# Patient Record
Sex: FEMALE | Race: WHITE
[De-identification: names, ages, dates, MRNs, and addresses within clinical notes are randomized per-mention and may not be internally consistent; named-entity substitution may affect disease eponyms.]

---

## 2017-01-12 RX ORDER — FUROSEMIDE 40 MG/1
TABLET ORAL
Qty: 90 TABLET | Refills: 0 | Status: SHIPPED | OUTPATIENT
Start: 2017-01-12 | End: 2017-03-13

## 2017-01-12 RX ORDER — BENAZEPRIL HYDROCHLORIDE 10 MG/1
TABLET ORAL
Qty: 90 TABLET | Refills: 0 | Status: SHIPPED | OUTPATIENT
Start: 2017-01-12 | End: 2017-03-13

## 2017-03-10 ENCOUNTER — PRIOR ORIGINAL RECORDS (OUTPATIENT)
Dept: OTHER | Age: 62
End: 2017-03-10

## 2017-03-13 ENCOUNTER — OFFICE VISIT (OUTPATIENT)
Dept: INTERNAL MEDICINE CLINIC | Facility: CLINIC | Age: 62
End: 2017-03-13

## 2017-03-13 VITALS
BODY MASS INDEX: 51.28 KG/M2 | TEMPERATURE: 98 F | WEIGHT: 261.19 LBS | HEART RATE: 80 BPM | SYSTOLIC BLOOD PRESSURE: 136 MMHG | DIASTOLIC BLOOD PRESSURE: 72 MMHG | HEIGHT: 60 IN | RESPIRATION RATE: 16 BRPM | OXYGEN SATURATION: 99 %

## 2017-03-13 DIAGNOSIS — Z00.00 ROUTINE GENERAL MEDICAL EXAMINATION AT A HEALTH CARE FACILITY: Primary | ICD-10-CM

## 2017-03-13 DIAGNOSIS — Z12.31 ENCOUNTER FOR SCREENING MAMMOGRAM FOR BREAST CANCER: ICD-10-CM

## 2017-03-13 PROCEDURE — 99396 PREV VISIT EST AGE 40-64: CPT | Performed by: INTERNAL MEDICINE

## 2017-03-13 RX ORDER — FUROSEMIDE 40 MG/1
TABLET ORAL
Qty: 90 TABLET | Refills: 1 | Status: SHIPPED | OUTPATIENT
Start: 2017-03-13 | End: 2017-10-30

## 2017-03-13 RX ORDER — LEVOTHYROXINE SODIUM 112 UG/1
TABLET ORAL
Qty: 90 TABLET | Refills: 1 | Status: SHIPPED | OUTPATIENT
Start: 2017-03-13 | End: 2017-03-13

## 2017-03-13 RX ORDER — BENAZEPRIL HYDROCHLORIDE 10 MG/1
TABLET ORAL
Qty: 90 TABLET | Refills: 1 | Status: SHIPPED | OUTPATIENT
Start: 2017-03-13 | End: 2017-03-13

## 2017-03-13 RX ORDER — FUROSEMIDE 40 MG/1
TABLET ORAL
Qty: 90 TABLET | Refills: 1 | Status: SHIPPED | OUTPATIENT
Start: 2017-03-13 | End: 2017-03-13

## 2017-03-13 RX ORDER — BENAZEPRIL HYDROCHLORIDE 10 MG/1
TABLET ORAL
Qty: 90 TABLET | Refills: 1 | Status: SHIPPED | OUTPATIENT
Start: 2017-03-13 | End: 2017-10-23

## 2017-03-13 RX ORDER — LEVOTHYROXINE SODIUM 112 UG/1
TABLET ORAL
Qty: 90 TABLET | Refills: 1 | Status: SHIPPED | OUTPATIENT
Start: 2017-03-13 | End: 2017-10-11

## 2017-03-13 NOTE — PROGRESS NOTES
HPI:   El Mason is a 57year old female who presents for a complete physical exam. . She started CPAP and feels a world of differencel. She gets occ palps and Dr Sonido Overton still has her on pradaxa and tambicor.      Wt Readings from Last 6 Encou Oral Tab TAKE 1 BY MOUTH BEFORE BREAKFAST Disp: 90 tablet Rfl: 1   [DISCONTINUED] BENAZEPRIL HCL 10 MG Oral Tab TAKE 1 BY MOUTH DAILY Disp: 90 tablet Rfl: 0   [DISCONTINUED] LEVOTHYROXINE SODIUM 112 MCG Oral Tab TAKE 1 BY MOUTH BEFORE BREAKFAST Disp: 90 ta abdominal pain,denies heartburn, change in bm's, bloody stools,   : denies dysuria, vaginal discharge or itching,  MUSCULOSKELETAL: denies back pain, new joint pain- her knees feel much better since she had a shot of cortisone  NEURO: denies headaches, d understanding of these issues and agrees to the plan. The patient is asked to return for CPX in 1 year, see me 6 mos for med check.     Routine general medical examination at a health care facility  (primary encounter diagnosis)  Encounter for screening ma

## 2017-05-10 ENCOUNTER — LAB ENCOUNTER (OUTPATIENT)
Dept: LAB | Age: 62
End: 2017-05-10
Attending: INTERNAL MEDICINE
Payer: COMMERCIAL

## 2017-05-10 ENCOUNTER — PRIOR ORIGINAL RECORDS (OUTPATIENT)
Dept: OTHER | Age: 62
End: 2017-05-10

## 2017-05-10 DIAGNOSIS — I10 HYPERTENSION: Primary | ICD-10-CM

## 2017-05-10 DIAGNOSIS — E78.00 HYPERCHOLESTEREMIA: ICD-10-CM

## 2017-05-10 DIAGNOSIS — Z00.00 ROUTINE GENERAL MEDICAL EXAMINATION AT A HEALTH CARE FACILITY: ICD-10-CM

## 2017-05-10 PROCEDURE — 80053 COMPREHEN METABOLIC PANEL: CPT

## 2017-05-10 PROCEDURE — 36415 COLL VENOUS BLD VENIPUNCTURE: CPT | Performed by: INTERNAL MEDICINE

## 2017-05-10 PROCEDURE — 85025 COMPLETE CBC W/AUTO DIFF WBC: CPT | Performed by: INTERNAL MEDICINE

## 2017-05-10 PROCEDURE — 80061 LIPID PANEL: CPT

## 2017-05-11 LAB
ALKALINE PHOSPHATATE(ALK PHOS): 93 IU/L
BILIRUBIN TOTAL: 0.5 MG/DL
BUN: 18 MG/DL
CALCIUM: 9.4 MG/DL
CHLORIDE: 103 MEQ/L
CHOLESTEROL, TOTAL: 167 MG/DL
CREATININE, SERUM: 0.88 MG/DL
GLUCOSE: 89 MG/DL
HDL CHOLESTEROL: 63 MG/DL
LDL CHOLESTEROL: 86 MG/DL
POTASSIUM, SERUM: 4.3 MEQ/L
PROTEIN, TOTAL: 7.3 G/DL
SGOT (AST): 15 IU/L
SGPT (ALT): 20 IU/L
SODIUM: 139 MEQ/L
TRIGLYCERIDES: 89 MG/DL

## 2017-05-26 ENCOUNTER — TELEPHONE (OUTPATIENT)
Dept: INTERNAL MEDICINE CLINIC | Facility: CLINIC | Age: 62
End: 2017-05-26

## 2017-05-26 RX ORDER — BENAZEPRIL HYDROCHLORIDE 10 MG/1
10 TABLET ORAL DAILY
Qty: 4 TABLET | Refills: 0 | Status: SHIPPED | OUTPATIENT
Start: 2017-05-26 | End: 2017-05-30

## 2017-05-26 NOTE — TELEPHONE ENCOUNTER
Patient is in [place] visiting her sister and she forgot her blood pressure medication.   She is asking for 4 pills of:  Benazepril HCl 10 MG Oral Tab  Please call into the following pharmacy:  [facility] on [address] in [facility] ([phone])

## 2017-05-31 ENCOUNTER — TELEPHONE (OUTPATIENT)
Dept: INTERNAL MEDICINE CLINIC | Facility: CLINIC | Age: 62
End: 2017-05-31

## 2017-05-31 ENCOUNTER — HOSPITAL ENCOUNTER (OUTPATIENT)
Dept: MAMMOGRAPHY | Age: 62
Discharge: HOME | End: 2017-05-31
Attending: INTERNAL MEDICINE
Payer: COMMERCIAL

## 2017-05-31 DIAGNOSIS — Z12.31 ENCOUNTER FOR SCREENING MAMMOGRAM FOR BREAST CANCER: ICD-10-CM

## 2017-05-31 PROCEDURE — 77067 SCR MAMMO BI INCL CAD: CPT | Performed by: INTERNAL MEDICINE

## 2017-05-31 NOTE — TELEPHONE ENCOUNTER
See below. Please advise. Last parking placard 12/1/16 for 6 months. Last OV 3/13/17 for physical and return in 6 months for med check.

## 2017-05-31 NOTE — TELEPHONE ENCOUNTER
Patient will need a new parking placard and is asking if Dr. Keya Mar can complete the paperwork for her. Patient advises that she was last seen by Dr. Keya Mar on 3/1317. Please call patient back to discuss.

## 2017-06-03 NOTE — TELEPHONE ENCOUNTER
Called and left message on home number advising pt that form is completed and can be picked up at  next week.

## 2017-08-10 ENCOUNTER — TELEPHONE (OUTPATIENT)
Dept: INTERNAL MEDICINE CLINIC | Facility: CLINIC | Age: 62
End: 2017-08-10

## 2017-08-18 RX ORDER — ESCITALOPRAM OXALATE 10 MG/1
TABLET ORAL
Qty: 90 TABLET | Refills: 3 | Status: SHIPPED | OUTPATIENT
Start: 2017-08-18 | End: 2017-10-30

## 2017-08-18 NOTE — TELEPHONE ENCOUNTER
Last OV pertinent to medication: 3/13/17 PE  Last refill date: 8/29/16     #/refills: 90+3  When pt was asked to return for OV: 6 months  Upcoming appt/reason: none    Lab Results  Component Value Date   GLU 89 05/10/2017   BUN 18 05/10/2017   CREATSERUM 0

## 2017-08-18 NOTE — TELEPHONE ENCOUNTER
Incoming (mail or fax): Fax  Received from:  [facility] for Escitalopram  Documentation given to:  [id number] [employer] fax bin.

## 2017-09-19 ENCOUNTER — TELEPHONE (OUTPATIENT)
Dept: INTERNAL MEDICINE CLINIC | Facility: CLINIC | Age: 62
End: 2017-09-19

## 2017-09-19 NOTE — TELEPHONE ENCOUNTER
Incoming (mail or fax):  fax  Received from:  [facility]  Documentation given to:  Dr Machelle Pacheco

## 2017-10-11 NOTE — TELEPHONE ENCOUNTER
Last OV pertinent to medication: 3/13/2017 - AWV  Last refill date: 3/13/2017     #/refills: 90/1  When pt was asked to return for OV: 6 months - Med check  Upcoming appt/reason: 10/30/2017 - Med chk, Lab results    FREE T4 (ng/dL)   Date Value   02/23/201

## 2017-10-12 RX ORDER — LEVOTHYROXINE SODIUM 112 UG/1
TABLET ORAL
Qty: 30 TABLET | Refills: 0 | Status: SHIPPED | OUTPATIENT
Start: 2017-10-12 | End: 2017-10-30

## 2017-10-13 ENCOUNTER — PRIOR ORIGINAL RECORDS (OUTPATIENT)
Dept: OTHER | Age: 62
End: 2017-10-13

## 2017-10-16 ENCOUNTER — TELEPHONE (OUTPATIENT)
Dept: INTERNAL MEDICINE CLINIC | Facility: CLINIC | Age: 62
End: 2017-10-16

## 2017-10-16 NOTE — TELEPHONE ENCOUNTER
Pt stopped by office for lab orders, had gone to lab thinking she had orders. Has appt on 10/30/17, no orders in system or encounters noted where pt requested labs to be ordered. Do you want to order any labs in advance of her med check?

## 2017-10-24 RX ORDER — BENAZEPRIL HYDROCHLORIDE 10 MG/1
TABLET ORAL
Qty: 90 TABLET | Refills: 0 | Status: SHIPPED | OUTPATIENT
Start: 2017-10-24 | End: 2017-10-30

## 2017-10-30 ENCOUNTER — TELEPHONE (OUTPATIENT)
Dept: INTERNAL MEDICINE CLINIC | Facility: CLINIC | Age: 62
End: 2017-10-30

## 2017-10-30 ENCOUNTER — OFFICE VISIT (OUTPATIENT)
Dept: INTERNAL MEDICINE CLINIC | Facility: CLINIC | Age: 62
End: 2017-10-30

## 2017-10-30 VITALS
SYSTOLIC BLOOD PRESSURE: 126 MMHG | BODY MASS INDEX: 46.99 KG/M2 | RESPIRATION RATE: 18 BRPM | HEIGHT: 63 IN | HEART RATE: 68 BPM | DIASTOLIC BLOOD PRESSURE: 68 MMHG | TEMPERATURE: 98 F | OXYGEN SATURATION: 99 % | WEIGHT: 265.19 LBS

## 2017-10-30 DIAGNOSIS — D64.9 ANEMIA, UNSPECIFIED TYPE: ICD-10-CM

## 2017-10-30 DIAGNOSIS — I10 ESSENTIAL HYPERTENSION: ICD-10-CM

## 2017-10-30 DIAGNOSIS — M17.0 OSTEOARTHRITIS OF BOTH KNEES, UNSPECIFIED OSTEOARTHRITIS TYPE: ICD-10-CM

## 2017-10-30 DIAGNOSIS — I89.0 LYMPHEDEMA OF BOTH LOWER EXTREMITIES: ICD-10-CM

## 2017-10-30 DIAGNOSIS — E03.9 ACQUIRED HYPOTHYROIDISM: Primary | ICD-10-CM

## 2017-10-30 DIAGNOSIS — F41.9 ANXIETY: ICD-10-CM

## 2017-10-30 PROCEDURE — 99214 OFFICE O/P EST MOD 30 MIN: CPT | Performed by: INTERNAL MEDICINE

## 2017-10-30 RX ORDER — FUROSEMIDE 40 MG/1
TABLET ORAL
Qty: 90 TABLET | Refills: 1 | Status: SHIPPED | OUTPATIENT
Start: 2017-10-30 | End: 2018-03-15

## 2017-10-30 RX ORDER — ESCITALOPRAM OXALATE 10 MG/1
TABLET ORAL
Qty: 90 TABLET | Refills: 3 | Status: SHIPPED | OUTPATIENT
Start: 2017-10-30 | End: 2018-10-02

## 2017-10-30 RX ORDER — BENAZEPRIL HYDROCHLORIDE 10 MG/1
10 TABLET ORAL
Qty: 90 TABLET | Refills: 1 | Status: SHIPPED | OUTPATIENT
Start: 2017-10-30 | End: 2018-03-15

## 2017-10-30 RX ORDER — LEVOTHYROXINE SODIUM 112 UG/1
TABLET ORAL
Qty: 90 TABLET | Refills: 1 | Status: SHIPPED | OUTPATIENT
Start: 2017-10-30 | End: 2018-06-07

## 2017-10-30 NOTE — PROGRESS NOTES
Cesar Hernandez is a 57year old female. To F/U for med-check. HPI:   Interim history: Patient is getting a holter monitor tomorrow for atrial fibrillation that is flaring up.   Patient has been having breakthrough in symptoms where she experiences her (PRILOSEC) 20 MG Oral Capsule Delayed Release Take 1 Cap by mouth daily.  Disp:  Rfl:          Social History:  Smoking status: Never Smoker                                                              Smokeless tobacco: Never Used                      Alco Absolute 0.40 0.10 - 0.60 x10(3) uL   Eosinophil Absolute 0.13 0.00 - 0.30 x10(3) uL   Basophil Absolute 0.03 0.00 - 0.10 x10(3) uL   Immature Granulocyte Absolute 0.02 0.00 - 1.00 x10(3) uL   Neutrophil % 59.4 %   Lymphocyte % 30.2 %   Monocyte % 7.2 %

## 2017-10-31 ENCOUNTER — HOSPITAL ENCOUNTER (OUTPATIENT)
Dept: LAB | Facility: HOSPITAL | Age: 62
Discharge: HOME | End: 2017-10-31
Attending: INTERNAL MEDICINE
Payer: COMMERCIAL

## 2017-10-31 ENCOUNTER — HOSPITAL ENCOUNTER (OUTPATIENT)
Dept: CV DIAGNOSTICS | Facility: HOSPITAL | Age: 62
Discharge: HOME | End: 2017-10-31
Attending: INTERNAL MEDICINE
Payer: COMMERCIAL

## 2017-10-31 DIAGNOSIS — E03.9 ACQUIRED HYPOTHYROIDISM: ICD-10-CM

## 2017-10-31 DIAGNOSIS — I48.91 A-FIB (HCC): ICD-10-CM

## 2017-10-31 DIAGNOSIS — R00.2 PALPITATIONS: ICD-10-CM

## 2017-10-31 DIAGNOSIS — D64.9 ANEMIA, UNSPECIFIED TYPE: ICD-10-CM

## 2017-10-31 PROCEDURE — 36415 COLL VENOUS BLD VENIPUNCTURE: CPT

## 2017-10-31 PROCEDURE — 93225 XTRNL ECG REC<48 HRS REC: CPT | Performed by: INTERNAL MEDICINE

## 2017-10-31 PROCEDURE — 85027 COMPLETE CBC AUTOMATED: CPT

## 2017-10-31 PROCEDURE — 84443 ASSAY THYROID STIM HORMONE: CPT

## 2017-10-31 PROCEDURE — 93227 XTRNL ECG REC<48 HR R&I: CPT | Performed by: INTERNAL MEDICINE

## 2017-10-31 PROCEDURE — 93226 XTRNL ECG REC<48 HR SCAN A/R: CPT | Performed by: INTERNAL MEDICINE

## 2017-10-31 PROCEDURE — 84439 ASSAY OF FREE THYROXINE: CPT

## 2017-11-03 ENCOUNTER — PRIOR ORIGINAL RECORDS (OUTPATIENT)
Dept: OTHER | Age: 62
End: 2017-11-03

## 2017-11-07 ENCOUNTER — TELEPHONE (OUTPATIENT)
Dept: INTERNAL MEDICINE CLINIC | Facility: CLINIC | Age: 62
End: 2017-11-07

## 2017-11-07 ENCOUNTER — MYAURORA ACCOUNT LINK (OUTPATIENT)
Dept: OTHER | Age: 62
End: 2017-11-07

## 2017-11-07 ENCOUNTER — HOSPITAL ENCOUNTER (OUTPATIENT)
Dept: CV DIAGNOSTICS | Facility: HOSPITAL | Age: 62
End: 2017-11-07
Attending: INTERNAL MEDICINE

## 2017-11-07 DIAGNOSIS — I48.91 ATRIAL FIBRILLATION, UNSPECIFIED TYPE (HCC): ICD-10-CM

## 2017-11-07 NOTE — TELEPHONE ENCOUNTER
Incoming (mail or fax):  fax  Received from:  [facility]  Documentation given to:  Dr Tyler Mckeon

## 2018-01-03 ENCOUNTER — TELEPHONE (OUTPATIENT)
Dept: INTERNAL MEDICINE CLINIC | Facility: CLINIC | Age: 63
End: 2018-01-03

## 2018-01-03 NOTE — TELEPHONE ENCOUNTER
Patient needs a handicap placard filled out by Dr. Shawn Yusuf the patient, she has arthritis in hips.  Patient stated her last one was done by Dr. Shawn Yusuf, it  on: 17, patient asked if Dr. Shawn Yusuf would fill out another form for patient, patient aware s

## 2018-01-04 NOTE — TELEPHONE ENCOUNTER
Received Completed Parking Placard Form from doctor. Called and informed Patient that form is ready for  at .

## 2018-01-04 NOTE — TELEPHONE ENCOUNTER
Spoke with pt. States that her current parking placard  17. States that due to her arthritis her mobility is limited. Last OV 10/30/17. Parking placard form to consult folder for completion and signature.

## 2018-03-01 RX ORDER — BENAZEPRIL HYDROCHLORIDE 10 MG/1
10 TABLET ORAL
Qty: 90 TABLET | Refills: 4 | OUTPATIENT
Start: 2018-03-01

## 2018-03-01 NOTE — TELEPHONE ENCOUNTER
Fax from [employer] requesting Benazepril HCL 10 mg tablet (1) po qd #90 with 4 refills. Last refilled 10/30/17 #90 with 1 refill.

## 2018-03-04 ENCOUNTER — HOSPITAL ENCOUNTER (INPATIENT)
Facility: HOSPITAL | Age: 63
LOS: 8 days | Discharge: HOME HEALTH | DRG: 872 | End: 2018-03-12
Attending: EMERGENCY MEDICINE | Admitting: HOSPITALIST
Payer: COMMERCIAL

## 2018-03-04 ENCOUNTER — HOSPITAL ENCOUNTER (OUTPATIENT)
Age: 63
Discharge: ANOTHER HOSPITAL | End: 2018-03-04
Attending: FAMILY MEDICINE
Payer: COMMERCIAL

## 2018-03-04 ENCOUNTER — APPOINTMENT (OUTPATIENT)
Dept: ULTRASOUND IMAGING | Facility: HOSPITAL | Age: 63
DRG: 872 | End: 2018-03-04
Attending: EMERGENCY MEDICINE
Payer: COMMERCIAL

## 2018-03-04 VITALS
DIASTOLIC BLOOD PRESSURE: 68 MMHG | WEIGHT: 256 LBS | TEMPERATURE: 101 F | SYSTOLIC BLOOD PRESSURE: 139 MMHG | HEIGHT: 62.5 IN | HEART RATE: 90 BPM | RESPIRATION RATE: 16 BRPM | BODY MASS INDEX: 45.93 KG/M2 | OXYGEN SATURATION: 98 %

## 2018-03-04 DIAGNOSIS — L03.116 CELLULITIS OF LEFT LOWER EXTREMITY: Primary | ICD-10-CM

## 2018-03-04 LAB
#LYMPHOCYTE IC: 0.5 X10ˆ3/UL (ref 0.9–3.2)
#MXD IC: 0.2 X10ˆ3/UL (ref 0.1–1)
#NEUTROPHIL IC: 21.6 X10ˆ3/UL (ref 1.3–6.7)
ALBUMIN SERPL-MCNC: 2.9 G/DL (ref 3.5–4.8)
ALP LIVER SERPL-CCNC: 94 U/L (ref 50–130)
ALT SERPL-CCNC: 37 U/L (ref 14–54)
AST SERPL-CCNC: 36 U/L (ref 15–41)
BAND %: 12 %
BAND %: 21 %
BASOPHIL % MANUAL: 0 %
BASOPHIL % MANUAL: 1 %
BASOPHIL ABSOLUTE MANUAL: 0 X10(3) UL (ref 0–0.1)
BASOPHIL ABSOLUTE MANUAL: 0.24 X10(3) UL (ref 0–0.1)
BILIRUB SERPL-MCNC: 0.8 MG/DL (ref 0.1–2)
BUN BLD-MCNC: 22 MG/DL (ref 8–20)
CALCIUM BLD-MCNC: 8.4 MG/DL (ref 8.3–10.3)
CHLORIDE: 97 MMOL/L (ref 101–111)
CO2: 26 MMOL/L (ref 22–32)
CREAT BLD-MCNC: 1.13 MG/DL (ref 0.55–1.02)
CREAT SERPL-MCNC: 1.3 MG/DL (ref 0.55–1.02)
DOHLE BODIES: PRESENT
EOSINOPHIL % MANUAL: 0 %
EOSINOPHIL % MANUAL: 0 %
EOSINOPHIL ABSOLUTE MANUAL: 0 X10(3) UL (ref 0–0.3)
EOSINOPHIL ABSOLUTE MANUAL: 0 X10(3) UL (ref 0–0.3)
ERYTHROCYTE [DISTWIDTH] IN BLOOD BY AUTOMATED COUNT: 13.5 % (ref 11.5–16)
ERYTHROCYTE [DISTWIDTH] IN BLOOD BY AUTOMATED COUNT: 13.5 % (ref 11.5–16)
GLUCOSE BLD-MCNC: 104 MG/DL (ref 70–99)
GLUCOSE BLD-MCNC: 118 MG/DL (ref 70–99)
HCT IC: 37.4 % (ref 37–54)
HCT VFR BLD AUTO: 36.3 % (ref 34–50)
HCT VFR BLD AUTO: 37.2 % (ref 34–50)
HGB BLD-MCNC: 11.7 G/DL (ref 12–16)
HGB BLD-MCNC: 12.1 G/DL (ref 12–16)
HGB IC: 12 G/DL (ref 11.7–16)
ISTAT BLOOD GAS TCO2: 27 MMOL/L (ref 22–32)
ISTAT BUN: 25 MG/DL (ref 8–20)
ISTAT CHLORIDE: 94 MMOL/L (ref 101–111)
ISTAT HEMATOCRIT: 39 % (ref 34–50)
ISTAT IONIZED CALCIUM: 1.07 MMOL/L (ref 1.12–1.32)
ISTAT POTASSIUM: 4 MMOL/L (ref 3.6–5.1)
ISTAT SODIUM: 133 MMOL/L (ref 136–144)
LACTIC ACID: 1.8 MMOL/L (ref 0.5–2)
LARGE PLATELETS: PRESENT
LYMPHOCYTE % MANUAL: 3 %
LYMPHOCYTE % MANUAL: 4 %
LYMPHOCYTE ABSOLUTE MANUAL: 0.71 X10(3) UL (ref 0.9–4)
LYMPHOCYTE ABSOLUTE MANUAL: 0.91 X10(3) UL (ref 0.9–4)
LYMPHOCYTES NFR BLD AUTO: 2.3 %
M PROTEIN MFR SERPL ELPH: 7 G/DL (ref 6.1–8.3)
MCH IC: 29.7 PG (ref 27–33.2)
MCH RBC QN AUTO: 29 PG (ref 27–33.2)
MCH RBC QN AUTO: 29.4 PG (ref 27–33.2)
MCHC IC: 32.1 G/DL (ref 31–37)
MCHC RBC AUTO-ENTMCNC: 32.2 G/DL (ref 31–37)
MCHC RBC AUTO-ENTMCNC: 32.5 G/DL (ref 31–37)
MCV IC: 92.6 FL (ref 81–100)
MCV RBC AUTO: 90.1 FL (ref 81–100)
MCV RBC AUTO: 90.3 FL (ref 81–100)
MIXED CELL %: 0.8 %
MONOCYTE % MANUAL: 2 %
MONOCYTE % MANUAL: 3 %
MONOCYTE ABSOLUTE MANUAL: 0.45 X10(3) UL (ref 0.1–1)
MONOCYTE ABSOLUTE MANUAL: 0.71 X10(3) UL (ref 0.1–1)
MORPHOLOGY: NORMAL
MORPHOLOGY: NORMAL
NEUTROPHIL ABS PRELIM: 21.35 X10 (3) UL (ref 1.3–6.7)
NEUTROPHIL ABS PRELIM: 22.02 X10 (3) UL (ref 1.3–6.7)
NEUTROPHIL ABSOLUTE MANUAL: 21.34 X10(3) UL (ref 1.3–6.7)
NEUTROPHIL ABSOLUTE MANUAL: 22.04 X10(3) UL (ref 1.3–6.7)
NEUTROPHILS % MANUAL: 72 %
NEUTROPHILS % MANUAL: 82 %
NEUTROPHILS NFR BLD AUTO: 96.9 %
NRBC CALCULATED: 1
PLATELET # BLD AUTO: 204 10(3)UL (ref 150–450)
PLATELET # BLD AUTO: 211 10(3)UL (ref 150–450)
PLATELET MORPHOLOGY: NORMAL
PLT IC: 201 X10ˆ3/UL (ref 150–450)
POTASSIUM SERPL-SCNC: 3.6 MMOL/L (ref 3.6–5.1)
RBC # BLD AUTO: 4.03 X10(6)UL (ref 3.8–5.1)
RBC # BLD AUTO: 4.12 X10(6)UL (ref 3.8–5.1)
RBC IC: 4.04 X10ˆ6/UL (ref 3.8–5.1)
RED CELL DISTRIBUTION WIDTH-SD: 44.6 FL (ref 35.1–46.3)
RED CELL DISTRIBUTION WIDTH-SD: 44.7 FL (ref 35.1–46.3)
SODIUM SERPL-SCNC: 133 MMOL/L (ref 136–144)
TOTAL CELLS COUNTED: 100
TOTAL CELLS COUNTED: 100
VACUOLATED NEUTS: PRESENT
WBC # BLD AUTO: 22.7 X10(3) UL (ref 4–13)
WBC # BLD AUTO: 23.7 X10(3) UL (ref 4–13)
WBC IC: 22.3 X10ˆ3/UL (ref 4–13)

## 2018-03-04 PROCEDURE — 81002 URINALYSIS NONAUTO W/O SCOPE: CPT | Performed by: FAMILY MEDICINE

## 2018-03-04 PROCEDURE — 85007 BL SMEAR W/DIFF WBC COUNT: CPT | Performed by: FAMILY MEDICINE

## 2018-03-04 PROCEDURE — 99215 OFFICE O/P EST HI 40 MIN: CPT

## 2018-03-04 PROCEDURE — 99205 OFFICE O/P NEW HI 60 MIN: CPT

## 2018-03-04 PROCEDURE — 85027 COMPLETE CBC AUTOMATED: CPT | Performed by: FAMILY MEDICINE

## 2018-03-04 PROCEDURE — 96374 THER/PROPH/DIAG INJ IV PUSH: CPT

## 2018-03-04 PROCEDURE — 80047 BASIC METABLC PNL IONIZED CA: CPT

## 2018-03-04 PROCEDURE — 99223 1ST HOSP IP/OBS HIGH 75: CPT | Performed by: HOSPITALIST

## 2018-03-04 PROCEDURE — 93971 EXTREMITY STUDY: CPT | Performed by: EMERGENCY MEDICINE

## 2018-03-04 PROCEDURE — 85025 COMPLETE CBC W/AUTO DIFF WBC: CPT | Performed by: FAMILY MEDICINE

## 2018-03-04 RX ORDER — ONDANSETRON 2 MG/ML
4 INJECTION INTRAMUSCULAR; INTRAVENOUS EVERY 6 HOURS PRN
Status: DISCONTINUED | OUTPATIENT
Start: 2018-03-04 | End: 2018-03-12

## 2018-03-04 RX ORDER — SODIUM CHLORIDE 9 MG/ML
125 INJECTION, SOLUTION INTRAVENOUS CONTINUOUS
Status: DISCONTINUED | OUTPATIENT
Start: 2018-03-04 | End: 2018-03-05

## 2018-03-04 RX ORDER — CLINDAMYCIN PHOSPHATE 600 MG/50ML
600 INJECTION INTRAVENOUS EVERY 8 HOURS
Status: DISCONTINUED | OUTPATIENT
Start: 2018-03-04 | End: 2018-03-07

## 2018-03-04 RX ORDER — ONDANSETRON 2 MG/ML
4 INJECTION INTRAMUSCULAR; INTRAVENOUS ONCE
Status: COMPLETED | OUTPATIENT
Start: 2018-03-04 | End: 2018-03-04

## 2018-03-04 RX ORDER — SODIUM CHLORIDE 9 MG/ML
1000 INJECTION, SOLUTION INTRAVENOUS ONCE
Status: COMPLETED | OUTPATIENT
Start: 2018-03-04 | End: 2018-03-04

## 2018-03-04 RX ORDER — CLINDAMYCIN PHOSPHATE 600 MG/50ML
600 INJECTION INTRAVENOUS ONCE
Status: COMPLETED | OUTPATIENT
Start: 2018-03-04 | End: 2018-03-04

## 2018-03-04 RX ORDER — SODIUM CHLORIDE 9 MG/ML
INJECTION, SOLUTION INTRAVENOUS ONCE
Status: COMPLETED | OUTPATIENT
Start: 2018-03-04 | End: 2018-03-04

## 2018-03-04 RX ORDER — ESCITALOPRAM OXALATE 10 MG/1
10 TABLET ORAL EVERY MORNING
Status: DISCONTINUED | OUTPATIENT
Start: 2018-03-04 | End: 2018-03-12

## 2018-03-04 RX ORDER — ACETAMINOPHEN 325 MG/1
650 TABLET ORAL EVERY 6 HOURS PRN
Status: DISCONTINUED | OUTPATIENT
Start: 2018-03-04 | End: 2018-03-12

## 2018-03-04 RX ORDER — LISINOPRIL 10 MG/1
10 TABLET ORAL DAILY
Status: DISCONTINUED | OUTPATIENT
Start: 2018-03-04 | End: 2018-03-12

## 2018-03-04 RX ORDER — FLECAINIDE ACETATE 100 MG/1
100 TABLET ORAL 2 TIMES DAILY
Status: DISCONTINUED | OUTPATIENT
Start: 2018-03-04 | End: 2018-03-12

## 2018-03-04 RX ORDER — POTASSIUM CHLORIDE 20 MEQ/1
40 TABLET, EXTENDED RELEASE ORAL EVERY 4 HOURS
Status: COMPLETED | OUTPATIENT
Start: 2018-03-04 | End: 2018-03-04

## 2018-03-04 RX ORDER — LEVOTHYROXINE SODIUM 112 UG/1
112 TABLET ORAL
Status: DISCONTINUED | OUTPATIENT
Start: 2018-03-04 | End: 2018-03-12

## 2018-03-04 RX ORDER — PANTOPRAZOLE SODIUM 20 MG/1
20 TABLET, DELAYED RELEASE ORAL
Status: DISCONTINUED | OUTPATIENT
Start: 2018-03-05 | End: 2018-03-12

## 2018-03-04 NOTE — H&P
MIC HOSPITALIST  History and Physical     Pujahao Denny Patient Status:  Emergency    1955 MRN [id number]   Location [address] [facility] Attending Jackie Rhoades, [address] [facility] Day # 0 PCP Deepali Lyle MD     Chief Comp 9/15/2015   • Measles    • Migraines    • Mumps    • OA (osteoarthritis)     knee   • Obesity 3/5/2012   • Occult blood positive stool    • Osteopenia 10/8/2014   • Other allergy, other than to medicinal agents 3/5/2012    Alleriges   • Otitis media    • P Rash  Augmentin [Amoxicil*        Comment:Reaction: \"violently ill\"  Dye [Iodine (Topica*      Pcn [Bicillin C-R,]       Shellfish                 Sulfa Antibiotics         Verapamil                   Comment:\"Verapamil SR\"  Verelan [Verapamil * Alert and oriented x 3. Morbidly obese. HEENT: Normocephalic atraumatic. Moist mucous membranes. EOM-I. PERRLA. Anicteric. Neck: No JVD. No carotid bruits. Respiratory: Clear to auscultation bilaterally. No wheezes.    Cardiovascular: S1, S2. Regular rat

## 2018-03-04 NOTE — ED PROVIDER NOTES
Patient Seen in: [facility] Emergency Department    History   No chief complaint on file. Stated Complaint: cellulitis    HPI    65-year-old female presents emergency room for evaluation of cellulitis to the left lower extremity.   Patient states • Migraines    • Mumps    • OA (osteoarthritis)     knee   • Obesity 3/5/2012   • Occult blood positive stool    • Osteopenia 10/8/2014   • Other allergy, other than to medicinal agents 3/5/2012    Alleriges   • Otitis media    • PAF (paroxysmal atrial f kg   SpO2 94%   BMI 45.73 kg/m²         Physical Exam    GENERAL: Patient is awake, alert, well-appearing, in no acute distress. HEENT: no scleral icterus. Mucous membranes are slightly dry, oropharynx is clear, uvula midline. Scalp is atraumatic.   NECK: Abnormal            Final result                 Please view results for these tests on the individual orders.    RAINBOW DRAW BLUE   RAINBOW DRAW LAVENDER   RAINBOW DRAW LIGHT GREEN   RAINBOW DRAW GOLD   BLOOD CULTURE   BLOOD CULTURE       ED Course as of

## 2018-03-04 NOTE — ED INITIAL ASSESSMENT (HPI)
The patient is here for evaluation of redness and pain to the left leg that is warm to touch and a burning sensation with touch.   She and her  states the symptoms started Friday in addition to a fever of 103, chills, a lot of fatigue and sleeping, a

## 2018-03-04 NOTE — ED INITIAL ASSESSMENT (HPI)
Pt sent from urgent care for evaluation of cellulitis of left groin and left leg. Pt has noticed symptoms on Friday.

## 2018-03-05 ENCOUNTER — PRIOR ORIGINAL RECORDS (OUTPATIENT)
Dept: OTHER | Age: 63
End: 2018-03-05

## 2018-03-05 LAB
BASOPHILS # BLD AUTO: 0.03 X10(3) UL (ref 0–0.1)
BASOPHILS NFR BLD AUTO: 0.2 %
EOSINOPHIL # BLD AUTO: 0.01 X10(3) UL (ref 0–0.3)
EOSINOPHIL NFR BLD AUTO: 0.1 %
ERYTHROCYTE [DISTWIDTH] IN BLOOD BY AUTOMATED COUNT: 13.8 % (ref 11.5–16)
EST. AVERAGE GLUCOSE BLD GHB EST-MCNC: 128 MG/DL (ref 68–126)
HBA1C MFR BLD HPLC: 6.1 % (ref ?–5.7)
HCT VFR BLD AUTO: 31.9 % (ref 34–50)
HGB BLD-MCNC: 10.2 G/DL (ref 12–16)
IMMATURE GRANULOCYTE COUNT: 0.08 X10(3) UL (ref 0–1)
IMMATURE GRANULOCYTE RATIO %: 0.6 %
LYMPHOCYTES # BLD AUTO: 0.66 X10(3) UL (ref 0.9–4)
LYMPHOCYTES NFR BLD AUTO: 4.9 %
MCH RBC QN AUTO: 28.7 PG (ref 27–33.2)
MCHC RBC AUTO-ENTMCNC: 32 G/DL (ref 31–37)
MCV RBC AUTO: 89.9 FL (ref 81–100)
MONOCYTES # BLD AUTO: 0.32 X10(3) UL (ref 0.1–1)
MONOCYTES NFR BLD AUTO: 2.4 %
NEUTROPHIL ABS PRELIM: 12.31 X10 (3) UL (ref 1.3–6.7)
NEUTROPHILS # BLD AUTO: 12.31 X10(3) UL (ref 1.3–6.7)
NEUTROPHILS NFR BLD AUTO: 91.8 %
PLATELET # BLD AUTO: 179 10(3)UL (ref 150–450)
POTASSIUM SERPL-SCNC: 4.4 MMOL/L (ref 3.6–5.1)
RBC # BLD AUTO: 3.55 X10(6)UL (ref 3.8–5.1)
RED CELL DISTRIBUTION WIDTH-SD: 45.7 FL (ref 35.1–46.3)
WBC # BLD AUTO: 13.4 X10(3) UL (ref 4–13)

## 2018-03-05 PROCEDURE — 99232 SBSQ HOSP IP/OBS MODERATE 35: CPT | Performed by: HOSPITALIST

## 2018-03-05 NOTE — PLAN OF CARE
CARDIOVASCULAR - ADULT    • Absence of cardiac arrhythmias or at baseline Progressing        DISCHARGE PLANNING    • Discharge to home or other facility with appropriate resources Progressing        PAIN - ADULT    • Verbalizes/displays adequate comfort le

## 2018-03-05 NOTE — PLAN OF CARE
Patient placed on isolation precaution for rule out c-diff (3 recent loose stools). Will continue to monitor.

## 2018-03-05 NOTE — PROGRESS NOTES
MIC HOSPITALIST  Progress Note     Terrell Gain Patient Status:  Inpatient    1955 MRN [id number]   [place] 3SW-A Attending Matthew Watts, [address] Day # 1 PCP Alexandru Velez MD     Chief Complaint: Left leg rednes (based on SCr of 1.13 mg/dL (H)). No results for input(s): PTP, INR in the last 72 hours. No results for input(s): TROP, CK in the last 72 hours. Imaging: Imaging data reviewed in Epic.     Medications:   • lisinopril  10 mg Oral Daily   • esc

## 2018-03-05 NOTE — PAYOR COMM NOTE
--------------  ADMISSION REVIEW     Payor: [employer]  Subscriber #:  [id number]  Authorization Number: [id number]    Admit date: 3/4/18  Admit time: 26       Admitting Physician: Kimberly Way MD  Attending Physician:  Felipe Solorio DO  Primary Care coughing paroxysm   • Dermatitis     stress   • Eczema    • Edema    • Epigastric fullness 4/27/2011   • Esophageal reflux     GERD   • ETD (eustachian tube dysfunction)    • Exertional chest pain 3/5/2012   • Extrinsic asthma, unspecified    • Fibroid javier reviewed and negative except as noted above.     Physical Exam[GM.1]   ED Triage Vitals [03/04/18 1015]  BP: 129/79  Pulse: 79  Resp: 19  Temp: 98 °F (36.7 °C)  Temp src: Temporal  SpO2: 100 %  O2 Device: None (Room air)[GM.2]    Current:[GM.1]/60   P WITH DIFFERENTIAL WITH PLATELET   BLOOD CULTURE   BLOOD CULTURE     ED Course as of Mar 04 1254   MDM[GM.1]   An IV established, blood work obtained, blood cultures performed.   Ultrasound performed to rule out DVT which is negative, patient did receive IV other complaints at this time. [NG.2]     Past Medical History:  As noted above in ED MD Assessment     Allergies:   Adhesive Tape           Rash  Augmentin [Amoxicil*        Comment:Reaction: \"violently ill\"  Dye [Iodine (Topica*      Pcn [Bicillin C-R,] kg)   SpO2 94%   BMI 45.73 kg/m²    General: No acute distress. Alert and oriented x 3. [NG.1]   HEENT: Normocephalic atraumatic. Moist mucous membranes. EOM-I. PERRLA. Anicteric. Neck: No JVD. No carotid bruits.   Respiratory: Clear to auscultation bilater DAY:  acetaminophen (TYLENOL) tab 650 mg     Date Action Dose Route User    3/5/2018 1056 Given 650 mg Oral Manuel Sung, RN    3/5/2018 0450 Given 650 mg Oral Yumiko Manzanares, RN    3/4/2018 1447 Given 650 mg Oral Ike Dangelo RN      cl Intravenous Adelina Novak RN      0.9%  NaCl infusion     125cc/h    Date Action Dose Route User    3/4/2018 1449 New Bag (none) Intravenous Jose Bell RN          REVIEWER COMMENTS:     PLEASE REVIEW AND FAX ALL INPT DAYS AS CERTIFIED ALONG

## 2018-03-06 LAB
BASOPHILS # BLD AUTO: 0.02 X10(3) UL (ref 0–0.1)
BASOPHILS NFR BLD AUTO: 0.2 %
BUN BLD-MCNC: 22 MG/DL (ref 8–20)
CALCIUM BLD-MCNC: 8.3 MG/DL (ref 8.3–10.3)
CHLORIDE: 100 MMOL/L (ref 101–111)
CO2: 21 MMOL/L (ref 22–32)
CREAT BLD-MCNC: 0.99 MG/DL (ref 0.55–1.02)
EOSINOPHIL # BLD AUTO: 0.06 X10(3) UL (ref 0–0.3)
EOSINOPHIL NFR BLD AUTO: 0.5 %
ERYTHROCYTE [DISTWIDTH] IN BLOOD BY AUTOMATED COUNT: 14 % (ref 11.5–16)
GLUCOSE BLD-MCNC: 108 MG/DL (ref 70–99)
HCT VFR BLD AUTO: 30.6 % (ref 34–50)
HGB BLD-MCNC: 9.8 G/DL (ref 12–16)
IMMATURE GRANULOCYTE COUNT: 0.15 X10(3) UL (ref 0–1)
IMMATURE GRANULOCYTE RATIO %: 1.2 %
LYMPHOCYTES # BLD AUTO: 1.13 X10(3) UL (ref 0.9–4)
LYMPHOCYTES NFR BLD AUTO: 8.8 %
MCH RBC QN AUTO: 28.9 PG (ref 27–33.2)
MCHC RBC AUTO-ENTMCNC: 32 G/DL (ref 31–37)
MCV RBC AUTO: 90.3 FL (ref 81–100)
MONOCYTES # BLD AUTO: 0.86 X10(3) UL (ref 0.1–1)
MONOCYTES NFR BLD AUTO: 6.7 %
NEUTROPHIL ABS PRELIM: 10.59 X10 (3) UL (ref 1.3–6.7)
NEUTROPHILS # BLD AUTO: 10.59 X10(3) UL (ref 1.3–6.7)
NEUTROPHILS NFR BLD AUTO: 82.6 %
PLATELET # BLD AUTO: 190 10(3)UL (ref 150–450)
POTASSIUM SERPL-SCNC: 3.9 MMOL/L (ref 3.6–5.1)
RBC # BLD AUTO: 3.39 X10(6)UL (ref 3.8–5.1)
RED CELL DISTRIBUTION WIDTH-SD: 46.2 FL (ref 35.1–46.3)
SODIUM SERPL-SCNC: 129 MMOL/L (ref 136–144)
WBC # BLD AUTO: 12.8 X10(3) UL (ref 4–13)

## 2018-03-06 PROCEDURE — 99232 SBSQ HOSP IP/OBS MODERATE 35: CPT | Performed by: HOSPITALIST

## 2018-03-06 RX ORDER — TRAMADOL HYDROCHLORIDE 50 MG/1
50 TABLET ORAL EVERY 6 HOURS PRN
Status: DISCONTINUED | OUTPATIENT
Start: 2018-03-06 | End: 2018-03-12

## 2018-03-06 NOTE — PROGRESS NOTES
MIC HOSPITALIST  Progress Note     Prem Line Patient Status:  Inpatient    1955 MRN [id number]   [facility] 3SW-A Attending Shari Mracos, [address] [facility] Day # 2 PCP Pina Peters MD     Chief Complaint: Left leg rednes Estimated Creatinine Clearance: 46 mL/min (based on SCr of 0.99 mg/dL). No results for input(s): PTP, INR in the last 72 hours. No results for input(s): TROP, CK in the last 72 hours. Imaging: Imaging data reviewed in Epic.     Roselyn Perales

## 2018-03-06 NOTE — PLAN OF CARE
PAIN - ADULT    • Verbalizes/displays adequate comfort level or patient's stated pain goal Progressing        SAFETY ADULT - FALL    • Free from fall injury Progressing        States pain well controlled on tylenol at this time. Left leg red, edematous.

## 2018-03-06 NOTE — PLAN OF CARE
Left lower extremity edematous, red, warm. Intact blisters to lle. Resting in bed, lle elevated on pillows. Instructed patient to call for all asst needed, discomfort felt. Verbalized understanding. Cont to monitor.

## 2018-03-06 NOTE — RESPIRATORY THERAPY NOTE
BHUPINDER - Equipment Use Daily Summary:                  . Set Mode:AUTO CPAP WITH C-FLEX                . Usage in hours:10.8                . 90% Pressure (EPAP) level:6.5                . 90% Insp. Pressure (IPAP): Ulises Stockton AHI:2.7                .  Barr

## 2018-03-06 NOTE — DIETARY NOTE
Nutrition Short Note    Dietitian consult received for pre-diabetes education. Reviewed and provided handouts on carbohydrate counting/label reading.  Discussed the role of CHO/protein and fat in BS control, reviewed importance of portion control, timing of

## 2018-03-07 PROCEDURE — 99232 SBSQ HOSP IP/OBS MODERATE 35: CPT | Performed by: HOSPITALIST

## 2018-03-07 RX ORDER — CEFAZOLIN SODIUM/WATER 2 G/20 ML
2 SYRINGE (ML) INTRAVENOUS EVERY 6 HOURS
Status: DISCONTINUED | OUTPATIENT
Start: 2018-03-07 | End: 2018-03-12

## 2018-03-07 RX ORDER — FUROSEMIDE 10 MG/ML
20 INJECTION INTRAMUSCULAR; INTRAVENOUS ONCE
Status: COMPLETED | OUTPATIENT
Start: 2018-03-07 | End: 2018-03-07

## 2018-03-07 RX ORDER — FUROSEMIDE 40 MG/1
40 TABLET ORAL DAILY
Status: DISCONTINUED | OUTPATIENT
Start: 2018-03-08 | End: 2018-03-12

## 2018-03-07 NOTE — RESPIRATORY THERAPY NOTE
BHUPINDER Equipment Usage Summary :            Set Mode :AUTO CPAP W FLEX          Usage in Hours:8;48          90% Pressure (EPAP) : 6           90% Insp Pressure (IPAP);           AHI : 5.9          Supplemental Oxygen : 3     LPM

## 2018-03-07 NOTE — PAYOR COMM NOTE
--------------  CONTINUED STAY REVIEW    Payor: [employer]  Subscriber #:  [id number]  Authorization Number: [id number]    Admit date: 3/4/18  Admit time: 26    Admitting Physician: Syed Greenberg MD  Attending Physician:  Jean Armas DO  Primary Care 3/7/2018 0620 Given 20 mg Oral Osker Nicola, RN            Plan: 3/6  1. Sepsis 2/2 LLE cellulitis  1. Clindamycin IV, like need 14 days of treatment- may be able to narrow to ancef as no h/o MRSA, suspect strep infection.    2. No DVT on US prior, d

## 2018-03-07 NOTE — CONSULTS
INFECTIOUS DISEASE [facility] Patient Status:  Inpatient    1955 MRN [id number]   [facility] 3SW-A Attending Nikia Mancilla, [address] [facility] Day # 3 PCP Eileen Paiz 8/4/2016   • Hypothyroid    • Hypothyroidism    • Laryngitis    • Leg pain    • Lipid screening 2/6/2009   • Lymphedema of both lower extremities 9/15/2015   • Measles    • Migraines    • Mumps    • OA (osteoarthritis)     knee   • Obesity 3/5/2012   • Occ reports that she does not use drugs.     Allergies:    Adhesive Tape           Rash  Augmentin [Amoxicil*        Comment:Reaction: \"violently ill\"  Dye [Iodine (Topica*      Pcn [Bicillin C-R,]       Shellfish                 Sulfa Antibiotics         Lola skin, redness, no fluctuance      Laboratory Data:      Recent Labs   Lab  03/06/18   0436   RBC  3.39*   HGB  9.8*   HCT  30.6*   MCV  90.3   MCH  28.9   MCHC  32.0   RDW  14.0   NEPRELIM  10.59*   WBC  12.8   PLT  190.0       Recent Labs   Lab  03/05/18

## 2018-03-07 NOTE — PROGRESS NOTES
MIC HOSPITALIST  Progress Note     Callum Boston Patient Status:  Inpatient    1955 MRN [id number]   [facility] 3SW-A Attending Sussy Naidu, [address] [facility] Day # 3 PCP Yessi Walls MD     Chief Complaint: Left leg rednes --    ALT  37   --    --    BILT  0.8   --    --    TP  7.0   --    --        Estimated Creatinine Clearance: 46 mL/min (based on SCr of 0.99 mg/dL). No results for input(s): PTP, INR in the last 72 hours.     No results for input(s): TROP, CK in the las DO

## 2018-03-08 LAB
BUN BLD-MCNC: 24 MG/DL (ref 8–20)
CALCIUM BLD-MCNC: 8.9 MG/DL (ref 8.3–10.3)
CHLORIDE: 101 MMOL/L (ref 101–111)
CO2: 23 MMOL/L (ref 22–32)
CREAT BLD-MCNC: 1.01 MG/DL (ref 0.55–1.02)
GLUCOSE BLD-MCNC: 105 MG/DL (ref 70–99)
POTASSIUM SERPL-SCNC: 4.2 MMOL/L (ref 3.6–5.1)
SODIUM SERPL-SCNC: 134 MMOL/L (ref 136–144)

## 2018-03-08 PROCEDURE — 99232 SBSQ HOSP IP/OBS MODERATE 35: CPT | Performed by: INTERNAL MEDICINE

## 2018-03-08 RX ORDER — ACETAMINOPHEN 325 MG/1
650 TABLET ORAL EVERY 6 HOURS PRN
Qty: 30 TABLET | Refills: 0 | Status: SHIPPED | OUTPATIENT
Start: 2018-03-08 | End: 2019-03-11 | Stop reason: ALTCHOICE

## 2018-03-08 RX ORDER — FUROSEMIDE 10 MG/ML
20 INJECTION INTRAMUSCULAR; INTRAVENOUS ONCE
Status: COMPLETED | OUTPATIENT
Start: 2018-03-08 | End: 2018-03-08

## 2018-03-08 NOTE — PAYOR COMM NOTE
--------------  CONTINUED STAY REVIEW    Payor: [facility] PPO  Subscriber #:  [id number]  Authorization Number: [id number]    Admit date: 3/4/18  Admit time: 26    Admitting Physician: Nessa Rose MD  Attending Physician:  Reuben Cortez MD  Primary C Route User    3/8/2018 4105 Given 40 mg Oral Clarita Payton RN      Levothyroxine Sodium (SYNTHROID, LEVOTHROID) tab 112 mcg     Date Action Dose Route User    3/8/2018 0451 Given 112 mcg Oral Juan Petersen RN      lisinopril (PRINIVIL,ZESTRIL

## 2018-03-08 NOTE — RESPIRATORY THERAPY NOTE
BHUPINDER : EQUIPMENT USE: DAILY SUMMARY                                            SET MODE: AUTO CPAP WITH CFLEX                                          USAGE IN HOURS: 10:10                                          9

## 2018-03-08 NOTE — PROGRESS NOTES
[facility]                INFECTIOUS DISEASE PROGRESS NOTE    Arnold Hui Patient Status:  Inpatient    1955 MRN [id number]   [facility] 3SW-A Attending Cyntha Leventhal, MD   [address] Day # 4 PCP Darshan Malone MD extremity     Morbid obesity with BMI of 45.0-49.9, adult (HCC)     Hyponatremia      ASSESSMENT/PLAN:  1.  Left thigh/leg cellulitis/blistering, weeping cw strep  Slow improvement on iv Cefazolin  -continue leg elevation, ivabx  Consider transfer to short

## 2018-03-08 NOTE — PLAN OF CARE
PAIN - ADULT    • Verbalizes/displays adequate comfort level or patient's stated pain goal Progressing        SKIN/TISSUE INTEGRITY - ADULT    • Skin integrity remains intact Progressing        Left lower leg with cellulitis, less redness, with some intact

## 2018-03-08 NOTE — PROGRESS NOTES
MIC HOSPITALIST  Progress Note     Rom Hernandez Patient Status:  Inpatient    1955 MRN [id number]   [facility] 3SW-A Attending Amy Schwab MD   [address] Day # 4 PCP Ro Flanagan MD     Chief Complaint: Left leg redn Daily   • escitalopram  10 mg Oral QAM   • Pantoprazole Sodium  20 mg Oral QAM AC   • Flecainide Acetate  100 mg Oral BID   • Levothyroxine Sodium  112 mcg Oral Before breakfast   • Dabigatran Etexilate Mesylate  150 mg Oral BID       ASSESSMENT / PLAN:

## 2018-03-09 PROCEDURE — 99232 SBSQ HOSP IP/OBS MODERATE 35: CPT | Performed by: INTERNAL MEDICINE

## 2018-03-09 PROCEDURE — 05H533Z INSERTION OF INFUSION DEVICE INTO RIGHT SUBCLAVIAN VEIN, PERCUTANEOUS APPROACH: ICD-10-PCS | Performed by: HOSPITALIST

## 2018-03-09 RX ORDER — SODIUM CHLORIDE 0.9 % (FLUSH) 0.9 %
10 SYRINGE (ML) INJECTION EVERY 12 HOURS
Status: DISCONTINUED | OUTPATIENT
Start: 2018-03-09 | End: 2018-03-12

## 2018-03-09 NOTE — RESPIRATORY THERAPY NOTE
BHUPINDER : EQUIPMENT USE: DAILY SUMMARY                                            SET MODE: AUTO CPAP WITH CFLEX                                          USAGE IN HOURS: 9:40                                          90

## 2018-03-09 NOTE — PLAN OF CARE
CARDIOVASCULAR - ADULT    • Absence of cardiac arrhythmias or at baseline Not Progressing        DISCHARGE PLANNING    • Discharge to home or other facility with appropriate resources Not Progressing        PAIN - ADULT    • Verbalizes/displays adequate co

## 2018-03-09 NOTE — PROGRESS NOTES
[facility]                INFECTIOUS DISEASE PROGRESS NOTE    Callum Boston Patient Status:  Inpatient    1955 MRN [id number]   [facility] 3SW-A Attending Nelson Dorman MD   [facility] Day # 5 PCP Yessi Walls MD Hyponatremia      ASSESSMENT/PLAN:  1.  Left thigh/leg cellulitis/blistering, weeping cw strep  Anticipate several days of ivabx, before ready to transition to PO  Continued leg elevation  PT eval pending  Consider transfer to [facility] for another week until kevin

## 2018-03-09 NOTE — PROGRESS NOTES
MIC HOSPITALIST  Progress Note     Jimena Hollins Patient Status:  Inpatient    1955 MRN [id number]   [facility] 3SW-A Attending Keira Méndez MD   [facility] Day # 5 PCP Geoffery Blizzard, MD     Chief Complaint: Left leg redn Oral Daily   • escitalopram  10 mg Oral QAM   • Pantoprazole Sodium  20 mg Oral QAM AC   • Flecainide Acetate  100 mg Oral BID   • Levothyroxine Sodium  112 mcg Oral Before breakfast   • Dabigatran Etexilate Mesylate  150 mg Oral BID       ASSESSMENT / VILMA

## 2018-03-09 NOTE — CM/SW NOTE
03/09/18 1500   CM/SW Referral Data   Referral Source Other  (PT)   Reason for Referral Discharge planning   Informant Patient;Spouse;Edward Staff   Pertinent Medical Hx   Primary Care Physician Name DC Pinto   Patient Info   Patient's Mental St was informed here that she is pre-diabetic.

## 2018-03-09 NOTE — PHYSICAL THERAPY NOTE
PHYSICAL THERAPY EVALUATION - INPATIENT     Room Number: 358/358-A  Evaluation Date: 3/9/2018  Type of Evaluation: Initial  Physician Order: PT Eval and Treat    Presenting Problem: Left LE cellulitis  Reason for Therapy: Mobility Dysfunction and Dis (paroxysmal atrial fibrillation) ([facility] 5/23/2016   • PMS (premenstrual syndrome)    • Pneumonia    • Rash    • Rhinitis    • Sinobronchitis    • Sinusitis     and acute sinusitis   • Stasis dermatitis 5/31/2003    3/14/2000: severe   • Stasis ulcer ([facility] Techniques: Activity promotion; Body mechanics;Breathing techniques;Relaxation;Repositioning    COGNITION  · Overall Cognitive Status:  WFL - within functional limits    RANGE OF MOTION AND STRENGTH ASSESSMENT  Upper extremity ROM and strength are within fu to perform supine<> sit with independence. Sit to stand with RW required supervision for safety. Patient ambulated with RW with Supervision to [facility] for safety. Able to manage 4 stairs with rail assist &min assist of PT. Patient was left up in bedside chair @ e education; Family education;Gait training;Transfer training;Balance training  Rehab Potential : Good  Frequency (Obs): 3x/week  Number of Visits to Meet Established Goals: 3      CURRENT GOALS    Goal #1 Patient is able to demonstrate supine - sit EOB @ lev

## 2018-03-09 NOTE — PAYOR COMM NOTE
--------------  CONTINUED STAY REVIEW    Payor: [facility]  Subscriber #:  [id number]  Authorization Number: [id number]    Admit date: 3/4/18  Admit time: 26    Admitting Physician: Nathanael Preciado MD  Attending Physician:  Josiah Mortensen MD  Primary C Date Action Dose Route User    3/9/2018 0920 Given 10 mL Intravenous Nehemiah Lopez, RN      Pantoprazole Sodium (PROTONIX) EC tab 20 mg     Date Action Dose Route User    3/9/2018 0747 Given 20 mg Oral Sabi Esteban RN            Plan: 3/9  Left t

## 2018-03-09 NOTE — PROGRESS NOTES
Patient seen by vascular access team due to difficult IV start and several infiltrated IV sites overnight. Per report, midline placed to right upper arm.

## 2018-03-09 NOTE — HOME CARE LIAISON
MET WITH PTNT TO DISCUSS HOME HEALTH SERVICES AND COVERAGE CRITERIA. PTNT AGREEABLE TO Gary Llamas. PTNT GIVEN RESIDENTIAL BROCHURE. RESIDENTIAL WITH PROVIDE SN/PT ON DISCHARGE.     Thank you for this referral,   Leigha Watts

## 2018-03-09 NOTE — PROGRESS NOTES
Called and spoke with PT aide per physician request, would like patient to see therapy earlier if possible today. Per PT will work on changing patient schedule.

## 2018-03-10 LAB
ALBUMIN SERPL-MCNC: 2 G/DL (ref 3.5–4.8)
ALP LIVER SERPL-CCNC: 288 U/L (ref 50–130)
ALT SERPL-CCNC: 11 U/L (ref 14–54)
AST SERPL-CCNC: 18 U/L (ref 15–41)
BASOPHILS # BLD AUTO: 0.03 X10(3) UL (ref 0–0.1)
BASOPHILS NFR BLD AUTO: 0.3 %
BILIRUB SERPL-MCNC: 0.4 MG/DL (ref 0.1–2)
BUN BLD-MCNC: 18 MG/DL (ref 8–20)
CALCIUM BLD-MCNC: 8.8 MG/DL (ref 8.3–10.3)
CHLORIDE: 101 MMOL/L (ref 101–111)
CO2: 28 MMOL/L (ref 22–32)
CREAT BLD-MCNC: 0.75 MG/DL (ref 0.55–1.02)
EOSINOPHIL # BLD AUTO: 0.15 X10(3) UL (ref 0–0.3)
EOSINOPHIL NFR BLD AUTO: 1.3 %
ERYTHROCYTE [DISTWIDTH] IN BLOOD BY AUTOMATED COUNT: 14.1 % (ref 11.5–16)
GLUCOSE BLD-MCNC: 107 MG/DL (ref 70–99)
HAV IGM SER QL: 2 MG/DL (ref 1.7–3)
HCT VFR BLD AUTO: 30.1 % (ref 34–50)
HGB BLD-MCNC: 9.5 G/DL (ref 12–16)
IMMATURE GRANULOCYTE COUNT: 0.5 X10(3) UL (ref 0–1)
IMMATURE GRANULOCYTE RATIO %: 4.5 %
LYMPHOCYTES # BLD AUTO: 1.15 X10(3) UL (ref 0.9–4)
LYMPHOCYTES NFR BLD AUTO: 10.3 %
M PROTEIN MFR SERPL ELPH: 6.4 G/DL (ref 6.1–8.3)
MCH RBC QN AUTO: 28.4 PG (ref 27–33.2)
MCHC RBC AUTO-ENTMCNC: 31.6 G/DL (ref 31–37)
MCV RBC AUTO: 90.1 FL (ref 81–100)
MONOCYTES # BLD AUTO: 1.09 X10(3) UL (ref 0.1–1)
MONOCYTES NFR BLD AUTO: 9.8 %
NEUTROPHIL ABS PRELIM: 8.2 X10 (3) UL (ref 1.3–6.7)
NEUTROPHILS # BLD AUTO: 8.2 X10(3) UL (ref 1.3–6.7)
NEUTROPHILS NFR BLD AUTO: 73.8 %
PLATELET # BLD AUTO: 323 10(3)UL (ref 150–450)
POTASSIUM SERPL-SCNC: 4.4 MMOL/L (ref 3.6–5.1)
RBC # BLD AUTO: 3.34 X10(6)UL (ref 3.8–5.1)
RED CELL DISTRIBUTION WIDTH-SD: 46.4 FL (ref 35.1–46.3)
SODIUM SERPL-SCNC: 136 MMOL/L (ref 136–144)
WBC # BLD AUTO: 11.1 X10(3) UL (ref 4–13)

## 2018-03-10 PROCEDURE — 99232 SBSQ HOSP IP/OBS MODERATE 35: CPT | Performed by: HOSPITALIST

## 2018-03-10 NOTE — PROGRESS NOTES
[facility]                INFECTIOUS DISEASE PROGRESS NOTE    Pablo Perera Patient Status:  Inpatient    1955 MRN [id number]   [facility] 3SW-A Attending Jacinta Leger MD   [address] Day # 6 PCP Sonu Davis MD PAF (paroxysmal atrial fibrillation) (HCC)     HTN (hypertension)     Cellulitis of left lower extremity     Morbid obesity with BMI of 45.0-49.9, adult (HCC)     Hyponatremia      ASSESSMENT/PLAN:  1.  Left thigh/leg cellulitis/blistering, weeping cw strep

## 2018-03-10 NOTE — PLAN OF CARE
CARDIOVASCULAR - ADULT    • Absence of cardiac arrhythmias or at baseline Progressing        DISCHARGE PLANNING    • Discharge to home or other facility with appropriate resources Progressing        Impaired Functional Mobility    • Achieve highest/safest

## 2018-03-10 NOTE — PROGRESS NOTES
Patient tele monitor alarming for R on T PVC and multifocal PVC, remains in SR. Asymptomatic. Notified Dr Daniel Chapman, orders received to check magnesium level. Mg level 2.0 this am, called results to Dr Daniel Chapman. Will monitor patient for now.

## 2018-03-10 NOTE — RESPIRATORY THERAPY NOTE
BHUPINDER - Equipment Use Daily Summary:  · Set Mode CPAP  · Usage in hours: 8  · 90% Pressure (EPAP) level:   · 90% Insp Pressure (IPAP): 10  · AHI: 5  · Supplemental Oxygen:  · Comments:

## 2018-03-11 PROCEDURE — 99232 SBSQ HOSP IP/OBS MODERATE 35: CPT | Performed by: HOSPITALIST

## 2018-03-11 NOTE — PLAN OF CARE
CARDIOVASCULAR - ADULT    • Absence of cardiac arrhythmias or at baseline Adequate for Discharge        Impaired Functional Mobility    • Achieve highest/safest level of mobility/gait Adequate for Discharge        PAIN - ADULT    • Verbalizes/displays adeq

## 2018-03-11 NOTE — PROGRESS NOTES
MIC HOSPITALIST  Progress Note     Chidi Luna Patient Status:  Inpatient    1955 MRN [id number]   [facility] 3SW-A Attending Georges Hernandes MD   Hosp Day # 7 PCP Yari Cruz MD     Chief Complaint: Left leg redness QAM   • Pantoprazole Sodium  20 mg Oral QAM AC   • Flecainide Acetate  100 mg Oral BID   • Levothyroxine Sodium  112 mcg Oral Before breakfast   • Dabigatran Etexilate Mesylate  150 mg Oral BID       ASSESSMENT / PLAN:     1.  Sepsis 2/2 LLE cellulitis, imp

## 2018-03-11 NOTE — PROGRESS NOTES
[facility]                INFECTIOUS DISEASE PROGRESS NOTE    Carol Granado Patient Status:  Inpatient    1955 MRN [id number]   [facility] 3SW-A Attending Marge Buchanan MD   [facility] Day # 7 PCP Elidia Viera MD with BMI of 45.0-49.9, adult (HCC)     Hyponatremia      ASSESSMENT/PLAN:  1.  Left thigh/leg cellulitis/blistering, weeping cw strep  Anticipate several days of ivabx, before ready to transition to PO  Per PT eval can do stairs  Will need several more days

## 2018-03-11 NOTE — RESPIRATORY THERAPY NOTE
BHUPINDER - Equipment Use Daily Summary:                  . Set Mode:AUTO CPAP WITH C-FLEX                . Usage in hours:8.4                . 90% Pressure (EPAP) level:7.0                . 90% Insp. Pressure (IPAP): [place] Pencil AHI:4.8                .  Sup

## 2018-03-12 VITALS
RESPIRATION RATE: 18 BRPM | TEMPERATURE: 99 F | HEIGHT: 62 IN | OXYGEN SATURATION: 94 % | BODY MASS INDEX: 46.01 KG/M2 | WEIGHT: 250 LBS | SYSTOLIC BLOOD PRESSURE: 121 MMHG | HEART RATE: 78 BPM | DIASTOLIC BLOOD PRESSURE: 68 MMHG

## 2018-03-12 PROCEDURE — 99232 SBSQ HOSP IP/OBS MODERATE 35: CPT | Performed by: HOSPITALIST

## 2018-03-12 RX ORDER — TRAMADOL HYDROCHLORIDE 50 MG/1
50 TABLET ORAL EVERY 6 HOURS PRN
Qty: 20 TABLET | Refills: 0 | Status: SHIPPED | OUTPATIENT
Start: 2018-03-12 | End: 2018-08-13 | Stop reason: ALTCHOICE

## 2018-03-12 RX ORDER — CEFAZOLIN SODIUM/WATER 2 G/20 ML
2 SYRINGE (ML) INTRAVENOUS EVERY 6 HOURS
Qty: 1120 ML | Refills: 0 | Status: SHIPPED | OUTPATIENT
Start: 2018-03-13 | End: 2018-03-27

## 2018-03-12 NOTE — CONSULTS
[facility]  Report of Inpatient Wound Care Consultation     Jimena Hollins Patient Status:  Inpatient    1955 MRN [id number]   [facility] 3SW-A Attending Eitan Devlin MD   [facility] Day # 8 PCP Geoffery Blizzard, MD     [place] of bone density study 1/12/2009   • HTN (hypertension) 8/4/2016   • Hypothyroid    • Hypothyroidism    • Laryngitis    • Leg pain    • Lipid screening 2/6/2009   • Lymphedema of both lower extremities 9/15/2015   • Measles    • Migraines    • Mumps    • OA Smokeless tobacco: Never Used                      Alcohol use: Yes           0.0 oz/week     Comment: 1-2 drink per month       Allergies:  @ALLERGY    Laboratory Data:  Recent Labs   Lab  03/10/18   0633   WBC  11.1   HGB  9 at #[phone] if you have any questions about this consultation and plan of care. If unable to reach me at these, please call the Inpatient Wound Care pager at #[phone]. Time Spent 1 Hour. Thank you,    Wesley Ortiz.  Nabor Sellers, PT, MPT  [id number]

## 2018-03-12 NOTE — PROGRESS NOTES
NURSING DISCHARGE NOTE    Discharged to home [facility]. .  Accompanied by her . Belongings packed and sent home with patient.   Discussed discharge instructions with patient and , both verbalized

## 2018-03-12 NOTE — RESPIRATORY THERAPY NOTE
BHUPINDER : EQUIPMENT USE: DAILY SUMMARY                                            SET MODE: AUTO CPAP WITH CFLEX                                          USAGE IN HOURS: 8:40                                          90

## 2018-03-12 NOTE — PLAN OF CARE
Impaired Functional Mobility    • Achieve highest/safest level of mobility/gait Adequate for Discharge        PAIN - ADULT    • Verbalizes/displays adequate comfort level or patient's stated pain goal Adequate for Discharge        SAFETY ADULT - FALL    •

## 2018-03-12 NOTE — DISCHARGE SUMMARY
[facility] HOSPITALIST  DISCHARGE SUMMARY     [facility] Patient Status:  Inpatient    1955 MRN [id number]   [facility] 3SW-A Attending Kimberly Way MD   [facility] Day # 8 PCP Tal Ward MD     Date of Admission: 3/4/2018 recent travel. No other complaints at this time. Brief Synopsis: Patient is a 67-year-old female who presented with weakness fever left lower extremity erythema. She reports it came on suddenly spreading up to her thigh.   She was admitted for sepsis s 3/27/2018  Quantity:  1120 mL  Refills:  0     TraMADol HCl 50 MG Tabs  Commonly known as:  ULTRAM      Take 1 tablet (50 mg total) by mouth every 6 (six) hours as needed.    Quantity:  20 tablet  Refills:  0        CONTINUE taking these medications      In Tabs  · ceFAZolin sodium 2 GM/20ML Sosy  · TraMADol HCl 50 MG Tabs         [place]    Follow-up appointment:   Marcell Rondon, [address]    Schedule an appointment as soon as pos

## 2018-03-12 NOTE — PLAN OF CARE
PAIN - ADULT    • Verbalizes/displays adequate comfort level or patient's stated pain goal Progressing        SKIN/TISSUE INTEGRITY - ADULT    • Skin integrity remains intact Progressing        Left lower leg with cellulitis with redness and swelling impro

## 2018-03-12 NOTE — CM/SW NOTE
Informed by Ariel Spivey with [facility] that pt has coverage at 100% for home IVAB. She is aware that [facility] will provide HHC.

## 2018-03-12 NOTE — PROGRESS NOTES
[facility] HOSPITALIST  Progress Note     Pepe Brown Patient Status:  Inpatient    1955 MRN [id number]   [facility] 3SW-A Attending Aaron Azar MD   Hosp Day # 8 PCP Chris Liz MD     Chief Complaint: Left leg redness QAM   • Pantoprazole Sodium  20 mg Oral QAM AC   • Flecainide Acetate  100 mg Oral BID   • Levothyroxine Sodium  112 mcg Oral Before breakfast   • Dabigatran Etexilate Mesylate  150 mg Oral BID       ASSESSMENT / PLAN:     1.  Sepsis 2/2 LLE cellulitis, imp

## 2018-03-12 NOTE — CM/SW NOTE
Call from PA that pt can d/c home today if cleared by ID. Pt will need dressing change to leg tomorrow. THOM spoke with Rigoberto Jiménez with [facility] [phone].   She notes that agency can provide wound care tomorrow during visit to teach Simran Scott

## 2018-03-12 NOTE — PROGRESS NOTES
[facility]                INFECTIOUS DISEASE PROGRESS NOTE    Magi Estevez Patient Status:  Inpatient    1955 MRN [id number]   [facility] 3SW-A Attending Lizandro Mendosa MD   [facility] Day # 8 PCP Reese Diop MD BMI of 45.0-49.9, adult (HCC)     Hyponatremia      ASSESSMENT/PLAN:  1.  Left thigh/leg cellulitis/blistering, weeping cw strep  Ongoing wound care, and ivabx   Wound care team to see today  Consider dc to [facility] for another 1-2 weeks if unable to manage at h

## 2018-03-12 NOTE — CM/SW NOTE
Order received re: coordinating IVAB after d/c with [facility]/I. SW spoke with pt and  re: above. Pt confirms that she does not want to go to rehab but will d/c to home. They agree with referral to above infusion provider.     Spoke with [facility]/I 6

## 2018-03-13 ENCOUNTER — TELEPHONE (OUTPATIENT)
Dept: INTERNAL MEDICINE CLINIC | Facility: CLINIC | Age: 63
End: 2018-03-13

## 2018-03-13 ENCOUNTER — PATIENT OUTREACH (OUTPATIENT)
Dept: CASE MANAGEMENT | Age: 63
End: 2018-03-13

## 2018-03-13 DIAGNOSIS — L03.116 CELLULITIS OF LEFT LOWER EXTREMITY: ICD-10-CM

## 2018-03-13 NOTE — TELEPHONE ENCOUNTER
[facility] calling to inquire if we will sign orders for physical therapy and skilled nursing. Please advise.

## 2018-03-13 NOTE — PROGRESS NOTES
Initial Post Discharge Follow Up   Discharge Date: 3/12/18  Contact Date: 3/13/2018    Consent Verification:  Assessment Completed With: Patient  HIPAA Verified?   Yes    Discharge Dx:    Sepsis secondary to left lower extremity cellulitis  Hx: chronic l needed for Pain. Disp: 30 tablet Rfl: 0   Albuterol Sulfate HFA (PROAIR HFA) 108 (90 Base) MCG/ACT Inhalation Aero Soln Inhale 2 puffs into the lungs every 4 (four) hours as needed for Wheezing or Shortness of Breath.  Disp: 3 Inhaler Rfl: 1   Benazepril HC antibiotics/supplies [phone]   When: 3/13/18    DME ordered at D/C? No    Services ordered at D/C?   No, not at present         Needs post D/C:   Now that you are home, are there any needs or concerns you need addressed before your next visit with your medications, discharge instructions, S&S of infection/blood clots, when to call the doctor and when to call 911. Patient verbalized understanding of these. NCM instructed patient to call PCP with any questions or needs, she states she will.     [x]  Amarilis Amanda

## 2018-03-13 NOTE — CM/SW NOTE
03/13/18 0800   Discharge disposition   Discharged to: Home-Health   Name of Fiona [address] Provider ([facility])   Home services after discharge Skilled home care; Other (comment)  (infusion)   Disch

## 2018-03-14 NOTE — TELEPHONE ENCOUNTER
Called Bar at [facility], advised we can sign orders after we see pt on 3/15/18.

## 2018-03-15 ENCOUNTER — HOSPITAL ENCOUNTER (EMERGENCY)
Facility: HOSPITAL | Age: 63
Discharge: HOME | End: 2018-03-15
Attending: EMERGENCY MEDICINE
Payer: COMMERCIAL

## 2018-03-15 ENCOUNTER — APPOINTMENT (OUTPATIENT)
Dept: ULTRASOUND IMAGING | Facility: HOSPITAL | Age: 63
End: 2018-03-15
Attending: EMERGENCY MEDICINE
Payer: COMMERCIAL

## 2018-03-15 ENCOUNTER — OFFICE VISIT (OUTPATIENT)
Dept: WOUND CARE | Facility: HOSPITAL | Age: 63
End: 2018-03-15
Attending: NURSE PRACTITIONER
Payer: COMMERCIAL

## 2018-03-15 VITALS
BODY MASS INDEX: 47.11 KG/M2 | OXYGEN SATURATION: 99 % | DIASTOLIC BLOOD PRESSURE: 66 MMHG | HEIGHT: 62 IN | RESPIRATION RATE: 20 BRPM | SYSTOLIC BLOOD PRESSURE: 117 MMHG | WEIGHT: 256 LBS | TEMPERATURE: 98 F | HEART RATE: 66 BPM

## 2018-03-15 DIAGNOSIS — L97.822 NON-PRESSURE CHRONIC ULCER OF OTHER PART OF LEFT LOWER LEG WITH FAT LAYER EXPOSED (HCC): Primary | ICD-10-CM

## 2018-03-15 DIAGNOSIS — L03.116 LEFT LEG CELLULITIS: Primary | ICD-10-CM

## 2018-03-15 DIAGNOSIS — I89.0 LYMPHEDEMA: ICD-10-CM

## 2018-03-15 PROCEDURE — 99284 EMERGENCY DEPT VISIT MOD MDM: CPT

## 2018-03-15 PROCEDURE — 29581 APPL MULTLAYER CMPRN SYS LEG: CPT

## 2018-03-15 PROCEDURE — 93971 EXTREMITY STUDY: CPT | Performed by: EMERGENCY MEDICINE

## 2018-03-15 PROCEDURE — 99215 OFFICE O/P EST HI 40 MIN: CPT

## 2018-03-15 RX ORDER — TRAMADOL HYDROCHLORIDE 50 MG/1
100 TABLET ORAL ONCE
Status: COMPLETED | OUTPATIENT
Start: 2018-03-15 | End: 2018-03-15

## 2018-03-15 NOTE — TELEPHONE ENCOUNTER
Spoke to patient's , Mireya Delgado (okay per HIPAA). 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  They are requesting the two below medi

## 2018-03-15 NOTE — PROGRESS NOTES
Chief Complaint  This information was obtained from the patient  Patient is here for an initial consult. She presents with a wound on her left lower leg from the first of the month. She is currently on an antibiotic for Cellulitis. 3/1/2018.  [facility] Grandparents, Thyroid Problems - No History, Tuberculosis - No History, Seizures - No History, Autoimmune Disease - No History    Social History  This information was obtained from the patient  Never smoker, Marital Status - , Alcohol Use - none, Cu reconciliation completed at today's visit. : Yes  History of chemotherapy?: No  History of radiation?: No    Medications  acetaminophen 325 mg tablet oral tablet oral  tramadol 50 mg tablet oral tablet oral  flecainide 100 mg tablet oral tablet oral  benaz thickened and long in length, adequate hygeine. no hairgrowth on legs and foot. .     Gastrointestinal (GI):  Abdomen is soft and non-distended without tenderness. Bowel sounds active.      Musculoskeletal:  patient is in wheelchair propelled by others for l Normal.    Psychiatric:  judgement and insight is intact, however assistance by family for medical decision-making. Alert and oriented times 3. Patient is anxious and teary, but cooperative with exam and answers questions appropriately.      Lower Extremity Lower Leg     Wound Cleansing & Dressings  May shower with protection.  - cleanse the limb, foot, between toes with soap and water and dry thoroughly with each compression wrap change  Cleanse with saline or wound cleanser - CLEANSE WITH ANASEPT  Silver alg Infection    Care summary  Reviewed and evaluated labs.  - march 2018 bun 18, creat .75, gfr>60  Wound type: - lymphedema/cellulitis  Start antibiotics as prescribed: - iv abx per dr Yaneth Miller  Perfusion assessed by doppler. - strong pulsatile signals at the d

## 2018-03-15 NOTE — TELEPHONE ENCOUNTER
Was patient advised to contact pharmacy directly?:  Patient's spouse stated this is a new mail order service asked if we can send new scripts. Is patient out of meds or supply very low?:  Very Low. ..  Need to send short term script as well    Medication

## 2018-03-15 NOTE — ED PROVIDER NOTES
Patient Seen in: [facility] Emergency Department    History   Patient presents with:  Deep Vein Thrombosis (cardiovascular)    Stated Complaint: R/O DVT and send back to wound care    HPI    Patient was discharged from the hospital just earlier this • History of bone density study 1/12/2009   • HTN (hypertension) 8/4/2016   • Hypothyroid    • Hypothyroidism    • Laryngitis    • Leg pain    • Lipid screening 2/6/2009   • Lymphedema of both lower extremities 9/15/2015   • Measles    • Migraines    • M Triage Vitals [03/15/18 0912]  BP: 130/49  Pulse: 63  Resp: 20  Temp: 98.1 °F (36.7 °C)  Temp src: Temporal  SpO2: 100 %  O2 Device: None (Room air)    Current:/66   Pulse 66   Temp 98.1 °F (36.7 °C) (Temporal)   Resp 20   Ht 157.5 cm (5' 2\")   Wt 1 specialist.      Disposition and Plan     Clinical Impression:  Left leg cellulitis  (primary encounter diagnosis)    Disposition:  Discharge  3/15/2018 10:10 am    Follow-up:  Marian Thacker [address] [place] [id number]

## 2018-03-16 RX ORDER — BENAZEPRIL HYDROCHLORIDE 10 MG/1
10 TABLET ORAL
Qty: 90 TABLET | Refills: 0 | Status: SHIPPED | OUTPATIENT
Start: 2018-03-16 | End: 2018-07-02

## 2018-03-16 RX ORDER — FUROSEMIDE 40 MG/1
TABLET ORAL
Qty: 90 TABLET | Refills: 0 | Status: SHIPPED | OUTPATIENT
Start: 2018-03-16 | End: 2018-07-02

## 2018-03-19 ENCOUNTER — OFFICE VISIT (OUTPATIENT)
Dept: WOUND CARE | Age: 63
End: 2018-03-19
Attending: NURSE PRACTITIONER
Payer: COMMERCIAL

## 2018-03-19 ENCOUNTER — LAB REQUISITION (OUTPATIENT)
Dept: LAB | Facility: HOSPITAL | Age: 63
End: 2018-03-19
Attending: INTERNAL MEDICINE
Payer: COMMERCIAL

## 2018-03-19 DIAGNOSIS — L03.116 CELLULITIS OF LEFT FOOT: ICD-10-CM

## 2018-03-19 DIAGNOSIS — I89.0 LYMPHEDEMA: ICD-10-CM

## 2018-03-19 DIAGNOSIS — L97.929 CHRONIC VENOUS HYPERTENSION (IDIOPATHIC) WITH ULCER AND INFLAMMATION OF LEFT LOWER EXTREMITY (HCC): ICD-10-CM

## 2018-03-19 DIAGNOSIS — L97.822 NON-PRESSURE CHRONIC ULCER OF OTHER PART OF LEFT LOWER LEG WITH FAT LAYER EXPOSED (HCC): Primary | ICD-10-CM

## 2018-03-19 DIAGNOSIS — I87.332 CHRONIC VENOUS HYPERTENSION (IDIOPATHIC) WITH ULCER AND INFLAMMATION OF LEFT LOWER EXTREMITY (HCC): ICD-10-CM

## 2018-03-19 DIAGNOSIS — R69 ILLNESS: ICD-10-CM

## 2018-03-19 LAB
ALBUMIN SERPL-MCNC: 2.2 G/DL (ref 3.5–4.8)
ALP LIVER SERPL-CCNC: 288 U/L (ref 50–130)
ALT SERPL-CCNC: 15 U/L (ref 14–54)
AST SERPL-CCNC: 23 U/L (ref 15–41)
BASOPHILS # BLD AUTO: 0.05 X10(3) UL (ref 0–0.1)
BASOPHILS NFR BLD AUTO: 0.7 %
BILIRUB SERPL-MCNC: 0.3 MG/DL (ref 0.1–2)
BUN BLD-MCNC: 16 MG/DL (ref 8–20)
CALCIUM BLD-MCNC: 8.9 MG/DL (ref 8.3–10.3)
CHLORIDE: 99 MMOL/L (ref 101–111)
CO2: 29 MMOL/L (ref 22–32)
CREAT BLD-MCNC: 0.79 MG/DL (ref 0.55–1.02)
EOSINOPHIL # BLD AUTO: 0.12 X10(3) UL (ref 0–0.3)
EOSINOPHIL NFR BLD AUTO: 1.7 %
ERYTHROCYTE [DISTWIDTH] IN BLOOD BY AUTOMATED COUNT: 14.3 % (ref 11.5–16)
GLUCOSE BLD-MCNC: 95 MG/DL (ref 70–99)
HCT VFR BLD AUTO: 27.9 % (ref 34–50)
HGB BLD-MCNC: 8.4 G/DL (ref 12–16)
IMMATURE GRANULOCYTE COUNT: 0.03 X10(3) UL (ref 0–1)
IMMATURE GRANULOCYTE RATIO %: 0.4 %
LYMPHOCYTES # BLD AUTO: 1.6 X10(3) UL (ref 0.9–4)
LYMPHOCYTES NFR BLD AUTO: 22.3 %
M PROTEIN MFR SERPL ELPH: 6.9 G/DL (ref 6.1–8.3)
MCH RBC QN AUTO: 28.2 PG (ref 27–33.2)
MCHC RBC AUTO-ENTMCNC: 30.1 G/DL (ref 31–37)
MCV RBC AUTO: 93.6 FL (ref 81–100)
MONOCYTES # BLD AUTO: 0.6 X10(3) UL (ref 0.1–1)
MONOCYTES NFR BLD AUTO: 8.3 %
NEUTROPHIL ABS PRELIM: 4.79 X10 (3) UL (ref 1.3–6.7)
NEUTROPHILS # BLD AUTO: 4.79 X10(3) UL (ref 1.3–6.7)
NEUTROPHILS NFR BLD AUTO: 66.6 %
PLATELET # BLD AUTO: 465 10(3)UL (ref 150–450)
POTASSIUM SERPL-SCNC: 4.4 MMOL/L (ref 3.6–5.1)
RBC # BLD AUTO: 2.98 X10(6)UL (ref 3.8–5.1)
RED CELL DISTRIBUTION WIDTH-SD: 48.8 FL (ref 35.1–46.3)
SODIUM SERPL-SCNC: 136 MMOL/L (ref 136–144)
WBC # BLD AUTO: 7.2 X10(3) UL (ref 4–13)

## 2018-03-19 PROCEDURE — 85025 COMPLETE CBC W/AUTO DIFF WBC: CPT | Performed by: INTERNAL MEDICINE

## 2018-03-19 PROCEDURE — 80053 COMPREHEN METABOLIC PANEL: CPT | Performed by: INTERNAL MEDICINE

## 2018-03-19 PROCEDURE — 29581 APPL MULTLAYER CMPRN SYS LEG: CPT

## 2018-03-21 ENCOUNTER — OFFICE VISIT (OUTPATIENT)
Dept: WOUND CARE | Age: 63
End: 2018-03-21
Attending: NURSE PRACTITIONER
Payer: COMMERCIAL

## 2018-03-21 DIAGNOSIS — L97.929 CHRONIC VENOUS HYPERTENSION (IDIOPATHIC) WITH ULCER AND INFLAMMATION OF LEFT LOWER EXTREMITY (HCC): ICD-10-CM

## 2018-03-21 DIAGNOSIS — L97.822 NON-PRESSURE CHRONIC ULCER OF OTHER PART OF LEFT LOWER LEG WITH FAT LAYER EXPOSED (HCC): Primary | ICD-10-CM

## 2018-03-21 DIAGNOSIS — I87.332 CHRONIC VENOUS HYPERTENSION (IDIOPATHIC) WITH ULCER AND INFLAMMATION OF LEFT LOWER EXTREMITY (HCC): ICD-10-CM

## 2018-03-21 DIAGNOSIS — I89.0 LYMPHEDEMA: ICD-10-CM

## 2018-03-21 PROCEDURE — 29581 APPL MULTLAYER CMPRN SYS LEG: CPT

## 2018-03-21 NOTE — PROGRESS NOTES
INFECTIOUS DISEASE PROGRESS NOTE    Bradley Larson     1955 MRN [id number]   Location: [facility]       PCP Caroline Medina MD     Antibiotics: Jannett Riedel    Subjective:  Attended wound clinic, martínez

## 2018-03-21 NOTE — PROGRESS NOTES
Chief Complaint  This information was obtained from the patient  Patient is here for a wound care follow up. She is having a lot of pain since Sonya had the wrap removed to look at the wound.      Allergies  Adhesive tape (Reaction: rash), Augmentin (Amoxi Patient to continue IV antibiotics and follow up with Dr. Tanja Carrion next Monday. x1 10cm comprilan wrap added this visit. Rai    Complaints and Symptoms  This information was obtained from the patient, caregiver and chart.   Patient complains of:   Cons dp bounding/palpable left. left lower Extremity free of varicosities, + edema/lymphadema, lipodermatersclerotic changes, the limb is much less red in color, scattered bruising and decompressed dry blisters. Capillary refill < 3 seconds. Digits are warm.  to Lower Extremity Assessment  Edema Assessment:  Left Extremity: Edema is present   Compression Device In Use: Yes   Device Used Correctly: Yes   Compression Device Used: Multi-Layer Wrap   Calf Measurement 31 cm from heel with left measurement of 55.5 cm RN visit for assessment of wound(s). - Monday and Friday    Misc/Additional Orders  Laisha Felton - for wound care  Supplement with a daily multivitamin.   Increase dietary protein - look for Brad by [employer] (These are essential branch chain Electronic Signature(s)   Signed By:  Date:    Cristhian CORREIA, GIOVANNI-ABBIE, CFLENA, [facility] 03/21/2018 16:26:38        Entered By: Cristhian Cervantes on 03/21/2018 22:82:29

## 2018-03-23 ENCOUNTER — OFFICE VISIT (OUTPATIENT)
Dept: WOUND CARE | Facility: HOSPITAL | Age: 63
End: 2018-03-23
Attending: NURSE PRACTITIONER
Payer: COMMERCIAL

## 2018-03-23 DIAGNOSIS — I87.332 CHRONIC VENOUS HYPERTENSION (IDIOPATHIC) WITH ULCER AND INFLAMMATION OF LEFT LOWER EXTREMITY (HCC): ICD-10-CM

## 2018-03-23 DIAGNOSIS — L97.822 NON-PRESSURE CHRONIC ULCER OF OTHER PART OF LEFT LOWER LEG WITH FAT LAYER EXPOSED (HCC): Primary | ICD-10-CM

## 2018-03-23 DIAGNOSIS — L97.929 CHRONIC VENOUS HYPERTENSION (IDIOPATHIC) WITH ULCER AND INFLAMMATION OF LEFT LOWER EXTREMITY (HCC): ICD-10-CM

## 2018-03-23 DIAGNOSIS — I89.0 LYMPHEDEMA: ICD-10-CM

## 2018-03-23 PROCEDURE — 29581 APPL MULTLAYER CMPRN SYS LEG: CPT

## 2018-03-26 ENCOUNTER — LAB REQUISITION (OUTPATIENT)
Dept: LAB | Facility: HOSPITAL | Age: 63
End: 2018-03-26
Attending: INTERNAL MEDICINE
Payer: COMMERCIAL

## 2018-03-26 ENCOUNTER — APPOINTMENT (OUTPATIENT)
Dept: WOUND CARE | Age: 63
End: 2018-03-26
Attending: NURSE PRACTITIONER
Payer: COMMERCIAL

## 2018-03-26 DIAGNOSIS — L03.90 CELLULITIS: ICD-10-CM

## 2018-03-26 DIAGNOSIS — A41.9 SEPSIS (HCC): ICD-10-CM

## 2018-03-26 LAB
ALBUMIN SERPL-MCNC: 2.4 G/DL (ref 3.5–4.8)
ALP LIVER SERPL-CCNC: 164 U/L (ref 50–130)
ALT SERPL-CCNC: 11 U/L (ref 14–54)
BASOPHILS # BLD AUTO: 0.05 X10(3) UL (ref 0–0.1)
BASOPHILS NFR BLD AUTO: 0.9 %
BILIRUB SERPL-MCNC: 0.6 MG/DL (ref 0.1–2)
BUN BLD-MCNC: 12 MG/DL (ref 8–20)
CALCIUM BLD-MCNC: 9.2 MG/DL (ref 8.3–10.3)
CHLORIDE: 97 MMOL/L (ref 101–111)
CO2: 32 MMOL/L (ref 22–32)
CREAT BLD-MCNC: 0.87 MG/DL (ref 0.55–1.02)
EOSINOPHIL # BLD AUTO: 0.18 X10(3) UL (ref 0–0.3)
EOSINOPHIL NFR BLD AUTO: 3.4 %
ERYTHROCYTE [DISTWIDTH] IN BLOOD BY AUTOMATED COUNT: 14.3 % (ref 11.5–16)
GLUCOSE BLD-MCNC: 77 MG/DL (ref 70–99)
HCT VFR BLD AUTO: 30.5 % (ref 34–50)
HGB BLD-MCNC: 9.5 G/DL (ref 12–16)
IMMATURE GRANULOCYTE COUNT: 0.02 X10(3) UL (ref 0–1)
IMMATURE GRANULOCYTE RATIO %: 0.4 %
LYMPHOCYTES # BLD AUTO: 1.37 X10(3) UL (ref 0.9–4)
LYMPHOCYTES NFR BLD AUTO: 25.8 %
M PROTEIN MFR SERPL ELPH: 7.9 G/DL (ref 6.1–8.3)
MCH RBC QN AUTO: 29.7 PG (ref 27–33.2)
MCHC RBC AUTO-ENTMCNC: 31.1 G/DL (ref 31–37)
MCV RBC AUTO: 95.3 FL (ref 81–100)
MONOCYTES # BLD AUTO: 0.52 X10(3) UL (ref 0.1–1)
MONOCYTES NFR BLD AUTO: 9.8 %
NEUTROPHIL ABS PRELIM: 3.16 X10 (3) UL (ref 1.3–6.7)
NEUTROPHILS # BLD AUTO: 3.16 X10(3) UL (ref 1.3–6.7)
NEUTROPHILS NFR BLD AUTO: 59.7 %
PLATELET # BLD AUTO: 356 10(3)UL (ref 150–450)
RBC # BLD AUTO: 3.2 X10(6)UL (ref 3.8–5.1)
RED CELL DISTRIBUTION WIDTH-SD: 48.1 FL (ref 35.1–46.3)
SODIUM SERPL-SCNC: 132 MMOL/L (ref 136–144)
WBC # BLD AUTO: 5.3 X10(3) UL (ref 4–13)

## 2018-03-26 PROCEDURE — 85025 COMPLETE CBC W/AUTO DIFF WBC: CPT | Performed by: INTERNAL MEDICINE

## 2018-03-26 PROCEDURE — 80053 COMPREHEN METABOLIC PANEL: CPT | Performed by: INTERNAL MEDICINE

## 2018-03-28 ENCOUNTER — APPOINTMENT (OUTPATIENT)
Dept: WOUND CARE | Age: 63
End: 2018-03-28
Attending: NURSE PRACTITIONER
Payer: COMMERCIAL

## 2018-03-28 DIAGNOSIS — I87.332 CHRONIC VENOUS HYPERTENSION (IDIOPATHIC) WITH ULCER AND INFLAMMATION OF LEFT LOWER EXTREMITY (HCC): ICD-10-CM

## 2018-03-28 DIAGNOSIS — L97.929 CHRONIC VENOUS HYPERTENSION (IDIOPATHIC) WITH ULCER AND INFLAMMATION OF LEFT LOWER EXTREMITY (HCC): ICD-10-CM

## 2018-03-28 DIAGNOSIS — I89.0 LYMPHEDEMA: ICD-10-CM

## 2018-03-28 DIAGNOSIS — L97.822 NON-PRESSURE CHRONIC ULCER OF OTHER PART OF LEFT LOWER LEG WITH FAT LAYER EXPOSED (HCC): Primary | ICD-10-CM

## 2018-03-28 PROCEDURE — 29581 APPL MULTLAYER CMPRN SYS LEG: CPT

## 2018-03-28 NOTE — PROGRESS NOTES
Chief Complaint  This information was obtained from the patient  Patient is here for a follow up visit. Patient has noticed a small amount of clear/yellow drainage in the medial thigh at the top of the wrap. They have been using a pad to monitor.  PICC line 3-21-18 patient returns today, she saw dr Julissa Alejandro earlier today and the plan is for iv abx x 1 more week and then transition to po abx. patient still with significant pain to the left lower leg, but redness has decreased and open areas are doing very well. Constitutional Symptoms (General Health):  Fatigue, Loss of Appetite, Marked Weight Change, Night Sweats, Chills  Respiratory: Cough, Shortness of Breath  Cardiovascular (Central/Peripheral): Chest Pain, Dyspnea on Exertion, Intermittent Claudication, Lower Wound #1 Left, Proximal, Posterior Lower Leg is an acute Full Thickness Cellulitis and has received an outcome of Resolved. Subsequent wound encounter measurements are 0cm length x 0cm width with no measurable depth, with an area of 0 sq cm .  No tunneling (Encounter Diagnosis) Z87.547 - Non-pressure chronic ulcer of other part of left lower leg with fat layer exposed  (Encounter Diagnosis) I89.0 - Lymphedema, not elsewhere classified  (Encounter Diagnosis) J67.438 - Chronic venous hypertension (idiopathic)

## 2018-03-29 ENCOUNTER — TELEPHONE (OUTPATIENT)
Dept: INTERNAL MEDICINE CLINIC | Facility: CLINIC | Age: 63
End: 2018-03-29

## 2018-03-29 ENCOUNTER — OFFICE VISIT (OUTPATIENT)
Dept: INTERNAL MEDICINE CLINIC | Facility: CLINIC | Age: 63
End: 2018-03-29

## 2018-03-29 VITALS
HEART RATE: 72 BPM | SYSTOLIC BLOOD PRESSURE: 128 MMHG | DIASTOLIC BLOOD PRESSURE: 62 MMHG | TEMPERATURE: 98 F | RESPIRATION RATE: 16 BRPM | HEIGHT: 63 IN

## 2018-03-29 DIAGNOSIS — L03.90 SEPSIS DUE TO CELLULITIS (HCC): Primary | ICD-10-CM

## 2018-03-29 DIAGNOSIS — R53.81 PHYSICAL DECONDITIONING: ICD-10-CM

## 2018-03-29 DIAGNOSIS — L03.116 CELLULITIS OF LEFT LOWER EXTREMITY: ICD-10-CM

## 2018-03-29 DIAGNOSIS — E66.01 MORBID OBESITY WITH BMI OF 45.0-49.9, ADULT (HCC): ICD-10-CM

## 2018-03-29 DIAGNOSIS — E87.1 HYPONATREMIA: ICD-10-CM

## 2018-03-29 DIAGNOSIS — E74.39 GLUCOSE INTOLERANCE: ICD-10-CM

## 2018-03-29 DIAGNOSIS — D64.9 ANEMIA, UNSPECIFIED TYPE: ICD-10-CM

## 2018-03-29 DIAGNOSIS — A41.9 SEPSIS DUE TO CELLULITIS (HCC): Primary | ICD-10-CM

## 2018-03-29 DIAGNOSIS — D72.829 LEUKOCYTOSIS, UNSPECIFIED TYPE: ICD-10-CM

## 2018-03-29 DIAGNOSIS — I10 ESSENTIAL HYPERTENSION: ICD-10-CM

## 2018-03-29 DIAGNOSIS — I48.0 PAF (PAROXYSMAL ATRIAL FIBRILLATION) (HCC): ICD-10-CM

## 2018-03-29 DIAGNOSIS — I89.0 LYMPHEDEMA OF BOTH LOWER EXTREMITIES: ICD-10-CM

## 2018-03-29 PROCEDURE — 1111F DSCHRG MED/CURRENT MED MERGE: CPT | Performed by: NURSE PRACTITIONER

## 2018-03-29 PROCEDURE — 99495 TRANSJ CARE MGMT MOD F2F 14D: CPT | Performed by: NURSE PRACTITIONER

## 2018-03-29 RX ORDER — CEPHALEXIN 500 MG/1
500 CAPSULE ORAL 2 TIMES DAILY
COMMUNITY
Start: 2018-03-21 | End: 2018-06-08

## 2018-03-29 NOTE — PROGRESS NOTES
HPI:    Bacilio Arreaga is a 60year old female here today for hospital follow up.    She was discharged from Inpatient hospital, [facility] to Home   Admission Date: 3/15/18   Discharge Date: 3/15/18  Hospital Discharge Diagnoses (since 2/27/2018 and wound care. In office today stated feeling well. Stated leg remains red and swollen but is \"so much better\" than on presentation to hospital. Stated she still has small area od weeping drainage to anterior lower left leg.  Stated has been discharge 500 MG Oral Tab Take 1 tablet by mouth daily. Omeprazole (PRILOSEC) 20 MG Oral Capsule Delayed Release Take 1 Cap by mouth daily. No current facility-administered medications on file prior to visit.        HISTORY: reconciled and reviewed with patient Cancer in an other family member; Heart Disease in her mother; High Blood Pressure in her father and mother; Hypertension in her mother; Osteoporosis in her mother; Other in her father and mother; Skin disorders in an other family member.    She  reports th intact    ASSESSMENT/ PLAN:   Diagnoses and all orders for this visit:    Sepsis due to cellulitis ([address]- resolved. Continue Keflex as ordered by ID. Cellulitis of left lower extremity- Resolving. Continue Keflex as ordered by ID.  Notify office if wors of Possible Diagnoses and/or Management Options: moderate  · Amount and/or Complexity of Data to Be Reviewed: moderate  · Risk of Significant Complications, Morbidity, and/or Mortality: moderate    Overall Risk:   moderate    Patient seen within 14 days fr

## 2018-03-29 NOTE — TELEPHONE ENCOUNTER
Called Justin Daigle [phone], gave ok to continue PT. He states that because pt is starting outpatient lymphedema clinic pt cannot continue home health. Ok to discharge?

## 2018-03-29 NOTE — TELEPHONE ENCOUNTER
Kimberly Tee from the [address] [place] [address] agency called ([phone]) wanting to extend the patient's PT order for 1 PT session/week for 3 weeks. Patient saw Kathy Barone this morning for OV. Forwarded to Kathy Barone for okay, and will call Giselle Stanford back.

## 2018-03-30 ENCOUNTER — APPOINTMENT (OUTPATIENT)
Dept: WOUND CARE | Facility: HOSPITAL | Age: 63
End: 2018-03-30
Attending: NURSE PRACTITIONER
Payer: COMMERCIAL

## 2018-04-02 ENCOUNTER — HOSPITAL ENCOUNTER (OUTPATIENT)
Dept: OCCUPATIONAL MEDICINE | Facility: HOSPITAL | Age: 63
Setting detail: THERAPIES SERIES
Discharge: HOME | End: 2018-04-02
Attending: NURSE PRACTITIONER
Payer: COMMERCIAL

## 2018-04-02 ENCOUNTER — LAB ENCOUNTER (OUTPATIENT)
Dept: LAB | Facility: HOSPITAL | Age: 63
End: 2018-04-02
Attending: NURSE PRACTITIONER
Payer: COMMERCIAL

## 2018-04-02 ENCOUNTER — PRIOR ORIGINAL RECORDS (OUTPATIENT)
Dept: OTHER | Age: 63
End: 2018-04-02

## 2018-04-02 DIAGNOSIS — D64.9 ANEMIA, UNSPECIFIED TYPE: ICD-10-CM

## 2018-04-02 DIAGNOSIS — E87.1 HYPONATREMIA: ICD-10-CM

## 2018-04-02 DIAGNOSIS — I89.0 LYMPHEDEMA: ICD-10-CM

## 2018-04-02 PROCEDURE — 97166 OT EVAL MOD COMPLEX 45 MIN: CPT

## 2018-04-02 PROCEDURE — 97535 SELF CARE MNGMENT TRAINING: CPT

## 2018-04-02 PROCEDURE — 97140 MANUAL THERAPY 1/> REGIONS: CPT

## 2018-04-02 PROCEDURE — 85025 COMPLETE CBC W/AUTO DIFF WBC: CPT

## 2018-04-02 PROCEDURE — 80048 BASIC METABOLIC PNL TOTAL CA: CPT

## 2018-04-02 PROCEDURE — 36415 COLL VENOUS BLD VENIPUNCTURE: CPT

## 2018-04-02 NOTE — PROGRESS NOTES
OCCUPATIONAL THERAPY LE LYMPHEDEMA EVALUATION:   Referring Physician:  GINETTE Shelley  Diagnosis: Lymphedema (I89.0)     Date of Service: 4/2/2018     PATIENT SUMMARY   Jimena Hollins is a 60year old y/o female who presents with report of bilat Significant findings include reports bilateral LE \"vein sx\" 10 years ago; cellulitis with \"blood poisoning\" in Right LE following an abrasive injury; HTN; severe bilateral knee OA (\"bone on bone\"); hypothyroidism; PAF; HTN; morbid obesity; asthma; GE limited bilateral knee flex  Sensation:  Intact   Edema/Tissue Observations:  Left LE circumferential lymphedema volume= 540.6cm. Right LE volume= 551.5cm.  Although Left LE total volume is 10.9cm lower than Right, Left thigh/knee volume is 14.1cm larger th Left lower leg Circaid garments. Pema Lever to wear lower leg compression for up to 23 hour periods; thigh compression until tomorrow, as tolerated. GARMENT SPECIFICS: Circaid Premium lower leg and ankle/foot garments.  [employer] custom-fit javier [phone]. Sincerely,  Electronically signed by therapist: Amarilis Perez OTR/L, MINOR     Physician's certification required: Yes  I certify the need for these services furnished under this plan of treatment and while under my care.     X_______

## 2018-04-04 ENCOUNTER — HOSPITAL ENCOUNTER (OUTPATIENT)
Dept: OCCUPATIONAL MEDICINE | Facility: HOSPITAL | Age: 63
Setting detail: THERAPIES SERIES
Discharge: HOME | End: 2018-04-04
Attending: NURSE PRACTITIONER
Payer: COMMERCIAL

## 2018-04-04 PROCEDURE — 97140 MANUAL THERAPY 1/> REGIONS: CPT

## 2018-04-04 NOTE — PROGRESS NOTES
Dx: Lymphedema (I89.0)         Authorized # of Visits:  2/16        Next MD visit/plan renewal:  5/2/2018  Fall Risk: Standard          Precautions:  Lymphedema; infection risk; PAF; 61+ y/o       Subjective:   *Pt reports she tolerated modified compressio reduction in distal lower leg density with slight improvement in superficial tissue mobility by end of session. *During MLD session discussed pt's above questions.  Advised pt to continue with elevating Left LE at night for the time being; will continue t Left thigh lymphedema volume by 10-20cm to allow pt to return to ambulation without use of RW and to navigate stairs to second floor. 16 sessions  6.  Reduce Left lower leg lymphedema density to densely boggy with fair to good soft tissue mobilization to re

## 2018-04-05 ENCOUNTER — OFFICE VISIT (OUTPATIENT)
Dept: INTERNAL MEDICINE CLINIC | Facility: CLINIC | Age: 63
End: 2018-04-05

## 2018-04-05 VITALS
SYSTOLIC BLOOD PRESSURE: 118 MMHG | RESPIRATION RATE: 16 BRPM | TEMPERATURE: 98 F | HEART RATE: 64 BPM | DIASTOLIC BLOOD PRESSURE: 66 MMHG | BODY MASS INDEX: 44.33 KG/M2 | WEIGHT: 250.19 LBS | HEIGHT: 63 IN

## 2018-04-05 DIAGNOSIS — D64.9 ANEMIA, UNSPECIFIED TYPE: ICD-10-CM

## 2018-04-05 DIAGNOSIS — E74.39 GLUCOSE INTOLERANCE: ICD-10-CM

## 2018-04-05 DIAGNOSIS — E87.1 HYPONATREMIA: ICD-10-CM

## 2018-04-05 DIAGNOSIS — L03.116 CELLULITIS OF LEFT LOWER EXTREMITY: Primary | ICD-10-CM

## 2018-04-05 DIAGNOSIS — I89.0 LYMPHEDEMA OF BOTH LOWER EXTREMITIES: ICD-10-CM

## 2018-04-05 PROCEDURE — 99214 OFFICE O/P EST MOD 30 MIN: CPT | Performed by: NURSE PRACTITIONER

## 2018-04-05 NOTE — PROGRESS NOTES
Patient presents with: Follow - Up      HPI:  Presents for follow up of multiple issues. Had recent sepsis secondary to  LLE cellulitis. Also had significant BLE lymphedema.  While hospitalized noted with some anemia and hyponatreima as well as glucose int pain    • Lipid screening 2/6/2009   • Lymphedema of both lower extremities 9/15/2015   • Measles    • Migraines    • Mumps    • OA (osteoarthritis)     knee   • Obesity 3/5/2012   • Occult blood positive stool    • Osteopenia 10/8/2014   • Other allergy, Sulfate HFA (PROAIR HFA) 108 (90 Base) MCG/ACT Inhalation Aero Soln Inhale 2 puffs into the lungs every 4 (four) hours as needed for Wheezing or Shortness of Breath.  Disp: 3 Inhaler Rfl: 1   Levothyroxine Sodium 112 MCG Oral Tab TAKE 1 TABLET BY MOUTH LYNETTE clinic. Glucose intolerance- Referred to Diabetic nutrition education. Discussed importance of controlling glucose again. Recheck A1C in 3 months as well. Anemia, unspecified type- improved. Recheck in 3 months. Hyponatremia- resolved.        Orders Pl

## 2018-04-06 ENCOUNTER — HOSPITAL ENCOUNTER (OUTPATIENT)
Dept: OCCUPATIONAL MEDICINE | Facility: HOSPITAL | Age: 63
Setting detail: THERAPIES SERIES
Discharge: HOME | End: 2018-04-06
Attending: NURSE PRACTITIONER
Payer: COMMERCIAL

## 2018-04-06 PROCEDURE — 97140 MANUAL THERAPY 1/> REGIONS: CPT

## 2018-04-06 NOTE — PROGRESS NOTES
Dx: Lymphedema (I89.0)         Authorized # of Visits:  3/16        Next MD visit/plan renewal:  5/2/2018  Fall Risk: Standard          Precautions:  Lymphedema; infection risk; PAF; 60+ y/o       Subjective:   *Pt reports she tolerated modified compressio exercises. Pt giving good return demonstration. Advised pt to complete 10 reps of each ex, x4/day. *Discussed difficulty with migration of modified thigh compression. Pt agreeable to using Tensogrip sleeve vs stockinet as layer under Rosidal wrap.    *Umair

## 2018-04-09 ENCOUNTER — HOSPITAL ENCOUNTER (OUTPATIENT)
Dept: OCCUPATIONAL MEDICINE | Facility: HOSPITAL | Age: 63
Setting detail: THERAPIES SERIES
Discharge: HOME | End: 2018-04-09
Attending: NURSE PRACTITIONER
Payer: COMMERCIAL

## 2018-04-09 PROCEDURE — 97140 MANUAL THERAPY 1/> REGIONS: CPT

## 2018-04-09 NOTE — PROGRESS NOTES
Dx: Lymphedema (I89.0)         Authorized # of Visits:  4/16        Next MD visit/plan renewal:  5/2/2018  Fall Risk: Standard          Precautions:  Lymphedema; infection risk; PAF; 60+ y/o       Subjective:   *Pt reports she tolerated modified compressio spared. Knee with light, dusky pink coloration. Lower leg dusky pink coloration over medial surface; medium pink coloration over lateral surface. Good tissue hydration, except around perimeters of sole in areas of callused/dead skin.    MEASUREMENTS:  *Left care. 4 sessions ACHIEVED   2. Pt will tolerate Left thigh modified compression via Rosidal/Tensogrip sleeve for 6-9 hours. 4 sessions ACHIEVED   3. Pt will be independent in decongestive exercises. 4 sessions   4.  Pt will be independent in upper quadrant

## 2018-04-11 ENCOUNTER — HOSPITAL ENCOUNTER (OUTPATIENT)
Dept: OCCUPATIONAL MEDICINE | Facility: HOSPITAL | Age: 63
Setting detail: THERAPIES SERIES
Discharge: HOME | End: 2018-04-11
Attending: NURSE PRACTITIONER
Payer: COMMERCIAL

## 2018-04-11 PROCEDURE — 97140 MANUAL THERAPY 1/> REGIONS: CPT

## 2018-04-11 NOTE — PROGRESS NOTES
Dx: Lymphedema (I89.0)         Authorized # of Visits:  5/16        Next MD visit/plan renewal:  5/2/2018  Fall Risk: Standard          Precautions:  Lymphedema; infection risk; PAF; 60+ y/o       Subjective:   *Pt reports she daily use of Circaid garments garments. *Applied modified compression over thigh.    COMPRESSION:  *Left thigh/knee: stockinet; size \"J\" Tensogrip sleeve; Rosidal wrap; size \"J\" Tensogrip sleeve  *Left lower leg: liner; Circaid Premium lower leg garment and ankle/foot garment     A

## 2018-04-13 ENCOUNTER — HOSPITAL ENCOUNTER (OUTPATIENT)
Dept: OCCUPATIONAL MEDICINE | Facility: HOSPITAL | Age: 63
Setting detail: THERAPIES SERIES
Discharge: HOME | End: 2018-04-13
Attending: NURSE PRACTITIONER
Payer: COMMERCIAL

## 2018-04-13 ENCOUNTER — PRIOR ORIGINAL RECORDS (OUTPATIENT)
Dept: OTHER | Age: 63
End: 2018-04-13

## 2018-04-13 PROCEDURE — 97140 MANUAL THERAPY 1/> REGIONS: CPT

## 2018-04-13 NOTE — PROGRESS NOTES
Dx: Lymphedema (I89.0)         Authorized # of Visits:  6/16        Next MD visit/plan renewal:  5/2/2018  Fall Risk: Standard          Precautions:  Lymphedema; infection risk; PAF; 60+ y/o       Subjective:   *Pt reports she daily use of Circaid garments Observed to have reduction in distal lower leg density with improvement in superficial tissue mobility by end of session. *Applied Left lower leg Circaid garments. *Applied modified compression over thigh sans stockinet.  Advised pt to remove immediatel

## 2018-04-14 ENCOUNTER — TELEPHONE (OUTPATIENT)
Dept: INTERNAL MEDICINE CLINIC | Facility: CLINIC | Age: 63
End: 2018-04-14

## 2018-04-16 ENCOUNTER — HOSPITAL ENCOUNTER (OUTPATIENT)
Dept: OCCUPATIONAL MEDICINE | Facility: HOSPITAL | Age: 63
Setting detail: THERAPIES SERIES
Discharge: HOME | End: 2018-04-16
Attending: NURSE PRACTITIONER
Payer: COMMERCIAL

## 2018-04-16 PROCEDURE — 97140 MANUAL THERAPY 1/> REGIONS: CPT

## 2018-04-16 NOTE — PROGRESS NOTES
Dx: Lymphedema (I89.0)         Authorized # of Visits:  7/16        Next MD visit/plan renewal:  5/2/2018  Fall Risk: Standard          Precautions:  Lymphedema; infection risk; PAF; 60+ y/o       Subjective:   *Pt reports Left thigh modified compression a casserole and some crackers for a snack; reports is aware she should avoid high sodium foods. *Left lower quadrant manual lymph drainage with soft tissue mobilization in areas of dense lymphedema.  Observed to have reduction in distal lower leg density wi Manual Therapy    Total Timed Treatment: 55 min  Total Treatment Time: 60 min

## 2018-04-18 ENCOUNTER — TELEPHONE (OUTPATIENT)
Dept: OCCUPATIONAL MEDICINE | Facility: HOSPITAL | Age: 63
End: 2018-04-18

## 2018-04-18 ENCOUNTER — TELEPHONE (OUTPATIENT)
Dept: INTERNAL MEDICINE CLINIC | Facility: CLINIC | Age: 63
End: 2018-04-18

## 2018-04-18 ENCOUNTER — OFFICE VISIT (OUTPATIENT)
Dept: INTERNAL MEDICINE CLINIC | Facility: CLINIC | Age: 63
End: 2018-04-18

## 2018-04-18 ENCOUNTER — HOSPITAL ENCOUNTER (OUTPATIENT)
Dept: OCCUPATIONAL MEDICINE | Facility: HOSPITAL | Age: 63
Setting detail: THERAPIES SERIES
Discharge: HOME | End: 2018-04-18
Attending: NURSE PRACTITIONER
Payer: COMMERCIAL

## 2018-04-18 VITALS
OXYGEN SATURATION: 99 % | SYSTOLIC BLOOD PRESSURE: 126 MMHG | HEIGHT: 62 IN | DIASTOLIC BLOOD PRESSURE: 60 MMHG | HEART RATE: 79 BPM | BODY MASS INDEX: 46.93 KG/M2 | TEMPERATURE: 98 F | RESPIRATION RATE: 16 BRPM | WEIGHT: 255 LBS

## 2018-04-18 DIAGNOSIS — I89.0 LYMPHEDEMA OF BOTH LOWER EXTREMITIES: Primary | ICD-10-CM

## 2018-04-18 PROCEDURE — 97140 MANUAL THERAPY 1/> REGIONS: CPT

## 2018-04-18 PROCEDURE — 99213 OFFICE O/P EST LOW 20 MIN: CPT | Performed by: NURSE PRACTITIONER

## 2018-04-18 NOTE — TELEPHONE ENCOUNTER
Returned pt's phone message requesting a return call.  She reports that yesterday afternoon she developed continuous, clear weeping from a pinhole-sized site on her medial to posterior Left thigh approx 5 inches superior to her knee in an area of discolored

## 2018-04-18 NOTE — PROGRESS NOTES
Patient presents with:  Drainage: Left Inner Thigh Area - former area of cellulitis      HPI:  Presents with approx  4-5 day history of clear, heavy drainage from left medial thigh at site of former cellulitis.  Denies fever/chills, redness/tenderness to ar Otitis media    • PAF (paroxysmal atrial fibrillation) ([facility]) 5/23/2016   • PMS (premenstrual syndrome)    • Pneumonia    • Rash    • Rhinitis    • Sinobronchitis    • Sinusitis     and acute sinusitis   • Stasis dermatitis 5/31/2003    3/14/2000: severe Oral Tab TAKE 1 BY MOUTH DAILY Disp: 90 tablet Rfl: 3   Flecainide Acetate (TAMBOCOR) 100 MG Oral Tab Take 100 mg by mouth 2 (two) times daily. Disp:  Rfl:    Cholecalciferol (VITAMIN D-3 OR) Take 2,000 Units by mouth daily.    Disp:  Rfl:    PRADAXA 150 MG prescriptions requested or ordered in this encounter    Imaging & Consults:  None    No Follow-up on file. Patient Instructions                     Take furosemide 60mg (one and one half tabs) daily for 3 days. Continue with lymphedema therapy.      No

## 2018-04-18 NOTE — PROGRESS NOTES
Dx: Lymphedema (I89.0)         Authorized # of Visits:  8/16        Next MD visit/plan renewal:  5/2/2018  Fall Risk: Standard          Precautions:  Lymphedema; infection risk; PAF; 59+ y/o       Subjective:   *Pt reports she saw NPAlessia, at [facility] TREATMENT:  *Removed Left lower leg Circaid garments, Left thigh Tensogrip sleeve and pad over thigh. Placed clean towel under thigh to collect fluid during session and to allow area to be exposed to air during session.    *Left lower quadrant manual lymp lymphedema. 16 sessions       Plan:   Continue current plan.       Charges: 4 Manual Therapy    Total Timed Treatment: 55 min  Total Treatment Time: 60 min

## 2018-04-18 NOTE — TELEPHONE ENCOUNTER
Patient called stating an area on her leg that had cellulitis d/t a staph infection started weeping last night slightly, but is weeping more this morning.  Patient states the drainage is clear and the skin surrounding the area where it is  weeping is red an

## 2018-04-18 NOTE — PATIENT INSTRUCTIONS
Take furosemide 60mg (one and one half tabs) daily for 3 days. Continue with lymphedema therapy. Notify office if fever/chills, foul odor to drainage, discoloration of fluid or other problems.

## 2018-04-18 NOTE — TELEPHONE ENCOUNTER
Patient has been seeing Corby Felton for a staph infection in her leg which developed into cellulitus and then sepsis. Yesterday, a little area in her upper thigh started weeping. It wept a little less last night and then started weeping more today.   She is get

## 2018-04-20 ENCOUNTER — PATIENT MESSAGE (OUTPATIENT)
Dept: INTERNAL MEDICINE CLINIC | Facility: CLINIC | Age: 63
End: 2018-04-20

## 2018-04-20 ENCOUNTER — TELEPHONE (OUTPATIENT)
Dept: INTERNAL MEDICINE CLINIC | Facility: CLINIC | Age: 63
End: 2018-04-20

## 2018-04-20 ENCOUNTER — HOSPITAL ENCOUNTER (OUTPATIENT)
Dept: OCCUPATIONAL MEDICINE | Facility: HOSPITAL | Age: 63
Setting detail: THERAPIES SERIES
Discharge: HOME | End: 2018-04-20
Attending: NURSE PRACTITIONER
Payer: COMMERCIAL

## 2018-04-20 LAB
BUN: 18 MG/DL
CALCIUM: 9.1 MG/DL
CHLORIDE: 101 MEQ/L
CREATININE, SERUM: 0.79 MG/DL
GLUCOSE: 86 MG/DL
HEMATOCRIT: 34.3 %
HEMOGLOBIN A1C: 6.1 %
HEMOGLOBIN: 10.5 G/DL
PLATELETS: 352 K/UL
POTASSIUM, SERUM: 4.4 MEQ/L
RED BLOOD COUNT: 3.66 X 10-6/U
SODIUM: 137 MEQ/L
WHITE BLOOD COUNT: 7 X 10-3/U

## 2018-04-20 PROCEDURE — 97140 MANUAL THERAPY 1/> REGIONS: CPT

## 2018-04-20 NOTE — TELEPHONE ENCOUNTER
From: Temitope Mckinney  To: Amisha Coyle NP  Sent: 4/20/2018 7:47 AM CDT  Subject: Other    The weeping in my leg has stopped. Yesterday I was suppose to take 60 mg of my water pill but I forgot.  Do I still need to go for the blood test?

## 2018-04-20 NOTE — TELEPHONE ENCOUNTER
JUST FYI - Patient did not increase dosage of water pills as was suggested at her Wednesday appt with Jacki Oliveira  and her legs are not leaking any more. So she is not going to get labs today as they were ordered with the upped dosage in mind.   Any questions, pl

## 2018-04-20 NOTE — TELEPHONE ENCOUNTER
Left msg that we received phone call and email (same information received in both). Asked pt to check her mychart msg as dr had responded to that msg. Pt asked to call back if any questions.

## 2018-04-20 NOTE — TELEPHONE ENCOUNTER
Take 60 mg furosemide today , tomorrow and Sunday and check BMP Monday.  Resume regular furosemide dose on monday

## 2018-04-20 NOTE — PROGRESS NOTES
Dx: Lymphedema (I89.0)         Authorized # of Visits:  9/16        Next MD visit/plan renewal:  5/2/2018  Fall Risk: Standard          Precautions:  Lymphedema; infection risk; PAF; 60+ y/o       Subjective:   *Pt reports lymphorrhea from Left medial post proximal thigh; 2-ply layer of size \"J\" Tensogrip sleeve  *Left lower leg: liner; Circaid Premium lower leg garment and ankle/foot garment     Assessment:   Pt with good response to session techniques. Demonstrating gradual improvement in tissue quality.

## 2018-04-23 ENCOUNTER — HOSPITAL ENCOUNTER (OUTPATIENT)
Dept: OCCUPATIONAL MEDICINE | Facility: HOSPITAL | Age: 63
Setting detail: THERAPIES SERIES
Discharge: HOME | End: 2018-04-23
Attending: NURSE PRACTITIONER
Payer: COMMERCIAL

## 2018-04-23 ENCOUNTER — APPOINTMENT (OUTPATIENT)
Dept: LAB | Age: 63
End: 2018-04-23
Attending: NURSE PRACTITIONER
Payer: COMMERCIAL

## 2018-04-23 DIAGNOSIS — I89.0 LYMPHEDEMA OF BOTH LOWER EXTREMITIES: ICD-10-CM

## 2018-04-23 PROCEDURE — 80048 BASIC METABOLIC PNL TOTAL CA: CPT | Performed by: NURSE PRACTITIONER

## 2018-04-23 PROCEDURE — 36415 COLL VENOUS BLD VENIPUNCTURE: CPT | Performed by: NURSE PRACTITIONER

## 2018-04-23 PROCEDURE — 97140 MANUAL THERAPY 1/> REGIONS: CPT

## 2018-04-23 NOTE — PROGRESS NOTES
Dx: Lymphedema (I89.0)         Authorized # of Visits:  10/16        Next MD visit/plan renewal:  5/2/2018  Fall Risk: Standard          Precautions:  Lymphedema; infection risk; PAF; 59+ y/o       Subjective:   *Pt reports daily use of Left lower leg Circ leg Circaid garments. *Assessed area of pt's concerns in Right lower leg. No signs/symptoms of infection. Pt reporting 2 areas of tenderness/soreness on lateral and medial sides of distal lower leg; both at same level in sagittal plane.  Discussed potentia for life-long self-management of lymphedema. 16 sessions       Plan:   Continue current plan.       Charges: 4 Manual Therapy    Total Timed Treatment: 55 min  Total Treatment Time: 60 min

## 2018-04-25 ENCOUNTER — HOSPITAL ENCOUNTER (OUTPATIENT)
Dept: OCCUPATIONAL MEDICINE | Facility: HOSPITAL | Age: 63
Setting detail: THERAPIES SERIES
Discharge: HOME | End: 2018-04-25
Attending: NURSE PRACTITIONER
Payer: COMMERCIAL

## 2018-04-25 PROCEDURE — 97140 MANUAL THERAPY 1/> REGIONS: CPT

## 2018-04-26 ENCOUNTER — HOSPITAL ENCOUNTER (OUTPATIENT)
Dept: OCCUPATIONAL MEDICINE | Facility: HOSPITAL | Age: 63
Setting detail: THERAPIES SERIES
Discharge: HOME | End: 2018-04-26
Attending: NURSE PRACTITIONER
Payer: COMMERCIAL

## 2018-04-26 PROCEDURE — 97140 MANUAL THERAPY 1/> REGIONS: CPT

## 2018-04-26 NOTE — PROGRESS NOTES
Dx: Lymphedema (I89.0)         Authorized # of Visits:  11/16        Next MD visit/plan renewal:  5/2/2018  Fall Risk: Standard          Precautions:  Lymphedema; infection risk; PAF; 60+ y/o       Subjective:   *Pt reports Left thigh compression applied a initiating lower leg sequences. No pressure areas or discomfort noted. *Left lower quadrant manual lymph drainage with soft tissue mobilization in areas of dense lymphedema.  Observed to have reduction in distal lower leg density with improvement in super compression stocking.      Charges: 4 Manual Therapy    Total Timed Treatment: 55 min  Total Treatment Time: 60 min

## 2018-04-26 NOTE — PROGRESS NOTES
Dx: Lymphedema (I89.0)         Authorized # of Visits:  12/16        Next MD visit/plan renewal:  5/2/2018  Fall Risk: Standard          Precautions:  Lymphedema; infection risk; PAF; 60+ y/o       Subjective:   *Pt reports Left thigh compression applied a than Right. Observed a rise in proximal Left lower leg volume by 2.3cm today. TREATMENT:  *Removed Left lower leg Circaid garments. *Volume re-measurement. *Discussed rise in lower leg volume with resuming use of compression stocking.  Educated pt on h liner; small textured foam pack placed over distal lateral surface of lower leg; Circaid garments    Assessment:   While pt's current compression stocking fits her well, it appears that it is unable to adequately manage her lower leg volume at this time.  P

## 2018-04-30 ENCOUNTER — APPOINTMENT (OUTPATIENT)
Dept: OCCUPATIONAL MEDICINE | Facility: HOSPITAL | Age: 63
End: 2018-04-30
Attending: NURSE PRACTITIONER
Payer: COMMERCIAL

## 2018-05-01 ENCOUNTER — HOSPITAL ENCOUNTER (OUTPATIENT)
Dept: OCCUPATIONAL MEDICINE | Facility: HOSPITAL | Age: 63
Setting detail: THERAPIES SERIES
Discharge: HOME | End: 2018-05-01
Attending: NURSE PRACTITIONER
Payer: COMMERCIAL

## 2018-05-01 PROCEDURE — 97535 SELF CARE MNGMENT TRAINING: CPT

## 2018-05-01 PROCEDURE — 97140 MANUAL THERAPY 1/> REGIONS: CPT

## 2018-05-01 NOTE — PROGRESS NOTES
Progress Summary    Pt has attended 14/16, cancelled 0, and no shown 0 visits in Occupational Therapy.      Subjective:   Pt reports daily use of Circaid garments on bilateral lower legs when in home environment; did use compression stockings when going ou her lower leg to improve tissue quality to reduce her infection risk. She has also been advised that d/t their therapeutic benefits she should wear her Circaid garments on both of her lower legs for 23 hour periods when in the home environment.  Pt and ther well, it is recommended that another pack be fabricated for use under her Right lower leg Circaid garment.     Today's session also included education on the benefits of RW use in the presence of end-stage knee OA--assistance with off-loading weight off of 2. Pt will tolerate Left thigh modified compression via Rosidal/Tensogrip sleeve for 6-9 hours. 4 sessions ACHIEVED   3. Pt will be independent in decongestive exercises. 4 sessions ACHIEVED   4.  Pt will be independent in upper quadrant self-manual lymph mobility by end of session. *Fabrication of [employer] foam pack insert for under Circaid garment. *Applied Circaid garments. Advised pt to use foam pack as tolerated. *Applied Left thigh modified compression.    COMPRESSION:  *Left thigh/knee: Rosidal w

## 2018-05-02 ENCOUNTER — HOSPITAL ENCOUNTER (OUTPATIENT)
Dept: OCCUPATIONAL MEDICINE | Facility: HOSPITAL | Age: 63
Setting detail: THERAPIES SERIES
Discharge: HOME | End: 2018-05-02
Attending: NURSE PRACTITIONER
Payer: COMMERCIAL

## 2018-05-02 PROCEDURE — 97140 MANUAL THERAPY 1/> REGIONS: CPT

## 2018-05-02 PROCEDURE — 97535 SELF CARE MNGMENT TRAINING: CPT

## 2018-05-02 NOTE — PROGRESS NOTES
Dx: Lymphedema (I89.0)         Authorized # of Visits:  14/16        Next MD visit/plan renewal:  6/1/2018  Fall Risk: Standard          Precautions:  Lymphedema; infection risk; PAF; 60+ y/o       Subjective:   *Pt reports no difficulty tolerating larger perimeters of sole in areas of callused/dead skin. Emerging hair follicles over proximal surface and now over distal surface. MEASUREMENTS:  None taken   TREATMENT:  *Removed Left lower leg Circaid garments.   * present to learn how to apply modifie PROGRESSING  6. Reduce Left lower leg lymphedema density to densely boggy with fair to good soft tissue mobilization to reduce infection risk. 16 sessions PROGRESSING  7.  Pt will be independent in use of compression garments, self-manual lymph drainage, de

## 2018-05-04 ENCOUNTER — HOSPITAL ENCOUNTER (OUTPATIENT)
Dept: OCCUPATIONAL MEDICINE | Facility: HOSPITAL | Age: 63
Setting detail: THERAPIES SERIES
Discharge: HOME | End: 2018-05-04
Attending: NURSE PRACTITIONER
Payer: COMMERCIAL

## 2018-05-04 PROCEDURE — 97140 MANUAL THERAPY 1/> REGIONS: CPT

## 2018-05-14 ENCOUNTER — APPOINTMENT (OUTPATIENT)
Dept: OCCUPATIONAL MEDICINE | Facility: HOSPITAL | Age: 63
End: 2018-05-14
Attending: NURSE PRACTITIONER
Payer: COMMERCIAL

## 2018-05-14 NOTE — PROGRESS NOTES
Dx: Lymphedema (I89.0)         Authorized # of Visits:  15/16        Next MD visit/plan renewal:  6/1/2018  Fall Risk: Standard          Precautions:  Lymphedema; infection risk; PAF; 60+ y/o       Subjective:   *Pt reports no difficulty tolerating large f TREATMENT:  *Removed Left lower leg Circaid garments. *Fabricated large foam pack insert for under Right lower leg Circaid garment. Advised pt to wear foam inserts as tolerated.  Educated pt that inserts can also be used under [facility] infection risk. 16 sessions PROGRESSING  7. Pt will be independent in use of compression garments, self-manual lymph drainage, decongestive exercises, Flexitouch device and lymphedema precautions for life-long self-management of lymphedema.  16 sessions [facility]

## 2018-05-25 ENCOUNTER — HOSPITAL ENCOUNTER (OUTPATIENT)
Dept: OCCUPATIONAL MEDICINE | Facility: HOSPITAL | Age: 63
Setting detail: THERAPIES SERIES
Discharge: HOME | End: 2018-05-25
Attending: NURSE PRACTITIONER
Payer: COMMERCIAL

## 2018-05-25 PROCEDURE — 97140 MANUAL THERAPY 1/> REGIONS: CPT

## 2018-05-25 PROCEDURE — 97535 SELF CARE MNGMENT TRAINING: CPT

## 2018-05-29 ENCOUNTER — APPOINTMENT (OUTPATIENT)
Dept: LAB | Facility: HOSPITAL | Age: 63
End: 2018-05-29
Attending: NURSE PRACTITIONER
Payer: COMMERCIAL

## 2018-05-29 DIAGNOSIS — I89.0 LYMPHEDEMA OF BOTH LOWER EXTREMITIES: ICD-10-CM

## 2018-05-29 PROCEDURE — 36415 COLL VENOUS BLD VENIPUNCTURE: CPT

## 2018-05-29 PROCEDURE — 80048 BASIC METABOLIC PNL TOTAL CA: CPT

## 2018-06-05 ENCOUNTER — TELEPHONE (OUTPATIENT)
Dept: INTERNAL MEDICINE CLINIC | Facility: CLINIC | Age: 63
End: 2018-06-05

## 2018-06-05 NOTE — TELEPHONE ENCOUNTER
Fax from [facility] requesting Rx for compression garments for the patient's lymphedema of both lower extremities. Please fill out the duration of need and a medical necessity statement then sign and date.  I will fax back to [facility] at [phone]

## 2018-06-07 RX ORDER — LEVOTHYROXINE SODIUM 112 UG/1
TABLET ORAL
Qty: 90 TABLET | Refills: 0 | Status: SHIPPED | OUTPATIENT
Start: 2018-06-07 | End: 2018-09-11

## 2018-06-08 ENCOUNTER — HOSPITAL ENCOUNTER (OUTPATIENT)
Age: 63
Discharge: HOME | End: 2018-06-08
Attending: FAMILY MEDICINE
Payer: COMMERCIAL

## 2018-06-08 VITALS
WEIGHT: 252 LBS | OXYGEN SATURATION: 99 % | SYSTOLIC BLOOD PRESSURE: 107 MMHG | TEMPERATURE: 98 F | RESPIRATION RATE: 18 BRPM | HEIGHT: 63 IN | HEART RATE: 68 BPM | DIASTOLIC BLOOD PRESSURE: 47 MMHG | BODY MASS INDEX: 44.65 KG/M2

## 2018-06-08 DIAGNOSIS — S61.451A CAT BITE OF RIGHT HAND, INITIAL ENCOUNTER: Primary | ICD-10-CM
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
## 2018-06-08 PROCEDURE — 99214 OFFICE O/P EST MOD 30 MIN: CPT

## 2018-06-08 PROCEDURE — 90471 IMMUNIZATION ADMIN: CPT

## 2018-06-08 PROCEDURE — 99213 OFFICE O/P EST LOW 20 MIN: CPT

## 2018-06-08 RX ORDER — DOXYCYCLINE HYCLATE 100 MG/1
100 CAPSULE ORAL 2 TIMES DAILY
Qty: 14 CAPSULE | Refills: 0 | Status: SHIPPED | OUTPATIENT
Start: 2018-06-08 | End: 2018-06-15

## 2018-06-08 NOTE — ED PROVIDER NOTES
Patient Seen in: [address] [facility]    History   Patient presents with:  Animal Bite    Stated Complaint: cat bite x1 day    HPI    79-year-old female was bit by her cat yesterday at 2200.   Patient was bitten and scratched on her r allergy, other than to medicinal agents 3/5/2012    Alleriges   • Otitis media    • PAF (paroxysmal atrial fibrillation) ([facility]) 5/23/2016   • PMS (premenstrual syndrome)    • Pneumonia    • Rash    • Rhinitis    • Sinobronchitis    • Sinusitis     and acute female in no acute distress  Neuro: Sensation intact. No focal deficits  Vascular: +2 radial and ulnar pulses of the right wrist.  Normal cap refill of all fingers of the right  Extremity: Swelling at the dorsal aspect of the right hand.   Tenderness on pa

## 2018-06-08 NOTE — ED INITIAL ASSESSMENT (HPI)
The patient is here with complaints of a cat bite and scratches to the right hand that occurred last night around 10pm.  She has used peroxide to clean it and used bacitracin and band-aids to the site.   She has had a small amount of creamy yellow/tan drain

## 2018-07-05 ENCOUNTER — TELEPHONE (OUTPATIENT)
Dept: INTERNAL MEDICINE CLINIC | Facility: CLINIC | Age: 63
End: 2018-07-05

## 2018-07-05 RX ORDER — BENAZEPRIL HYDROCHLORIDE 10 MG/1
TABLET ORAL
Qty: 14 TABLET | Refills: 0 | Status: SHIPPED | OUTPATIENT
Start: 2018-07-05 | End: 2018-07-11

## 2018-07-05 RX ORDER — FUROSEMIDE 40 MG/1
TABLET ORAL
Qty: 14 TABLET | Refills: 0 | Status: SHIPPED | OUTPATIENT
Start: 2018-07-05 | End: 2018-07-11

## 2018-07-05 NOTE — TELEPHONE ENCOUNTER
Patient called to schedule appointment for her Physical. Patient stated at her last visit she was advised to get a test done for blood in her stool.  Patient wanted to know if this is something she can get done before her appointment or if they would be don

## 2018-07-05 NOTE — TELEPHONE ENCOUNTER
Patient completely out of medication and needing 2 weeks worth until her mail order meds are delivered, both meds pass per protocol, 2 weeks worth sent to her [facility] listed. Patient has appt on 7/11/18 with Cindi Hernandez. Informed patient.

## 2018-07-05 NOTE — TELEPHONE ENCOUNTER
Spoke to patient to inform her that she did need to complete a CBC and A1C before her upcoming appt with Christian Washington on 7/11/18. Patient expressed understanding.

## 2018-07-05 NOTE — TELEPHONE ENCOUNTER
Patient calling regarding refill for her prescriptions Benazepril and Furosemide. Patient stated she did contact her Pharmacy [facility] Mail order for her refill's but the prescriptions will not be in for 2 weeks.  Patient stated she is out of her med

## 2018-07-06 NOTE — PROGRESS NOTES
Discharge Summary    Pt has attended 16/16, cancelled 0, and no shown 0 visits in Occupational Therapy from 4/2-5/25/2018.     Subjective:   Pt reports she is wearing her bilateral lower leg Circaid garments at home with the clinic-fabricated foam inserts; the end of the summer to assess the appropriateness of being re-measured for compression garments. Pt is to be discharged from lymphedema therapy services at this time. Objective:   Pt returns for follow-up visit today after last being seen on 5/4.  She foot is boggy, pitting; lateral surface is densely boggy, pitting. Dorsum of foot with resolving coloration. Good tissue hydration of LE, except around perimeters of sole of foot in areas of calloused/dead skin.  Continued emergence of hair follicles over l at Dept: [phone]. Sincerely,  Electronically signed by therapist: Amisha Darden. NORA PerezR/L, MANDEEP-SHAKIRA        Ambulated into clinic with RW,  Arrived wearing Circaid garments over bilateral lower legs; foam packs in place under garments.    TREATMENT:

## 2018-07-11 ENCOUNTER — OFFICE VISIT (OUTPATIENT)
Dept: INTERNAL MEDICINE CLINIC | Facility: CLINIC | Age: 63
End: 2018-07-11

## 2018-07-11 ENCOUNTER — LAB ENCOUNTER (OUTPATIENT)
Dept: LAB | Age: 63
End: 2018-07-11
Attending: NURSE PRACTITIONER
Payer: COMMERCIAL

## 2018-07-11 VITALS
WEIGHT: 262 LBS | HEART RATE: 68 BPM | OXYGEN SATURATION: 97 % | SYSTOLIC BLOOD PRESSURE: 124 MMHG | HEIGHT: 60.5 IN | RESPIRATION RATE: 16 BRPM | DIASTOLIC BLOOD PRESSURE: 72 MMHG | BODY MASS INDEX: 50.11 KG/M2 | TEMPERATURE: 97 F

## 2018-07-11 DIAGNOSIS — I10 ESSENTIAL HYPERTENSION: ICD-10-CM

## 2018-07-11 DIAGNOSIS — I89.0 LYMPHEDEMA OF BOTH LOWER EXTREMITIES: Primary | ICD-10-CM

## 2018-07-11 DIAGNOSIS — E66.01 MORBID OBESITY WITH BMI OF 45.0-49.9, ADULT (HCC): ICD-10-CM

## 2018-07-11 DIAGNOSIS — E74.39 GLUCOSE INTOLERANCE: ICD-10-CM

## 2018-07-11 DIAGNOSIS — D64.9 ANEMIA, UNSPECIFIED TYPE: ICD-10-CM

## 2018-07-11 PROBLEM — L03.116 CELLULITIS OF LEFT LOWER EXTREMITY: Status: RESOLVED | Noted: 2018-03-04 | Resolved: 2018-07-11

## 2018-07-11 LAB
BASOPHILS # BLD AUTO: 0.04 X10(3) UL (ref 0–0.1)
BASOPHILS NFR BLD AUTO: 0.7 %
EOSINOPHIL # BLD AUTO: 0.17 X10(3) UL (ref 0–0.3)
EOSINOPHIL NFR BLD AUTO: 3.2 %
ERYTHROCYTE [DISTWIDTH] IN BLOOD BY AUTOMATED COUNT: 14.4 % (ref 11.5–16)
EST. AVERAGE GLUCOSE BLD GHB EST-MCNC: 126 MG/DL (ref 68–126)
HBA1C MFR BLD HPLC: 6 % (ref ?–5.7)
HCT VFR BLD AUTO: 38.1 % (ref 34–50)
HGB BLD-MCNC: 11.2 G/DL (ref 12–16)
IMMATURE GRANULOCYTE COUNT: 0.02 X10(3) UL (ref 0–1)
IMMATURE GRANULOCYTE RATIO %: 0.4 %
LYMPHOCYTES # BLD AUTO: 1.52 X10(3) UL (ref 0.9–4)
LYMPHOCYTES NFR BLD AUTO: 28.3 %
MCH RBC QN AUTO: 28.6 PG (ref 27–33.2)
MCHC RBC AUTO-ENTMCNC: 29.4 G/DL (ref 31–37)
MCV RBC AUTO: 97.4 FL (ref 81–100)
MONOCYTES # BLD AUTO: 0.43 X10(3) UL (ref 0.1–1)
MONOCYTES NFR BLD AUTO: 8 %
NEUTROPHIL ABS PRELIM: 3.19 X10 (3) UL (ref 1.3–6.7)
NEUTROPHILS # BLD AUTO: 3.19 X10(3) UL (ref 1.3–6.7)
NEUTROPHILS NFR BLD AUTO: 59.4 %
PLATELET # BLD AUTO: 257 10(3)UL (ref 150–450)
RBC # BLD AUTO: 3.91 X10(6)UL (ref 3.8–5.1)
RED CELL DISTRIBUTION WIDTH-SD: 50.9 FL (ref 35.1–46.3)
WBC # BLD AUTO: 5.4 X10(3) UL (ref 4–13)

## 2018-07-11 PROCEDURE — 36415 COLL VENOUS BLD VENIPUNCTURE: CPT | Performed by: NURSE PRACTITIONER

## 2018-07-11 PROCEDURE — 83036 HEMOGLOBIN GLYCOSYLATED A1C: CPT | Performed by: NURSE PRACTITIONER

## 2018-07-11 PROCEDURE — 99214 OFFICE O/P EST MOD 30 MIN: CPT | Performed by: NURSE PRACTITIONER

## 2018-07-11 PROCEDURE — 85025 COMPLETE CBC W/AUTO DIFF WBC: CPT | Performed by: NURSE PRACTITIONER

## 2018-07-11 RX ORDER — FUROSEMIDE 40 MG/1
40 TABLET ORAL
Qty: 90 TABLET | Refills: 1 | Status: SHIPPED | OUTPATIENT
Start: 2018-07-11 | End: 2019-02-25

## 2018-07-11 RX ORDER — BENAZEPRIL HYDROCHLORIDE 10 MG/1
10 TABLET ORAL
Qty: 90 TABLET | Refills: 1 | Status: SHIPPED | OUTPATIENT
Start: 2018-07-11 | End: 2019-02-25

## 2018-07-11 NOTE — PROGRESS NOTES
Patient presents with: Follow - Up: patient here follow up on lymphedema of both lower extrimities. HPI:  Presents for follow up of lymphedema. Has followed with lymphedema clinic and stated is feeling much better.  Is able to ambulate short distance (hypertension) 8/4/2016   • Hypothyroid    • Hypothyroidism    • Laryngitis    • Leg pain    • Lipid screening 2/6/2009   • Lymphedema of both lower extremities 9/15/2015   • Measles    • Migraines    • Mumps    • OA (osteoarthritis)     knee   • Obesity 3 TraMADol HCl 50 MG Oral Tab Take 1 tablet (50 mg total) by mouth every 6 (six) hours as needed. Disp: 20 tablet Rfl: 0   acetaminophen 325 MG Oral Tab Take 2 tablets (650 mg total) by mouth every 6 (six) hours as needed for Pain.  Disp: 30 tablet Rfl: 0 encounter. Meds & Refills for this Visit:  Signed Prescriptions Disp Refills    Benazepril HCl 10 MG Oral Tab 90 tablet 1      Sig: Take 1 tablet (10 mg total) by mouth once daily.       furosemide 40 MG Oral Tab 90 tablet 1      Sig: Take 1 tablet (40

## 2018-07-28 ENCOUNTER — TELEPHONE (OUTPATIENT)
Dept: INTERNAL MEDICINE CLINIC | Facility: CLINIC | Age: 63
End: 2018-07-28

## 2018-07-28 DIAGNOSIS — I89.0 LYMPHEDEMA OF BOTH LOWER EXTREMITIES: Primary | ICD-10-CM

## 2018-08-02 ENCOUNTER — HOSPITAL ENCOUNTER (OUTPATIENT)
Dept: OCCUPATIONAL MEDICINE | Facility: HOSPITAL | Age: 63
Setting detail: THERAPIES SERIES
Discharge: HOME | End: 2018-08-02
Attending: NURSE PRACTITIONER
Payer: COMMERCIAL

## 2018-08-02 DIAGNOSIS — I89.0 LYMPHEDEMA OF BOTH LOWER EXTREMITIES: ICD-10-CM

## 2018-08-02 PROCEDURE — 97166 OT EVAL MOD COMPLEX 45 MIN: CPT

## 2018-08-02 PROCEDURE — 97535 SELF CARE MNGMENT TRAINING: CPT

## 2018-08-06 NOTE — PROGRESS NOTES
OCCUPATIONAL THERAPY LE LYMPHEDEMA EVALUATION:   Referring Provider: Deja Terry NP  Diagnosis: Lymphedema of both lower extremities (I89.0) Date of Service: 8/2/2018     PATIENT SUMMARY   Arturo Shine is a 60year old y/o female who presents with bilateral knee pain 4-5/10. Current functional limitations  Denies. Arjun Gomez describes prior level of function as independent in ADL/IADLs. Works in her home as a . Has a RW, but is not using it.    Pt goals include get measured for new com 566.9cm. Left thigh is supple except for distal medial to posterior thigh which is boggy, pitting. Knee is boggy, pitting.  Proximal 1/3 of lower leg is boggy, pitting over anterior and lateral surfaces; boggy, non-pitting over medial and posterior surfac thigh compression. Discussed present status and anticipated plan.  Deandre Camilo requesting that she be allowed to return to recommended self-management skills for a period of approximately one month and then return at that time to determine if she has been able to below SBP  52.9 59.7   30cm below SBP  35.5 45.7   35cm below SBP  28.9 35.0   40cm below SBP  29.3 30.5   Lateral Malleoli  29.8 30.6   7cm proximal to 1st toe web space  26.0 25.5   5cm proximal to 1st toe web space  25.1 24.7   TOTALS  550.6 566.9   Abelardo

## 2018-08-13 ENCOUNTER — OFFICE VISIT (OUTPATIENT)
Dept: INTERNAL MEDICINE CLINIC | Facility: CLINIC | Age: 63
End: 2018-08-13
Payer: COMMERCIAL

## 2018-08-13 VITALS
RESPIRATION RATE: 16 BRPM | OXYGEN SATURATION: 98 % | HEIGHT: 60.25 IN | BODY MASS INDEX: 49.6 KG/M2 | HEART RATE: 83 BPM | SYSTOLIC BLOOD PRESSURE: 128 MMHG | WEIGHT: 256 LBS | DIASTOLIC BLOOD PRESSURE: 66 MMHG | TEMPERATURE: 98 F

## 2018-08-13 DIAGNOSIS — Z12.31 ENCOUNTER FOR SCREENING MAMMOGRAM FOR BREAST CANCER: Primary | ICD-10-CM

## 2018-08-13 DIAGNOSIS — Z00.00 ROUTINE GENERAL MEDICAL EXAMINATION AT A HEALTH CARE FACILITY: ICD-10-CM

## 2018-08-13 DIAGNOSIS — Z12.11 COLON CANCER SCREENING: ICD-10-CM

## 2018-08-13 PROCEDURE — 99396 PREV VISIT EST AGE 40-64: CPT | Performed by: INTERNAL MEDICINE

## 2018-08-13 NOTE — PROGRESS NOTES
HPI:   Jose Prince is a 60year old female who presents for a complete physical exam. . She started CPAP and feels a world of differencel. She gets occ palps and Dr Edward Salgado still has her on pradaxa and tambicor.    She was hosp for cellulitis in Family History   Problem Relation Age of Onset   • High Blood Pressure Father    • Other [OTHER] Father      DEMENTIA   • Hypertension Mother    • High Blood Pressure Mother    • Heart Disease Mother    • Other Get Dy Mother      MACULAR DEGENERATION anemia  ENDOCRINE: denies thyroid history  ALL/ASTHMA: allergies, ACT 25 this year, adherent to meds    EXAM:   /66 (BP Location: Left arm, Patient Position: Sitting, Cuff Size: large)   Pulse 83   Temp 98 °F (36.7 °C) (Oral)   Resp 16   Ht 60.25\" Encounter      CMP      Lipid Panel      Vit D      TSH W Reflex To Free T4      CBC With Differential With Platelet      Occult Blood, Fecal, Immunoassay [E]    Meds & Refills for this Visit:  No prescriptions requested or ordered in this encounter    Ozzy Montoya

## 2018-08-29 ENCOUNTER — HOSPITAL ENCOUNTER (OUTPATIENT)
Dept: OCCUPATIONAL MEDICINE | Facility: HOSPITAL | Age: 63
Setting detail: THERAPIES SERIES
Discharge: HOME | End: 2018-08-29
Attending: NURSE PRACTITIONER
Payer: COMMERCIAL

## 2018-08-29 PROCEDURE — 97535 SELF CARE MNGMENT TRAINING: CPT

## 2018-08-29 NOTE — PROGRESS NOTES
Discharge Summary    Pt has attended ***, cancelled ***, and no shown *** visits in {EDW THERAPY LIST:1266} from ***    Subjective:   ***    Assessment:   ***    Objective:   ***    Lymphedema Life Impact Scale 2: Score: ***. Percent of impairment: ***%.

## 2018-09-12 RX ORDER — LEVOTHYROXINE SODIUM 112 UG/1
TABLET ORAL
Qty: 90 TABLET | Refills: 0 | Status: SHIPPED | OUTPATIENT
Start: 2018-09-12 | End: 2018-12-10

## 2018-09-13 ENCOUNTER — TELEPHONE (OUTPATIENT)
Dept: INTERNAL MEDICINE CLINIC | Facility: CLINIC | Age: 63
End: 2018-09-13

## 2018-09-13 NOTE — TELEPHONE ENCOUNTER
Incoming (mail or fax):  Fax  Received from: [facility]   Documentation given to: Triage    *Prescriber Signature Requested

## 2018-09-14 NOTE — TELEPHONE ENCOUNTER
Order for compression stockings signed and faxed back to [address] [place] [address]. at [phone]. Fax confirmation received. Order placed in scanning bin.

## 2018-09-14 NOTE — TELEPHONE ENCOUNTER
Filled out form and routed back to Dr. Stu Obrien. In folder. Patient seen by Hudson Paredes for this, but out of the office until Tuesday next week, so requested from Dr. Stu Obrien.

## 2018-09-14 NOTE — TELEPHONE ENCOUNTER
Order for compression stockings received from [email or web address] and placed in folder. Routed to Dr. Bart Reynolds.

## 2018-09-18 ENCOUNTER — LABORATORY ENCOUNTER (OUTPATIENT)
Dept: LAB | Age: 63
End: 2018-09-18
Attending: INTERNAL MEDICINE
Payer: COMMERCIAL

## 2018-09-18 ENCOUNTER — PRIOR ORIGINAL RECORDS (OUTPATIENT)
Dept: OTHER | Age: 63
End: 2018-09-18

## 2018-09-18 DIAGNOSIS — Z00.00 ROUTINE GENERAL MEDICAL EXAMINATION AT A HEALTH CARE FACILITY: ICD-10-CM

## 2018-09-18 DIAGNOSIS — D64.9 ANEMIA, UNSPECIFIED TYPE: ICD-10-CM

## 2018-09-18 LAB
ALBUMIN SERPL-MCNC: 3.8 G/DL (ref 3.5–4.8)
ALBUMIN/GLOB SERPL: 0.9 {RATIO} (ref 1–2)
ALP LIVER SERPL-CCNC: 106 U/L (ref 50–130)
ALT SERPL-CCNC: 20 U/L (ref 14–54)
ANION GAP SERPL CALC-SCNC: 10 MMOL/L (ref 0–18)
AST SERPL-CCNC: 15 U/L (ref 15–41)
BASOPHILS # BLD AUTO: 0.03 X10(3) UL (ref 0–0.1)
BASOPHILS NFR BLD AUTO: 0.5 %
BILIRUB SERPL-MCNC: 0.6 MG/DL (ref 0.1–2)
BUN BLD-MCNC: 23 MG/DL (ref 8–20)
BUN/CREAT SERPL: 22.3 (ref 10–20)
CALCIUM BLD-MCNC: 9.1 MG/DL (ref 8.3–10.3)
CHLORIDE SERPL-SCNC: 103 MMOL/L (ref 101–111)
CHOLEST SMN-MCNC: 199 MG/DL (ref ?–200)
CO2 SERPL-SCNC: 26 MMOL/L (ref 22–32)
CREAT BLD-MCNC: 1.03 MG/DL (ref 0.55–1.02)
EOSINOPHIL # BLD AUTO: 0.14 X10(3) UL (ref 0–0.3)
EOSINOPHIL NFR BLD AUTO: 2.3 %
ERYTHROCYTE [DISTWIDTH] IN BLOOD BY AUTOMATED COUNT: 14.1 % (ref 11.5–16)
GLOBULIN PLAS-MCNC: 4.1 G/DL (ref 2.5–4)
GLUCOSE BLD-MCNC: 89 MG/DL (ref 70–99)
HCT VFR BLD AUTO: 37.4 % (ref 34–50)
HDLC SERPL-MCNC: 60 MG/DL (ref 40–59)
HGB BLD-MCNC: 11.5 G/DL (ref 12–16)
IMMATURE GRANULOCYTE COUNT: 0.01 X10(3) UL (ref 0–1)
IMMATURE GRANULOCYTE RATIO %: 0.2 %
LDLC SERPL CALC-MCNC: 119 MG/DL (ref ?–100)
LYMPHOCYTES # BLD AUTO: 1.8 X10(3) UL (ref 0.9–4)
LYMPHOCYTES NFR BLD AUTO: 29.1 %
M PROTEIN MFR SERPL ELPH: 7.9 G/DL (ref 6.1–8.3)
MCH RBC QN AUTO: 28.5 PG (ref 27–33.2)
MCHC RBC AUTO-ENTMCNC: 30.7 G/DL (ref 31–37)
MCV RBC AUTO: 92.6 FL (ref 81–100)
MONOCYTES # BLD AUTO: 0.38 X10(3) UL (ref 0.1–1)
MONOCYTES NFR BLD AUTO: 6.1 %
NEUTROPHIL ABS PRELIM: 3.83 X10 (3) UL (ref 1.3–6.7)
NEUTROPHILS # BLD AUTO: 3.83 X10(3) UL (ref 1.3–6.7)
NEUTROPHILS NFR BLD AUTO: 61.8 %
NONHDLC SERPL-MCNC: 139 MG/DL (ref ?–130)
OSMOLALITY SERPL CALC.SUM OF ELEC: 291 MOSM/KG (ref 275–295)
PLATELET # BLD AUTO: 269 10(3)UL (ref 150–450)
POTASSIUM SERPL-SCNC: 4.4 MMOL/L (ref 3.6–5.1)
RBC # BLD AUTO: 4.04 X10(6)UL (ref 3.8–5.1)
RED CELL DISTRIBUTION WIDTH-SD: 47.8 FL (ref 35.1–46.3)
SODIUM SERPL-SCNC: 139 MMOL/L (ref 136–144)
TRIGL SERPL-MCNC: 101 MG/DL (ref 30–149)
TSI SER-ACNC: 4.37 MIU/ML (ref 0.35–5.5)
VIT D+METAB SERPL-MCNC: 30.6 NG/ML (ref 30–100)
VLDLC SERPL CALC-MCNC: 20 MG/DL (ref 0–30)
WBC # BLD AUTO: 6.2 X10(3) UL (ref 4–13)

## 2018-09-18 PROCEDURE — 82607 VITAMIN B-12: CPT | Performed by: INTERNAL MEDICINE

## 2018-09-18 PROCEDURE — 83540 ASSAY OF IRON: CPT | Performed by: INTERNAL MEDICINE

## 2018-09-18 PROCEDURE — 83550 IRON BINDING TEST: CPT | Performed by: INTERNAL MEDICINE

## 2018-09-18 PROCEDURE — 82306 VITAMIN D 25 HYDROXY: CPT | Performed by: INTERNAL MEDICINE

## 2018-09-18 PROCEDURE — 80061 LIPID PANEL: CPT | Performed by: INTERNAL MEDICINE

## 2018-09-18 PROCEDURE — 82728 ASSAY OF FERRITIN: CPT | Performed by: INTERNAL MEDICINE

## 2018-09-18 PROCEDURE — 80050 GENERAL HEALTH PANEL: CPT | Performed by: INTERNAL MEDICINE

## 2018-09-18 PROCEDURE — 36415 COLL VENOUS BLD VENIPUNCTURE: CPT | Performed by: INTERNAL MEDICINE

## 2018-09-19 DIAGNOSIS — D64.9 ANEMIA, UNSPECIFIED TYPE: Primary | ICD-10-CM

## 2018-09-19 LAB
DEPRECATED HBV CORE AB SER IA-ACNC: 24.1 NG/ML (ref 18–340)
HAV AB SERPL IA-ACNC: 339 PG/ML (ref 193–986)
IRON SATURATION: 15 % (ref 15–50)
IRON: 70 UG/DL (ref 28–170)
TOTAL IRON BINDING CAPACITY: 472 UG/DL (ref 240–450)
TRANSFERRIN SERPL-MCNC: 317 MG/DL (ref 200–360)

## 2018-09-20 DIAGNOSIS — Z12.11 SCREENING FOR COLON CANCER: ICD-10-CM

## 2018-09-20 DIAGNOSIS — K90.9 INTESTINAL MALABSORPTION, UNSPECIFIED TYPE: Primary | ICD-10-CM

## 2018-09-20 DIAGNOSIS — D50.9 IRON DEFICIENCY ANEMIA, UNSPECIFIED IRON DEFICIENCY ANEMIA TYPE: ICD-10-CM

## 2018-09-20 NOTE — PROGRESS NOTES
Cannot add Folate lab to existing specimen, will call patient to let her know to redraw. Referral pended for [facility], phone number: [phone]. Dr. Daley Favors Dx code for GI referral, pended order.  Also would you like them to place colonoscop

## 2018-09-24 NOTE — PROGRESS NOTES
Spoke to patient, aware of results, and recommendations. Pt voiced understanding. Referral is placed, patient has information already for [address] [place] [address].

## 2018-10-01 ENCOUNTER — HOSPITAL ENCOUNTER (OUTPATIENT)
Dept: MAMMOGRAPHY | Age: 63
End: 2018-10-01
Attending: INTERNAL MEDICINE
Payer: COMMERCIAL

## 2018-10-01 ENCOUNTER — HOSPITAL ENCOUNTER (OUTPATIENT)
Dept: MAMMOGRAPHY | Age: 63
Discharge: HOME | End: 2018-10-01
Attending: INTERNAL MEDICINE
Payer: COMMERCIAL

## 2018-10-01 DIAGNOSIS — Z12.31 ENCOUNTER FOR SCREENING MAMMOGRAM FOR BREAST CANCER: ICD-10-CM

## 2018-10-01 PROCEDURE — 77067 SCR MAMMO BI INCL CAD: CPT | Performed by: INTERNAL MEDICINE

## 2018-10-01 PROCEDURE — 77063 BREAST TOMOSYNTHESIS BI: CPT | Performed by: INTERNAL MEDICINE

## 2018-10-03 NOTE — TELEPHONE ENCOUNTER
Last OV relevant to medication: 8/13/18 - PE  Last refill date: 10/30/2017     #/refills: 90/3  When pt was asked to return for OV: 6 months - BP, 1 yr - PE  Upcoming appt/reason: none

## 2018-10-04 RX ORDER — ESCITALOPRAM OXALATE 10 MG/1
TABLET ORAL
Qty: 90 TABLET | Refills: 2 | Status: SHIPPED | OUTPATIENT
Start: 2018-10-04 | End: 2019-07-12

## 2018-11-09 ENCOUNTER — PRIOR ORIGINAL RECORDS (OUTPATIENT)
Dept: OTHER | Age: 63
End: 2018-11-09

## 2018-11-09 ENCOUNTER — MYAURORA ACCOUNT LINK (OUTPATIENT)
Dept: OTHER | Age: 63
End: 2018-11-09

## 2018-12-03 LAB
ALBUMIN: 3.8 G/DL
ALKALINE PHOSPHATATE(ALK PHOS): 106 IU/L
BILIRUBIN TOTAL: 0.6 MG/DL
BUN: 23 MG/DL
CALCIUM: 9.1 MG/DL
CHLORIDE: 103 MEQ/L
CHOLESTEROL, TOTAL: 199 MG/DL
CREATININE, SERUM: 1.03 MG/DL
GLOBULIN: 4.1 G/DL
GLUCOSE: 89 MG/DL
HDL CHOLESTEROL: 60 MG/DL
HEMATOCRIT: 37.4 %
HEMOGLOBIN: 11.5 G/DL
LDL CHOLESTEROL: 119 MG/DL
PLATELETS: 269 K/UL
POTASSIUM, SERUM: 4.4 MEQ/L
PROTEIN, TOTAL: 7.9 G/DL
RED BLOOD COUNT: 4.04 X 10-6/U
SGOT (AST): 15 IU/L
SGPT (ALT): 20 IU/L
SODIUM: 139 MEQ/L
THYROID STIMULATING HORMONE: 4.37 MLU/L
TRIGLYCERIDES: 101 MG/DL
VITAMIN D 25-OH: 30.6 NG/ML
WHITE BLOOD COUNT: 6.2 X 10-3/U

## 2018-12-05 ENCOUNTER — MED REC SCAN ONLY (OUTPATIENT)
Dept: INTERNAL MEDICINE CLINIC | Facility: CLINIC | Age: 63
End: 2018-12-05

## 2018-12-11 RX ORDER — LEVOTHYROXINE SODIUM 112 UG/1
TABLET ORAL
Qty: 90 TABLET | Refills: 1 | Status: SHIPPED | OUTPATIENT
Start: 2018-12-11 | End: 2019-06-10

## 2019-01-09 ENCOUNTER — TELEPHONE (OUTPATIENT)
Dept: INTERNAL MEDICINE CLINIC | Facility: CLINIC | Age: 64
End: 2019-01-09

## 2019-01-09 NOTE — TELEPHONE ENCOUNTER
Patient's handicapped plackard expires the end of January. The plackard she gets is usually for 6 months but she would like a permanent one if possible. She said our office usually prints and completes the form for her and then tells her when it's ready.

## 2019-01-10 NOTE — TELEPHONE ENCOUNTER
Noted below and this has been addressed through a [employer] message that the patient has sent. I am awaiting Dr. Emilee Holstein reply, then will contact the patient.

## 2019-02-27 NOTE — TELEPHONE ENCOUNTER
LM's for patient to call back. Patient is due for a 6 month Med chk appt and must schedule to Release Rx Refill. A 30 day supply can be sent to local pharmacy to last until appt date.       Last OV relevant to medication: 8/13/2018 - PE  Last refill date:

## 2019-02-28 VITALS
WEIGHT: 250 LBS | HEIGHT: 63 IN | SYSTOLIC BLOOD PRESSURE: 130 MMHG | BODY MASS INDEX: 44.3 KG/M2 | HEART RATE: 58 BPM | DIASTOLIC BLOOD PRESSURE: 56 MMHG

## 2019-02-28 VITALS
WEIGHT: 262 LBS | BODY MASS INDEX: 49.47 KG/M2 | HEIGHT: 61 IN | SYSTOLIC BLOOD PRESSURE: 138 MMHG | DIASTOLIC BLOOD PRESSURE: 60 MMHG | HEART RATE: 68 BPM

## 2019-02-28 VITALS
SYSTOLIC BLOOD PRESSURE: 118 MMHG | WEIGHT: 264 LBS | HEART RATE: 65 BPM | DIASTOLIC BLOOD PRESSURE: 58 MMHG | HEIGHT: 63 IN | BODY MASS INDEX: 46.78 KG/M2

## 2019-02-28 RX ORDER — BENAZEPRIL HYDROCHLORIDE 10 MG/1
TABLET ORAL
Qty: 30 TABLET | Refills: 0 | Status: SHIPPED | OUTPATIENT
Start: 2019-02-28 | End: 2019-03-11

## 2019-02-28 RX ORDER — FUROSEMIDE 40 MG/1
TABLET ORAL
Qty: 30 TABLET | Refills: 0 | Status: SHIPPED | OUTPATIENT
Start: 2019-02-28 | End: 2019-03-11

## 2019-02-28 NOTE — TELEPHONE ENCOUNTER
Patient called back, scheduled her a medication check apt with Dr. Juan Peralta for 3/11/19, can we please send small RX over to pharmacy to last patient until upcoming appointment? Thank you!

## 2019-03-01 VITALS
BODY MASS INDEX: 49.47 KG/M2 | HEIGHT: 61 IN | WEIGHT: 262 LBS | HEART RATE: 66 BPM | DIASTOLIC BLOOD PRESSURE: 76 MMHG | SYSTOLIC BLOOD PRESSURE: 134 MMHG

## 2019-03-11 ENCOUNTER — OFFICE VISIT (OUTPATIENT)
Dept: INTERNAL MEDICINE CLINIC | Facility: CLINIC | Age: 64
End: 2019-03-11
Payer: COMMERCIAL

## 2019-03-11 VITALS
BODY MASS INDEX: 51.19 KG/M2 | DIASTOLIC BLOOD PRESSURE: 78 MMHG | OXYGEN SATURATION: 99 % | SYSTOLIC BLOOD PRESSURE: 124 MMHG | HEART RATE: 78 BPM | HEIGHT: 60.25 IN | WEIGHT: 264.19 LBS | TEMPERATURE: 98 F | RESPIRATION RATE: 18 BRPM

## 2019-03-11 DIAGNOSIS — E03.9 ACQUIRED HYPOTHYROIDISM: ICD-10-CM

## 2019-03-11 DIAGNOSIS — I89.0 LYMPHEDEMA OF BOTH LOWER EXTREMITIES: ICD-10-CM

## 2019-03-11 DIAGNOSIS — D64.89 ANEMIA DUE TO OTHER CAUSE, NOT CLASSIFIED: Primary | ICD-10-CM

## 2019-03-11 DIAGNOSIS — I10 ESSENTIAL HYPERTENSION: ICD-10-CM

## 2019-03-11 DIAGNOSIS — E74.39 GLUCOSE INTOLERANCE: ICD-10-CM

## 2019-03-11 DIAGNOSIS — E66.01 MORBID OBESITY WITH BMI OF 45.0-49.9, ADULT (HCC): ICD-10-CM

## 2019-03-11 DIAGNOSIS — J45.20 MILD INTERMITTENT ASTHMA WITHOUT COMPLICATION: ICD-10-CM

## 2019-03-11 DIAGNOSIS — G47.33 OSA ON CPAP: ICD-10-CM

## 2019-03-11 DIAGNOSIS — Z99.89 OSA ON CPAP: ICD-10-CM

## 2019-03-11 PROBLEM — D64.9 ANEMIA: Status: ACTIVE | Noted: 2019-03-11

## 2019-03-11 PROCEDURE — 99214 OFFICE O/P EST MOD 30 MIN: CPT | Performed by: INTERNAL MEDICINE

## 2019-03-11 RX ORDER — FUROSEMIDE 40 MG/1
40 TABLET ORAL
Qty: 90 TABLET | Refills: 1 | Status: SHIPPED | OUTPATIENT
Start: 2019-03-11 | End: 2019-09-11

## 2019-03-11 RX ORDER — BENAZEPRIL HYDROCHLORIDE 10 MG/1
10 TABLET ORAL
Qty: 90 TABLET | Refills: 1 | Status: SHIPPED | OUTPATIENT
Start: 2019-03-11 | End: 2019-09-11

## 2019-03-11 NOTE — PROGRESS NOTES
Terrell Richter is a 58year old female.  To F/U from last visit regarding lymphedema, htn, hypothyroidism and asthma and anemia  HPI:    Interim history:her legs are great- weeping and blisters all healed, redness gone, massage boots nightly  Swimming t mouth daily. Disp:  Rfl:          Social History:  Social History    Tobacco Use      Smoking status: Never Smoker      Smokeless tobacco: Never Used    Alcohol use:  Yes      Alcohol/week: 0.0 oz      Comment: 1-2 drink per month     Drug use: No     Patie consider wt loss clinic discussion at annual PE  Essential hypertension- at goal, CPM  Lymphedema of both lower extremities- doing great, CPM  Kale on cpap- CPM  Acquired hypothyroidism- euthyorid chemically and clinically    Orders Placed This Encounter

## 2019-03-12 ENCOUNTER — LAB ENCOUNTER (OUTPATIENT)
Dept: LAB | Age: 64
End: 2019-03-12
Attending: INTERNAL MEDICINE
Payer: COMMERCIAL

## 2019-03-12 DIAGNOSIS — D64.89 ANEMIA DUE TO OTHER CAUSE, NOT CLASSIFIED: ICD-10-CM

## 2019-03-12 DIAGNOSIS — D64.9 ANEMIA, UNSPECIFIED TYPE: ICD-10-CM

## 2019-03-12 LAB
BASOPHILS # BLD AUTO: 0.03 X10(3) UL (ref 0–0.2)
BASOPHILS NFR BLD AUTO: 0.4 %
DEPRECATED HBV CORE AB SER IA-ACNC: 18.9 NG/ML (ref 18–340)
DEPRECATED RDW RBC AUTO: 46.1 FL (ref 35.1–46.3)
EOSINOPHIL # BLD AUTO: 0.16 X10(3) UL (ref 0–0.7)
EOSINOPHIL NFR BLD AUTO: 2.3 %
ERYTHROCYTE [DISTWIDTH] IN BLOOD BY AUTOMATED COUNT: 13.5 % (ref 11–15)
FOLATE SERPL-MCNC: 25.4 NG/ML (ref 8.7–?)
HCT VFR BLD AUTO: 37 % (ref 35–48)
HGB BLD-MCNC: 11.7 G/DL (ref 12–16)
IMM GRANULOCYTES # BLD AUTO: 0.03 X10(3) UL (ref 0–1)
IMM GRANULOCYTES NFR BLD: 0.4 %
IRON SATURATION: 22 % (ref 15–50)
IRON SERPL-MCNC: 96 UG/DL (ref 50–170)
LYMPHOCYTES # BLD AUTO: 1.6 X10(3) UL (ref 1–4)
LYMPHOCYTES NFR BLD AUTO: 23.3 %
MCH RBC QN AUTO: 29.1 PG (ref 26–34)
MCHC RBC AUTO-ENTMCNC: 31.6 G/DL (ref 31–37)
MCV RBC AUTO: 92 FL (ref 80–100)
MONOCYTES # BLD AUTO: 0.4 X10(3) UL (ref 0.1–1)
MONOCYTES NFR BLD AUTO: 5.8 %
NEUTROPHILS # BLD AUTO: 4.64 X10 (3) UL (ref 1.5–7.7)
NEUTROPHILS # BLD AUTO: 4.64 X10(3) UL (ref 1.5–7.7)
NEUTROPHILS NFR BLD AUTO: 67.8 %
PLATELET # BLD AUTO: 261 10(3)UL (ref 150–450)
RBC # BLD AUTO: 4.02 X10(6)UL (ref 3.8–5.3)
TOTAL IRON BINDING CAPACITY: 432 UG/DL (ref 240–450)
TRANSFERRIN SERPL-MCNC: 290 MG/DL (ref 200–360)
VIT B12 SERPL-MCNC: 343 PG/ML (ref 193–986)
WBC # BLD AUTO: 6.9 X10(3) UL (ref 4–11)

## 2019-03-12 PROCEDURE — 82728 ASSAY OF FERRITIN: CPT | Performed by: INTERNAL MEDICINE

## 2019-03-12 PROCEDURE — 36415 COLL VENOUS BLD VENIPUNCTURE: CPT | Performed by: INTERNAL MEDICINE

## 2019-03-12 PROCEDURE — 83550 IRON BINDING TEST: CPT | Performed by: INTERNAL MEDICINE

## 2019-03-12 PROCEDURE — 83540 ASSAY OF IRON: CPT | Performed by: INTERNAL MEDICINE

## 2019-03-12 PROCEDURE — 82607 VITAMIN B-12: CPT | Performed by: INTERNAL MEDICINE

## 2019-03-12 PROCEDURE — 85025 COMPLETE CBC W/AUTO DIFF WBC: CPT | Performed by: INTERNAL MEDICINE

## 2019-03-12 PROCEDURE — 82746 ASSAY OF FOLIC ACID SERUM: CPT | Performed by: INTERNAL MEDICINE

## 2019-03-14 DIAGNOSIS — O28.0 LOW MATERNAL SERUM VITAMIN B12: ICD-10-CM

## 2019-03-14 DIAGNOSIS — E53.8 LOW VITAMIN B12 LEVEL: ICD-10-CM

## 2019-03-14 DIAGNOSIS — R79.89 ABNORMAL CBC: Primary | ICD-10-CM

## 2019-03-14 DIAGNOSIS — R79.0 LOW FERRITIN: ICD-10-CM

## 2019-03-28 RX ORDER — ESCITALOPRAM OXALATE 10 MG/1
TABLET ORAL
COMMUNITY
Start: 2013-07-17 | End: 2019-12-02 | Stop reason: ALTCHOICE

## 2019-03-28 RX ORDER — LEVOTHYROXINE SODIUM 112 UG/1
1 TABLET ORAL DAILY
COMMUNITY
Start: 2015-08-28 | End: 2019-12-02 | Stop reason: ALTCHOICE

## 2019-03-28 RX ORDER — FUROSEMIDE 40 MG/1
TABLET ORAL
COMMUNITY
Start: 2013-07-17 | End: 2019-12-02 | Stop reason: ALTCHOICE

## 2019-03-28 RX ORDER — ALBUTEROL SULFATE 90 UG/1
AEROSOL, METERED RESPIRATORY (INHALATION)
COMMUNITY
Start: 2013-07-17 | End: 2019-12-02 | Stop reason: ALTCHOICE

## 2019-03-28 RX ORDER — FLECAINIDE ACETATE 100 MG/1
TABLET ORAL
COMMUNITY
Start: 2018-11-09 | End: 2019-12-02 | Stop reason: SDUPTHER

## 2019-03-28 RX ORDER — ACETAMINOPHEN 160 MG
TABLET,DISINTEGRATING ORAL
COMMUNITY
Start: 2015-08-28

## 2019-03-28 RX ORDER — BENAZEPRIL HYDROCHLORIDE 10 MG/1
TABLET ORAL
COMMUNITY
Start: 2013-07-17 | End: 2019-12-02 | Stop reason: ALTCHOICE

## 2019-03-28 RX ORDER — OMEPRAZOLE 20 MG/1
TABLET, DELAYED RELEASE ORAL
COMMUNITY
Start: 2013-07-17

## 2019-03-28 RX ORDER — B-COMPLEX WITH VITAMIN C
1 TABLET ORAL DAILY
COMMUNITY
Start: 2015-08-28 | End: 2019-12-02 | Stop reason: ALTCHOICE

## 2019-03-29 RX ORDER — CALCIUM CARBONATE 500(1250)
1 TABLET ORAL
COMMUNITY

## 2019-04-22 RX ORDER — BENAZEPRIL HYDROCHLORIDE 10 MG/1
TABLET ORAL
Qty: 30 TABLET | Refills: 0 | OUTPATIENT
Start: 2019-04-22

## 2019-04-22 RX ORDER — FUROSEMIDE 40 MG/1
TABLET ORAL
Qty: 30 TABLET | Refills: 0 | OUTPATIENT
Start: 2019-04-22

## 2019-05-16 ENCOUNTER — OFFICE VISIT (OUTPATIENT)
Dept: CARDIOLOGY | Age: 64
End: 2019-05-16

## 2019-05-16 VITALS
HEIGHT: 63 IN | WEIGHT: 265 LBS | SYSTOLIC BLOOD PRESSURE: 142 MMHG | BODY MASS INDEX: 46.95 KG/M2 | HEART RATE: 84 BPM | DIASTOLIC BLOOD PRESSURE: 60 MMHG

## 2019-05-16 DIAGNOSIS — I10 BENIGN ESSENTIAL HTN: ICD-10-CM

## 2019-05-16 DIAGNOSIS — I48.91 ATRIAL FIBRILLATION, UNSPECIFIED TYPE (CMD): Primary | ICD-10-CM

## 2019-05-16 DIAGNOSIS — G47.30 SLEEP APNEA, UNSPECIFIED TYPE: ICD-10-CM

## 2019-05-16 PROCEDURE — 93000 ELECTROCARDIOGRAM COMPLETE: CPT | Performed by: NURSE PRACTITIONER

## 2019-05-16 PROCEDURE — 99213 OFFICE O/P EST LOW 20 MIN: CPT | Performed by: NURSE PRACTITIONER

## 2019-05-16 PROCEDURE — 3077F SYST BP >= 140 MM HG: CPT | Performed by: NURSE PRACTITIONER

## 2019-05-16 PROCEDURE — 3078F DIAST BP <80 MM HG: CPT | Performed by: NURSE PRACTITIONER

## 2019-05-16 RX ORDER — PNV NO.95/FERROUS FUM/FOLIC AC 28MG-0.8MG
TABLET ORAL
COMMUNITY
End: 2019-12-02 | Stop reason: ALTCHOICE

## 2019-05-16 RX ORDER — DABIGATRAN ETEXILATE 150 MG/1
150 CAPSULE ORAL EVERY 12 HOURS SCHEDULED
COMMUNITY
End: 2019-05-18 | Stop reason: SDUPTHER

## 2019-05-16 ASSESSMENT — ENCOUNTER SYMPTOMS
CHILLS: 0
FEVER: 0
WEIGHT GAIN: 0
HEMATOCHEZIA: 0
SNORING: 1
HEMOPTYSIS: 0
ALLERGIC/IMMUNOLOGIC COMMENTS: NO NEW FOOD ALLERGIES
SUSPICIOUS LESIONS: 0
WEIGHT LOSS: 0
BRUISES/BLEEDS EASILY: 0
DIZZINESS: 0
LIGHT-HEADEDNESS: 0
COUGH: 0

## 2019-05-20 ENCOUNTER — E-ADVICE (OUTPATIENT)
Dept: CARDIOLOGY | Age: 64
End: 2019-05-20

## 2019-05-20 RX ORDER — DABIGATRAN ETEXILATE 150 MG/1
150 CAPSULE ORAL EVERY 12 HOURS SCHEDULED
Qty: 180 CAPSULE | Refills: 1 | Status: SHIPPED | OUTPATIENT
Start: 2019-05-20 | End: 2020-04-20

## 2019-05-20 RX ORDER — DABIGATRAN ETEXILATE 150 MG/1
CAPSULE ORAL EVERY 12 HOURS SCHEDULED
COMMUNITY
End: 2019-05-20 | Stop reason: SDUPTHER

## 2019-05-20 RX ORDER — ATENOLOL 100 MG/1
TABLET ORAL
Qty: 180 CAPSULE | Refills: 3 | Status: SHIPPED | OUTPATIENT
Start: 2019-05-20 | End: 2019-12-02 | Stop reason: ALTCHOICE

## 2019-05-20 RX ORDER — ATENOLOL 100 MG/1
150 TABLET ORAL EVERY 12 HOURS SCHEDULED
Qty: 60 CAPSULE | Refills: 1 | Status: SHIPPED | OUTPATIENT
Start: 2019-05-20 | End: 2019-12-02 | Stop reason: ALTCHOICE

## 2019-06-10 DIAGNOSIS — E03.9 ACQUIRED HYPOTHYROIDISM: Primary | ICD-10-CM

## 2019-06-11 RX ORDER — LEVOTHYROXINE SODIUM 112 UG/1
TABLET ORAL
Qty: 90 TABLET | Refills: 0 | Status: SHIPPED | OUTPATIENT
Start: 2019-06-11 | End: 2019-09-02

## 2019-07-12 ENCOUNTER — LAB ENCOUNTER (OUTPATIENT)
Dept: LAB | Age: 64
End: 2019-07-12
Attending: INTERNAL MEDICINE
Payer: COMMERCIAL

## 2019-07-12 DIAGNOSIS — D64.89 ANEMIA DUE TO OTHER CAUSE, NOT CLASSIFIED: Primary | ICD-10-CM

## 2019-07-12 DIAGNOSIS — R79.0 LOW FERRITIN: ICD-10-CM

## 2019-07-12 DIAGNOSIS — R79.89 ABNORMAL CBC: ICD-10-CM

## 2019-07-12 DIAGNOSIS — E53.8 LOW VITAMIN B12 LEVEL: ICD-10-CM

## 2019-07-12 LAB
BASOPHILS # BLD AUTO: 0.04 X10(3) UL (ref 0–0.2)
BASOPHILS NFR BLD AUTO: 0.7 %
DEPRECATED HBV CORE AB SER IA-ACNC: 30.4 NG/ML (ref 18–340)
DEPRECATED RDW RBC AUTO: 50.8 FL (ref 35.1–46.3)
EOSINOPHIL # BLD AUTO: 0.14 X10(3) UL (ref 0–0.7)
EOSINOPHIL NFR BLD AUTO: 2.6 %
ERYTHROCYTE [DISTWIDTH] IN BLOOD BY AUTOMATED COUNT: 14.8 % (ref 11–15)
HCT VFR BLD AUTO: 39.3 % (ref 35–48)
HGB BLD-MCNC: 12.4 G/DL (ref 12–16)
IMM GRANULOCYTES # BLD AUTO: 0.02 X10(3) UL (ref 0–1)
IMM GRANULOCYTES NFR BLD: 0.4 %
IRON SATURATION: 23 % (ref 15–50)
IRON SERPL-MCNC: 97 UG/DL (ref 50–170)
LYMPHOCYTES # BLD AUTO: 1.71 X10(3) UL (ref 1–4)
LYMPHOCYTES NFR BLD AUTO: 31.4 %
MCH RBC QN AUTO: 29.5 PG (ref 26–34)
MCHC RBC AUTO-ENTMCNC: 31.6 G/DL (ref 31–37)
MCV RBC AUTO: 93.6 FL (ref 80–100)
MONOCYTES # BLD AUTO: 0.38 X10(3) UL (ref 0.1–1)
MONOCYTES NFR BLD AUTO: 7 %
NEUTROPHILS # BLD AUTO: 3.15 X10 (3) UL (ref 1.5–7.7)
NEUTROPHILS # BLD AUTO: 3.15 X10(3) UL (ref 1.5–7.7)
NEUTROPHILS NFR BLD AUTO: 57.9 %
PLATELET # BLD AUTO: 178 10(3)UL (ref 150–450)
RBC # BLD AUTO: 4.2 X10(6)UL (ref 3.8–5.3)
TOTAL IRON BINDING CAPACITY: 413 UG/DL (ref 240–450)
TRANSFERRIN SERPL-MCNC: 277 MG/DL (ref 200–360)
VIT B12 SERPL-MCNC: 452 PG/ML (ref 193–986)
WBC # BLD AUTO: 5.4 X10(3) UL (ref 4–11)

## 2019-07-12 PROCEDURE — 36415 COLL VENOUS BLD VENIPUNCTURE: CPT | Performed by: INTERNAL MEDICINE

## 2019-07-12 PROCEDURE — 82607 VITAMIN B-12: CPT | Performed by: INTERNAL MEDICINE

## 2019-07-12 PROCEDURE — 83550 IRON BINDING TEST: CPT | Performed by: INTERNAL MEDICINE

## 2019-07-12 PROCEDURE — 82728 ASSAY OF FERRITIN: CPT | Performed by: INTERNAL MEDICINE

## 2019-07-12 PROCEDURE — 83540 ASSAY OF IRON: CPT | Performed by: INTERNAL MEDICINE

## 2019-07-12 PROCEDURE — 85025 COMPLETE CBC W/AUTO DIFF WBC: CPT | Performed by: INTERNAL MEDICINE

## 2019-07-15 DIAGNOSIS — D64.89 ANEMIA DUE TO OTHER CAUSE, NOT CLASSIFIED: Primary | ICD-10-CM

## 2019-07-15 RX ORDER — ESCITALOPRAM OXALATE 10 MG/1
TABLET ORAL
Qty: 90 TABLET | Refills: 0 | Status: SHIPPED | OUTPATIENT
Start: 2019-07-15 | End: 2019-11-06

## 2019-07-15 NOTE — TELEPHONE ENCOUNTER
Last OV relevant to medication: 3/11/19  Last refill date: 10/4/18     #90/refills: 2  When pt was asked to return for OV: 6 months  Upcoming appt/reason: 8/19/19

## 2019-08-19 ENCOUNTER — OFFICE VISIT (OUTPATIENT)
Dept: INTERNAL MEDICINE CLINIC | Facility: CLINIC | Age: 64
End: 2019-08-19
Payer: COMMERCIAL

## 2019-08-19 VITALS
WEIGHT: 258.19 LBS | HEART RATE: 72 BPM | SYSTOLIC BLOOD PRESSURE: 130 MMHG | OXYGEN SATURATION: 98 % | DIASTOLIC BLOOD PRESSURE: 68 MMHG | RESPIRATION RATE: 16 BRPM | BODY MASS INDEX: 47.51 KG/M2 | HEIGHT: 61.81 IN

## 2019-08-19 DIAGNOSIS — D50.8 OTHER IRON DEFICIENCY ANEMIA: ICD-10-CM

## 2019-08-19 DIAGNOSIS — M85.80 OSTEOPENIA, UNSPECIFIED LOCATION: ICD-10-CM

## 2019-08-19 DIAGNOSIS — Z12.11 COLON CANCER SCREENING: ICD-10-CM

## 2019-08-19 DIAGNOSIS — Z00.00 ROUTINE GENERAL MEDICAL EXAMINATION AT A HEALTH CARE FACILITY: ICD-10-CM

## 2019-08-19 DIAGNOSIS — E53.8 LOW SERUM VITAMIN B12: ICD-10-CM

## 2019-08-19 DIAGNOSIS — Z12.31 ENCOUNTER FOR SCREENING MAMMOGRAM FOR BREAST CANCER: Primary | ICD-10-CM

## 2019-08-19 DIAGNOSIS — I10 ESSENTIAL HYPERTENSION: ICD-10-CM

## 2019-08-19 DIAGNOSIS — E03.9 ACQUIRED HYPOTHYROIDISM: ICD-10-CM

## 2019-08-19 DIAGNOSIS — F41.9 ANXIETY: ICD-10-CM

## 2019-08-19 DIAGNOSIS — D64.89 ANEMIA DUE TO OTHER CAUSE, NOT CLASSIFIED: ICD-10-CM

## 2019-08-19 DIAGNOSIS — J45.20 MILD INTERMITTENT ASTHMA WITHOUT COMPLICATION: ICD-10-CM

## 2019-08-19 DIAGNOSIS — I89.0 LYMPHEDEMA OF BOTH LOWER EXTREMITIES: ICD-10-CM

## 2019-08-19 DIAGNOSIS — Z99.89 OSA ON CPAP: ICD-10-CM

## 2019-08-19 DIAGNOSIS — I48.0 PAF (PAROXYSMAL ATRIAL FIBRILLATION) (HCC): ICD-10-CM

## 2019-08-19 DIAGNOSIS — G47.33 OSA ON CPAP: ICD-10-CM

## 2019-08-19 PROCEDURE — 99214 OFFICE O/P EST MOD 30 MIN: CPT | Performed by: INTERNAL MEDICINE

## 2019-08-19 PROCEDURE — 99396 PREV VISIT EST AGE 40-64: CPT | Performed by: INTERNAL MEDICINE

## 2019-08-19 NOTE — PROGRESS NOTES
HPI:   Dawn Leon is a 58year old female who presents for a complete physical exam. . She started CPAP and feels a world of differencel. She gets occ palps and Dr David Lozoya still has her on pradaxa and tambicor.      Last visit march:   Interim h Tab Take 1 tablet (40 mg total) by mouth once daily. Disp: 90 tablet Rfl: 1   Flecainide Acetate (TAMBOCOR) 100 MG Oral Tab Take 100 mg by mouth 2 (two) times daily. Disp:  Rfl:    PRADAXA 150 MG Oral Cap Take 1 tablet by mouth Twice daily.  Disp:  Rfl: congestion, sinus pain or ST  LUNGS: denies shortness of breath with exertion  CARDIOVASCULAR: denies chest pain on exertion.  Her edema and stasis change is so improved w/exericise, wt reduction and CPAP  GI: denies abdominal pain,denies heartburn, change equal,    ASSESSMENT AND PLAN:   Archie Ram is a 58year old female who presents for a complete physical exam. . Order put in for mammogram and dexascan. Appropriate lab testing ordered, instructions given for healthy living as indicated.  The yun this visit.     Meds & Refills for this Visit:  Requested Prescriptions      No prescriptions requested or ordered in this encounter       Imaging & Consults:  GASTRO - INTERNAL  MANDIE JADE 2D+3D SCREENING BILAT (CPT=77067/18706)  XR DEXA BONE DENSITOMETRY (C

## 2019-08-26 ENCOUNTER — HOSPITAL ENCOUNTER (OUTPATIENT)
Dept: MAMMOGRAPHY | Age: 64
Discharge: HOME | End: 2019-08-26
Attending: INTERNAL MEDICINE
Payer: COMMERCIAL

## 2019-08-26 DIAGNOSIS — Z12.31 ENCOUNTER FOR SCREENING MAMMOGRAM FOR BREAST CANCER: ICD-10-CM

## 2019-08-26 PROCEDURE — 77063 BREAST TOMOSYNTHESIS BI: CPT | Performed by: INTERNAL MEDICINE

## 2019-08-26 PROCEDURE — 77067 SCR MAMMO BI INCL CAD: CPT | Performed by: INTERNAL MEDICINE

## 2019-09-02 DIAGNOSIS — E03.9 ACQUIRED HYPOTHYROIDISM: ICD-10-CM

## 2019-09-03 RX ORDER — LEVOTHYROXINE SODIUM 112 UG/1
TABLET ORAL
Qty: 90 TABLET | Refills: 0 | Status: SHIPPED | OUTPATIENT
Start: 2019-09-03 | End: 2019-12-01

## 2019-09-12 RX ORDER — BENAZEPRIL HYDROCHLORIDE 10 MG/1
TABLET ORAL
Qty: 90 TABLET | Refills: 1 | Status: SHIPPED | OUTPATIENT
Start: 2019-09-12 | End: 2020-03-12

## 2019-09-12 RX ORDER — FUROSEMIDE 40 MG/1
TABLET ORAL
Qty: 90 TABLET | Refills: 1 | Status: SHIPPED | OUTPATIENT
Start: 2019-09-12 | End: 2020-03-12

## 2019-10-12 ENCOUNTER — HOSPITAL ENCOUNTER (OUTPATIENT)
Age: 64
Discharge: HOME | End: 2019-10-12
Attending: FAMILY MEDICINE
Payer: COMMERCIAL

## 2019-10-12 VITALS
RESPIRATION RATE: 18 BRPM | DIASTOLIC BLOOD PRESSURE: 60 MMHG | HEART RATE: 64 BPM | OXYGEN SATURATION: 100 % | SYSTOLIC BLOOD PRESSURE: 136 MMHG | TEMPERATURE: 98 F

## 2019-10-12 DIAGNOSIS — R06.2 WHEEZING: ICD-10-CM

## 2019-10-12 DIAGNOSIS — J06.9 ACUTE URI: Primary | ICD-10-CM

## 2019-10-12 PROCEDURE — 99213 OFFICE O/P EST LOW 20 MIN: CPT

## 2019-10-12 PROCEDURE — 99214 OFFICE O/P EST MOD 30 MIN: CPT

## 2019-10-12 RX ORDER — CHOLECALCIFEROL (VITAMIN D3) 125 MCG
CAPSULE ORAL
COMMUNITY
End: 2019-12-03

## 2019-10-12 RX ORDER — METHYLPREDNISOLONE 4 MG/1
TABLET ORAL
Qty: 1 PACKAGE | Refills: 0 | Status: SHIPPED | OUTPATIENT
Start: 2019-10-12 | End: 2019-11-11

## 2019-10-12 RX ORDER — ALBUTEROL SULFATE 2.5 MG/3ML
2.5 SOLUTION RESPIRATORY (INHALATION) EVERY 4 HOURS PRN
Qty: 30 AMPULE | Refills: 0 | Status: SHIPPED | OUTPATIENT
Start: 2019-10-12 | End: 2019-11-11

## 2019-10-12 RX ORDER — ASCORBIC ACID 500 MG
500 TABLET ORAL DAILY
COMMUNITY

## 2019-10-12 RX ORDER — ALBUTEROL SULFATE 90 UG/1
2 AEROSOL, METERED RESPIRATORY (INHALATION) EVERY 4 HOURS PRN
Qty: 1 INHALER | Refills: 0 | Status: SHIPPED | OUTPATIENT
Start: 2019-10-12 | End: 2019-11-11

## 2019-10-12 NOTE — ED PROVIDER NOTES
Patient Seen in: [facility]      History   Patient presents with:  Cough/URI    Stated Complaint: chest congestion and asthma x 2 days    HPI    69-year-old female presents with complaints of acute URI symptoms.   She report Obesity 3/5/2012   • Occult blood positive stool    • Osteopenia 10/8/2014   • Other allergy, other than to medicinal agents 3/5/2012    Alleriges   • Otitis media    • PAF (paroxysmal atrial fibrillation) ([facility] 5/23/2016   • PMS (premenstrual syndrome) Physical Exam    Patient is alert oriented ×3 in no acute distress   conjunctiva clear no icterus  Some fluid behind the tympanic membrane bilaterally. Mild nasal congestion. Oropharynx : No erythema no exudates.   Neck supple full range of motion

## 2019-10-12 NOTE — ED INITIAL ASSESSMENT (HPI)
C/o head cold symptoms this week. Since yesterday with chest congestion, feeing like her asthma was getting aggravated. Also with ear discomfort, left more than right. Using Robitussin DM for relief.

## 2019-11-04 ENCOUNTER — HOSPITAL ENCOUNTER (OUTPATIENT)
Dept: BONE DENSITY | Age: 64
Discharge: HOME | End: 2019-11-04
Attending: INTERNAL MEDICINE
Payer: COMMERCIAL

## 2019-11-04 DIAGNOSIS — M85.80 OSTEOPENIA, UNSPECIFIED LOCATION: ICD-10-CM

## 2019-11-04 PROCEDURE — 77080 DXA BONE DENSITY AXIAL: CPT | Performed by: INTERNAL MEDICINE

## 2019-11-07 ENCOUNTER — TELEPHONE (OUTPATIENT)
Dept: INTERNAL MEDICINE CLINIC | Facility: CLINIC | Age: 64
End: 2019-11-07

## 2019-11-07 NOTE — TELEPHONE ENCOUNTER
Received fax from [employer], prescription for medical compression garments, requesting 4 pairs of compression day time knee high garments for patient for diagnosis of lymphedema.      Last OV 8/19/19, discussed lymphedema improvement with weight loss

## 2019-11-08 NOTE — TELEPHONE ENCOUNTER
Last OV relevant to medication: 8/19/19  Last refill date: 7/15/19     #/refills: 90/0  When pt was asked to return for OV: 6 months  Upcoming appt/reason: 2/7/20 med check

## 2019-11-11 PROBLEM — I10 BENIGN ESSENTIAL HTN: Status: ACTIVE | Noted: 2019-05-16

## 2019-11-12 ENCOUNTER — MED REC SCAN ONLY (OUTPATIENT)
Dept: INTERNAL MEDICINE CLINIC | Facility: CLINIC | Age: 64
End: 2019-11-12

## 2019-11-13 ENCOUNTER — LAB ENCOUNTER (OUTPATIENT)
Dept: LAB | Age: 64
End: 2019-11-13
Attending: INTERNAL MEDICINE
Payer: COMMERCIAL

## 2019-11-13 DIAGNOSIS — M85.80 OSTEOPENIA, UNSPECIFIED LOCATION: ICD-10-CM

## 2019-11-13 DIAGNOSIS — D64.89 ANEMIA DUE TO OTHER CAUSE, NOT CLASSIFIED: ICD-10-CM

## 2019-11-13 DIAGNOSIS — I10 ESSENTIAL HYPERTENSION: ICD-10-CM

## 2019-11-13 DIAGNOSIS — E03.9 ACQUIRED HYPOTHYROIDISM: ICD-10-CM

## 2019-11-13 PROCEDURE — 82306 VITAMIN D 25 HYDROXY: CPT | Performed by: INTERNAL MEDICINE

## 2019-11-13 PROCEDURE — 36415 COLL VENOUS BLD VENIPUNCTURE: CPT | Performed by: INTERNAL MEDICINE

## 2019-11-13 PROCEDURE — 82728 ASSAY OF FERRITIN: CPT | Performed by: INTERNAL MEDICINE

## 2019-11-13 PROCEDURE — 82607 VITAMIN B-12: CPT | Performed by: INTERNAL MEDICINE

## 2019-11-13 PROCEDURE — 83550 IRON BINDING TEST: CPT | Performed by: INTERNAL MEDICINE

## 2019-11-13 PROCEDURE — 80050 GENERAL HEALTH PANEL: CPT | Performed by: INTERNAL MEDICINE

## 2019-11-13 PROCEDURE — 83540 ASSAY OF IRON: CPT | Performed by: INTERNAL MEDICINE

## 2019-11-14 ENCOUNTER — TELEPHONE (OUTPATIENT)
Dept: CARDIOLOGY | Age: 64
End: 2019-11-14

## 2019-12-01 DIAGNOSIS — E03.9 ACQUIRED HYPOTHYROIDISM: ICD-10-CM

## 2019-12-02 RX ORDER — FLECAINIDE ACETATE 100 MG/1
TABLET ORAL
Qty: 60 TABLET | Refills: 0 | Status: SHIPPED | OUTPATIENT
Start: 2019-12-02 | End: 2020-01-20 | Stop reason: SDUPTHER

## 2019-12-03 ENCOUNTER — APPOINTMENT (OUTPATIENT)
Dept: LAB | Age: 64
End: 2019-12-03
Payer: COMMERCIAL

## 2019-12-03 DIAGNOSIS — R12 CHRONIC HEARTBURN: ICD-10-CM

## 2019-12-03 PROCEDURE — 80051 ELECTROLYTE PANEL: CPT

## 2019-12-03 PROCEDURE — 36415 COLL VENOUS BLD VENIPUNCTURE: CPT

## 2019-12-03 RX ORDER — LEVOTHYROXINE SODIUM 112 UG/1
TABLET ORAL
Qty: 90 TABLET | Refills: 0 | Status: SHIPPED | OUTPATIENT
Start: 2019-12-03 | End: 2020-03-12

## 2019-12-16 ENCOUNTER — TELEPHONE (OUTPATIENT)
Dept: CARDIOLOGY | Age: 64
End: 2019-12-16

## 2019-12-16 ENCOUNTER — OFFICE VISIT (OUTPATIENT)
Dept: CARDIOLOGY | Age: 64
End: 2019-12-16

## 2019-12-16 VITALS
DIASTOLIC BLOOD PRESSURE: 58 MMHG | WEIGHT: 249 LBS | BODY MASS INDEX: 47.01 KG/M2 | HEIGHT: 61 IN | SYSTOLIC BLOOD PRESSURE: 122 MMHG

## 2019-12-16 DIAGNOSIS — G47.30 SLEEP APNEA, UNSPECIFIED TYPE: ICD-10-CM

## 2019-12-16 DIAGNOSIS — I48.91 ATRIAL FIBRILLATION, UNSPECIFIED TYPE (CMD): Primary | ICD-10-CM

## 2019-12-16 DIAGNOSIS — I10 BENIGN ESSENTIAL HTN: ICD-10-CM

## 2019-12-16 PROCEDURE — 99244 OFF/OP CNSLTJ NEW/EST MOD 40: CPT | Performed by: INTERNAL MEDICINE

## 2019-12-16 PROCEDURE — X1094 ELECTROCARDIOGRAM 12-LEAD: HCPCS | Performed by: INTERNAL MEDICINE

## 2019-12-16 PROCEDURE — 93000 ELECTROCARDIOGRAM COMPLETE: CPT | Performed by: INTERNAL MEDICINE

## 2019-12-16 RX ORDER — FUROSEMIDE 40 MG/1
TABLET ORAL
COMMUNITY
Start: 2019-09-12

## 2019-12-16 RX ORDER — LEVOTHYROXINE SODIUM 112 UG/1
TABLET ORAL
COMMUNITY
Start: 2019-12-03

## 2019-12-16 RX ORDER — BENAZEPRIL HYDROCHLORIDE 10 MG/1
TABLET ORAL
COMMUNITY
Start: 2019-09-12

## 2019-12-16 RX ORDER — ASCORBIC ACID 500 MG
500 TABLET ORAL
COMMUNITY

## 2019-12-16 RX ORDER — ALBUTEROL SULFATE 2.5 MG/3ML
SOLUTION RESPIRATORY (INHALATION)
Refills: 0 | COMMUNITY
Start: 2019-10-12

## 2019-12-16 RX ORDER — ESCITALOPRAM OXALATE 10 MG/1
TABLET ORAL
COMMUNITY
Start: 2019-11-08

## 2019-12-16 SDOH — SOCIAL STABILITY: SOCIAL NETWORK: ARE YOU MARRIED, WIDOWED, DIVORCED, SEPARATED, NEVER MARRIED, OR LIVING WITH A PARTNER?: MARRIED

## 2019-12-16 SDOH — HEALTH STABILITY: PHYSICAL HEALTH: ON AVERAGE, HOW MANY MINUTES DO YOU ENGAGE IN EXERCISE AT THIS LEVEL?: 60 MIN

## 2019-12-16 SDOH — HEALTH STABILITY: PHYSICAL HEALTH: ON AVERAGE, HOW MANY DAYS PER WEEK DO YOU ENGAGE IN MODERATE TO STRENUOUS EXERCISE (LIKE A BRISK WALK)?: 2 DAYS

## 2019-12-16 ASSESSMENT — ENCOUNTER SYMPTOMS
FEVER: 0
HEMATOCHEZIA: 0
SUSPICIOUS LESIONS: 0
HEMOPTYSIS: 0
COUGH: 0
ALLERGIC/IMMUNOLOGIC COMMENTS: NO NEW FOOD ALLERGIES
CHILLS: 0
BRUISES/BLEEDS EASILY: 0
WEIGHT GAIN: 0
WEIGHT LOSS: 0

## 2019-12-16 ASSESSMENT — PATIENT HEALTH QUESTIONNAIRE - PHQ9
SUM OF ALL RESPONSES TO PHQ9 QUESTIONS 1 AND 2: 0
2. FEELING DOWN, DEPRESSED OR HOPELESS: NOT AT ALL
1. LITTLE INTEREST OR PLEASURE IN DOING THINGS: NOT AT ALL
SUM OF ALL RESPONSES TO PHQ9 QUESTIONS 1 AND 2: 0

## 2019-12-18 ENCOUNTER — APPOINTMENT (OUTPATIENT)
Dept: CARDIOLOGY | Age: 64
End: 2019-12-18

## 2019-12-26 RX ORDER — UBIDECARENONE 30 MG
CAPSULE ORAL DAILY
COMMUNITY
End: 2020-10-29

## 2020-01-09 ENCOUNTER — ANESTHESIA EVENT (OUTPATIENT)
Dept: ENDOSCOPY | Facility: HOSPITAL | Age: 65
End: 2020-01-09
Payer: COMMERCIAL

## 2020-01-09 ENCOUNTER — HOSPITAL ENCOUNTER (OUTPATIENT)
Facility: HOSPITAL | Age: 65
Setting detail: HOSPITAL OUTPATIENT SURGERY
Discharge: HOME | End: 2020-01-09
Attending: INTERNAL MEDICINE | Admitting: INTERNAL MEDICINE
Payer: COMMERCIAL

## 2020-01-09 ENCOUNTER — ANESTHESIA (OUTPATIENT)
Dept: ENDOSCOPY | Facility: HOSPITAL | Age: 65
End: 2020-01-09
Payer: COMMERCIAL

## 2020-01-09 VITALS
HEIGHT: 61 IN | WEIGHT: 247 LBS | DIASTOLIC BLOOD PRESSURE: 58 MMHG | SYSTOLIC BLOOD PRESSURE: 120 MMHG | BODY MASS INDEX: 46.63 KG/M2 | RESPIRATION RATE: 18 BRPM | TEMPERATURE: 98 F | HEART RATE: 56 BPM | OXYGEN SATURATION: 95 %

## 2020-01-09 DIAGNOSIS — Z12.11 ENCOUNTER FOR SCREENING COLONOSCOPY: ICD-10-CM

## 2020-01-09 DIAGNOSIS — K21.9 GASTROESOPHAGEAL REFLUX DISEASE, ESOPHAGITIS PRESENCE NOT SPECIFIED: ICD-10-CM

## 2020-01-09 PROCEDURE — 0DB68ZX EXCISION OF STOMACH, VIA NATURAL OR ARTIFICIAL OPENING ENDOSCOPIC, DIAGNOSTIC: ICD-10-PCS | Performed by: INTERNAL MEDICINE

## 2020-01-09 PROCEDURE — 88305 TISSUE EXAM BY PATHOLOGIST: CPT | Performed by: INTERNAL MEDICINE

## 2020-01-09 PROCEDURE — 0DB98ZX EXCISION OF DUODENUM, VIA NATURAL OR ARTIFICIAL OPENING ENDOSCOPIC, DIAGNOSTIC: ICD-10-PCS | Performed by: INTERNAL MEDICINE

## 2020-01-09 PROCEDURE — 0DBP8ZX EXCISION OF RECTUM, VIA NATURAL OR ARTIFICIAL OPENING ENDOSCOPIC, DIAGNOSTIC: ICD-10-PCS | Performed by: INTERNAL MEDICINE

## 2020-01-09 RX ORDER — NALOXONE HYDROCHLORIDE 0.4 MG/ML
80 INJECTION, SOLUTION INTRAMUSCULAR; INTRAVENOUS; SUBCUTANEOUS AS NEEDED
Status: DISCONTINUED | OUTPATIENT
Start: 2020-01-09 | End: 2020-01-09

## 2020-01-09 RX ORDER — LIDOCAINE HYDROCHLORIDE 10 MG/ML
INJECTION, SOLUTION EPIDURAL; INFILTRATION; INTRACAUDAL; PERINEURAL AS NEEDED
Status: DISCONTINUED | OUTPATIENT
Start: 2020-01-09 | End: 2020-01-09 | Stop reason: SURG

## 2020-01-09 RX ORDER — SODIUM CHLORIDE, SODIUM LACTATE, POTASSIUM CHLORIDE, CALCIUM CHLORIDE 600; 310; 30; 20 MG/100ML; MG/100ML; MG/100ML; MG/100ML
INJECTION, SOLUTION INTRAVENOUS CONTINUOUS
Status: DISCONTINUED | OUTPATIENT
Start: 2020-01-09 | End: 2020-01-09

## 2020-01-09 RX ADMIN — LIDOCAINE HYDROCHLORIDE 25 MG: 10 INJECTION, SOLUTION EPIDURAL; INFILTRATION; INTRACAUDAL; PERINEURAL at 07:31:00

## 2020-01-09 RX ADMIN — SODIUM CHLORIDE, SODIUM LACTATE, POTASSIUM CHLORIDE, CALCIUM CHLORIDE: 600; 310; 30; 20 INJECTION, SOLUTION INTRAVENOUS at 07:51:00

## 2020-01-09 NOTE — ANESTHESIA POSTPROCEDURE EVALUATION
[facility] Patient Status:  Hospital Outpatient Surgery   Age/Gender 71year old female MRN [id number]   Location [facility]. Attending Abdullahi Elias, [facility] Day # 0 PCP Deepali Lyle MD       [facility] [place]

## 2020-01-09 NOTE — OPERATIVE REPORT
Dariana Dempsey Patient Status:  Hospital Outpatient Surgery    1955 MRN [id number]   [facility] ENDOSCOPY Attending Poppy Ruelas DO   Hosp Day # 0 PCP Antonella Jay MD       PREOPERATIVE DIAGNOSIS/INDICATION: pattern were normal.  Descending colon: The mucosa and vascular pattern were normal.  Sigmoid colon: Diverticulosis. Rectum: 10 mm flat polyp s/p cold snare polypectomy (Exacto) Internal hemorrhoids. IMPRESSION:   1. A 10 mm rectal polyp. Removed.     2

## 2020-01-09 NOTE — OPERATIVE REPORT
Puja Denny Patient Status:  Hospital Outpatient Surgery    1955 MRN [id number]   [facility] ENDOSCOPY Attending Abdullahi Elias, DO   Hosp Day # 0 PCP Deepali Lyle MD       PREOPERATIVE DIAGNOSIS/INDICATION pathology results.        Jeanne Masterson  Gastroenterology/Advanced Endoscopy  [facility]

## 2020-01-10 NOTE — H&P
History & Physical Examination    Patient Name: El Mason  MRN: [id number]  [facility]: [id number]  YOB: 1955    Diagnosis: Anemia    Present Illness: 72 y/o F history as above presents for Colonoscopy    No medications prior to admission. pain    • Leg swelling    • Lipid screening 2/6/2009   • Lymphedema of both lower extremities 9/15/2015   • Measles    • Migraines    • Mumps    • OA (osteoarthritis)     knee   • Obesity 3/5/2012   • Occult blood positive stool    • Osteopenia 10/8/2014 Alcohol/week: 0.0 standard drinks      Comment: 1-2 drink per month       SYSTEM Check if Review is Normal Check if Physical Exam is Normal If not normal, please explain:   HEENT [ x] [x ]    NECK & BACK [ x] [ x]    HEART [ x] [x ]    LUNGS [ x] [ x]    A

## 2020-01-14 DIAGNOSIS — F41.9 ANXIETY: ICD-10-CM

## 2020-01-15 RX ORDER — ESCITALOPRAM OXALATE 10 MG/1
TABLET ORAL
Qty: 90 TABLET | Refills: 0 | Status: SHIPPED | OUTPATIENT
Start: 2020-01-15 | End: 2020-04-20

## 2020-01-15 NOTE — TELEPHONE ENCOUNTER
Last OV relevant to medication: 8/19/19  Last refill date: 11/8/19     #/refills: 90/0  When pt was asked to return for OV: 6 months  Upcoming appt/reason: 2/17/2020, 6mos multiple issues

## 2020-01-20 RX ORDER — FLECAINIDE ACETATE 100 MG/1
TABLET ORAL
Qty: 60 TABLET | Refills: 11 | Status: SHIPPED | OUTPATIENT
Start: 2020-01-20 | End: 2020-12-11 | Stop reason: SDUPTHER

## 2020-02-17 ENCOUNTER — OFFICE VISIT (OUTPATIENT)
Dept: INTERNAL MEDICINE CLINIC | Facility: CLINIC | Age: 65
End: 2020-02-17
Payer: COMMERCIAL

## 2020-02-17 VITALS
TEMPERATURE: 98 F | DIASTOLIC BLOOD PRESSURE: 68 MMHG | RESPIRATION RATE: 16 BRPM | WEIGHT: 250.5 LBS | HEART RATE: 61 BPM | OXYGEN SATURATION: 97 % | SYSTOLIC BLOOD PRESSURE: 116 MMHG | BODY MASS INDEX: 47.29 KG/M2 | HEIGHT: 60.83 IN

## 2020-02-17 DIAGNOSIS — E03.9 ACQUIRED HYPOTHYROIDISM: ICD-10-CM

## 2020-02-17 DIAGNOSIS — J45.20 MILD INTERMITTENT ASTHMA WITHOUT COMPLICATION: ICD-10-CM

## 2020-02-17 DIAGNOSIS — E66.01 MORBID OBESITY WITH BMI OF 45.0-49.9, ADULT (HCC): ICD-10-CM

## 2020-02-17 DIAGNOSIS — Z99.89 OSA ON CPAP: ICD-10-CM

## 2020-02-17 DIAGNOSIS — Z23 NEED FOR VACCINATION: Primary | ICD-10-CM

## 2020-02-17 DIAGNOSIS — G47.33 OSA ON CPAP: ICD-10-CM

## 2020-02-17 DIAGNOSIS — I10 BENIGN ESSENTIAL HTN: ICD-10-CM

## 2020-02-17 DIAGNOSIS — I10 ESSENTIAL HYPERTENSION: ICD-10-CM

## 2020-02-17 DIAGNOSIS — E74.39 GLUCOSE INTOLERANCE: ICD-10-CM

## 2020-02-17 DIAGNOSIS — D64.89 ANEMIA DUE TO OTHER CAUSE, NOT CLASSIFIED: ICD-10-CM

## 2020-02-17 PROBLEM — I83.90 VARICOSE VEIN OF LEG: Status: RESOLVED | Noted: 2020-02-17 | Resolved: 2020-02-17

## 2020-02-17 PROBLEM — I83.90 VARICOSE VEIN OF LEG: Status: ACTIVE | Noted: 2020-02-17

## 2020-02-17 PROCEDURE — 99214 OFFICE O/P EST MOD 30 MIN: CPT | Performed by: INTERNAL MEDICINE

## 2020-02-17 NOTE — PROGRESS NOTES
Dawn Leon is a 71year old female. To F/U from last visit regarding IGT HTN, BHUPINDER, AF, DL, lymphedema  HPI:    Interim history:      She started CPAP and feels a world of differencel.  She gets occ palps and Dr David Lozoya still has her on pradaxa a MG Oral Capsule Delayed Release Take 1 Cap by mouth daily. Social History:  Social History    Tobacco Use      Smoking status: Never Smoker      Smokeless tobacco: Never Used    Alcohol use:  Yes      Alcohol/week: 0.0 standard drinks      Comment Specimens:   A) - Duodenum, Duodenum biopsy                                                                      B) - Gastric biopsy CPM,   Mild intermittent asthma without complication controlled, CPM  Anemia due to other cause, not classified - resolved, no deficiencies, will check one more time, GI alvarado neg  Kale on cpap compliant, CPM  Acquired hypothyroidism- euthyroidi chmeically and

## 2020-03-09 DIAGNOSIS — E03.9 ACQUIRED HYPOTHYROIDISM: ICD-10-CM

## 2020-03-09 DIAGNOSIS — I10 BENIGN ESSENTIAL HTN: Primary | ICD-10-CM

## 2020-03-12 RX ORDER — BENAZEPRIL HYDROCHLORIDE 10 MG/1
TABLET ORAL
Qty: 90 TABLET | Refills: 1 | Status: SHIPPED | OUTPATIENT
Start: 2020-03-12 | End: 2020-09-16

## 2020-03-12 RX ORDER — FUROSEMIDE 40 MG/1
TABLET ORAL
Qty: 90 TABLET | Refills: 1 | Status: SHIPPED | OUTPATIENT
Start: 2020-03-12 | End: 2020-09-16

## 2020-03-12 RX ORDER — LEVOTHYROXINE SODIUM 112 UG/1
TABLET ORAL
Qty: 90 TABLET | Refills: 1 | Status: SHIPPED | OUTPATIENT
Start: 2020-03-12 | End: 2020-09-16

## 2020-04-20 DIAGNOSIS — F41.9 ANXIETY: ICD-10-CM

## 2020-04-20 RX ORDER — ESCITALOPRAM OXALATE 10 MG/1
TABLET ORAL
Qty: 90 TABLET | Refills: 1 | Status: SHIPPED | OUTPATIENT
Start: 2020-04-20 | End: 2020-11-28

## 2020-04-20 RX ORDER — ATENOLOL 100 MG/1
TABLET ORAL
Qty: 180 CAPSULE | Refills: 3 | Status: SHIPPED | OUTPATIENT
Start: 2020-04-20

## 2020-04-20 NOTE — TELEPHONE ENCOUNTER
Last OV relevant to medication: 2/17/2020  Last refill date: 1/15/2020     #/refills: 90/0  When pt was asked to return for OV: 6 months - PE  Upcoming appt/reason: 8/24/2020 - PE

## 2020-08-18 ENCOUNTER — LAB ENCOUNTER (OUTPATIENT)
Dept: LAB | Age: 65
End: 2020-08-18
Attending: INTERNAL MEDICINE
Payer: COMMERCIAL

## 2020-08-18 DIAGNOSIS — I10 ESSENTIAL HYPERTENSION: ICD-10-CM

## 2020-08-18 DIAGNOSIS — D64.89 ANEMIA DUE TO OTHER CAUSE, NOT CLASSIFIED: ICD-10-CM

## 2020-08-18 DIAGNOSIS — E74.39 GLUCOSE INTOLERANCE: ICD-10-CM

## 2020-08-18 DIAGNOSIS — I10 BENIGN ESSENTIAL HTN: ICD-10-CM

## 2020-08-18 DIAGNOSIS — E03.9 ACQUIRED HYPOTHYROIDISM: ICD-10-CM

## 2020-08-18 LAB
ALBUMIN SERPL-MCNC: 3.4 G/DL (ref 3.4–5)
ALBUMIN/GLOB SERPL: 0.9 {RATIO} (ref 1–2)
ALP LIVER SERPL-CCNC: 103 U/L (ref 50–130)
ALT SERPL-CCNC: 18 U/L (ref 13–56)
ANION GAP SERPL CALC-SCNC: 1 MMOL/L (ref 0–18)
AST SERPL-CCNC: 10 U/L (ref 15–37)
BASOPHILS # BLD AUTO: 0.04 X10(3) UL (ref 0–0.2)
BASOPHILS NFR BLD AUTO: 0.7 %
BILIRUB SERPL-MCNC: 0.7 MG/DL (ref 0.1–2)
BUN BLD-MCNC: 20 MG/DL (ref 7–18)
BUN/CREAT SERPL: 21.1 (ref 10–20)
CALCIUM BLD-MCNC: 9 MG/DL (ref 8.5–10.1)
CHLORIDE SERPL-SCNC: 104 MMOL/L (ref 98–112)
CHOLEST SMN-MCNC: 192 MG/DL (ref ?–200)
CO2 SERPL-SCNC: 32 MMOL/L (ref 21–32)
CREAT BLD-MCNC: 0.95 MG/DL (ref 0.55–1.02)
DEPRECATED HBV CORE AB SER IA-ACNC: 26 NG/ML (ref 18–340)
DEPRECATED RDW RBC AUTO: 47 FL (ref 35.1–46.3)
EOSINOPHIL # BLD AUTO: 0.17 X10(3) UL (ref 0–0.7)
EOSINOPHIL NFR BLD AUTO: 3.2 %
ERYTHROCYTE [DISTWIDTH] IN BLOOD BY AUTOMATED COUNT: 13.2 % (ref 11–15)
EST. AVERAGE GLUCOSE BLD GHB EST-MCNC: 123 MG/DL (ref 68–126)
GLOBULIN PLAS-MCNC: 3.8 G/DL (ref 2.8–4.4)
GLUCOSE BLD-MCNC: 93 MG/DL (ref 70–99)
HBA1C MFR BLD HPLC: 5.9 % (ref ?–5.7)
HCT VFR BLD AUTO: 38.8 % (ref 35–48)
HDLC SERPL-MCNC: 59 MG/DL (ref 40–59)
HGB BLD-MCNC: 12 G/DL (ref 12–16)
IMM GRANULOCYTES # BLD AUTO: 0.02 X10(3) UL (ref 0–1)
IMM GRANULOCYTES NFR BLD: 0.4 %
IRON SATURATION: 18 % (ref 15–50)
IRON SERPL-MCNC: 72 UG/DL (ref 50–170)
LDLC SERPL CALC-MCNC: 107 MG/DL (ref ?–100)
LYMPHOCYTES # BLD AUTO: 1.58 X10(3) UL (ref 1–4)
LYMPHOCYTES NFR BLD AUTO: 29.6 %
M PROTEIN MFR SERPL ELPH: 7.2 G/DL (ref 6.4–8.2)
MCH RBC QN AUTO: 29.6 PG (ref 26–34)
MCHC RBC AUTO-ENTMCNC: 30.9 G/DL (ref 31–37)
MCV RBC AUTO: 95.8 FL (ref 80–100)
MONOCYTES # BLD AUTO: 0.4 X10(3) UL (ref 0.1–1)
MONOCYTES NFR BLD AUTO: 7.5 %
NEUTROPHILS # BLD AUTO: 3.13 X10 (3) UL (ref 1.5–7.7)
NEUTROPHILS # BLD AUTO: 3.13 X10(3) UL (ref 1.5–7.7)
NEUTROPHILS NFR BLD AUTO: 58.6 %
NONHDLC SERPL-MCNC: 133 MG/DL (ref ?–130)
OSMOLALITY SERPL CALC.SUM OF ELEC: 286 MOSM/KG (ref 275–295)
PATIENT FASTING Y/N/NP: YES
PATIENT FASTING Y/N/NP: YES
PLATELET # BLD AUTO: 252 10(3)UL (ref 150–450)
POTASSIUM SERPL-SCNC: 4.3 MMOL/L (ref 3.5–5.1)
RBC # BLD AUTO: 4.05 X10(6)UL (ref 3.8–5.3)
SODIUM SERPL-SCNC: 137 MMOL/L (ref 136–145)
T4 FREE SERPL-MCNC: 1.2 NG/DL (ref 0.8–1.7)
TOTAL IRON BINDING CAPACITY: 404 UG/DL (ref 240–450)
TRANSFERRIN SERPL-MCNC: 271 MG/DL (ref 200–360)
TRIGL SERPL-MCNC: 132 MG/DL (ref 30–149)
TSI SER-ACNC: 2.76 MIU/ML (ref 0.36–3.74)
VIT B12 SERPL-MCNC: 279 PG/ML (ref 193–986)
VLDLC SERPL CALC-MCNC: 26 MG/DL (ref 0–30)
WBC # BLD AUTO: 5.3 X10(3) UL (ref 4–11)

## 2020-08-18 PROCEDURE — 82607 VITAMIN B-12: CPT | Performed by: INTERNAL MEDICINE

## 2020-08-18 PROCEDURE — 80061 LIPID PANEL: CPT | Performed by: INTERNAL MEDICINE

## 2020-08-18 PROCEDURE — 80050 GENERAL HEALTH PANEL: CPT | Performed by: INTERNAL MEDICINE

## 2020-08-18 PROCEDURE — 83550 IRON BINDING TEST: CPT | Performed by: INTERNAL MEDICINE

## 2020-08-18 PROCEDURE — 83540 ASSAY OF IRON: CPT | Performed by: INTERNAL MEDICINE

## 2020-08-18 PROCEDURE — 84439 ASSAY OF FREE THYROXINE: CPT | Performed by: INTERNAL MEDICINE

## 2020-08-18 PROCEDURE — 36415 COLL VENOUS BLD VENIPUNCTURE: CPT | Performed by: INTERNAL MEDICINE

## 2020-08-18 PROCEDURE — 82728 ASSAY OF FERRITIN: CPT | Performed by: INTERNAL MEDICINE

## 2020-08-18 PROCEDURE — 83036 HEMOGLOBIN GLYCOSYLATED A1C: CPT | Performed by: INTERNAL MEDICINE

## 2020-08-21 ENCOUNTER — HOSPITAL ENCOUNTER (EMERGENCY)
Facility: HOSPITAL | Age: 65
Discharge: HOME | End: 2020-08-21
Attending: EMERGENCY MEDICINE
Payer: COMMERCIAL

## 2020-08-21 ENCOUNTER — APPOINTMENT (OUTPATIENT)
Dept: CT IMAGING | Facility: HOSPITAL | Age: 65
End: 2020-08-21
Attending: EMERGENCY MEDICINE
Payer: COMMERCIAL

## 2020-08-21 VITALS
WEIGHT: 249.13 LBS | HEIGHT: 65 IN | SYSTOLIC BLOOD PRESSURE: 128 MMHG | RESPIRATION RATE: 18 BRPM | HEART RATE: 64 BPM | BODY MASS INDEX: 41.51 KG/M2 | OXYGEN SATURATION: 99 % | TEMPERATURE: 97 F | DIASTOLIC BLOOD PRESSURE: 59 MMHG

## 2020-08-21 DIAGNOSIS — S00.03XA CONTUSION OF SCALP, INITIAL ENCOUNTER: Primary | ICD-10-CM

## 2020-08-21 PROCEDURE — 70450 CT HEAD/BRAIN W/O DYE: CPT | Performed by: EMERGENCY MEDICINE

## 2020-08-21 PROCEDURE — 99284 EMERGENCY DEPT VISIT MOD MDM: CPT

## 2020-08-21 RX ORDER — ACETAMINOPHEN 500 MG
1000 TABLET ORAL ONCE
Status: COMPLETED | OUTPATIENT
Start: 2020-08-21 | End: 2020-08-21

## 2020-08-22 NOTE — ED PROVIDER NOTES
Patient Seen in: [facility] Emergency Department      History   Patient presents with:  Trauma    Stated Complaint: Fall; Head injury; taking Pradaxa. no LOC. HPI     69-year-old woman on Pradaxa for A. fib.   She was lifting a mattress when the h of both lower extremities 9/15/2015   • Measles    • Migraines    • Mumps    • OA (osteoarthritis)     knee   • Obesity 3/5/2012   • Occult blood positive stool    • Osteopenia 10/8/2014   • Other allergy, other than to medicinal agents 3/5/2012    [place] except as noted above.     Physical Exam     ED Triage Vitals [08/21/20 2206]   /59   Pulse 64   Resp 18   Temp 96.5 °F (35.8 °C)   Temp src Temporal   SpO2 99 %   O2 Device None (Room air)       Current:/59   Pulse 64   Temp 96.5 °F (35.8 °C) ( (As transcribed by Technologist)  Patient fell     backward hitting her posterior head. onto dresser               FINDINGS:      The ventricles are normal in size and configuration.  There is no evidence     of hemorrhage, mass, midline shift, or extra-axia

## 2020-08-22 NOTE — ED INITIAL ASSESSMENT (HPI)
Pt ambulatory to er with spouse cc hit back of her head pta  While moving her mattress at home; fell backward onto dresser because handle broke off.  On pradaxa for afib  Denies loc/visual changes

## 2020-08-23 ENCOUNTER — TELEPHONE (OUTPATIENT)
Dept: INTERNAL MEDICINE CLINIC | Facility: CLINIC | Age: 65
End: 2020-08-23

## 2020-08-23 DIAGNOSIS — S09.90XD INJURY OF HEAD, SUBSEQUENT ENCOUNTER: Primary | ICD-10-CM

## 2020-08-24 NOTE — TELEPHONE ENCOUNTER
Patient was seen in ED on 8/21 for injury to head. If patient has any concerns or is not feeling well, can schedule follow-up with me in 1-2days. Otherwise, patient is due for PE w/pelvic. Please schedule patient with Dr. Juan Peralta.

## 2020-09-14 DIAGNOSIS — I10 BENIGN ESSENTIAL HTN: ICD-10-CM

## 2020-09-14 DIAGNOSIS — E03.9 ACQUIRED HYPOTHYROIDISM: ICD-10-CM

## 2020-09-16 RX ORDER — LEVOTHYROXINE SODIUM 112 UG/1
TABLET ORAL
Qty: 90 TABLET | Refills: 1 | Status: SHIPPED | OUTPATIENT
Start: 2020-09-16 | End: 2021-03-17

## 2020-09-16 RX ORDER — FUROSEMIDE 40 MG/1
TABLET ORAL
Qty: 90 TABLET | Refills: 1 | Status: SHIPPED | OUTPATIENT
Start: 2020-09-16 | End: 2021-03-17

## 2020-09-16 RX ORDER — BENAZEPRIL HYDROCHLORIDE 10 MG/1
TABLET ORAL
Qty: 90 TABLET | Refills: 1 | Status: SHIPPED | OUTPATIENT
Start: 2020-09-16 | End: 2021-03-17

## 2020-09-16 NOTE — TELEPHONE ENCOUNTER
Last OV relevant to medication: 2/17/2020  Last refill date: 3/12/2020    #/refills: 90-1  When pt was asked to return for OV: 6 months PE  Upcoming appt/reason: 10/29/2020 PE    Lab Results   Component Value Date    GLU 93 08/18/2020    BUN 20 (H) 08/18/2

## 2020-10-29 ENCOUNTER — OFFICE VISIT (OUTPATIENT)
Dept: INTERNAL MEDICINE CLINIC | Facility: CLINIC | Age: 65
End: 2020-10-29
Payer: COMMERCIAL

## 2020-10-29 VITALS
HEART RATE: 75 BPM | DIASTOLIC BLOOD PRESSURE: 68 MMHG | TEMPERATURE: 98 F | BODY MASS INDEX: 47.74 KG/M2 | RESPIRATION RATE: 16 BRPM | HEIGHT: 60.83 IN | SYSTOLIC BLOOD PRESSURE: 112 MMHG | WEIGHT: 252.88 LBS | OXYGEN SATURATION: 99 %

## 2020-10-29 DIAGNOSIS — J45.20 MILD INTERMITTENT ASTHMA WITHOUT COMPLICATION: ICD-10-CM

## 2020-10-29 DIAGNOSIS — K21.9 GASTROESOPHAGEAL REFLUX DISEASE WITHOUT ESOPHAGITIS: ICD-10-CM

## 2020-10-29 DIAGNOSIS — M19.041 ARTHRITIS OF FINGER OF RIGHT HAND: ICD-10-CM

## 2020-10-29 DIAGNOSIS — Z99.89 OSA ON CPAP: ICD-10-CM

## 2020-10-29 DIAGNOSIS — I48.0 PAF (PAROXYSMAL ATRIAL FIBRILLATION) (HCC): ICD-10-CM

## 2020-10-29 DIAGNOSIS — G47.33 OSA ON CPAP: ICD-10-CM

## 2020-10-29 DIAGNOSIS — Z23 NEED FOR VACCINATION: ICD-10-CM

## 2020-10-29 DIAGNOSIS — Z12.31 BREAST CANCER SCREENING BY MAMMOGRAM: ICD-10-CM

## 2020-10-29 DIAGNOSIS — Z00.00 ROUTINE GENERAL MEDICAL EXAMINATION AT A HEALTH CARE FACILITY: Primary | ICD-10-CM

## 2020-10-29 DIAGNOSIS — I10 ESSENTIAL HYPERTENSION: ICD-10-CM

## 2020-10-29 DIAGNOSIS — E74.39 GLUCOSE INTOLERANCE: ICD-10-CM

## 2020-10-29 DIAGNOSIS — E03.9 ACQUIRED HYPOTHYROIDISM: ICD-10-CM

## 2020-10-29 DIAGNOSIS — E66.01 MORBID OBESITY WITH BMI OF 45.0-49.9, ADULT (HCC): ICD-10-CM

## 2020-10-29 DIAGNOSIS — M85.80 OSTEOPENIA, UNSPECIFIED LOCATION: ICD-10-CM

## 2020-10-29 DIAGNOSIS — I89.0 LYMPHEDEMA OF BOTH LOWER EXTREMITIES: ICD-10-CM

## 2020-10-29 PROCEDURE — 99214 OFFICE O/P EST MOD 30 MIN: CPT | Performed by: INTERNAL MEDICINE

## 2020-10-29 PROCEDURE — 3074F SYST BP LT 130 MM HG: CPT | Performed by: INTERNAL MEDICINE

## 2020-10-29 PROCEDURE — 3008F BODY MASS INDEX DOCD: CPT | Performed by: INTERNAL MEDICINE

## 2020-10-29 PROCEDURE — 3078F DIAST BP <80 MM HG: CPT | Performed by: INTERNAL MEDICINE

## 2020-10-29 PROCEDURE — 99397 PER PM REEVAL EST PAT 65+ YR: CPT | Performed by: INTERNAL MEDICINE

## 2020-10-29 NOTE — PROGRESS NOTES
HPI:   Christiano Liu is a 71year old female who presents for a complete physical exam. . She started CPAP and feels a world of differencel. She gets occ palps and Dr Marisol Merchant still has her on pradaxa and tambicor.      She is taking all of her medcai 108 (90 Base) MCG/ACT Inhalation Aero Soln Inhale 2 puffs into the lungs every 4 (four) hours as needed for Wheezing.  3 Inhaler 1   • escitalopram 10 MG Oral Tab TAKE 1 TABLET DAILY 90 tablet 1   • Calcium Carbonate-Vitamin D 600-125 MG-UNIT Oral Tab Take vision  HEENT: denies nasal congestion, sinus pain or ST  LUNGS: denies shortness of breath with exertion  CARDIOVASCULAR: denies chest pain on exertion.  Her edema and stasis change is so improved w/exericise, wt reduction and CPAP  GI: denies abdominal pa three,cranial nerves are intact,motor and sensory are grossly intact    ASSESSMENT AND PLAN:   Aleene Krabbe is a 71year old female who presents for a complete physical exam. . Order put in for mammogram and dexascan.   Appropriate lab testing ordered, in this encounter       Imaging & Consults:  INFLUENZA REFUSED [facility]  ORTHOPEDIC - INTERNAL  [facility] JADE 2D+3D SCREENING BILAT (CPT=77067/58171)    Return in about 6 months (around 4/29/2021) for multiple issues.

## 2020-11-28 DIAGNOSIS — F41.9 ANXIETY: ICD-10-CM

## 2020-11-28 RX ORDER — ESCITALOPRAM OXALATE 10 MG/1
TABLET ORAL
Qty: 90 TABLET | Refills: 1 | Status: SHIPPED | OUTPATIENT
Start: 2020-11-28 | End: 2021-04-29

## 2020-11-28 NOTE — TELEPHONE ENCOUNTER
Last OV relevant to medication: 10/29/20  Last refill date:4/20/20    #90/refills:1  When pt was asked to return for OV:6 months for med check  Upcoming appt/reason:4/29/21

## 2020-12-11 ENCOUNTER — OFFICE VISIT (OUTPATIENT)
Dept: CARDIOLOGY | Age: 65
End: 2020-12-11

## 2020-12-11 VITALS
DIASTOLIC BLOOD PRESSURE: 58 MMHG | HEIGHT: 61 IN | HEART RATE: 58 BPM | WEIGHT: 254.1 LBS | BODY MASS INDEX: 47.98 KG/M2 | SYSTOLIC BLOOD PRESSURE: 118 MMHG

## 2020-12-11 DIAGNOSIS — I10 BENIGN ESSENTIAL HTN: ICD-10-CM

## 2020-12-11 DIAGNOSIS — I48.91 ATRIAL FIBRILLATION, UNSPECIFIED TYPE (CMD): ICD-10-CM

## 2020-12-11 DIAGNOSIS — I83.90 VARICOSE VEINS OF LOWER EXTREMITY, UNSPECIFIED LATERALITY, UNSPECIFIED WHETHER COMPLICATED: ICD-10-CM

## 2020-12-11 DIAGNOSIS — G47.30 SLEEP APNEA, UNSPECIFIED TYPE: Primary | ICD-10-CM

## 2020-12-11 PROCEDURE — 93000 ELECTROCARDIOGRAM COMPLETE: CPT | Performed by: INTERNAL MEDICINE

## 2020-12-11 PROCEDURE — 99214 OFFICE O/P EST MOD 30 MIN: CPT | Performed by: INTERNAL MEDICINE

## 2020-12-11 RX ORDER — FLECAINIDE ACETATE 100 MG/1
100 TABLET ORAL EVERY 12 HOURS
Qty: 180 TABLET | Refills: 3 | Status: SHIPPED | OUTPATIENT
Start: 2020-12-11 | End: 2021-02-08

## 2020-12-11 ASSESSMENT — ENCOUNTER SYMPTOMS
SUSPICIOUS LESIONS: 0
BRUISES/BLEEDS EASILY: 0
FEVER: 0
COUGH: 0
CHILLS: 0
WEIGHT LOSS: 0
HEMOPTYSIS: 0
ALLERGIC/IMMUNOLOGIC COMMENTS: NO NEW FOOD ALLERGIES
WEIGHT GAIN: 0
SLEEP DISTURBANCES DUE TO BREATHING: 1
HEMATOCHEZIA: 0

## 2020-12-11 ASSESSMENT — PATIENT HEALTH QUESTIONNAIRE - PHQ9
2. FEELING DOWN, DEPRESSED OR HOPELESS: NOT AT ALL
CLINICAL INTERPRETATION OF PHQ2 SCORE: NO FURTHER SCREENING NEEDED
SUM OF ALL RESPONSES TO PHQ9 QUESTIONS 1 AND 2: 0
SUM OF ALL RESPONSES TO PHQ9 QUESTIONS 1 AND 2: 0
1. LITTLE INTEREST OR PLEASURE IN DOING THINGS: NOT AT ALL
CLINICAL INTERPRETATION OF PHQ9 SCORE: NO FURTHER SCREENING NEEDED

## 2020-12-31 ENCOUNTER — HOSPITAL ENCOUNTER (OUTPATIENT)
Dept: MAMMOGRAPHY | Age: 65
Discharge: HOME | End: 2020-12-31
Attending: INTERNAL MEDICINE
Payer: COMMERCIAL

## 2020-12-31 DIAGNOSIS — Z12.31 BREAST CANCER SCREENING BY MAMMOGRAM: ICD-10-CM

## 2020-12-31 PROCEDURE — 77067 SCR MAMMO BI INCL CAD: CPT | Performed by: INTERNAL MEDICINE

## 2020-12-31 PROCEDURE — 77063 BREAST TOMOSYNTHESIS BI: CPT | Performed by: INTERNAL MEDICINE

## 2021-02-08 RX ORDER — FLECAINIDE ACETATE 100 MG/1
100 TABLET ORAL EVERY 12 HOURS
Qty: 60 TABLET | Refills: 11 | Status: SHIPPED | OUTPATIENT
Start: 2021-02-08

## 2021-03-03 DIAGNOSIS — Z23 NEED FOR VACCINATION: ICD-10-CM

## 2021-03-15 DIAGNOSIS — E03.9 ACQUIRED HYPOTHYROIDISM: ICD-10-CM

## 2021-03-15 DIAGNOSIS — I10 BENIGN ESSENTIAL HTN: ICD-10-CM

## 2021-03-17 RX ORDER — BENAZEPRIL HYDROCHLORIDE 10 MG/1
TABLET ORAL
Qty: 90 TABLET | Refills: 0 | Status: SHIPPED | OUTPATIENT
Start: 2021-03-17 | End: 2021-07-14

## 2021-03-17 RX ORDER — FUROSEMIDE 40 MG/1
TABLET ORAL
Qty: 90 TABLET | Refills: 0 | Status: SHIPPED | OUTPATIENT
Start: 2021-03-17 | End: 2021-07-14

## 2021-03-17 RX ORDER — LEVOTHYROXINE SODIUM 112 UG/1
TABLET ORAL
Qty: 90 TABLET | Refills: 0 | Status: SHIPPED | OUTPATIENT
Start: 2021-03-17 | End: 2021-05-25

## 2021-04-09 ENCOUNTER — IMMUNIZATION (OUTPATIENT)
Dept: LAB | Age: 66
End: 2021-04-09
Attending: HOSPITALIST
Payer: COMMERCIAL

## 2021-04-09 DIAGNOSIS — Z23 NEED FOR VACCINATION: Primary | ICD-10-CM

## 2021-04-09 PROCEDURE — 0001A SARSCOV2 VAC 30MCG/0.3ML IM: CPT

## 2021-04-26 ENCOUNTER — TELEPHONE (OUTPATIENT)
Dept: CARDIOLOGY | Age: 66
End: 2021-04-26

## 2021-04-29 ENCOUNTER — OFFICE VISIT (OUTPATIENT)
Dept: INTERNAL MEDICINE CLINIC | Facility: CLINIC | Age: 66
End: 2021-04-29
Payer: COMMERCIAL

## 2021-04-29 VITALS
HEIGHT: 60 IN | SYSTOLIC BLOOD PRESSURE: 126 MMHG | BODY MASS INDEX: 50.65 KG/M2 | OXYGEN SATURATION: 95 % | HEART RATE: 61 BPM | TEMPERATURE: 98 F | DIASTOLIC BLOOD PRESSURE: 68 MMHG | WEIGHT: 258 LBS

## 2021-04-29 DIAGNOSIS — D64.89 ANEMIA DUE TO OTHER CAUSE, NOT CLASSIFIED: ICD-10-CM

## 2021-04-29 DIAGNOSIS — F41.9 ANXIETY: ICD-10-CM

## 2021-04-29 DIAGNOSIS — M85.80 OSTEOPENIA, UNSPECIFIED LOCATION: ICD-10-CM

## 2021-04-29 DIAGNOSIS — E66.01 MORBID OBESITY WITH BMI OF 45.0-49.9, ADULT (HCC): ICD-10-CM

## 2021-04-29 DIAGNOSIS — I48.0 PAF (PAROXYSMAL ATRIAL FIBRILLATION) (HCC): ICD-10-CM

## 2021-04-29 DIAGNOSIS — I10 ESSENTIAL HYPERTENSION: ICD-10-CM

## 2021-04-29 DIAGNOSIS — R73.03 PREDIABETES: ICD-10-CM

## 2021-04-29 DIAGNOSIS — J45.20 MILD INTERMITTENT ASTHMA WITHOUT COMPLICATION: Primary | ICD-10-CM

## 2021-04-29 DIAGNOSIS — Z99.89 OSA ON CPAP: ICD-10-CM

## 2021-04-29 DIAGNOSIS — G47.33 OSA ON CPAP: ICD-10-CM

## 2021-04-29 DIAGNOSIS — E03.9 ACQUIRED HYPOTHYROIDISM: ICD-10-CM

## 2021-04-29 DIAGNOSIS — I89.0 LYMPHEDEMA OF BOTH LOWER EXTREMITIES: ICD-10-CM

## 2021-04-29 DIAGNOSIS — I70.90 ATHEROSCLEROSIS: ICD-10-CM

## 2021-04-29 DIAGNOSIS — K21.9 GASTROESOPHAGEAL REFLUX DISEASE WITHOUT ESOPHAGITIS: ICD-10-CM

## 2021-04-29 PROBLEM — I77.9 CAROTID DISEASE, BILATERAL (HCC): Status: ACTIVE | Noted: 2021-04-29

## 2021-04-29 PROBLEM — I77.9 CAROTID DISEASE, BILATERAL: Status: ACTIVE | Noted: 2021-04-29

## 2021-04-29 PROCEDURE — 3008F BODY MASS INDEX DOCD: CPT | Performed by: INTERNAL MEDICINE

## 2021-04-29 PROCEDURE — 3074F SYST BP LT 130 MM HG: CPT | Performed by: INTERNAL MEDICINE

## 2021-04-29 PROCEDURE — 99214 OFFICE O/P EST MOD 30 MIN: CPT | Performed by: INTERNAL MEDICINE

## 2021-04-29 PROCEDURE — 3078F DIAST BP <80 MM HG: CPT | Performed by: INTERNAL MEDICINE

## 2021-04-29 RX ORDER — ESCITALOPRAM OXALATE 10 MG/1
TABLET ORAL
Qty: 135 TABLET | Refills: 0 | Status: SHIPPED | OUTPATIENT
Start: 2021-04-29 | End: 2021-09-09

## 2021-04-29 RX ORDER — ALBUTEROL SULFATE 90 UG/1
2 AEROSOL, METERED RESPIRATORY (INHALATION) EVERY 4 HOURS PRN
Qty: 3 INHALER | Refills: 0 | Status: SHIPPED | OUTPATIENT
Start: 2021-04-29 | End: 2021-09-09

## 2021-04-29 NOTE — PROGRESS NOTES
Bacilio Arreaga is a 76year old female. To F/U from last visit regarding multiple CCM and medications  HPI:      She is on CPAP  She sees cards for PAF.  Some palpitations lately d/t anxiety over COVID vaccine     She is taking all of her medcaitons     • FUROSEMIDE 40 MG Oral Tab TAKE 1 TABLET DAILY 90 tablet 0   • POTASSIUM OR Take 1 tablet by mouth daily. • ELDERBERRY OR Take 2 tablets by mouth daily.      • Albuterol Sulfate HFA (PROAIR HFA) 108 (90 Base) MCG/ACT Inhalation Aero Soln Inhale 2 puf mild pink discoloration, lymphedema    Results for orders placed or performed in visit on 08/18/20   TSH+FREE T4   Result Value Ref Range    Free T4 1.2 0.8 - 1.7 ng/dL    TSH 2.760 0.358 - 3.740 mIU/mL   VITAMIN B12   Result Value Ref Range    Vitamin B12 (L) 31.0 - 37.0 g/dL    RDW 13.2 11.0 - 15.0 %    RDW-SD 47.0 (H) 35.1 - 46.3 fL    Neutrophil Absolute Prelim 3.13 1.50 - 7.70 x10 (3) uL    Neutrophil Absolute 3.13 1.50 - 7.70 x10(3) uL    Lymphocyte Absolute 1.58 1.00 - 4.00 x10(3) uL    Monocyte Absol Consults:  None    Return in about 6 months (around 10/29/2021). Patient Instructions          The patient indicates understanding of these issues and agrees to the plan.

## 2021-04-29 NOTE — PROGRESS NOTES
HPI:   Archie Ram is a 76year old female who presents for a complete physical exam. . She started CPAP and feels a world of differencel. She gets occ palps and Dr Bipin Espinoza still has her on pradaxa and tambicor.      Last visit march:   Interim h Base) MCG/ACT Inhalation Aero Soln Inhale 2 puffs into the lungs every 4 (four) hours as needed for Wheezing. 3 Inhaler 1   • Calcium Carbonate-Vitamin D 600-125 MG-UNIT Oral Tab Take by mouth daily.      • Vitamin C 500 MG Oral Tab Take 500 mg by mouth aguila any unusual skin lesions  EYES:denies blurred vision or double vision  HEENT: denies nasal congestion, sinus pain or ST  LUNGS: denies shortness of breath with exertion  CARDIOVASCULAR: denies chest pain on exertion.  Her edema and stasis change is so impro resolved  NEURO: Oriented times three,cranial nerves are intact,motor and sensory are grossly intact, DTR's b/l equal,    ASSESSMENT AND PLAN:   Funim Bowles is a 76year old female who presents for a complete physical exam. . Order put in for [facility] may not be covered, or covered at this frequency, by your insurer. Please check with your insurance carrier before scheduling to verify coverage.    PREVENTATIVE SERVICES  INDICATIONS AND SCHEDULE Internal Lab or Procedure External Lab or Procedure   Diabet Anyone with a family history    Colorectal Cancer Screening  Covered up to Age 76     Colonoscopy Screen   Covered every 10 years- more often if abnormal Colonoscopy due on 01/09/2023 Update [facility] if applicable    Flex Sigmoidoscopy Screen  Co No orders found for this or any previous visit. Please get once after your 65th birthday    Pneumococcal 23 (Pneumovax)  Covered Once after 65 No orders found for this or any previous visit.  Please get once after your 65th birthday    Hepatitis B for Moder

## 2021-04-30 ENCOUNTER — IMMUNIZATION (OUTPATIENT)
Dept: LAB | Age: 66
End: 2021-04-30
Attending: HOSPITALIST
Payer: COMMERCIAL

## 2021-04-30 DIAGNOSIS — Z23 NEED FOR VACCINATION: Primary | ICD-10-CM

## 2021-04-30 PROCEDURE — 0002A SARSCOV2 VAC 30MCG/0.3ML IM: CPT

## 2021-05-24 DIAGNOSIS — E03.9 ACQUIRED HYPOTHYROIDISM: ICD-10-CM

## 2021-05-25 RX ORDER — LEVOTHYROXINE SODIUM 112 UG/1
TABLET ORAL
Qty: 90 TABLET | Refills: 1 | Status: SHIPPED | OUTPATIENT
Start: 2021-05-25 | End: 2021-12-13

## 2021-07-13 DIAGNOSIS — I10 BENIGN ESSENTIAL HTN: ICD-10-CM

## 2021-07-14 RX ORDER — FUROSEMIDE 40 MG/1
TABLET ORAL
Qty: 90 TABLET | Refills: 1 | Status: SHIPPED | OUTPATIENT
Start: 2021-07-14

## 2021-07-14 RX ORDER — BENAZEPRIL HYDROCHLORIDE 10 MG/1
TABLET ORAL
Qty: 90 TABLET | Refills: 1 | Status: SHIPPED | OUTPATIENT
Start: 2021-07-14 | End: 2021-11-04 | Stop reason: ALTCHOICE

## 2021-07-14 NOTE — TELEPHONE ENCOUNTER
Pt asked to come back in 6 months (around 10/29/2021) for physical and BP check. 90-1 sent to pharmacy.

## 2021-09-04 ENCOUNTER — LAB ENCOUNTER (OUTPATIENT)
Dept: LAB | Facility: HOSPITAL | Age: 66
End: 2021-09-04
Attending: INTERNAL MEDICINE
Payer: COMMERCIAL

## 2021-09-04 DIAGNOSIS — D64.89 ANEMIA DUE TO OTHER CAUSE, NOT CLASSIFIED: ICD-10-CM

## 2021-09-04 DIAGNOSIS — R73.03 PREDIABETES: ICD-10-CM

## 2021-09-04 DIAGNOSIS — I10 ESSENTIAL HYPERTENSION: ICD-10-CM

## 2021-09-04 DIAGNOSIS — E03.9 ACQUIRED HYPOTHYROIDISM: ICD-10-CM

## 2021-09-04 LAB
ALBUMIN SERPL-MCNC: 3.2 G/DL (ref 3.4–5)
ALBUMIN/GLOB SERPL: 0.9 {RATIO} (ref 1–2)
ALP LIVER SERPL-CCNC: 97 U/L
ALT SERPL-CCNC: 18 U/L
ANION GAP SERPL CALC-SCNC: 5 MMOL/L (ref 0–18)
AST SERPL-CCNC: 21 U/L (ref 15–37)
BASOPHILS # BLD AUTO: 0.05 X10(3) UL (ref 0–0.2)
BASOPHILS NFR BLD AUTO: 0.7 %
BILIRUB SERPL-MCNC: 0.5 MG/DL (ref 0.1–2)
BUN BLD-MCNC: 23 MG/DL (ref 7–18)
CALCIUM BLD-MCNC: 9.2 MG/DL (ref 8.5–10.1)
CHLORIDE SERPL-SCNC: 105 MMOL/L (ref 98–112)
CHOLEST SMN-MCNC: 188 MG/DL (ref ?–200)
CO2 SERPL-SCNC: 29 MMOL/L (ref 21–32)
CREAT BLD-MCNC: 0.9 MG/DL
EOSINOPHIL # BLD AUTO: 0.24 X10(3) UL (ref 0–0.7)
EOSINOPHIL NFR BLD AUTO: 3.3 %
ERYTHROCYTE [DISTWIDTH] IN BLOOD BY AUTOMATED COUNT: 12.9 %
EST. AVERAGE GLUCOSE BLD GHB EST-MCNC: 126 MG/DL (ref 68–126)
GLOBULIN PLAS-MCNC: 3.7 G/DL (ref 2.8–4.4)
GLUCOSE BLD-MCNC: 89 MG/DL (ref 70–99)
HBA1C MFR BLD HPLC: 6 % (ref ?–5.7)
HCT VFR BLD AUTO: 38.4 %
HDLC SERPL-MCNC: 51 MG/DL (ref 40–59)
HGB BLD-MCNC: 11.7 G/DL
IMM GRANULOCYTES # BLD AUTO: 0.02 X10(3) UL (ref 0–1)
IMM GRANULOCYTES NFR BLD: 0.3 %
LDLC SERPL CALC-MCNC: 110 MG/DL (ref ?–100)
LYMPHOCYTES # BLD AUTO: 1.99 X10(3) UL (ref 1–4)
LYMPHOCYTES NFR BLD AUTO: 27.5 %
M PROTEIN MFR SERPL ELPH: 6.9 G/DL (ref 6.4–8.2)
MCH RBC QN AUTO: 29.5 PG (ref 26–34)
MCHC RBC AUTO-ENTMCNC: 30.5 G/DL (ref 31–37)
MCV RBC AUTO: 96.7 FL
MONOCYTES # BLD AUTO: 0.57 X10(3) UL (ref 0.1–1)
MONOCYTES NFR BLD AUTO: 7.9 %
NEUTROPHILS # BLD AUTO: 4.36 X10 (3) UL (ref 1.5–7.7)
NEUTROPHILS # BLD AUTO: 4.36 X10(3) UL (ref 1.5–7.7)
NEUTROPHILS NFR BLD AUTO: 60.3 %
NONHDLC SERPL-MCNC: 137 MG/DL (ref ?–130)
OSMOLALITY SERPL CALC.SUM OF ELEC: 291 MOSM/KG (ref 275–295)
PATIENT FASTING Y/N/NP: YES
PATIENT FASTING Y/N/NP: YES
PLATELET # BLD AUTO: 244 10(3)UL (ref 150–450)
POTASSIUM SERPL-SCNC: 5 MMOL/L (ref 3.5–5.1)
RBC # BLD AUTO: 3.97 X10(6)UL
SODIUM SERPL-SCNC: 139 MMOL/L (ref 136–145)
TRIGL SERPL-MCNC: 153 MG/DL (ref 30–149)
TSI SER-ACNC: 2.36 MIU/ML (ref 0.36–3.74)
VLDLC SERPL CALC-MCNC: 26 MG/DL (ref 0–30)
WBC # BLD AUTO: 7.2 X10(3) UL (ref 4–11)

## 2021-09-04 PROCEDURE — 85025 COMPLETE CBC W/AUTO DIFF WBC: CPT

## 2021-09-04 PROCEDURE — 84443 ASSAY THYROID STIM HORMONE: CPT

## 2021-09-04 PROCEDURE — 83036 HEMOGLOBIN GLYCOSYLATED A1C: CPT

## 2021-09-04 PROCEDURE — 36415 COLL VENOUS BLD VENIPUNCTURE: CPT

## 2021-09-04 PROCEDURE — 80053 COMPREHEN METABOLIC PANEL: CPT

## 2021-09-04 PROCEDURE — 80061 LIPID PANEL: CPT

## 2021-09-07 DIAGNOSIS — F41.9 ANXIETY: ICD-10-CM

## 2021-09-07 DIAGNOSIS — J45.20 MILD INTERMITTENT ASTHMA WITHOUT COMPLICATION: Primary | ICD-10-CM

## 2021-09-09 RX ORDER — ESCITALOPRAM OXALATE 10 MG/1
TABLET ORAL
Qty: 135 TABLET | Refills: 0 | Status: SHIPPED | OUTPATIENT
Start: 2021-09-09

## 2021-09-09 RX ORDER — ALBUTEROL SULFATE 90 UG/1
AEROSOL, METERED RESPIRATORY (INHALATION)
Qty: 18 G | Refills: 0 | Status: SHIPPED | OUTPATIENT
Start: 2021-09-09

## 2021-09-09 NOTE — TELEPHONE ENCOUNTER
Last OV relevant to medication: 4/29/21  Last refill date: 4/29/21     #/refills: 135/0  When pt was asked to return for OV: 6 months  Upcoming appt/reason: 11/4/21, 6 mos f/u  Was pt informed of any over due labs: none

## 2021-09-20 ENCOUNTER — TELEPHONE (OUTPATIENT)
Dept: INTERNAL MEDICINE CLINIC | Facility: CLINIC | Age: 66
End: 2021-09-20

## 2021-09-20 NOTE — TELEPHONE ENCOUNTER
Received rx for [employer] for lymphedema of BLE.   Last OV 4/29/21  Next OV 11/4/21    To Dr. Hai Marin for signature  To fax back to [facility] at [phone]

## 2021-09-20 NOTE — TELEPHONE ENCOUNTER
Incoming (mail or fax):  fax  Received from:  [facility]   Documentation given to:   Incoming triage bin

## 2021-09-30 ENCOUNTER — MED REC SCAN ONLY (OUTPATIENT)
Dept: INTERNAL MEDICINE CLINIC | Facility: CLINIC | Age: 66
End: 2021-09-30

## 2021-11-04 ENCOUNTER — OFFICE VISIT (OUTPATIENT)
Dept: INTERNAL MEDICINE CLINIC | Facility: CLINIC | Age: 66
End: 2021-11-04
Payer: MEDICARE

## 2021-11-04 VITALS
OXYGEN SATURATION: 93 % | RESPIRATION RATE: 16 BRPM | SYSTOLIC BLOOD PRESSURE: 150 MMHG | BODY MASS INDEX: 48.74 KG/M2 | WEIGHT: 258.13 LBS | DIASTOLIC BLOOD PRESSURE: 70 MMHG | HEART RATE: 64 BPM | TEMPERATURE: 99 F | HEIGHT: 61 IN

## 2021-11-04 DIAGNOSIS — D64.89 ANEMIA DUE TO OTHER CAUSE, NOT CLASSIFIED: ICD-10-CM

## 2021-11-04 DIAGNOSIS — Z78.0 POSTMENOPAUSAL: ICD-10-CM

## 2021-11-04 DIAGNOSIS — Z11.59 NEED FOR HEPATITIS C SCREENING TEST: ICD-10-CM

## 2021-11-04 DIAGNOSIS — I48.0 PAF (PAROXYSMAL ATRIAL FIBRILLATION) (HCC): ICD-10-CM

## 2021-11-04 DIAGNOSIS — Z00.00 ENCOUNTER FOR ANNUAL HEALTH EXAMINATION: Primary | ICD-10-CM

## 2021-11-04 DIAGNOSIS — G47.33 OSA ON CPAP: ICD-10-CM

## 2021-11-04 DIAGNOSIS — I77.9 BILATERAL CAROTID ARTERY DISEASE, UNSPECIFIED TYPE (HCC): ICD-10-CM

## 2021-11-04 DIAGNOSIS — Z12.31 BREAST CANCER SCREENING BY MAMMOGRAM: ICD-10-CM

## 2021-11-04 DIAGNOSIS — F41.9 ANXIETY: ICD-10-CM

## 2021-11-04 DIAGNOSIS — I10 PRIMARY HYPERTENSION: ICD-10-CM

## 2021-11-04 DIAGNOSIS — Z99.89 OSA ON CPAP: ICD-10-CM

## 2021-11-04 DIAGNOSIS — R73.03 PRE-DIABETES: ICD-10-CM

## 2021-11-04 DIAGNOSIS — K21.9 GASTROESOPHAGEAL REFLUX DISEASE WITHOUT ESOPHAGITIS: ICD-10-CM

## 2021-11-04 DIAGNOSIS — E03.9 ACQUIRED HYPOTHYROIDISM: ICD-10-CM

## 2021-11-04 DIAGNOSIS — Z23 NEED FOR VACCINATION: ICD-10-CM

## 2021-11-04 DIAGNOSIS — I89.0 LYMPHEDEMA OF BOTH LOWER EXTREMITIES: ICD-10-CM

## 2021-11-04 DIAGNOSIS — M85.80 OSTEOPENIA, UNSPECIFIED LOCATION: ICD-10-CM

## 2021-11-04 DIAGNOSIS — E66.01 MORBID OBESITY WITH BMI OF 45.0-49.9, ADULT (HCC): ICD-10-CM

## 2021-11-04 DIAGNOSIS — J45.20 MILD INTERMITTENT ASTHMA WITHOUT COMPLICATION: ICD-10-CM

## 2021-11-04 PROBLEM — E74.39 GLUCOSE INTOLERANCE: Status: RESOLVED | Noted: 2018-04-05 | Resolved: 2021-11-04

## 2021-11-04 PROCEDURE — 99214 OFFICE O/P EST MOD 30 MIN: CPT | Performed by: INTERNAL MEDICINE

## 2021-11-04 PROCEDURE — G0402 INITIAL PREVENTIVE EXAM: HCPCS | Performed by: INTERNAL MEDICINE

## 2021-11-04 RX ORDER — BENAZEPRIL HYDROCHLORIDE 10 MG/1
10 TABLET ORAL DAILY
Qty: 90 TABLET | Refills: 1 | COMMUNITY
Start: 2021-11-04

## 2021-11-04 NOTE — PROGRESS NOTES
HPI:   Ted Mar is a 76year old female who presents for a Medicare Initial Preventative Physical Exam (Welcome to Medicare- < 12 months on Medicare). She is on CPAP  She sees cards for PAF. stable.  On pradaxa     She is taking all of her me (PHQ-2/PHQ-9): Over the LAST 2 WEEKS   Little interest or pleasure in doing things: Not at all  Feeling down, depressed, or hopeless: Not at all  PHQ-2 SCORE: 0      Advanced Directive:  She does NOT have a Living Will on file in [address] [place] [address] Rd.    Advance care plann Chemistry Labs:   Lab Results   Component Value Date    AST 21 09/04/2021    ALT 18 09/04/2021    CA 9.2 09/04/2021    ALB 3.2 (L) 09/04/2021    TSH 2.360 09/04/2021    CREATSERUM 0.90 09/04/2021    GLU 89 09/04/2021        CBC  (most recent labs)   Lab Re mass, left (5/29/2012), Bronchitis, Bronchospasm, Carotid disease, bilateral ([facility] [address] (4/29/2021), Cellulitis, Cephalgia (3/15/2012), Chest pain (11/3/2012), Chickenpox, Coughing (4/1/2010), Dermatitis, Eczema, Edema, Epigastric fullness (4/27/2011), Esophage She  reports that she has never smoked. She has never used smokeless tobacco. She reports current alcohol use. She reports that she does not use drugs.              EXAM:   /70   Pulse 64   Temp 99.2 °F (37.3 °C) (Temporal)   Resp 16   Ht 5' 1\" (1. intermittent asthma without complication controlled, does not want maintenance treatment    Bilateral carotid artery disease, unspecified type ([facility]- RF control    Morbid obesity with BMI of 45.0-49.9, adult ([facility]    Primary hypertension- high today, just DETAILS LAST COMPLETION DATE   Diabetes Screening    Fasting Blood Sugar /  Glucose    One screening every 12 months if never tested or if previously tested but not diagnosed with pre-diabetes   One screening every 6 months if diagnosed with pre-diabetes L Recommend annually for all female patients aged 36 and older    One baseline mammogram covered for patients aged 32-38 12/31/2020    Mammogram due on 12/31/2021    Immunizations    Influenza Covered once per flu season  Please get every year -  Influenza V walking?: Yes  Do you worry about falling?: No  Have you fallen in the past year?: No      Cognitive Assessment   She had a completely normal cognitive assessment- see flowsheet entries    Functional Ability/Status   Cyn Robledo has some abnormal fu without complication     Anemia     Carotid disease, bilateral (HCC)     Pre-diabetes    Wt Readings from Last 3 Encounters:  11/04/21 : 258 lb 1.6 oz (117.1 kg)  05/10/21 : 256 lb (116.1 kg)  04/29/21 : 258 lb (117 kg)     Last Cholesterol Labs:   Lab Res daily.  omeprazole 20 MG Oral Capsule Delayed Release, Take 1 Cap by mouth daily.        MEDICAL INFORMATION:   She  has a past medical history of Abnormal mammogram, Acute nasopharyngitis, Adjustment reaction (12/12/2000), Allergic rhinitis, Anemia (3/11/2 site right (cpt=19281) (2008?); and colonoscopy (N/A, 1/9/2020). Her family history includes ASVD in her mother;  Allergies/Asthma in an other family member; Cancer in an other family member; Heart Disease in her mother; High Blood Pressure in her father presents for a Medicare Assessment. PLAN SUMMARY:   Diagnoses and all orders for this visit:    Encounter for annual health examination    Postmenopausal  -     XR DEXA BONE DENSITOMETRY (CPT=77080);  Future    Need for vaccination  -     PNEUMOCOCCAL I &  COVERAGE DETAILS LAST COMPLETION DATE   Diabetes Screening    Fasting Blood Sugar /  Glucose    One screening every 12 months if never tested or if previously tested but not diagnosed with pre-diabetes   One screening every 6 months if diagnosed with pr Mammogram     Recommend annually for all female patients aged 36 and older    One baseline mammogram covered for patients aged 32-38 12/31/2020    Mammogram due on 12/31/2021    Immunizations    Influenza Covered once per flu season  Please get every year

## 2021-11-04 NOTE — PATIENT INSTRUCTIONS
Ellis Rivera's SCREENING SCHEDULE   Tests on this list are recommended by your physician but may not be covered, or covered at this frequency, by your insurer. Please check with your insurance carrier before scheduling to verify coverage.    German Diaz (CPT=77080) 11/04/2019      DEXA Scan due on 11/04/2021   Pap and Pelvic    Pap   Covered every 2 years for women at normal risk;  Annually if at high risk -  No recommendations at this time    Chlamydia Annually if high risk -  No recommendations at this t http://www. [place]ph.state. [place]/public/books/advin.htm  A link to the [employer]. This site has a lot of good information including definitions of the different types of Advance Directives.  It also has the State forms available on it's webs every 2 years if patient is at high risk or previous colonoscopy was abnormal 01/09/2020    Colonoscopy due on 01/09/2023    Flexible Sigmoidoscopy   Covered every 4 years -    Fecal Occult Blood Test Covered annually -   Bone Density Screening    Bone den 09/04/2021         Creatinine   Annually Lab Results   Component Value Date    CREATSERUM 0.90 09/04/2021         BUN Annually Lab Results   Component Value Date    BUN 23 (H) 09/04/2021       Drug Serum Conc Annually No results found for: DIGOXIN, DIG, VA

## 2021-11-17 ENCOUNTER — MED REC SCAN ONLY (OUTPATIENT)
Dept: INTERNAL MEDICINE CLINIC | Facility: CLINIC | Age: 66
End: 2021-11-17

## 2021-12-13 ENCOUNTER — LAB ENCOUNTER (OUTPATIENT)
Dept: LAB | Age: 66
End: 2021-12-13
Attending: INTERNAL MEDICINE
Payer: MEDICARE

## 2021-12-13 DIAGNOSIS — Z11.59 NEED FOR HEPATITIS C SCREENING TEST: ICD-10-CM

## 2021-12-13 DIAGNOSIS — E03.9 ACQUIRED HYPOTHYROIDISM: ICD-10-CM

## 2021-12-13 PROCEDURE — 36415 COLL VENOUS BLD VENIPUNCTURE: CPT

## 2021-12-13 PROCEDURE — 86803 HEPATITIS C AB TEST: CPT

## 2021-12-13 RX ORDER — LEVOTHYROXINE SODIUM 112 UG/1
112 TABLET ORAL
Qty: 90 TABLET | Refills: 1 | Status: SHIPPED | OUTPATIENT
Start: 2021-12-13

## 2021-12-13 NOTE — TELEPHONE ENCOUNTER
Did the patient contact the pharmacy directly?:  No new Pharm     Is patient out of meds or supply very low?:  8 days left     Medication Requested:  LEVOTHYROXINE SODIUM 112 MCG Oral Tab    Dose:      Is patient requesting a 30 or 90 day supply?: 90    Ph

## 2021-12-17 ENCOUNTER — APPOINTMENT (OUTPATIENT)
Dept: CARDIOLOGY | Age: 66
End: 2021-12-17

## 2022-01-10 ENCOUNTER — HOSPITAL ENCOUNTER (OUTPATIENT)
Dept: BONE DENSITY | Age: 67
Discharge: HOME | End: 2022-01-10
Attending: INTERNAL MEDICINE
Payer: MEDICARE

## 2022-01-10 ENCOUNTER — HOSPITAL ENCOUNTER (OUTPATIENT)
Dept: MAMMOGRAPHY | Age: 67
Discharge: HOME | End: 2022-01-10
Attending: INTERNAL MEDICINE
Payer: MEDICARE

## 2022-01-10 DIAGNOSIS — Z78.0 POSTMENOPAUSAL: ICD-10-CM

## 2022-01-10 DIAGNOSIS — Z12.31 BREAST CANCER SCREENING BY MAMMOGRAM: ICD-10-CM

## 2022-01-10 PROCEDURE — 77063 BREAST TOMOSYNTHESIS BI: CPT | Performed by: INTERNAL MEDICINE

## 2022-01-10 PROCEDURE — 77067 SCR MAMMO BI INCL CAD: CPT | Performed by: INTERNAL MEDICINE

## 2022-01-10 PROCEDURE — 77080 DXA BONE DENSITY AXIAL: CPT | Performed by: INTERNAL MEDICINE

## 2022-01-13 ENCOUNTER — IMMUNIZATION (OUTPATIENT)
Dept: LAB | Facility: HOSPITAL | Age: 67
End: 2022-01-13
Attending: EMERGENCY MEDICINE
Payer: MEDICARE

## 2022-01-13 DIAGNOSIS — Z23 NEED FOR VACCINATION: Primary | ICD-10-CM

## 2022-01-13 PROCEDURE — 0004A SARSCOV2 VAC 30MCG/0.3ML IM: CPT

## 2022-01-13 PROCEDURE — 0054A SARSCOV2 VAC 30MCG/0.3ML IM: CPT

## 2022-02-15 ENCOUNTER — HOSPITAL ENCOUNTER (OUTPATIENT)
Dept: MAMMOGRAPHY | Facility: HOSPITAL | Age: 67
Discharge: HOME | End: 2022-02-15
Attending: INTERNAL MEDICINE
Payer: MEDICARE

## 2022-02-15 DIAGNOSIS — R92.2 INCONCLUSIVE MAMMOGRAPHY: ICD-10-CM

## 2022-02-15 PROCEDURE — 76642 ULTRASOUND BREAST LIMITED: CPT | Performed by: INTERNAL MEDICINE

## 2022-02-15 PROCEDURE — 77065 DX MAMMO INCL CAD UNI: CPT | Performed by: INTERNAL MEDICINE

## 2022-02-15 PROCEDURE — 77061 BREAST TOMOSYNTHESIS UNI: CPT | Performed by: INTERNAL MEDICINE

## 2022-02-15 NOTE — IMAGING NOTE
This Breast Care RN assisted Dr. Obando with recommendation for a left breast 2 site ultrasound guided biopsy for mass. Procedure reviewed and all questions answered. Emotional and educational support given. On the day of the biopsy, pt instructed to take Tylenol 1000mg PO, eat a light meal & bring or wear a sports bra. Post biopsy care also reviewed with pt to include NO lifting more than 5lbs, no exercising or housework (limit upper body movement) for 24-48 hrs post biopsy. Patient denies bleeding disorders, liver disease, and chemo. Patient is taking Pradaxa prescribed by her Cardiologist. Patient instructed to check with her Cardiologist for permission to stop her Pradaxa for the recommended 4 days prior to breast biopsy. Patient also informed that she will need to arrive 1 hour early for breast biopsy and get labwork done. Pt verbalized understanding. Our breast center schedulers will be calling to schedule an appt that is convenient for pt.

## 2022-02-16 ENCOUNTER — TELEPHONE (OUTPATIENT)
Dept: INTERNAL MEDICINE CLINIC | Facility: CLINIC | Age: 67
End: 2022-02-16

## 2022-02-16 NOTE — TELEPHONE ENCOUNTER
Mammography needs biopsy order sign. Reviewed original mammogram order - was not a superorder. Routed to Dr Anirudh Brenner.

## 2022-02-16 NOTE — TELEPHONE ENCOUNTER
Incoming (mail or fax):  fax  Received from:  [facility]   Documentation given to:  Triage in basket     Needs to be signed and returned

## 2022-02-17 ENCOUNTER — PATIENT MESSAGE (OUTPATIENT)
Dept: INTERNAL MEDICINE CLINIC | Facility: CLINIC | Age: 67
End: 2022-02-17

## 2022-02-17 RX ORDER — ESCITALOPRAM OXALATE 10 MG/1
TABLET ORAL
Qty: 135 TABLET | Refills: 0 | Status: SHIPPED | OUTPATIENT
Start: 2022-02-17

## 2022-02-17 RX ORDER — BENAZEPRIL HYDROCHLORIDE 10 MG/1
10 TABLET ORAL DAILY
Qty: 90 TABLET | Refills: 0 | Status: SHIPPED | OUTPATIENT
Start: 2022-02-17

## 2022-02-17 NOTE — TELEPHONE ENCOUNTER
there is 1 refill remaining on benazepril. No refills left for escitalopram. Routing both to provider. Pharmacy change.     Last OV relevant to medication: 11/4/21  Last refill date: 9/9/21     #/refills: 90ds/0  When pt was asked to return for OV: 6 months  Upcoming appt/reason: 5/5/22 med check  Was pt informed of any over due labs: n/a

## 2022-02-17 NOTE — TELEPHONE ENCOUNTER
From: Marlon Zhang  To: Mat Villeda MD  Sent: 2/17/2022 10:17 AM CST  Subject: Medication refill due to change in insurance    Good morning Dr. Patsy Akbar,    Since going on medicare I now have to use a different insurance plan for refills of my Benazepril Hcl and for my Escitalopram (which is now 15 mg). If you could please send a 90 day prescription to [facility] at the address of [address] [place] [address] [employer] in [place]. If you could do that at your earliest convenience I would appreciate it.     Thanks,  Clint Dixon

## 2022-02-22 ENCOUNTER — MED REC SCAN ONLY (OUTPATIENT)
Dept: INTERNAL MEDICINE CLINIC | Facility: CLINIC | Age: 67
End: 2022-02-22

## 2022-03-09 ENCOUNTER — HOSPITAL ENCOUNTER (OUTPATIENT)
Dept: MAMMOGRAPHY | Facility: HOSPITAL | Age: 67
Discharge: HOME | End: 2022-03-09
Attending: INTERNAL MEDICINE
Payer: MEDICARE

## 2022-03-09 ENCOUNTER — LAB ENCOUNTER (OUTPATIENT)
Dept: LAB | Facility: HOSPITAL | Age: 67
End: 2022-03-09
Attending: INTERNAL MEDICINE
Payer: MEDICARE

## 2022-03-09 DIAGNOSIS — R92.2 INCONCLUSIVE MAMMOGRAPHY: ICD-10-CM

## 2022-03-09 DIAGNOSIS — R92.8 ABNORMAL MAMMOGRAM: ICD-10-CM

## 2022-03-09 DIAGNOSIS — R92.8 ABNORMAL MAMMOGRAM OF LEFT BREAST: ICD-10-CM

## 2022-03-09 LAB
INR BLD: 1 (ref 0.8–1.2)
PROTHROMBIN TIME: 13.2 SECONDS (ref 11.6–14.8)

## 2022-03-09 PROCEDURE — 77065 DX MAMMO INCL CAD UNI: CPT | Performed by: INTERNAL MEDICINE

## 2022-03-09 PROCEDURE — 88305 TISSUE EXAM BY PATHOLOGIST: CPT | Performed by: INTERNAL MEDICINE

## 2022-03-09 PROCEDURE — 36415 COLL VENOUS BLD VENIPUNCTURE: CPT

## 2022-03-09 PROCEDURE — 85610 PROTHROMBIN TIME: CPT

## 2022-03-09 PROCEDURE — 19083 BX BREAST 1ST LESION US IMAG: CPT | Performed by: INTERNAL MEDICINE

## 2022-03-09 PROCEDURE — 19084 BX BREAST ADD LESION US IMAG: CPT | Performed by: INTERNAL MEDICINE

## 2022-03-11 NOTE — IMAGING NOTE
1725: Contacted Ms. Aleshia Howe at this time. Follow-up to previous conversation today. Provided Ms. Aleshia Howe with Dr. Ginger Perdomo office phone number. Instructed Ms. Aleshia Howe to call Dr. Krista Campbell office for an appointment- surgical consult/referral recommended by radiologist.  Maria Fernanda Betancourt Ms. Aleshia Howe to follow-up with her primary care provider Dr. Aidan Richter. Ms. Aleshia Howe verbalized agreement and gratitude for the call.

## 2022-03-14 ENCOUNTER — TELEPHONE (OUTPATIENT)
Dept: INTERNAL MEDICINE CLINIC | Facility: CLINIC | Age: 67
End: 2022-03-14

## 2022-03-14 NOTE — TELEPHONE ENCOUNTER
Pt called and stated that she had a breast biopsy done and after the breast center told Pt that they would like her to see a specialist. Dr Facundo Dempsey. Pt was told to check in with Dr Chani Santiago to make sure Dr Chani Santiago is ok with this.    Please advise  Thank you

## 2022-03-17 ENCOUNTER — OFFICE VISIT (OUTPATIENT)
Dept: SURGERY | Facility: CLINIC | Age: 67
End: 2022-03-17
Payer: MEDICARE

## 2022-03-17 VITALS
SYSTOLIC BLOOD PRESSURE: 130 MMHG | RESPIRATION RATE: 16 BRPM | DIASTOLIC BLOOD PRESSURE: 69 MMHG | BODY MASS INDEX: 51.44 KG/M2 | HEIGHT: 60 IN | WEIGHT: 262 LBS | OXYGEN SATURATION: 97 % | HEART RATE: 60 BPM

## 2022-03-17 DIAGNOSIS — R92.8 ABNORMAL MAMMOGRAM OF LEFT BREAST: Primary | ICD-10-CM

## 2022-03-17 PROCEDURE — 99205 OFFICE O/P NEW HI 60 MIN: CPT | Performed by: SURGERY

## 2022-03-17 RX ORDER — RIVAROXABAN 20 MG/1
20 TABLET, FILM COATED ORAL DAILY
COMMUNITY
Start: 2022-03-03

## 2022-03-17 NOTE — PATIENT INSTRUCTIONS
Dr. Tonja Ureña  Tel: [phone]  Fax: [facility] [address] [place], [address]  [phone]     Surgery/Procedure: Left breast wire localized excisional biopsy. Anesthesia:   MAC  Surgery Length:   45 minutes CPT:  42352   Wire LOC:   Yes Nuc Med:   No   Dinah Seed:  No     Dx & ICD-10: Abnormal mammogram of left breast (R92.8)   Radiology Instructions: Left breast, 6 o'clock position, pellet shaped clip, biopsy demonstrates dense stromal fibrosis and dilated duct which is considered discordant.    _______________________________________________________________________________    1. Someone must accompany you the day of the procedure to drive you home safely, because of anesthesia. 2. You must remove any kind of makeup, nail polish, lotions, powders, creams or deodorant. 3. [place] ONLY: Pre-admission will give instruct you on when to take Gatorade and Tylenol/acetaminophen prior to your surgery, purchase 2 - 12oz bottles of regular Gatorade (NOT RED/SUGAR FREE). Otherwise, you may not eat or drink anything else after 11PM the night before surgery. 4. [place] ONLY: You may not eat or drink anything after midnight the day of your surgery. 5. Wear comfortable clothing that can be easily removed. 6. If you wear dentures, contacts lenses, or any prosthesis, you will be asked to remove them. 7. Do not drink alcohol or smoke 24 hours prior to your procedure. 8. Bring a picture ID and your insurance card. 9. You will be contacted by the hospital for Pre-Admission Covid-19 testing (regardless of vaccination status) to be scheduled as an appointment prior to surgery. They will call closer to the surgery date to set this up, because the earliest this can be done is 72 hours prior to surgery. 10. The Pre-Admission Testing Department will call the day before to confirm your procedure, give you the time you need to arrive by and directions on where to go.  They begin making calls after 2pm, if you are not contacted by 4pm, please call the surgeon's office listed above. 11. Do not take any blood thinners at least one week prior to the procedure/surgery. This includes aspirin, baby aspirin, Ibuprofen products, herbal supplements, diet medications, vitamin E, fish oil and green tea supplements. Please check other supplements for these ingredients. *TYLENOL or acetaminophen is acceptable*  12. If you take Coumadin, Plavix, Xarelto, or Eliquis, please contact your prescribing physician for special instructions on how long to hold. If you take insulin contact your primary care physician for special instructions. 15. Our surgery scheduler, Saturnino Solis, will be contacting you to discuss surgery dates. If you have any questions related to scheduling your surgery, please reach out to her at [phone].  _____________________________________________________________________  PRE-OPERATIVE TESTING IF INDICATED BELOW  PLEASE COMPLETE ASAP (AT LEAST 7-10 DAYS PRIOR TO SURGERY)  [] CBC [x] BMP [] CMP [x] EKG    [] PT, PTT, INR [x] Cardiac Clearance  [] H&P Medical Clearance [] Chest X-ray     Please call Central Scheduling to schedule an appointment for pre-operative labs/tests @ [phone]    Does the patient have a pacemaker or ICD?        [] Yes   [x] No

## 2022-03-21 NOTE — PROGRESS NOTES
[facility]PITAL[facility]  Progress Note     Pablo Perera Patient Status:  Inpatient    1955 MRN [id number]   [facility] 3SW-A Attending Tiffanie Zhou MD   [address] Day [address] 6 PCP Sonu Davis MD     Chief Complaint: Left leg redn mg Oral Daily   • ceFAZolin  2 g Intravenous Q6H   • lisinopril  10 mg Oral Daily   • escitalopram  10 mg Oral QAM   • Pantoprazole Sodium  20 mg Oral QAM AC   • Flecainide Acetate  100 mg Oral BID   • Levothyroxine Sodium  112 mcg Oral Before breakfast done

## 2022-03-24 ENCOUNTER — TELEPHONE (OUTPATIENT)
Dept: SURGERY | Facility: CLINIC | Age: 67
End: 2022-03-24

## 2022-03-24 NOTE — TELEPHONE ENCOUNTER
Attempted to call pt back regarding pt's message sent from PSR's  Pt would like to know when she will be getting a call to schedule her surgery and pre-testing. No answer. LVM after verifying verbal release. Informed pt that I would have our surgery scheduler Laure Zacarias call pt to set up an OR date. Re: setting up pre-testing (BMP/EKG), Pt needs to contact her cardiologist to set up appt for cardiac clearance and get testing done. Provided office number to call back or with additional questions and concerns.

## 2022-03-25 ENCOUNTER — TELEPHONE (OUTPATIENT)
Dept: SURGERY | Facility: CLINIC | Age: 67
End: 2022-03-25

## 2022-03-25 ENCOUNTER — TELEPHONE (OUTPATIENT)
Dept: CARDIOLOGY | Age: 67
End: 2022-03-25

## 2022-03-25 NOTE — TELEPHONE ENCOUNTER
Calling pt in regards to scheduling surgery. Informed pt that I have 05/20/2022 available at [facility] with Dr. Ilene Rodriguez. Pt verbalized understanding and in agreement with date and location. All questions answered. Encouraged pt to call or [employer] message office with any other questions or concerns.

## 2022-04-26 ENCOUNTER — TELEPHONE (OUTPATIENT)
Dept: GENERAL RADIOLOGY | Facility: HOSPITAL | Age: 67
End: 2022-04-26

## 2022-04-26 NOTE — TELEPHONE ENCOUNTER
1200: Spoke with Ham Elizabeth regarding localization procedure to be done in the women's imaging center prior to surgery Friday, May 20. Procedure explained and all questions answered. Ms. Isabela Krishna verbalized understanding and gratitude for the call.

## 2022-05-05 ENCOUNTER — OFFICE VISIT (OUTPATIENT)
Dept: INTERNAL MEDICINE CLINIC | Facility: CLINIC | Age: 67
End: 2022-05-05
Payer: MEDICARE

## 2022-05-05 ENCOUNTER — LAB ENCOUNTER (OUTPATIENT)
Dept: LAB | Age: 67
End: 2022-05-05
Attending: INTERNAL MEDICINE
Payer: MEDICARE

## 2022-05-05 VITALS
HEART RATE: 70 BPM | WEIGHT: 263 LBS | BODY MASS INDEX: 51.63 KG/M2 | TEMPERATURE: 98 F | HEIGHT: 60 IN | SYSTOLIC BLOOD PRESSURE: 112 MMHG | RESPIRATION RATE: 16 BRPM | OXYGEN SATURATION: 98 % | DIASTOLIC BLOOD PRESSURE: 64 MMHG

## 2022-05-05 DIAGNOSIS — F41.9 ANXIETY: ICD-10-CM

## 2022-05-05 DIAGNOSIS — K21.9 GASTROESOPHAGEAL REFLUX DISEASE WITHOUT ESOPHAGITIS: ICD-10-CM

## 2022-05-05 DIAGNOSIS — H93.19 TINNITUS, UNSPECIFIED LATERALITY: ICD-10-CM

## 2022-05-05 DIAGNOSIS — D64.89 ANEMIA DUE TO OTHER CAUSE, NOT CLASSIFIED: ICD-10-CM

## 2022-05-05 DIAGNOSIS — R73.03 PREDIABETES: ICD-10-CM

## 2022-05-05 DIAGNOSIS — Z01.818 PREOPERATIVE CLEARANCE: ICD-10-CM

## 2022-05-05 DIAGNOSIS — E03.9 ACQUIRED HYPOTHYROIDISM: ICD-10-CM

## 2022-05-05 DIAGNOSIS — Z99.89 OSA ON CPAP: ICD-10-CM

## 2022-05-05 DIAGNOSIS — G47.33 OSA ON CPAP: ICD-10-CM

## 2022-05-05 DIAGNOSIS — I77.9 BILATERAL CAROTID ARTERY DISEASE, UNSPECIFIED TYPE (HCC): ICD-10-CM

## 2022-05-05 DIAGNOSIS — E66.01 CLASS 3 SEVERE OBESITY DUE TO EXCESS CALORIES WITH SERIOUS COMORBIDITY AND BODY MASS INDEX (BMI) OF 50.0 TO 59.9 IN ADULT (HCC): ICD-10-CM

## 2022-05-05 DIAGNOSIS — I10 PRIMARY HYPERTENSION: Primary | ICD-10-CM

## 2022-05-05 DIAGNOSIS — I89.0 LYMPHEDEMA OF BOTH LOWER EXTREMITIES: ICD-10-CM

## 2022-05-05 DIAGNOSIS — R73.03 PRE-DIABETES: ICD-10-CM

## 2022-05-05 DIAGNOSIS — J45.20 MILD INTERMITTENT ASTHMA WITHOUT COMPLICATION: ICD-10-CM

## 2022-05-05 LAB
ANION GAP SERPL CALC-SCNC: 5 MMOL/L (ref 0–18)
BASOPHILS # BLD AUTO: 0.04 X10(3) UL (ref 0–0.2)
BASOPHILS NFR BLD AUTO: 0.5 %
BUN BLD-MCNC: 26 MG/DL (ref 7–18)
CALCIUM BLD-MCNC: 9.3 MG/DL (ref 8.5–10.1)
CHLORIDE SERPL-SCNC: 104 MMOL/L (ref 98–112)
CO2 SERPL-SCNC: 31 MMOL/L (ref 21–32)
CREAT BLD-MCNC: 1.2 MG/DL
EOSINOPHIL # BLD AUTO: 0.14 X10(3) UL (ref 0–0.7)
EOSINOPHIL NFR BLD AUTO: 1.9 %
ERYTHROCYTE [DISTWIDTH] IN BLOOD BY AUTOMATED COUNT: 13.2 %
FASTING STATUS PATIENT QL REPORTED: YES
GLUCOSE BLD-MCNC: 92 MG/DL (ref 70–99)
HCT VFR BLD AUTO: 39.7 %
HGB BLD-MCNC: 12.2 G/DL
IMM GRANULOCYTES # BLD AUTO: 0.03 X10(3) UL (ref 0–1)
IMM GRANULOCYTES NFR BLD: 0.4 %
LYMPHOCYTES # BLD AUTO: 1.64 X10(3) UL (ref 1–4)
LYMPHOCYTES NFR BLD AUTO: 21.8 %
MCH RBC QN AUTO: 29.8 PG (ref 26–34)
MCHC RBC AUTO-ENTMCNC: 30.7 G/DL (ref 31–37)
MCV RBC AUTO: 97.1 FL
MONOCYTES # BLD AUTO: 0.52 X10(3) UL (ref 0.1–1)
MONOCYTES NFR BLD AUTO: 6.9 %
NEUTROPHILS # BLD AUTO: 5.15 X10 (3) UL (ref 1.5–7.7)
NEUTROPHILS # BLD AUTO: 5.15 X10(3) UL (ref 1.5–7.7)
NEUTROPHILS NFR BLD AUTO: 68.5 %
OSMOLALITY SERPL CALC.SUM OF ELEC: 294 MOSM/KG (ref 275–295)
PLATELET # BLD AUTO: 245 10(3)UL (ref 150–450)
POTASSIUM SERPL-SCNC: 4.6 MMOL/L (ref 3.5–5.1)
RBC # BLD AUTO: 4.09 X10(6)UL
SODIUM SERPL-SCNC: 140 MMOL/L (ref 136–145)
WBC # BLD AUTO: 7.5 X10(3) UL (ref 4–11)

## 2022-05-05 PROCEDURE — 80048 BASIC METABOLIC PNL TOTAL CA: CPT

## 2022-05-05 PROCEDURE — 99214 OFFICE O/P EST MOD 30 MIN: CPT | Performed by: INTERNAL MEDICINE

## 2022-05-05 PROCEDURE — 85025 COMPLETE CBC W/AUTO DIFF WBC: CPT

## 2022-05-05 PROCEDURE — 36415 COLL VENOUS BLD VENIPUNCTURE: CPT

## 2022-05-05 RX ORDER — BIFIDOBACTERIUM LONGUM SUBSP. INFANTIS, AVOBENZONE, HOMOSALATE, OCTISALATE, OCTOCRYLENE, AND OXYBENZONE
2 KIT DAILY
COMMUNITY

## 2022-05-05 RX ORDER — LEVOTHYROXINE SODIUM 112 UG/1
112 TABLET ORAL
Qty: 90 TABLET | Refills: 1 | Status: SHIPPED | OUTPATIENT
Start: 2022-05-05

## 2022-05-05 RX ORDER — PHENOL 1.4 %
1 AEROSOL, SPRAY (ML) MUCOUS MEMBRANE DAILY
COMMUNITY

## 2022-05-05 RX ORDER — BENAZEPRIL HYDROCHLORIDE 10 MG/1
10 TABLET ORAL DAILY
Qty: 90 TABLET | Refills: 1 | Status: SHIPPED | OUTPATIENT
Start: 2022-05-05

## 2022-05-05 RX ORDER — ESCITALOPRAM OXALATE 10 MG/1
TABLET ORAL
Qty: 135 TABLET | Refills: 1 | Status: SHIPPED | OUTPATIENT
Start: 2022-05-05

## 2022-05-12 ENCOUNTER — TELEPHONE (OUTPATIENT)
Dept: SURGERY | Facility: CLINIC | Age: 67
End: 2022-05-12

## 2022-05-12 NOTE — TELEPHONE ENCOUNTER
No answer. Left voicemail informing patient that the office has not received her cardiac clearance, BMP, and EKG.

## 2022-05-17 ENCOUNTER — LAB ENCOUNTER (OUTPATIENT)
Dept: LAB | Facility: HOSPITAL | Age: 67
End: 2022-05-17
Attending: SURGERY
Payer: MEDICARE

## 2022-05-17 ENCOUNTER — TELEPHONE (OUTPATIENT)
Dept: SURGERY | Facility: CLINIC | Age: 67
End: 2022-05-17

## 2022-05-17 DIAGNOSIS — Z01.812 ENCOUNTER FOR PREOPERATIVE SCREENING LABORATORY TESTING FOR COVID-19 VIRUS: ICD-10-CM

## 2022-05-17 DIAGNOSIS — Z20.822 ENCOUNTER FOR PREOPERATIVE SCREENING LABORATORY TESTING FOR COVID-19 VIRUS: ICD-10-CM

## 2022-05-17 RX ORDER — DIAZEPAM 5 MG/1
5 TABLET ORAL AS NEEDED
Status: CANCELLED | OUTPATIENT
Start: 2022-05-17

## 2022-05-17 NOTE — TELEPHONE ENCOUNTER
Called the office to request pt cardiac clearance. They are going to refax the clearance and confirmed the fax number.

## 2022-05-18 ENCOUNTER — ANESTHESIA EVENT (OUTPATIENT)
Dept: SURGERY | Facility: HOSPITAL | Age: 67
End: 2022-05-18
Payer: MEDICARE

## 2022-05-18 LAB — SARS-COV-2 RNA RESP QL NAA+PROBE: NOT DETECTED

## 2022-05-20 ENCOUNTER — HOSPITAL ENCOUNTER (OUTPATIENT)
Facility: HOSPITAL | Age: 67
Setting detail: HOSPITAL OUTPATIENT SURGERY
Discharge: HOME | End: 2022-05-20
Attending: SURGERY | Admitting: SURGERY
Payer: MEDICARE

## 2022-05-20 ENCOUNTER — ANESTHESIA (OUTPATIENT)
Dept: SURGERY | Facility: HOSPITAL | Age: 67
End: 2022-05-20
Payer: MEDICARE

## 2022-05-20 ENCOUNTER — APPOINTMENT (OUTPATIENT)
Dept: MAMMOGRAPHY | Facility: HOSPITAL | Age: 67
End: 2022-05-20
Attending: SURGERY
Payer: MEDICARE

## 2022-05-20 ENCOUNTER — HOSPITAL ENCOUNTER (OUTPATIENT)
Dept: MAMMOGRAPHY | Facility: HOSPITAL | Age: 67
Discharge: HOME | End: 2022-05-20
Attending: SURGERY
Payer: MEDICARE

## 2022-05-20 VITALS
HEART RATE: 57 BPM | BODY MASS INDEX: 50.49 KG/M2 | HEIGHT: 60.5 IN | SYSTOLIC BLOOD PRESSURE: 129 MMHG | RESPIRATION RATE: 16 BRPM | DIASTOLIC BLOOD PRESSURE: 54 MMHG | OXYGEN SATURATION: 97 % | WEIGHT: 264 LBS | TEMPERATURE: 98 F

## 2022-05-20 DIAGNOSIS — Z01.812 ENCOUNTER FOR PREOPERATIVE SCREENING LABORATORY TESTING FOR COVID-19 VIRUS: Primary | ICD-10-CM

## 2022-05-20 DIAGNOSIS — R92.8 ABNORMAL MAMMOGRAM OF LEFT BREAST: ICD-10-CM

## 2022-05-20 DIAGNOSIS — Z20.822 ENCOUNTER FOR PREOPERATIVE SCREENING LABORATORY TESTING FOR COVID-19 VIRUS: Primary | ICD-10-CM

## 2022-05-20 PROCEDURE — 88305 TISSUE EXAM BY PATHOLOGIST: CPT | Performed by: SURGERY

## 2022-05-20 PROCEDURE — 76098 X-RAY EXAM SURGICAL SPECIMEN: CPT | Performed by: SURGERY

## 2022-05-20 PROCEDURE — 77065 DX MAMMO INCL CAD UNI: CPT | Performed by: SURGERY

## 2022-05-20 PROCEDURE — 0HBU0ZZ EXCISION OF LEFT BREAST, OPEN APPROACH: ICD-10-PCS | Performed by: SURGERY

## 2022-05-20 PROCEDURE — 19285 PERQ DEV BREAST 1ST US IMAG: CPT | Performed by: SURGERY

## 2022-05-20 RX ORDER — NALOXONE HYDROCHLORIDE 0.4 MG/ML
80 INJECTION, SOLUTION INTRAMUSCULAR; INTRAVENOUS; SUBCUTANEOUS AS NEEDED
Status: DISCONTINUED | OUTPATIENT
Start: 2022-05-20 | End: 2022-05-20

## 2022-05-20 RX ORDER — HYDROMORPHONE HYDROCHLORIDE 1 MG/ML
0.4 INJECTION, SOLUTION INTRAMUSCULAR; INTRAVENOUS; SUBCUTANEOUS EVERY 5 MIN PRN
Status: DISCONTINUED | OUTPATIENT
Start: 2022-05-20 | End: 2022-05-20

## 2022-05-20 RX ORDER — ACETAMINOPHEN 500 MG
1000 TABLET ORAL ONCE AS NEEDED
Status: DISCONTINUED | OUTPATIENT
Start: 2022-05-20 | End: 2022-05-20

## 2022-05-20 RX ORDER — ACETAMINOPHEN 500 MG
1000 TABLET ORAL EVERY 6 HOURS PRN
COMMUNITY

## 2022-05-20 RX ORDER — ONDANSETRON 2 MG/ML
4 INJECTION INTRAMUSCULAR; INTRAVENOUS EVERY 6 HOURS PRN
Status: DISCONTINUED | OUTPATIENT
Start: 2022-05-20 | End: 2022-05-20

## 2022-05-20 RX ORDER — ACETAMINOPHEN 500 MG
1000 TABLET ORAL ONCE
Status: DISCONTINUED | OUTPATIENT
Start: 2022-05-20 | End: 2022-05-20 | Stop reason: HOSPADM

## 2022-05-20 RX ORDER — LIDOCAINE HYDROCHLORIDE AND EPINEPHRINE 10; 10 MG/ML; UG/ML
INJECTION, SOLUTION INFILTRATION; PERINEURAL AS NEEDED
Status: DISCONTINUED | OUTPATIENT
Start: 2022-05-20 | End: 2022-05-20 | Stop reason: HOSPADM

## 2022-05-20 RX ORDER — MIDAZOLAM HYDROCHLORIDE 1 MG/ML
INJECTION INTRAMUSCULAR; INTRAVENOUS AS NEEDED
Status: DISCONTINUED | OUTPATIENT
Start: 2022-05-20 | End: 2022-05-20 | Stop reason: SURG

## 2022-05-20 RX ORDER — CLINDAMYCIN PHOSPHATE 900 MG/50ML
900 INJECTION INTRAVENOUS ONCE
Status: COMPLETED | OUTPATIENT
Start: 2022-05-20 | End: 2022-05-20

## 2022-05-20 RX ORDER — HYDROCODONE BITARTRATE AND ACETAMINOPHEN 5; 325 MG/1; MG/1
1-2 TABLET ORAL EVERY 6 HOURS PRN
Qty: 20 TABLET | Refills: 0 | Status: SHIPPED | OUTPATIENT
Start: 2022-05-20 | End: 2022-05-24

## 2022-05-20 RX ORDER — SODIUM CHLORIDE, SODIUM LACTATE, POTASSIUM CHLORIDE, CALCIUM CHLORIDE 600; 310; 30; 20 MG/100ML; MG/100ML; MG/100ML; MG/100ML
INJECTION, SOLUTION INTRAVENOUS CONTINUOUS
Status: DISCONTINUED | OUTPATIENT
Start: 2022-05-20 | End: 2022-05-20

## 2022-05-20 RX ORDER — HYDROMORPHONE HYDROCHLORIDE 1 MG/ML
0.6 INJECTION, SOLUTION INTRAMUSCULAR; INTRAVENOUS; SUBCUTANEOUS EVERY 5 MIN PRN
Status: DISCONTINUED | OUTPATIENT
Start: 2022-05-20 | End: 2022-05-20

## 2022-05-20 RX ORDER — DIAZEPAM 5 MG/1
5 TABLET ORAL AS NEEDED
Status: DISCONTINUED | OUTPATIENT
Start: 2022-05-20 | End: 2022-05-20 | Stop reason: HOSPADM

## 2022-05-20 RX ORDER — BUPIVACAINE HYDROCHLORIDE 5 MG/ML
INJECTION, SOLUTION EPIDURAL; INTRACAUDAL AS NEEDED
Status: DISCONTINUED | OUTPATIENT
Start: 2022-05-20 | End: 2022-05-20 | Stop reason: HOSPADM

## 2022-05-20 RX ORDER — HYDROMORPHONE HYDROCHLORIDE 1 MG/ML
0.2 INJECTION, SOLUTION INTRAMUSCULAR; INTRAVENOUS; SUBCUTANEOUS EVERY 5 MIN PRN
Status: DISCONTINUED | OUTPATIENT
Start: 2022-05-20 | End: 2022-05-20

## 2022-05-20 RX ORDER — HYDROCODONE BITARTRATE AND ACETAMINOPHEN 5; 325 MG/1; MG/1
2 TABLET ORAL ONCE AS NEEDED
Status: DISCONTINUED | OUTPATIENT
Start: 2022-05-20 | End: 2022-05-20

## 2022-05-20 RX ORDER — LIDOCAINE HYDROCHLORIDE 10 MG/ML
INJECTION, SOLUTION EPIDURAL; INFILTRATION; INTRACAUDAL; PERINEURAL AS NEEDED
Status: DISCONTINUED | OUTPATIENT
Start: 2022-05-20 | End: 2022-05-20 | Stop reason: SURG

## 2022-05-20 RX ORDER — METOCLOPRAMIDE HYDROCHLORIDE 5 MG/ML
10 INJECTION INTRAMUSCULAR; INTRAVENOUS EVERY 8 HOURS PRN
Status: DISCONTINUED | OUTPATIENT
Start: 2022-05-20 | End: 2022-05-20

## 2022-05-20 RX ORDER — LABETALOL HYDROCHLORIDE 5 MG/ML
5 INJECTION, SOLUTION INTRAVENOUS EVERY 5 MIN PRN
Status: DISCONTINUED | OUTPATIENT
Start: 2022-05-20 | End: 2022-05-20

## 2022-05-20 RX ORDER — HYDROCODONE BITARTRATE AND ACETAMINOPHEN 5; 325 MG/1; MG/1
1 TABLET ORAL ONCE AS NEEDED
Status: DISCONTINUED | OUTPATIENT
Start: 2022-05-20 | End: 2022-05-20

## 2022-05-20 RX ADMIN — LIDOCAINE HYDROCHLORIDE 30 MG: 10 INJECTION, SOLUTION EPIDURAL; INFILTRATION; INTRACAUDAL; PERINEURAL at 09:17:00

## 2022-05-20 RX ADMIN — SODIUM CHLORIDE, SODIUM LACTATE, POTASSIUM CHLORIDE, CALCIUM CHLORIDE: 600; 310; 30; 20 INJECTION, SOLUTION INTRAVENOUS at 09:49:00

## 2022-05-20 RX ADMIN — SODIUM CHLORIDE, SODIUM LACTATE, POTASSIUM CHLORIDE, CALCIUM CHLORIDE: 600; 310; 30; 20 INJECTION, SOLUTION INTRAVENOUS at 09:12:00

## 2022-05-20 RX ADMIN — CLINDAMYCIN PHOSPHATE 900 MG: 900 INJECTION INTRAVENOUS at 09:14:00

## 2022-05-20 RX ADMIN — MIDAZOLAM HYDROCHLORIDE 2 MG: 1 INJECTION INTRAMUSCULAR; INTRAVENOUS at 09:12:00

## 2022-05-20 NOTE — IMAGING NOTE
Assisted  with ultrasound guided needle localization of the left breast.   Davida Villanueva identified with spelling of name and date of birth. Medications and allergies reviewed. The following allergies were reported:    Dye [Iodine (Topical)]OTHER (SEE COMMENTS)   LatexSWELLING   ShellfishSWELLING   Augmentin [Amoxicillin-pot Clavulanate]DIARRHEA, NAUSEA AND VOMITING   Pcn [Bicillin C-r,]NAUSEA AND VOMITING   Sulfa AntibioticsOTHER (SEE COMMENTS)   Adhesive TapeRASH   VerapamilRASH    History: Abnormal mammogram of left breast- discordant biopsy findings  Surgery: Left breast wire localized excisional biopsy    Order verified. Procedure explained and questions answered. Davida Villanueva verbalized understanding and agreement. 0632: Written consent obtained. Scans taken by Joss Espinosa- ultrasound technologist    5858: Dr. Lisa Schaeffer present    9440: Time out complete.    0806: Site prepped and draped in a sterile manner. 5024: Lidocaine administered for anesthetic affect. 0809: Dietrich 20G x 5 cm needle placed- left breast  Emotional support provided. Davida Villanueva tolerated procedure well. Site cleaned. Discussed adhesive allergy with Ms. Sena Fung. Ms. Sena Fung provided verbal agreement to the use of steri strips and Tegaderm to secure wire. Wire secured with blue clip, steri strips,sterile 4x4 gauze dressing, and Tegaderm. 5271: Ms. Sena Fung transported via wheelchair to mammography for post localization images. in stable condition. Ms. Sena Fung without complaints or concerns at this time,. Transfer of care to mammography technician-Meño. Davida Villanueva to be discharged to surgery holding after imaging complete.

## 2022-05-20 NOTE — ANESTHESIA POSTPROCEDURE EVALUATION
[address] [facility] Patient Status:  Hospital Outpatient Surgery   Age/Gender 78year old female MRN [id number]   [facility] SURGERY Attending Anil Saavedra MD   Hosp Day # 0 PCP Ana Laura Patel MD       Anesthesia Post-op Note     Left breast wire localized excisional biopsy    Procedure Summary     Date: 05/20/22 Room / Location: [address] [place] [address] OR 12 / [facility] OR    Anesthesia Start: 3167 Anesthesia Stop: 1149    Procedure: Left breast wire localized excisional biopsy (Left Breast) Diagnosis:       Abnormal mammogram of left breast      (Abnormal mammogram of left breast [R92.8])    Surgeons: Anil Saavedra MD Anesthesiologist: Dru Alonso MD    Anesthesia Type: MAC ASA Status: 3          Anesthesia Type: MAC    Vitals Value Taken Time   /61 05/20/22 0950   Temp 97.5 05/20/22 0950   Pulse 63 05/20/22 0950   Resp 16 05/20/22 0950   SpO2 97 05/20/22 0950       Patient Location: Same Day Surgery    Anesthesia Type: MAC    Airway Patency: patent    Postop Pain Control: adequate    Mental Status: mildly sedated but able to meaningfully participate in the post-anesthesia evaluation    Nausea/Vomiting: none    Cardiopulmonary/Hydration status: stable euvolemic    Complications: no apparent anesthesia related complications    Postop vital signs: stable    Dental Exam: Unchanged from Postop

## 2022-05-20 NOTE — BRIEF OP NOTE
Pre-Operative Diagnosis: Abnormal mammogram of left breast [R92.8]     Post-Operative Diagnosis: Abnormal mammogram of left breast [R92.8]      Procedure Performed:    Left breast wire localized excisional biopsy    Surgeon(s) and Role:     Boogie Gayle MD - Primary    Assistant(s):  Surgical Assistant.: Maria L Gomez CSA     Surgical Findings: Clip in xray     Specimen: L lumpectomy     Estimated Blood Loss: Blood Output: 5 mL (5/20/2022  9:36 AM)    Rick Samayoa MD  5/20/2022  9:49 AM

## 2022-05-20 NOTE — OPERATIVE REPORT
[facility]    PATIENT'S NAME: Fillmore Dinorah MORRIS   ATTENDING PHYSICIAN: Otis Bach. Nestor Geller M.D. OPERATING PHYSICIAN: Otis Bach. Nestor Geller M.D. PATIENT ACCOUNT#:   [de-identified]    LOCATION:  [facility] 17 EDWP 10  MEDICAL RECORD #:   [id number]       YOB: 1955  ADMISSION DATE:       05/20/2022      OPERATION DATE:  05/20/2022    OPERATIVE REPORT      PREOPERATIVE DIAGNOSIS:  Abnormal imaging of the left breast and left breast mass. POSTOPERATIVE DIAGNOSIS:  Abnormal imaging of the left breast and left breast mass. PROCEDURE:  Left breast wire-localized lumpectomy with left breast specimen radiography. ASSISTANT:  Ky Snow CSA    ANESTHESIA:  Monitored anesthesia care and local.    ESTIMATED BLOOD LOSS:  5 mL. DRAINS:  None. COMPLICATIONS:  None. DISPOSITION:  Stable on transfer to recovery room. INDICATIONS:  The patient is a 76-year-old female who presents with an imaging-detected concern of the breast.  She was noted to have a suspicious 1.3 cm mass at 6 o'clock, underwent a biopsy that confirmed benign fibrosis. This was considered discordant. Given this, formal surgical excision with evaluation of pathology and margination has been recommended. Risks and possible complications were discussed with the patient including, but not limited to, infection, bleeding, injury to surrounding structures, possible need for reoperation. She agreed to the proposed surgery. OPERATIVE TECHNIQUE:  Patient was brought to the imaging suite. She underwent a wire localization of the area of concern in the left breast 6-o'clock position. She was brought to the OR, placed in supine position, properly padded and secured, given a dose of IV antibiotics, and sequential compression devices were applied to her legs for DVT prophylaxis. Monitored anesthesia care was induced. The left breast was prepped and draped in usual sterile fashion.   Lidocaine 1% with epinephrine was used to infiltrate the skin and subcutaneous tissues at the targeted incision site. A radial incision was made near the 6-o'clock position of the breast with a 15-blade knife in the skin. Her wire was identified and brought into the field. Using sharp dissection and electrocautery, a segment of breast tissue surrounding the tip of the wire was carefully excised and oriented with a short single clip superiorly, long stitch double clip laterally, in order to allow for appropriate pathological margin assessment. This was placed in the imaging device, where specimen x-ray confirmed the presence of the previously placed biopsy clip with visible mass and adequate margins as deemed by myself. A clip was then placed back within the cavity to assist with subsequent surveillance. The wound was irrigated, and hemostasis was assured with electrocautery. Closure was accomplished with interrupted 3-0 Vicryl for deep layer, running 4-0 subcuticular Monocryl for the skin. Mastisol and Steri-Strips were applied, and 0.5% Marcaine was instilled in the cavity to assist with postoperative analgesia. A sterile dressing and compression bra were placed. Blood loss was minimal.  All counts were correct at the conclusion of the procedure. She tolerated the procedure well, and she was transferred to the recovery area in stable condition. Dictated By Sherrin Landau.  Bernie Thayer M.D.  d: 05/20/2022 09:55:23  t: 05/20/2022 17:31:13  Job [id number]  [facility]/    cc: MD Yamilet Hughes M.D.

## 2022-05-24 ENCOUNTER — OFFICE VISIT (OUTPATIENT)
Dept: SURGERY | Facility: CLINIC | Age: 67
End: 2022-05-24
Payer: MEDICARE

## 2022-05-24 VITALS
HEART RATE: 58 BPM | RESPIRATION RATE: 20 BRPM | BODY MASS INDEX: 50 KG/M2 | TEMPERATURE: 98 F | DIASTOLIC BLOOD PRESSURE: 65 MMHG | SYSTOLIC BLOOD PRESSURE: 129 MMHG | WEIGHT: 260.81 LBS | OXYGEN SATURATION: 94 %

## 2022-05-24 DIAGNOSIS — R92.8 ABNORMAL MAMMOGRAM OF LEFT BREAST: Primary | ICD-10-CM

## 2022-05-24 DIAGNOSIS — N64.89 PSEUDOANGIOMATOUS STROMAL HYPERPLASIA OF BREAST: ICD-10-CM

## 2022-05-24 PROCEDURE — 99024 POSTOP FOLLOW-UP VISIT: CPT | Performed by: SURGERY

## 2022-06-06 DIAGNOSIS — E03.9 ACQUIRED HYPOTHYROIDISM: ICD-10-CM

## 2022-06-06 DIAGNOSIS — J45.20 MILD INTERMITTENT ASTHMA WITHOUT COMPLICATION: ICD-10-CM

## 2022-06-06 RX ORDER — LEVOTHYROXINE SODIUM 112 UG/1
112 TABLET ORAL
Qty: 90 TABLET | Refills: 1 | Status: SHIPPED | OUTPATIENT
Start: 2022-06-06

## 2022-06-07 RX ORDER — ALBUTEROL SULFATE 90 UG/1
2 AEROSOL, METERED RESPIRATORY (INHALATION) EVERY 4 HOURS PRN
Qty: 18 G | Refills: 0 | Status: SHIPPED | OUTPATIENT
Start: 2022-06-07

## 2022-06-07 NOTE — TELEPHONE ENCOUNTER
Last OV relevant to medication: 5/5/22   Last refill date: 9/9/21 18g    #/refills:   When pt was asked to return for OV: 11/5/22  Upcoming appt/reason:   Future Appointments   Date Time Provider Raffaele Kaur   11/14/2022 10:40 AM Shasta Landry MD [facility]       Was pt informed of any over due labs: n/a

## 2022-09-16 DIAGNOSIS — F41.9 ANXIETY: ICD-10-CM

## 2022-09-16 RX ORDER — ESCITALOPRAM OXALATE 10 MG/1
TABLET ORAL
Qty: 135 TABLET | Refills: 1 | OUTPATIENT
Start: 2022-09-16

## 2022-09-28 DIAGNOSIS — I10 BENIGN ESSENTIAL HTN: ICD-10-CM

## 2022-09-28 NOTE — TELEPHONE ENCOUNTER
Is this medication prescribed by the [facility] Providers? yes    Did the patient contact the pharmacy directly?: no it is for new pharm     Is patient out of meds or supply very low?:  2 weeks     Medication Requested:  FUROSEMIDE 40 MG Oral Tab    Dose:      Is patient requesting a 30 or 90 day supply?:  90    Pharmacy name and phone # or location:  [id number] - [address], [address] [place] [address], [phone], [phone]    Is the patient due for an appointment?: No has AWV scheduled on 11/14/22  (if so, please schedule appt)    Additional Notes:  Pt is leaving to go out of town on oct 1-8 and Pt stated she has enough of the med until she gets back. Pt wants to get this started before she leaves so it is ready when she gets back in town. Please advise the patient refills take up to 72 business hours.

## 2022-09-29 RX ORDER — FUROSEMIDE 40 MG/1
40 TABLET ORAL DAILY
Qty: 90 TABLET | Refills: 0 | Status: SHIPPED | OUTPATIENT
Start: 2022-09-29

## 2022-10-27 ENCOUNTER — TELEPHONE (OUTPATIENT)
Dept: INTERNAL MEDICINE CLINIC | Facility: CLINIC | Age: 67
End: 2022-10-27

## 2022-10-27 NOTE — TELEPHONE ENCOUNTER
Patient walked in office for her [address] paperwork to be filled out.     Placed in Triage incoming

## 2022-10-28 NOTE — TELEPHONE ENCOUNTER
Patient has appointment with [facility] 11/14/22. Form placed on her desk.

## 2022-11-02 RX ORDER — BENAZEPRIL HYDROCHLORIDE 10 MG/1
10 TABLET ORAL DAILY
Qty: 90 TABLET | Refills: 0 | Status: SHIPPED | OUTPATIENT
Start: 2022-11-02

## 2022-11-02 NOTE — TELEPHONE ENCOUNTER
Hypertension Medications Protocol Passed 11/02/2022 10:20 AM   Protocol Details  CMP or BMP in past 12 months    Last serum creatinine< 2.0    Appointment in past 6 or next 3 months

## 2022-11-15 ENCOUNTER — MED REC SCAN ONLY (OUTPATIENT)
Dept: INTERNAL MEDICINE CLINIC | Facility: CLINIC | Age: 67
End: 2022-11-15

## 2022-12-05 ENCOUNTER — OFFICE VISIT (OUTPATIENT)
Dept: INTERNAL MEDICINE CLINIC | Facility: CLINIC | Age: 67
End: 2022-12-05
Payer: MEDICARE

## 2022-12-05 VITALS
DIASTOLIC BLOOD PRESSURE: 70 MMHG | HEIGHT: 60 IN | TEMPERATURE: 98 F | HEART RATE: 60 BPM | BODY MASS INDEX: 52.03 KG/M2 | OXYGEN SATURATION: 99 % | SYSTOLIC BLOOD PRESSURE: 138 MMHG | WEIGHT: 265 LBS | RESPIRATION RATE: 22 BRPM

## 2022-12-05 DIAGNOSIS — D64.89 ANEMIA DUE TO OTHER CAUSE, NOT CLASSIFIED: ICD-10-CM

## 2022-12-05 DIAGNOSIS — Z99.89 OSA ON CPAP: ICD-10-CM

## 2022-12-05 DIAGNOSIS — R73.03 PRE-DIABETES: ICD-10-CM

## 2022-12-05 DIAGNOSIS — J45.20 MILD INTERMITTENT ASTHMA WITHOUT COMPLICATION: ICD-10-CM

## 2022-12-05 DIAGNOSIS — I89.0 LYMPHEDEMA OF BOTH LOWER EXTREMITIES: ICD-10-CM

## 2022-12-05 DIAGNOSIS — Z13.220 SCREENING FOR CHOLESTEROL LEVEL: ICD-10-CM

## 2022-12-05 DIAGNOSIS — Z00.00 ENCOUNTER FOR ANNUAL HEALTH EXAMINATION: Primary | ICD-10-CM

## 2022-12-05 DIAGNOSIS — I48.0 PAF (PAROXYSMAL ATRIAL FIBRILLATION) (HCC): ICD-10-CM

## 2022-12-05 DIAGNOSIS — Z13.29 SCREENING FOR THYROID DISORDER: ICD-10-CM

## 2022-12-05 DIAGNOSIS — M15.9 OSTEOARTHRITIS OF MULTIPLE JOINTS, UNSPECIFIED OSTEOARTHRITIS TYPE: ICD-10-CM

## 2022-12-05 DIAGNOSIS — I77.9 BILATERAL CAROTID ARTERY DISEASE, UNSPECIFIED TYPE (HCC): ICD-10-CM

## 2022-12-05 DIAGNOSIS — I10 BENIGN ESSENTIAL HTN: ICD-10-CM

## 2022-12-05 DIAGNOSIS — E66.01 CLASS 3 SEVERE OBESITY DUE TO EXCESS CALORIES WITH SERIOUS COMORBIDITY AND BODY MASS INDEX (BMI) OF 50.0 TO 59.9 IN ADULT (HCC): ICD-10-CM

## 2022-12-05 DIAGNOSIS — M85.80 OSTEOPENIA, UNSPECIFIED LOCATION: ICD-10-CM

## 2022-12-05 DIAGNOSIS — K21.9 GASTROESOPHAGEAL REFLUX DISEASE WITHOUT ESOPHAGITIS: ICD-10-CM

## 2022-12-05 DIAGNOSIS — F39 MOOD DISORDER (HCC): ICD-10-CM

## 2022-12-05 DIAGNOSIS — E03.9 ACQUIRED HYPOTHYROIDISM: ICD-10-CM

## 2022-12-05 DIAGNOSIS — E55.9 VITAMIN D DEFICIENCY: ICD-10-CM

## 2022-12-05 DIAGNOSIS — G47.33 OSA ON CPAP: ICD-10-CM

## 2022-12-05 PROCEDURE — 99214 OFFICE O/P EST MOD 30 MIN: CPT | Performed by: NURSE PRACTITIONER

## 2022-12-05 PROCEDURE — G0438 PPPS, INITIAL VISIT: HCPCS | Performed by: NURSE PRACTITIONER

## 2022-12-05 RX ORDER — FAMOTIDINE 20 MG/1
20 TABLET, FILM COATED ORAL 2 TIMES DAILY
Qty: 90 TABLET | Refills: 0 | Status: SHIPPED | OUTPATIENT
Start: 2022-12-05

## 2022-12-05 RX ORDER — LEVOTHYROXINE SODIUM 112 UG/1
112 TABLET ORAL
Qty: 90 TABLET | Refills: 1 | Status: SHIPPED | OUTPATIENT
Start: 2022-12-05

## 2022-12-05 RX ORDER — ALBUTEROL SULFATE 90 UG/1
2 AEROSOL, METERED RESPIRATORY (INHALATION) EVERY 4 HOURS PRN
Qty: 18 G | Refills: 0 | Status: SHIPPED | OUTPATIENT
Start: 2022-12-05

## 2022-12-05 RX ORDER — ESCITALOPRAM OXALATE 5 MG/1
5 TABLET ORAL DAILY
Qty: 90 TABLET | Refills: 1 | Status: SHIPPED | OUTPATIENT
Start: 2022-12-05

## 2022-12-05 RX ORDER — ESCITALOPRAM OXALATE 10 MG/1
TABLET ORAL
Qty: 90 TABLET | Refills: 1 | Status: SHIPPED | OUTPATIENT
Start: 2022-12-05

## 2022-12-05 RX ORDER — FUROSEMIDE 40 MG/1
40 TABLET ORAL DAILY
Qty: 90 TABLET | Refills: 1 | Status: SHIPPED | OUTPATIENT
Start: 2022-12-05

## 2022-12-05 RX ORDER — BENAZEPRIL HYDROCHLORIDE 10 MG/1
10 TABLET ORAL DAILY
Qty: 90 TABLET | Refills: 1 | Status: SHIPPED | OUTPATIENT
Start: 2022-12-05

## 2022-12-05 NOTE — PATIENT INSTRUCTIONS
COVID booster     Flu shot recommended. Start therapy with the lymphedema clinic    Continue to see cardiology    Continue wearing your MONO hose    Check with your insurance to verify coverage for the Shingrix vaccine for shingles. Also check to see where you can go to get the vaccine. [id number] vaccine recommended     Osteopenia present. Continue vit D supplement, 1200mg daily of calcium in diet, weight bearing exercises to prevent osteoporosis. Get your labs done. You should be fasting for at least 10 hours. If you take a multivitamin with Biotin or any biotin product it should be held for 3 days prior to getting your labs done. Get your Mammogram done in May when due    Continue to see the breast specialist     Change the Lexapro from 1-1/2tablets to 1-10 mg tablet and 1-5 mg tablet to make 15 mg. Stop omeprazole and start Pepcid 40 mg daily. Continue this for 2 weeks and monitor your acid reflux. If your acid reflux worsens go back to the omeprazole and let the office know. If it is stable go to Pepcid 20 mg daily and continue monitoring    Follow up in 6 months for a med check or sooner as needed      [address]   Tests on this list are recommended by your physician but may not be covered, or covered at this frequency, by your insurer. Please check with your insurance carrier before scheduling to verify coverage.    PREVENTATIVE SERVICES FREQUENCY &  COVERAGE DETAILS LAST COMPLETION DATE   Diabetes Screening    Fasting Blood Sugar /  Glucose    One screening every 12 months if never tested or if previously tested but not diagnosed with pre-diabetes   One screening every 6 months if diagnosed with pre-diabetes Lab Results   Component Value Date    GLU 92 05/05/2022        Cardiovascular Disease Screening    Lipid Panel  Cholesterol  Lipoprotein (HDL)  Triglycerides Covered every 5 years for all Medicare beneficiaries without apparent signs or symptoms of cardiovascular disease Lab Results   Component Value Date    CHOLEST 188 09/04/2021    HDL 51 09/04/2021     (H) 09/04/2021    TRIG 153 (H) 09/04/2021         Electrocardiogram (EKG)   Covered if needed at Welcome to Medicare, and non-screening if indicated for medical reasons 04/20/2022      Ultrasound Screening for Abdominal Aortic Aneurysm (AAA) Covered once in a lifetime for one of the following risk factors    Men who are 73-68 years old and have ever smoked    Anyone with a family history -     Colorectal Cancer Screening  Covered for ages 52-80; only need ONE of the following:    Colonoscopy   Covered every 10 years    Covered every 2 years if patient is at high risk or previous colonoscopy was abnormal 01/09/2020    Colorectal Cancer Screening due on 01/09/2023    Flexible Sigmoidoscopy   Covered every 4 years -    Fecal Occult Blood Test Covered annually -   Bone Density Screening    Bone density screening    Covered every 2 years after age 72 if diagnosed with risk of osteoporosis or estrogen deficiency. Covered yearly for long-term glucocorticoid medication use (Steroids) Last Dexa Scan:    XR DEXA BONE DENSITOMETRY (CPT=77080) 01/10/2022      No recommendations at this time   Pap and Pelvic    Pap   Covered every 2 years for women at normal risk;  Annually if at high risk -  No recommendations at this time    Chlamydia Annually if high risk -  No recommendations at this time   Screening Mammogram    Mammogram     Recommend annually for all female patients aged 36 and older    One baseline mammogram covered for patients aged 32-38 05/20/2022    Mammogram due on 05/20/2023    Immunizations    Influenza Covered once per flu season  Please get every year -  Influenza Vaccine(1) due on 10/01/2022    Pneumococcal Each vaccine (Lhjbyhn88 & Eccoypans30) covered once after 65 Prevnar 13: -    Adwbrvduz12: 10/28/2008     Pneumococcal Vaccination(2 - PCV) due on 10/28/2009    Hepatitis B One screening covered for patients with certain risk factors   -  No recommendations at this time    Tetanus Toxoid Not covered by Medicare Part B unless medically necessary (cut with metal); may be covered with your pharmacy prescription benefits 02/20/2006    Tetanus, Diptheria and Pertusis TD and TDaP Not covered by Medicare Part B -  No recommendations at this time    Zoster Not covered by Medicare Part B; may be covered with your pharmacy  prescription benefits -  Zoster Vaccines(1 of 2) Never done       Annual Monitoring of Persistent Medications (ACE/ARB, digoxin diuretics, anticonvulsants)    Potassium Annually Lab Results   Component Value Date    K 4.6 05/05/2022         Creatinine   Annually Lab Results   Component Value Date    CREATSERUM 1.20 (H) 05/05/2022         BUN Annually Lab Results   Component Value Date    BUN 26 (H) 05/05/2022       Drug Serum Conc Annually No results found for: DIGOXIN, DIG, VALP

## 2022-12-14 ENCOUNTER — LAB ENCOUNTER (OUTPATIENT)
Dept: LAB | Facility: HOSPITAL | Age: 67
End: 2022-12-14
Attending: NURSE PRACTITIONER
Payer: MEDICARE

## 2022-12-14 DIAGNOSIS — Z13.220 SCREENING FOR CHOLESTEROL LEVEL: ICD-10-CM

## 2022-12-14 DIAGNOSIS — E55.9 VITAMIN D DEFICIENCY: ICD-10-CM

## 2022-12-14 DIAGNOSIS — R73.03 PRE-DIABETES: ICD-10-CM

## 2022-12-14 DIAGNOSIS — Z00.00 ENCOUNTER FOR ANNUAL HEALTH EXAMINATION: ICD-10-CM

## 2022-12-14 DIAGNOSIS — D64.9 ANEMIA, UNSPECIFIED TYPE: ICD-10-CM

## 2022-12-14 DIAGNOSIS — Z13.29 SCREENING FOR THYROID DISORDER: ICD-10-CM

## 2022-12-14 LAB
ALBUMIN SERPL-MCNC: 3.3 G/DL (ref 3.4–5)
ALBUMIN/GLOB SERPL: 1 {RATIO} (ref 1–2)
ALP LIVER SERPL-CCNC: 98 U/L
ALT SERPL-CCNC: 19 U/L
ANION GAP SERPL CALC-SCNC: 5 MMOL/L (ref 0–18)
AST SERPL-CCNC: 12 U/L (ref 15–37)
BASOPHILS # BLD AUTO: 0.03 X10(3) UL (ref 0–0.2)
BASOPHILS NFR BLD AUTO: 0.6 %
BILIRUB SERPL-MCNC: 0.4 MG/DL (ref 0.1–2)
BUN BLD-MCNC: 17 MG/DL (ref 7–18)
CALCIUM BLD-MCNC: 9 MG/DL (ref 8.5–10.1)
CHLORIDE SERPL-SCNC: 106 MMOL/L (ref 98–112)
CHOLEST SERPL-MCNC: 219 MG/DL (ref ?–200)
CO2 SERPL-SCNC: 29 MMOL/L (ref 21–32)
CREAT BLD-MCNC: 0.9 MG/DL
EOSINOPHIL # BLD AUTO: 0.18 X10(3) UL (ref 0–0.7)
EOSINOPHIL NFR BLD AUTO: 3.8 %
ERYTHROCYTE [DISTWIDTH] IN BLOOD BY AUTOMATED COUNT: 13.2 %
EST. AVERAGE GLUCOSE BLD GHB EST-MCNC: 120 MG/DL (ref 68–126)
FASTING PATIENT LIPID ANSWER: YES
FASTING STATUS PATIENT QL REPORTED: YES
GFR SERPLBLD BASED ON 1.73 SQ M-ARVRAT: 70 ML/MIN/1.73M2 (ref 60–?)
GLOBULIN PLAS-MCNC: 3.2 G/DL (ref 2.8–4.4)
GLUCOSE BLD-MCNC: 90 MG/DL (ref 70–99)
HBA1C MFR BLD: 5.8 % (ref ?–5.7)
HCT VFR BLD AUTO: 37.2 %
HDLC SERPL-MCNC: 61 MG/DL (ref 40–59)
HGB BLD-MCNC: 11.4 G/DL
IMM GRANULOCYTES # BLD AUTO: 0.02 X10(3) UL (ref 0–1)
IMM GRANULOCYTES NFR BLD: 0.4 %
LDLC SERPL CALC-MCNC: 142 MG/DL (ref ?–100)
LYMPHOCYTES # BLD AUTO: 1.45 X10(3) UL (ref 1–4)
LYMPHOCYTES NFR BLD AUTO: 30.3 %
MCH RBC QN AUTO: 29.8 PG (ref 26–34)
MCHC RBC AUTO-ENTMCNC: 30.6 G/DL (ref 31–37)
MCV RBC AUTO: 97.4 FL
MONOCYTES # BLD AUTO: 0.37 X10(3) UL (ref 0.1–1)
MONOCYTES NFR BLD AUTO: 7.7 %
NEUTROPHILS # BLD AUTO: 2.74 X10 (3) UL (ref 1.5–7.7)
NEUTROPHILS # BLD AUTO: 2.74 X10(3) UL (ref 1.5–7.7)
NEUTROPHILS NFR BLD AUTO: 57.2 %
NONHDLC SERPL-MCNC: 158 MG/DL (ref ?–130)
OSMOLALITY SERPL CALC.SUM OF ELEC: 291 MOSM/KG (ref 275–295)
PLATELET # BLD AUTO: 239 10(3)UL (ref 150–450)
POTASSIUM SERPL-SCNC: 4.7 MMOL/L (ref 3.5–5.1)
PROT SERPL-MCNC: 6.5 G/DL (ref 6.4–8.2)
RBC # BLD AUTO: 3.82 X10(6)UL
SODIUM SERPL-SCNC: 140 MMOL/L (ref 136–145)
TRIGL SERPL-MCNC: 88 MG/DL (ref 30–149)
TSI SER-ACNC: 2.17 MIU/ML (ref 0.36–3.74)
VIT D+METAB SERPL-MCNC: 37.8 NG/ML (ref 30–100)
VLDLC SERPL CALC-MCNC: 16 MG/DL (ref 0–30)
WBC # BLD AUTO: 4.8 X10(3) UL (ref 4–11)

## 2022-12-14 PROCEDURE — 85025 COMPLETE CBC W/AUTO DIFF WBC: CPT

## 2022-12-14 PROCEDURE — 82728 ASSAY OF FERRITIN: CPT

## 2022-12-14 PROCEDURE — 80061 LIPID PANEL: CPT

## 2022-12-14 PROCEDURE — 80053 COMPREHEN METABOLIC PANEL: CPT

## 2022-12-14 PROCEDURE — 36415 COLL VENOUS BLD VENIPUNCTURE: CPT

## 2022-12-14 PROCEDURE — 83036 HEMOGLOBIN GLYCOSYLATED A1C: CPT

## 2022-12-14 PROCEDURE — 83540 ASSAY OF IRON: CPT

## 2022-12-14 PROCEDURE — 82306 VITAMIN D 25 HYDROXY: CPT

## 2022-12-14 PROCEDURE — 83550 IRON BINDING TEST: CPT

## 2022-12-14 PROCEDURE — 84443 ASSAY THYROID STIM HORMONE: CPT

## 2022-12-15 DIAGNOSIS — D64.9 ANEMIA, UNSPECIFIED TYPE: Primary | ICD-10-CM

## 2022-12-15 LAB
DEPRECATED HBV CORE AB SER IA-ACNC: 31.8 NG/ML
IRON SATN MFR SERPL: 12 %
IRON SERPL-MCNC: 39 UG/DL
TIBC SERPL-MCNC: 316 UG/DL (ref 240–450)
TRANSFERRIN SERPL-MCNC: 212 MG/DL (ref 200–360)

## 2022-12-29 ENCOUNTER — ORDER TRANSCRIPTION (OUTPATIENT)
Dept: PHYSICAL THERAPY | Facility: HOSPITAL | Age: 67
End: 2022-12-29

## 2022-12-29 DIAGNOSIS — I89.0 LYMPHEDEMA OF BOTH LOWER EXTREMITIES: Primary | ICD-10-CM

## 2022-12-30 ENCOUNTER — PATIENT MESSAGE (OUTPATIENT)
Dept: INTERNAL MEDICINE CLINIC | Facility: CLINIC | Age: 67
End: 2022-12-30

## 2022-12-30 DIAGNOSIS — K21.9 GASTROESOPHAGEAL REFLUX DISEASE WITHOUT ESOPHAGITIS: Primary | ICD-10-CM

## 2022-12-30 RX ORDER — OMEPRAZOLE 40 MG/1
40 CAPSULE, DELAYED RELEASE ORAL DAILY
Qty: 90 CAPSULE | Refills: 0 | COMMUNITY
Start: 2022-12-30

## 2022-12-30 NOTE — TELEPHONE ENCOUNTER
From: Tree Lewis  To: MARIELA Ayala  Sent: 12/30/2022 8:02 AM CST  Subject: Changed back to Prilosec    Good morning Ermelinda,    I wanted to let you know that just this morning I switched back to taking Prilosec as the Fumotidine was not controlling my GERD. I had heart burn each day when taking the Fumotidine and it also triggered my asthma. I have been taking the Fumotidine since 12/10/2022.     Festus Rand

## 2023-02-21 ENCOUNTER — OFFICE VISIT (OUTPATIENT)
Dept: OCCUPATIONAL MEDICINE | Facility: HOSPITAL | Age: 68
End: 2023-02-21
Attending: NURSE PRACTITIONER
Payer: MEDICARE

## 2023-02-21 DIAGNOSIS — I89.0 LYMPHEDEMA OF BOTH LOWER EXTREMITIES: ICD-10-CM

## 2023-02-21 PROCEDURE — 97535 SELF CARE MNGMENT TRAINING: CPT

## 2023-02-21 PROCEDURE — 97167 OT EVAL HIGH COMPLEX 60 MIN: CPT

## 2023-03-01 NOTE — ED PROVIDER NOTES
Patient Seen in: [facility]    History   Patient presents with:  Fever (infectious)  Fatigue (constitutional, neurologic)  Leg Pain    Stated Complaint: check up, feeling hot , left leg pain    HPI    19-year-old female wit Extrinsic asthma, unspecified    • Fibroid tumor     in uterine wall   • High blood pressure     HTN   • History of anemia    • History of bone density study 1/12/2009   • HTN (hypertension) 8/4/2016   • Hypothyroid    • Hypothyroidism    • Laryngitis    • feeling hot , left leg pain  Other systems are as noted in HPI. Constitutional and vital signs reviewed. All other systems reviewed and negative except as noted above.     Physical Exam   ED Triage Vitals [03/04/18 0900]  BP: 139/68  Pulse: 90  Resp: main ED for further workup. She may need IV antibiotics, septic workup, and admission pending evaluation in the ED.             Disposition and Plan     Clinical Impression:  Cellulitis of left lower extremity  (primary encounter diagnosis)    Disposition: 01-Mar-2023 09:44

## 2023-03-13 ENCOUNTER — OFFICE VISIT (OUTPATIENT)
Dept: OCCUPATIONAL MEDICINE | Facility: HOSPITAL | Age: 68
End: 2023-03-13
Attending: NURSE PRACTITIONER
Payer: MEDICARE

## 2023-03-13 PROCEDURE — 97140 MANUAL THERAPY 1/> REGIONS: CPT

## 2023-03-13 NOTE — PROGRESS NOTES
Diagnosis:   Lymphedema of both lower extremities (I89.0)      Referring Provider: Robert Bose  Date of Evaluation:    2/21/2023     Precautions:  Lymphedema; infection risk; cardiac; abdominal Next MD visit:   none scheduled  Date of Surgery: n/a   Insurance Primary/Secondary: MEDICARE / COMMERCIAL # Authorized Visits: 2/24            Next MD/Plan Renewal Date: 5/22/2023       Subjective:   *Pt reports daily use of Velcro-closure garments on Right lower leg since initial session. *Had a recent mechanical fall resulting in bruising over Left elbow and Left hip/upper thigh. *Returned to daily use of compression device, except for last 3-4 days following fall d/t hip/upper thigh bruise. *Brought her compression bandaging supplies that were used during last course of treatment to find out if they are still usable. Objective:  Brought compression bandaging supplies to session. OBSERVATION:   *Improved coloration of bilateral lower legs. *Right LE:  Tissue quality over thigh is slightly boggy to supple except for distal medial/posterior surfaces which are boggy to slightly boggy. Knee is boggy, non-pitting. Lower leg is densely boggy, pitting with fair superficial tissue mobility over proximal 1/4, poor superficial tissue mobility over distal 3/4. Ankle and dorsum of foot are boggy, pitting. *Left LE:  Tissue quality over thigh is slightly boggy to supple; discoloration in area of prior cellulitis. Knee is slightly boggy to supple. Proximal 1/3 of lower leg is slightly boggy to supple; mid 1/3 is boggy, pitting with fair superficial tissue mobility; distal 1/3 is densely boggy, pitting with poor superficial tissue mobility; discoloration in area of prior cellulitis. Ankle and dorsum of foot are boggy, pitting. MEASUREMENTS:   None taken   TREATMENT:  *Checked compression bandages for integrity.  Along with 2 rolls of Rosidal wrap and stockinets, has following bandages they will be able to be re-used: 3, 8cm; 9, 10cm; 5, 12cm. Also has used size \"J\" Tensogrip sleeve (thigh/knee length); 2 unused size \"E\" Tensogrip sleeves (lower leg length). *Re-collected measurements for [facility] Classic garments for bilateral lower legs. *Right lower quadrant manual lymph drainage  *Discussed beginning short stretch compression bandaging of Right lower leg. However, pt left post-op shoe at home and shoes that she is wearing will not accommodate bandages. Pt to post-op shoes to session tomorrow. *Pt donned current knee highs. COMPRESSION:  *Bilateral lower legs: [employer] custom-fit compression knee highs, CCL2     Assessment:   Pt with good follow through with return to use of Flexitouch Plus device and use of Right lower leg Velcro-closure garments. Goals:  (to be met in 24 visits)  1. Pt will wear Right lower leg Circaid garments on a daily basis. ACHIEVED   2. Pt will return to use of Flexitouch device 6-7 days/week. ACHIEVED   3. Pt will be independent in decongestive exercises. 4. Pt will tolerate Right lower leg short stretch compression bandaging for 20-23 hours. 5. Pt/Caregiver will be independent in Right lower leg short stretch compression bandaging. 6. Reduce Right LE lymphedema volume by 30-50cm to allow pt to transition back into custom-fit knee high and to reduce limb heaviness during ambulation. 7. Reduce bilateral lower leg density to boggy to reduce infection risk. 8. Pt will be independent in use of compression garments, self-manual lymph drainage, decongestive exercises, Flexitouch device and lymphedema precautions for life-long self-management of lymphedema. Plan:   Continue current plan. Initiate short stretch compression bandaging of Right lower leg.       Charges: 4 Manual Therapy    Total Timed Treatment: 55 min  Total Treatment Time: 60 min      LE Circumferential Measurements (cm)   2/21/2023   LEFT  2/21/2023   RIGHT    20cm above Superior Border of Patella (SBP)  81.3 77.0 10cm above SBP  65.5 75.1   SBP  63.3 66.6   5cm below SBP  47.0 60.1   10cm below SBP  52.4 60.6   20cm below SBP  54.3 64.0   30cm below SBP  39.0 47.6   35cm below SBP  30.5 35.4   40cm below SBP  29.2 30.5    Lateral Malleoli  29.8 30.8   7cm proximal to 1st toe web space  26.2 26.3   5cm proximal to 1st toe web space  24.3 25.0   TOTALS  542.8 599.0

## 2023-03-14 ENCOUNTER — OFFICE VISIT (OUTPATIENT)
Dept: OCCUPATIONAL MEDICINE | Facility: HOSPITAL | Age: 68
End: 2023-03-14
Attending: NURSE PRACTITIONER
Payer: MEDICARE

## 2023-03-14 PROCEDURE — 97140 MANUAL THERAPY 1/> REGIONS: CPT

## 2023-03-14 NOTE — PROGRESS NOTES
Diagnosis:   Lymphedema of both lower extremities (I89.0)      Referring Provider: Patrick Adams  Date of Evaluation:    2/21/2023     Precautions:  Lymphedema; infection risk; cardiac; abdominal Next MD visit:   none scheduled  Date of Surgery: n/a   Insurance Primary/Secondary: MEDICARE / COMMERCIAL # Authorized Visits: 3/24            Next MD/Plan Renewal Date: 5/22/2023       Subjective:   *Pt reports yesterday's manual lymph drainage session was helpful. Sharing, \"I could walk better\" yesterday. *Would like to move forward with ordering Farrow Wrap garments for Left lower leg. Objective:  Pt brought post-op shoe and hospital bootie sock to session. OBSERVATION:     *Right LE:  Tissue quality over thigh is slightly boggy to supple except for distal medial/posterior surfaces which are boggy to slightly boggy. Knee is boggy, non-pitting. Lower leg is densely boggy, pitting with fair superficial tissue mobility over proximal 1/4, poor superficial tissue mobility over distal 3/4. Ankle and dorsum of foot are boggy, pitting. *Left LE:  Tissue quality over thigh is slightly boggy to supple; discoloration in area of prior cellulitis. Knee is slightly boggy to supple. Proximal 1/3 of lower leg is slightly boggy to supple; mid 1/3 is boggy, pitting with fair superficial tissue mobility; distal 1/3 is densely boggy, pitting with poor superficial tissue mobility; discoloration in area of prior cellulitis. Ankle and dorsum of foot are boggy, pitting. MEASUREMENTS:   None taken   TREATMENT:  *Advised pt that therapist is agreeable to assist her with ordering Floridalma Miller Wrap garments from her provider of choice. *Right lower quadrant manual lymph drainage with soft tissue mobilization of dense areas of lymphedema in lower leg. Observed slight improvement in superficial tissue mobility in lower leg by end of session. *Applied bandage system over Right lower leg.   COMPRESSION:  *Right lower leg: stockinet; Rosidal wrap toes to knee; 3 short stretch compression bandages: 1, 8cm, 1, 10cm (herringbone pattern of distal leg); 1, 12cm. Hospital bootie sock, post-op shoe  *Left lower leg: [employer] custom-fit compression knee high, CCL2     Assessment:   Pt with good response to today's session and by her report, good response to yesterday's session. Goals:  (to be met in 24 visits)  1. Pt will wear Right lower leg Circaid garments on a daily basis. ACHIEVED   2. Pt will return to use of Flexitouch device 6-7 days/week. ACHIEVED   3. Pt will be independent in decongestive exercises. 4. Pt will tolerate Right lower leg short stretch compression bandaging for 20-23 hours. 5. Pt/Caregiver will be independent in Right lower leg short stretch compression bandaging. 6. Reduce Right LE lymphedema volume by 30-50cm to allow pt to transition back into custom-fit knee high and to reduce limb heaviness during ambulation. 7. Reduce bilateral lower leg density to boggy to reduce infection risk. 8. Pt will be independent in use of compression garments, self-manual lymph drainage, decongestive exercises, Flexitouch device and lymphedema precautions for life-long self-management of lymphedema. Plan:   Continue current plan. Assess response to short stretch compression bandaging of Right lower leg. Assist pt with ordering Farrow Wrap garments for Left lower leg.      Charges: 4 Manual Therapy    Total Timed Treatment: 55 min  Total Treatment Time: 60 min      LE Circumferential Measurements (cm)   2/21/2023   LEFT  2/21/2023   RIGHT    20cm above Superior Border of Patella (SBP)  81.3 77.0   10cm above SBP  65.5 75.1   SBP  63.3 66.6   5cm below SBP  47.0 60.1   10cm below SBP  52.4 60.6   20cm below SBP  54.3 64.0   30cm below SBP  39.0 47.6   35cm below SBP  30.5 35.4   40cm below SBP  29.2 30.5    Lateral Malleoli  29.8 30.8   7cm proximal to 1st toe web space  26.2 26.3   5cm proximal to 1st toe web space  24.3 25.0   TOTALS 542.8 599.0

## 2023-03-16 ENCOUNTER — OFFICE VISIT (OUTPATIENT)
Dept: OCCUPATIONAL MEDICINE | Facility: HOSPITAL | Age: 68
End: 2023-03-16
Attending: NURSE PRACTITIONER
Payer: MEDICARE

## 2023-03-16 PROCEDURE — 97140 MANUAL THERAPY 1/> REGIONS: CPT

## 2023-03-16 NOTE — PROGRESS NOTES
Diagnosis:   Lymphedema of both lower extremities (I89.0)      Referring Provider: Jc Grant  Date of Evaluation:    2/21/2023     Precautions:  Lymphedema; infection risk; cardiac; abdominal Next MD visit:   none scheduled  Date of Surgery: n/a   Insurance Primary/Secondary: MEDICARE / COMMERCIAL # Authorized Visits: 4/24            Next MD/Plan Renewal Date: 5/22/2023       Subjective:   *Pt reports she was able to tolerate her Right lower leg bandage system until last evening. Removed as bandages began to slip down her leg and become uncomfortable. Replaced with her Velcro-closure garments, which she removed for sleeping and then re-donned this morning. *Reports she has lost 9# since starting therapy. *Received message re: ordering Farrow Wrap garments for Left lower leg. Plans to call provider after session to order garments. Objective:  Arrived wearing Right lower leg Velcro-closure garments. OBSERVATION:   *Observable reduction in lymphedema volume of Right lower leg with improved tissue quality. *Right LE:  Tissue quality over thigh is slightly boggy to supple except for distal medial/posterior surfaces which are boggy to slightly boggy. Knee is boggy, non-pitting. Proximal 1/4 of lower leg is boggy, pitting with fair superficial tissue mobility; distal 3/4 is densely boggy, pitting with poor to fair . umRonel Haresh Mack Ankle and dorsum of foot are boggy, pitting. *Left LE:  Tissue quality over thigh is slightly boggy to supple; discoloration in area of prior cellulitis. Knee is slightly boggy to supple. Proximal 1/3 of lower leg is slightly boggy to supple; mid 1/3 is boggy, pitting with fair superficial tissue mobility; distal 1/3 is densely boggy, pitting with poor superficial tissue mobility; discoloration in area of prior cellulitis. Ankle and dorsum of foot are boggy, pitting.    MEASUREMENTS:   None taken   TREATMENT:  *Advised pt that with prolonged use of short stretch compression bandaging it would be expected that bandages would become loose and migrate down. *Right lower quadrant manual lymph drainage with soft tissue mobilization of dense areas of lymphedema in lower leg. Observed improvement in superficial tissue mobility in lower leg by end of session. *Applied bandage system over Right lower leg; progressed layers to 4 bandages  COMPRESSION:  *Right lower leg: stockinet; Rosidal wrap toes to knee with 1/2\" foam insert over medial surface of lower leg  ; 4 short stretch compression bandages: 1, 8cm, 2, 10cm (herringbone pattern of distal leg with first bandage); 1, 12cm. Hospital bootie sock, post-op shoe  *Left lower leg: Jobst Elvarex custom-fit compression knee high, CCL2     Assessment:   Pt with good tolerance of Right lower leg bandaging. Good progress towards reduction of volume and improvement in tissue quality. Goals:  (to be met in 24 visits)  1. Pt will wear Right lower leg Circaid garments on a daily basis. ACHIEVED   2. Pt will return to use of Flexitouch device 6-7 days/week. ACHIEVED   3. Pt will be independent in decongestive exercises. 4. Pt will tolerate Right lower leg short stretch compression bandaging for 20-23 hours. 5. Pt/Caregiver will be independent in Right lower leg short stretch compression bandaging. 6. Reduce Right LE lymphedema volume by 30-50cm to allow pt to transition back into custom-fit knee high and to reduce limb heaviness during ambulation. 7. Reduce bilateral lower leg density to boggy to reduce infection risk. 8. Pt will be independent in use of compression garments, self-manual lymph drainage, decongestive exercises, Flexitouch device and lymphedema precautions for life-long self-management of lymphedema. Plan:   Continue current plan. Assess response to short stretch compression bandaging of Right lower leg.       Charges: 4 Manual Therapy    Total Timed Treatment: 55 min  Total Treatment Time: 60 min      LE Circumferential Measurements (cm)   2/21/2023   LEFT  2/21/2023   RIGHT    20cm above Superior Border of Patella (SBP)  81.3 77.0   10cm above SBP  65.5 75.1   SBP  63.3 66.6   5cm below SBP  47.0 60.1   10cm below SBP  52.4 60.6   20cm below SBP  54.3 64.0   30cm below SBP  39.0 47.6   35cm below SBP  30.5 35.4   40cm below SBP  29.2 30.5    Lateral Malleoli  29.8 30.8   7cm proximal to 1st toe web space  26.2 26.3   5cm proximal to 1st toe web space  24.3 25.0   TOTALS  542.8 599.0

## 2023-03-20 ENCOUNTER — OFFICE VISIT (OUTPATIENT)
Dept: OCCUPATIONAL MEDICINE | Facility: HOSPITAL | Age: 68
End: 2023-03-20
Attending: NURSE PRACTITIONER
Payer: MEDICARE

## 2023-03-20 PROCEDURE — 97140 MANUAL THERAPY 1/> REGIONS: CPT

## 2023-03-20 NOTE — PROGRESS NOTES
Diagnosis:   Lymphedema of both lower extremities (I89.0)      Referring Provider: Robert Bose  Date of Evaluation:    2/21/2023     Precautions:  Lymphedema; infection risk; cardiac; abdominal Next MD visit:   none scheduled  Date of Surgery: n/a   Insurance Primary/Secondary: MEDICARE / COMMERCIAL # Authorized Visits: 5/24            Next MD/Plan Renewal Date: 5/22/2023       Subjective:   *Pt reports she was able to tolerate Right lower leg bandage system applied at last session until the following morning. Did have some discomfort over anterior ankle and on her heel. Replaced with  Velcro-closure garments once she completed hygiene. *Sharing that she does not always get garments on right away after she gets up. Confirming that she should wear Velcro-closure garments at home and can wear custom-fit knee highs when going out? *Reports Left lower leg Farrow Wrap garments arrived, but she forgot to bring them to session. Objective:  Arrived wearing no compression on Right lower leg; custom-fit knee high on Left. Brought Flexitouch device garment to session for assessment, as well as Circaid PAC bands that she has from prior Velcro-closure garment orders. OBSERVATION:     *Right LE:  Tissue quality over thigh is slightly boggy to supple except for distal medial/posterior surfaces which are boggy to slightly boggy. Knee is boggy, non-pitting. Proximal 1/4 of lower leg is boggy, pitting with fair superficial tissue mobility; distal 3/4 is densely boggy, pitting with poor to fair . stum. Klaus Coke Ankle and dorsum of foot are boggy, pitting. *Left LE:  Tissue quality over thigh is slightly boggy to supple; discoloration in area of prior cellulitis. Knee is slightly boggy to supple. Proximal 1/3 of lower leg is slightly boggy to supple; mid 1/3 is boggy, pitting with fair superficial tissue mobility; distal 1/3 is densely boggy, pitting with poor superficial tissue mobility; discoloration in area of prior cellulitis. Ankle and dorsum of foot are boggy, pitting. MEASUREMENTS:   None taken   TREATMENT:  *Noted that Flexitouch device garment cover  from inner section at ankle level. Pt stating being unaware of this. Photos collected to share with garment provider. Donned device garment over Right LE and connected to device to run \"foot only\" program. Noted that when garment is pulled to necessary contact at ankle level, garment Velcro insufficient to hold strap in place once garment inflates. Used 2 Velcro hook attachments from PAC bands to reinforce area. *Right lower quadrant manual lymph drainage  *During MLD discussed pt's comments re: delay in garment donning, use of knee highs in community. Advised pt of her aggressive lymphedema and need for 23 hours of compression. Explained to pt that being she is in the reduction phase of therapy, she should only wear her Velcro-closure garments as wearing her custom-fit knee highs, which were measured when pt had higher volume, will result in her lower legs swelling to the size of her garments. Pt expressing understanding. *Requested pt to bring Left lower leg Farrow Wrap garments to next session. *Applied bandage system over Right lower leg   COMPRESSION:  *Right lower leg: stockinet; Rosidal wrap toes to knee with 1/2\" foam insert over medial surface of lower leg; 4 short stretch compression bandages: 1, 8cm, 2, 10cm (herringbone pattern of distal leg with first bandage); 1, 12cm. Hospital bootie sock, post-op shoe  *Left lower leg: Jobst Elvarex custom-fit compression knee high, CCL2     Assessment:   Pt with good tolerance of upgraded Right lower leg bandaging. After discussion today, pt appearing to better understand need to wear therapeutic compression for 20-23 hour periods for successful reduction. Goals:  (to be met in 24 visits)  1. Pt will wear Right lower leg Circaid garments on a daily basis. ACHIEVED   2.  Pt will return to use of Flexitouch device 6-7 days/week. ACHIEVED   3. Pt will be independent in decongestive exercises. 4. Pt will tolerate Right lower leg short stretch compression bandaging for 20-23 hours. 5. Pt/Caregiver will be independent in Right lower leg short stretch compression bandaging. 6. Reduce Right LE lymphedema volume by 30-50cm to allow pt to transition back into custom-fit knee high and to reduce limb heaviness during ambulation. 7. Reduce bilateral lower leg density to boggy to reduce infection risk. 8. Pt will be independent in use of compression garments, self-manual lymph drainage, decongestive exercises, Flexitouch device and lymphedema precautions for life-long self-management of lymphedema. Plan:   Continue current plan.       Charges: 4 Manual Therapy    Total Timed Treatment: 55 min  Total Treatment Time: 60 min      LE Circumferential Measurements (cm)   2/21/2023   LEFT  2/21/2023   RIGHT    20cm above Superior Border of Patella (SBP)  81.3 77.0   10cm above SBP  65.5 75.1   SBP  63.3 66.6   5cm below SBP  47.0 60.1   10cm below SBP  52.4 60.6   20cm below SBP  54.3 64.0   30cm below SBP  39.0 47.6   35cm below SBP  30.5 35.4   40cm below SBP  29.2 30.5    Lateral Malleoli  29.8 30.8   7cm proximal to 1st toe web space  26.2 26.3   5cm proximal to 1st toe web space  24.3 25.0   TOTALS  542.8 599.0

## 2023-03-21 ENCOUNTER — OFFICE VISIT (OUTPATIENT)
Dept: OCCUPATIONAL MEDICINE | Facility: HOSPITAL | Age: 68
End: 2023-03-21
Attending: NURSE PRACTITIONER
Payer: MEDICARE

## 2023-03-21 PROCEDURE — 97140 MANUAL THERAPY 1/> REGIONS: CPT

## 2023-03-21 NOTE — PROGRESS NOTES
Diagnosis:   Lymphedema of both lower extremities (I89.0)      Referring Provider: Patrick Adams  Date of Evaluation:    2/21/2023     Precautions:  Lymphedema; infection risk; cardiac; abdominal Next MD visit:   none scheduled  Date of Surgery: n/a   Insurance Primary/Secondary: MEDICARE / COMMERCIAL # Authorized Visits: 6/24            Next MD/Plan Renewal Date: 5/22/2023          Progress Summary  Pt has attended 6/24 visits in Occupational Therapy. Subjective:   Pt reports that she tolerated Right lower leg bandage system applied at last session without difficulty; removed this morning and donned Circaid garments following hygiene. Had  assist her to don her Left lower leg Farrow Wrap garments yesterday afternoon; was able to tolerate wearing them overnight, sharing that garments are comfortable; re-donned this morning following hygiene. Assessment:   Pt demonstrating good tolerance to short stretch compression bandaging of Right lower leg and good tolerance and response to first use of Farrow Wrap garments on Left lower leg. She has demonstrated progress towards improvement in tissue quality of her lower legs and in reduction of the volume in her lower legs. However, while lower leg volume has reduced, her thigh volume has increased, as was anticipated. Recommend continued therapy to maximize volume loss and improvement in tissue quality to reduce limb heaviness and infection risk and to allow pt to return to use of traditional custom-fit garments. Objective:  Pt attending Occupational Therapy for complete decongestive therapy of bilateral lower quadrants, including short stretch compression bandaging of Right lower leg; education in need for consistent 23 hours of therapeutic compression during the reduction phase / advised to no longer wear current custom-fit knee highs.  She has been assisted to obtain [employer] Classic garments for her Left lower leg for reduction purposes and Velcro hook was added to the ankle area of her Flexitouch device to assist with keeping garment closed during use. EDEMA/TISSUE QUALITY:   *Right LE:  Although circumferential volume in lower leg decreased by 15.6cm since initial eval, only a 9.0cm overall reduction seen d/t rise in volume in thigh. Tissue quality over thigh is slightly boggy to supple except for distal medial/posterior surfaces which are boggy to slightly boggy. Knee is boggy, non-pitting. Proximal 1/4 of lower leg is boggy, pitting with fair superficial tissue mobility; distal 3/4 is densely boggy to boggy, pitting with poor to fair superficial tissue mobility. Ankle is boggy to slightly boggy. Dorsum of foot is slightly boggy. *Left LE:  Although circumferential volume in lower leg decreased by 9.5cm since initial eval, overall volume reduction remains stable d/t rise in volume in thigh. Tissue quality over thigh is slightly boggy to supple; discoloration in area of prior cellulitis. Knee is slightly boggy to supple. Proximal 1/3 of lower leg is slightly boggy to supple; mid 1/3 is boggy, pitting with fair superficial tissue mobility; distal 1/3 is densely boggy to boggy, pitting with poor to fair superficial tissue mobility; discoloration in area of prior cellulitis. Ankle is boggy to slightly boggy. Dorsum of foot is slightly boggy. Goals:  (to be met in 24 visits)  1. Pt will wear Right lower leg Circaid garments on a daily basis. ACHIEVED   2. Pt will return to use of Flexitouch device 6-7 days/week. ACHIEVED   3. Pt will be independent in decongestive exercises. 4. Pt will tolerate Right lower leg short stretch compression bandaging for 20-23 hours. 5. Pt/Caregiver will be independent in Right lower leg short stretch compression bandaging. 6. Reduce Right LE lymphedema volume by 30-50cm to allow pt to transition back into custom-fit knee high and to reduce limb heaviness during ambulation.   7. Reduce bilateral lower leg density to boggy to reduce infection risk. 8. Pt will be independent in use of compression garments, self-manual lymph drainage, decongestive exercises, Flexitouch device and lymphedema precautions for life-long self-management of lymphedema. Plan: Continue skilled Occupational Therapy 3 x/week or a total of 18 more visits. Treatment will include: complete decongestive therapy of bilateral lower quadrants; assist with garment prescription       Patient/Family/Caregiver was advised of these findings, precautions, and treatment options and has agreed to actively participate in planning and for this course of care. Thank you for your referral. If you have any questions, please contact me at Dept: [phone]. Sincerely,  Electronically signed by therapist: Sandra Perez, NORAR/L, MANDEEP-SHAKIRA           Objective:  Arrived wearing Circaid garments on Right lower leg; new Farrow Wrap garments on Left lower leg / correctly applied. OBSERVATION:     *Right LE:  Although circumferential volume in lower leg decreased by 15.6cm since initial eval, only a 9.0cm overall reduction seen d/t rise in volume in thigh. Tissue quality over thigh is slightly boggy to supple except for distal medial/posterior surfaces which are boggy to slightly boggy. Knee is boggy, non-pitting. Proximal 1/4 of lower leg is boggy, pitting with fair superficial tissue mobility; distal 3/4 is densely boggy to boggy, pitting with poor to fair superficial tissue mobility. Ankle is boggy to slightly boggy. Dorsum of foot is slightly boggy. *Left LE:  Although circumferential volume in lower leg decreased by 9.5cm since initial eval, overall volume reduction remains stable d/t rise in volume in thigh. Tissue quality over thigh is slightly boggy to supple; discoloration in area of prior cellulitis. Knee is slightly boggy to supple.  Proximal 1/3 of lower leg is slightly boggy to supple; mid 1/3 is boggy, pitting with fair superficial tissue mobility; distal 1/3 is densely boggy to boggy, pitting with poor to fair superficial tissue mobility; discoloration in area of prior cellulitis. Ankle is boggy to slightly boggy. Dorsum of foot is slightly boggy. MEASUREMENTS:   **As above   TREATMENT:   *Removal of Velcro-closure garments. *Re-assessment of status   *Right lower quadrant manual lymph drainage with soft tissue mobilization of dense areas of tissue. Observed improvement in superficial tissue mobility of lower leg and ankle with reduction in foot volume by end of MLD. *During MLD discussed use of compression device. Advised pt to use on Left LE daily; use on Right LE when not in bandage system. *Applied Left lower leg Farrow Wrap garments. *Applied bandage system over Right lower leg. COMPRESSION:  *Right lower leg: stockinet; Rosidal wrap toes to knee with 1/2\" foam insert over medial surface of lower leg; 4 short stretch compression bandages: 1, 8cm, 2, 10cm (herringbone pattern of distal leg with first bandage); 1, 12cm. Hospital bootie sock, post-op shoe  *Left lower leg: non-compressive liner; Farrow Wrap Classic footpiece, size Small, Regular length; Farrow Wrap Classic lower leg, size Medium, Regular length    Assessment:   **As above     Goals:  (to be met in 24 visits)  1. Pt will wear Right lower leg Circaid garments on a daily basis. ACHIEVED   2. Pt will return to use of Flexitouch device 6-7 days/week. ACHIEVED   3. Pt will be independent in decongestive exercises. 4. Pt will tolerate Right lower leg short stretch compression bandaging for 20-23 hours. 5. Pt/Caregiver will be independent in Right lower leg short stretch compression bandaging. 6. Reduce Right LE lymphedema volume by 30-50cm to allow pt to transition back into custom-fit knee high and to reduce limb heaviness during ambulation. 7. Reduce bilateral lower leg density to boggy to reduce infection risk.   8. Pt will be independent in use of compression garments, self-manual lymph drainage, decongestive exercises, Flexitouch device and lymphedema precautions for life-long self-management of lymphedema. Plan:   Continue current plan.       Charges: 4 Manual Therapy    Total Timed Treatment: 55 min  Total Treatment Time: 60 min      LE Circumferential Measurements (cm)   2/21/2023   LEFT  2/21/2023   RIGHT  3/21/2023   LEFT 3/21/2023   RIGHT   20cm above Superior Border of Patella (SBP)  81.3 77.0 84.5 83.8   10cm above SBP  65.5 75.1 72.0 74.8   SBP  63.3 66.6 62.3 66.0   5cm below SBP  57.0 60.1 57.0 60.8   10cm below SBP  52.4 60.6 53.0 59.5   20cm below SBP  54.3 64.0 51.5 59.2   30cm below SBP  39.0 47.6 38.4 46.0   35cm below SBP  30.5 35.4 30.4 33.5   40cm below SBP  29.2 30.5 25.7 27.5    Lateral Malleoli  29.8 30.8 27.2 29.0   7cm proximal to 1st toe web space  26.2 26.3 25.3 25.4   5cm proximal to 1st toe web space  24.3 25.0 24.7 24.5   TOTALS  552.8  Lower leg = 285.7 599.0  Lower leg = 320.2 552.0  Lower leg =   276.2 590.0  Lower leg = 304.6

## 2023-03-23 ENCOUNTER — OFFICE VISIT (OUTPATIENT)
Dept: OCCUPATIONAL MEDICINE | Facility: HOSPITAL | Age: 68
End: 2023-03-23
Attending: NURSE PRACTITIONER
Payer: MEDICARE

## 2023-03-23 PROCEDURE — 97140 MANUAL THERAPY 1/> REGIONS: CPT

## 2023-03-23 NOTE — PROGRESS NOTES
Diagnosis:   Lymphedema of both lower extremities (I89.0)      Referring Provider: Jass Perea  Date of Evaluation:    2/21/2023     Precautions:  Lymphedema; infection risk; cardiac; abdominal Next MD visit:   none scheduled  Date of Surgery: n/a   Insurance Primary/Secondary: MEDICARE / COMMERCIAL # Authorized Visits: 7/24            Next MD/Plan Renewal Date: 5/22/2023       Subjective:   *Pt reports she tolerated her bilateral lower leg compression applied at last session until the evening. During the day developed pain over bilateral tibial ridges; unsure why. Replaced Velcro-closure garment yesterday. Did have some pain yesterday and this morning; last evening took garments off and kept lower legs elevated. *Reports is very happy with [employer] [place] garments as she is able to wear a regular shoe when wearing footpiece. *Was contacted by representative from [facility] to discuss compression device; obtaining newer device. Pt preferring to defer decision until speaking with therapist.   *Reports has been feeling better overall. Is ambulating better and has more confidence in her ambulation. Objective:  Arrived wearing Circaid garments on Right lower leg; Farrow Wrap garments on Left lower leg. Observed increased pace of gait during ambulation from lobby to treatment room. OBSERVATION:  *Observable reduction in volume of bilateral lower legs with improved tissue quality, Left more than Right. *Right LE:  Tissue quality over thigh is slightly boggy to supple except for distal medial/posterior surfaces which are boggy to slightly boggy. Knee is boggy, non-pitting. Proximal 1/4 of lower leg is boggy, pitting with fair superficial tissue mobility; distal 3/4 is densely boggy to boggy, pitting with poor to fair superficial tissue mobility. Ankle is boggy to slightly boggy. Dorsum of foot is slightly boggy.     *Left LE:  Tissue quality over thigh is slightly boggy to supple; discoloration in area of prior cellulitis. Knee is slightly boggy to supple. Proximal 1/3 of lower leg is slightly boggy to supple; mid 1/3 is boggy, pitting with fair superficial tissue mobility; distal 1/3 is densely boggy to boggy, pitting with poor to fair superficial tissue mobility; discoloration in area of prior cellulitis. Ankle is boggy to slightly boggy. Dorsum of foot is slightly boggy. MEASUREMENTS:   None taken   TREATMENT:   *Discussed pt's response to compression. Explained possible reason for tibial ridge pain; advised of benefit of foam insert in these areas. Fabricated 2, 1/4\" foam inserts to be used between lower legs and Velcro-closure garments as well as a longer 1/4\" foam insert to be used in same area during bandaging. *With change in volume/shape of Right lower leg, tried pt's Left lower leg Farrow Wrap garments over Right lower leg to assess ability for pt to use [employer] garments once short stretch compression bandaging is removed from lower leg. Garments with adequate. Pt requesting assist from therapist to order set of garments for Right lower leg. *Re-applied Farrow Wrap garments on Left lower leg. *Right lower quadrant manual lymph drainage with soft tissue mobilization of dense areas of tissue. Observed improvement in superficial tissue mobility of lower leg and ankle by end of MLD.   *Discussed pt's contact with [facility]. Advised pt to begin process of device replacement. *Also discussed plan to progress compression to thighs at some point. *Applied Right lower leg bandage system. COMPRESSION:  *Right lower leg: stockinet; 1/4\" foam insert over tibial ridge and anterior ankle; Rosidal wrap toes to knee (DEFERRED -- 1/2\" foam insert over medial surface of lower leg); 4 short stretch compression bandages: 1, 8cm, 2, 10cm (herringbone pattern of distal leg with first bandage); 1, 12cm.  Hospital bootie sock, post-op shoe  *Left lower leg: non-compressive liner; Farrow Wrap Classic footpiece, size Small, Regular length; Farrow Wrap Classic lower leg, size Medium, Regular length    Assessment:   Pt with excellent response to use of compression over bilateral lower legs with progress being made towards volume reduction and improvement in tissue quality. Pt also reporting related improvement in functional mobility. In consideration of possibility to replace compression device that pt has been using for the past 8 years, an advanced compression device is highly recommended over a basic limb-only device d/t it's ability to more individually address the areas of her LEs as well as decongest the trunk prior to decongesting the limbs and to avoid potential for creating truncal/genital lymphedema which may occur with a limb-only device. Goals:  (to be met in 24 visits)  1. Pt will wear Right lower leg Circaid garments on a daily basis. ACHIEVED   2. Pt will return to use of Flexitouch device 6-7 days/week. ACHIEVED   3. Pt will be independent in decongestive exercises. 4. Pt will tolerate Right lower leg short stretch compression bandaging for 20-23 hours. 5. Pt/Caregiver will be independent in Right lower leg short stretch compression bandaging. 6. Reduce Right LE lymphedema volume by 30-50cm to allow pt to transition back into custom-fit knee high and to reduce limb heaviness during ambulation. 7. Reduce bilateral lower leg density to boggy to reduce infection risk. 8. Pt will be independent in use of compression garments, self-manual lymph drainage, decongestive exercises, Flexitouch device and lymphedema precautions for life-long self-management of lymphedema. Plan:   Continue current plan.       Charges: 4 Manual Therapy    Total Timed Treatment: 55 min  Total Treatment Time: 60 min      LE Circumferential Measurements (cm)   2/21/2023   LEFT  2/21/2023   RIGHT  3/21/2023   LEFT 3/21/2023   RIGHT   20cm above Superior Border of Patella (SBP)  81.3 77.0 84.5 83.8   10cm above SBP  65.5 75.1 72.0 74.8   SBP  63.3 66.6 62.3 66.0   5cm below SBP  57.0 60.1 57.0 60.8   10cm below SBP  52.4 60.6 53.0 59.5   20cm below SBP  54.3 64.0 51.5 59.2   30cm below SBP  39.0 47.6 38.4 46.0   35cm below SBP  30.5 35.4 30.4 33.5   40cm below SBP  29.2 30.5 25.7 27.5    Lateral Malleoli  29.8 30.8 27.2 29.0   7cm proximal to 1st toe web space  26.2 26.3 25.3 25.4   5cm proximal to 1st toe web space  24.3 25.0 24.7 24.5   TOTALS  552.8  Lower leg = 285.7 599.0  Lower leg = 320.2 552.0  Lower leg =   276.2 590.0  Lower leg = 304.6

## 2023-03-27 ENCOUNTER — OFFICE VISIT (OUTPATIENT)
Dept: OCCUPATIONAL MEDICINE | Facility: HOSPITAL | Age: 68
End: 2023-03-27
Attending: NURSE PRACTITIONER
Payer: MEDICARE

## 2023-03-27 PROCEDURE — 97140 MANUAL THERAPY 1/> REGIONS: CPT

## 2023-03-27 NOTE — PROGRESS NOTES
Diagnosis:   Lymphedema of both lower extremities (I89.0)      Referring Provider: Kika Lutz  Date of Evaluation:    2/21/2023     Precautions:  Lymphedema; infection risk; cardiac; abdominal Next MD visit:   none scheduled  Date of Surgery: n/a   Insurance Primary/Secondary: MEDICARE / COMMERCIAL # Authorized Visits: 8/24            Next MD/Plan Renewal Date: 5/22/2023       Subjective:   *Pt reports she Right lower leg bandage system applied at last session until 3/25. Removed and donned Farrow Wrap garments after skin hygiene. *Has been wearing Farrow Wrap garments on Right lower leg; Circaid garments on Left lower leg. Has noticed with bandage system and with Velcro-closure garments she is having discomfort over her bilateral lateral malleoli, which occurs mostly at night. Objective:  Arrived wearing Circaid garments on Left lower leg; Farrow Wrap garments on Right lower leg. OBSERVATION:  *Reducing lymphedema density in bilateral lower legs. *Right LE:  Tissue quality over thigh is slightly boggy to supple except for distal medial/posterior surfaces which are boggy to slightly boggy. Knee is boggy, non-pitting. Proximal 1/4 of lower leg is boggy, pitting with fair superficial tissue mobility; distal 3/4 is densely boggy to boggy, pitting with poor to fair superficial tissue mobility. Ankle is boggy to slightly boggy. Dorsum of foot is slightly boggy. *Left LE:  Tissue quality over thigh is slightly boggy to supple; discoloration in area of prior cellulitis. Knee is slightly boggy to supple. Proximal 1/3 of lower leg is slightly boggy to supple; mid 1/3 is boggy, pitting with fair superficial tissue mobility; distal 1/3 is densely boggy to boggy, pitting with poor to fair superficial tissue mobility; discoloration in area of prior cellulitis. Ankle is boggy to slightly boggy. Dorsum of foot is slightly boggy.     MEASUREMENTS:   None taken   TREATMENT:   *Removed compression  *Fabricated 1/4\" foam inserts to pad posterior ankles and medial/lateral malleoli. *Bilateral lower quadrant manual lymph drainage with soft tissue mobilization of dense areas of tissue. Observed improvement in superficial tissue mobility of lower legs and ankles by end of MLD. *[employer] garments over Left lower leg, adding new 1/4\" foam insert. *Applied Right lower leg bandage system, adding new 1/4\" foam insert and re-adding 1/2\" foam insert. COMPRESSION:  *Right lower leg: stockinet; 1/4\" foam insert over tibial ridge and anterior ankle, second 1/4\" foam insert over malleoli/posterior ankle;  1/2\" foam insert over medial surface of lower leg; Rosidal wrap toes to knee; 4 short stretch compression bandages: 1, 8cm, 2, 10cm (herringbone pattern of distal leg with first bandage); 1, 12cm. Pt's tennis shoe [facility] bootie sock, post-op shoe)  *Left lower leg: non-compressive liner; 1/4\" foam insert over tibial ridge and anterior ankle, second 1/4\" foam insert over malleoli/posterior ankle; Farrow Wrap Classic footpiece, size Small, Regular length; Farrow Wrap Classic lower leg, size Medium, Regular length    Assessment:   Pt demonstrating progress towards improvement in tissue quality of bilateral lower legs. Excellent follow through with use of compression for reduction purposes. Goals:  (to be met in 24 visits)  1. Pt will wear Right lower leg Circaid garments on a daily basis. ACHIEVED   2. Pt will return to use of Flexitouch device 6-7 days/week. ACHIEVED   3. Pt will be independent in decongestive exercises. 4. Pt will tolerate Right lower leg short stretch compression bandaging for 20-23 hours. 5. Pt/Caregiver will be independent in Right lower leg short stretch compression bandaging. 6. Reduce Right LE lymphedema volume by 30-50cm to allow pt to transition back into custom-fit knee high and to reduce limb heaviness during ambulation.   7. Reduce bilateral lower leg density to boggy to reduce infection risk.  8. Pt will be independent in use of compression garments, self-manual lymph drainage, decongestive exercises, Flexitouch device and lymphedema precautions for life-long self-management of lymphedema. Plan:   Continue current plan.       Charges: 4 Manual Therapy    Total Timed Treatment: 55 min  Total Treatment Time: 60 min      LE Circumferential Measurements (cm)   2/21/2023   LEFT  2/21/2023   RIGHT  3/21/2023   LEFT 3/21/2023   RIGHT   20cm above Superior Border of Patella (SBP)  81.3 77.0 84.5 83.8   10cm above SBP  65.5 75.1 72.0 74.8   SBP  63.3 66.6 62.3 66.0   5cm below SBP  57.0 60.1 57.0 60.8   10cm below SBP  52.4 60.6 53.0 59.5   20cm below SBP  54.3 64.0 51.5 59.2   30cm below SBP  39.0 47.6 38.4 46.0   35cm below SBP  30.5 35.4 30.4 33.5   40cm below SBP  29.2 30.5 25.7 27.5    Lateral Malleoli  29.8 30.8 27.2 29.0   7cm proximal to 1st toe web space  26.2 26.3 25.3 25.4   5cm proximal to 1st toe web space  24.3 25.0 24.7 24.5   TOTALS  552.8  Lower leg = 285.7 599.0  Lower leg = 320.2 552.0  Lower leg =   276.2 590.0  Lower leg = 304.6

## 2023-03-28 ENCOUNTER — OFFICE VISIT (OUTPATIENT)
Dept: OCCUPATIONAL MEDICINE | Facility: HOSPITAL | Age: 68
End: 2023-03-28
Attending: NURSE PRACTITIONER
Payer: MEDICARE

## 2023-03-28 PROCEDURE — 97140 MANUAL THERAPY 1/> REGIONS: CPT

## 2023-03-28 NOTE — PROGRESS NOTES
Diagnosis:   Lymphedema of both lower extremities (I89.0)      Referring Provider: Jass Perea  Date of Evaluation:    2/21/2023     Precautions:  Lymphedema; infection risk; cardiac; abdominal Next MD visit:   none scheduled  Date of Surgery: n/a   Insurance Primary/Secondary: MEDICARE / COMMERCIAL # Authorized Visits: 9/24            Next MD/Plan Renewal Date: 5/22/2023       Subjective:   *Pt reports she tolerated compression over bilateral lower legs last night without any discomfort; feels new 1/4\" foam inserts were helpful. Removed compression for showering/skin care. *Has noticed improved stair navigation; \"foot bends\" when walking. Objective:  Arrived wearing Circaid garments on Left lower leg; Farrow Wrap garments on Right lower leg. OBSERVATION:  *Continued reduction in lymphedema density in bilateral lower legs. *Emerging hair follicles in bilateral lower legs. *Right LE:  Tissue quality over thigh is slightly boggy to supple except for distal medial/posterior surfaces which are boggy to slightly boggy. Knee is boggy, non-pitting. Proximal 1/4 of lower leg is boggy, pitting with fair superficial tissue mobility; distal 3/4 is boggy with scattered areas of densely boggy quality, pitting with fair superficial tissue mobility. Ankle is slightly boggy. Dorsum of foot is slightly boggy. *Left LE:  Tissue quality over thigh is slightly boggy to supple; discoloration in area of prior cellulitis. Knee is slightly boggy to supple. Proximal 1/3 of lower leg is slightly boggy to supple; mid 1/3 is boggy, pitting with fair to good superficial tissue mobility; distal 1/3 is boggy with scattered areas of densely boggy quality, pitting with fair superficial tissue mobility; discoloration in area of prior cellulitis. Ankle is slightly boggy. Dorsum of foot is slightly boggy.     MEASUREMENTS:   None taken   TREATMENT:   *Removed compression  *Bilateral lower quadrant manual lymph drainage with soft tissue mobilization of dense areas of tissue. Observed improvement in superficial tissue mobility of lower legs by end of MLD. *Proposed progressing compression over Right thigh/knee. Pt in agreement. *Applied bilateral lower leg compression. *Applied compression over Right thigh/knee/proximal lower leg  COMPRESSION:  *Right lower leg: stockinet; 1/4\" foam insert over tibial ridge and anterior ankle, second /4\" foam insert over malleoli/posterior ankle;  1/2\" foam insert over medial surface of lower leg; Rosidal wrap toes to knee; 4 short stretch compression bandages: 1, 8cm, 2, 10cm (herringbone pattern of distal leg with first bandage); 1, 12cm. Hospital bootie sock, post-op shoe. *Right thigh/knee/proximal lower le, 0.4cm Rosidal wrap; size \"J\" Tensogrip sleeve with 2-ply layer over knee  *Left lower leg: non-compressive liner; \" foam insert over tibial ridge and anterior ankle, second 4\" foam insert over malleoli/posterior ankle; Farrow Wrap Classic footpiece, size Small, Regular length; Farrow Wrap Classic lower leg, size Medium, Regular length    Assessment:   Pt demonstrating good progress towards volume reduction and improvement in tissue quality of bilateral lower legs with related report of improved functional mobility. Goals:  (to be met in 24 visits)  1. Pt will wear Right lower leg Circaid garments on a daily basis. ACHIEVED   2. Pt will return to use of Flexitouch device 6-7 days/week. ACHIEVED   3. Pt will be independent in decongestive exercises. 4. Pt will tolerate Right lower leg short stretch compression bandaging for 20-23 hours. 5. Pt/Caregiver will be independent in Right lower leg short stretch compression bandaging. 6. Reduce Right LE lymphedema volume by 30-50cm to allow pt to transition back into custom-fit knee high and to reduce limb heaviness during ambulation. 7. Reduce bilateral lower leg density to boggy to reduce infection risk.   8. Pt will be independent in use of compression garments, self-manual lymph drainage, decongestive exercises, Flexitouch device and lymphedema precautions for life-long self-management of lymphedema. Plan:   Continue current plan. Assess response to Right thigh/knee/proximal lower leg compression; progress as able.      Charges: 4 Manual Therapy    Total Timed Treatment: 55 min  Total Treatment Time: 60 min      LE Circumferential Measurements (cm)   2/21/2023   LEFT  2/21/2023   RIGHT  3/21/2023   LEFT 3/21/2023   RIGHT   20cm above Superior Border of Patella (SBP)  81.3 77.0 84.5 83.8   10cm above SBP  65.5 75.1 72.0 74.8   SBP  63.3 66.6 62.3 66.0   5cm below SBP  57.0 60.1 57.0 60.8   10cm below SBP  52.4 60.6 53.0 59.5   20cm below SBP  54.3 64.0 51.5 59.2   30cm below SBP  39.0 47.6 38.4 46.0   35cm below SBP  30.5 35.4 30.4 33.5   40cm below SBP  29.2 30.5 25.7 27.5    Lateral Malleoli  29.8 30.8 27.2 29.0   7cm proximal to 1st toe web space  26.2 26.3 25.3 25.4   5cm proximal to 1st toe web space  24.3 25.0 24.7 24.5   TOTALS  552.8  Lower leg = 285.7 599.0  Lower leg = 320.2 552.0  Lower leg =   276.2 590.0  Lower leg = 304.6

## 2023-03-30 ENCOUNTER — OFFICE VISIT (OUTPATIENT)
Dept: OCCUPATIONAL MEDICINE | Facility: HOSPITAL | Age: 68
End: 2023-03-30
Attending: NURSE PRACTITIONER
Payer: MEDICARE

## 2023-03-30 PROCEDURE — 97140 MANUAL THERAPY 1/> REGIONS: CPT

## 2023-03-30 NOTE — PROGRESS NOTES
Diagnosis:   Lymphedema of both lower extremities (I89.0)      Referring Provider: Tosin Stark  Date of Evaluation:    2/21/2023     Precautions:  Lymphedema; infection risk; cardiac; abdominal Next MD visit:   none scheduled  Date of Surgery: n/a   Insurance Primary/Secondary: MEDICARE / COMMERCIAL # Authorized Visits: 10/24            Next MD/Plan Renewal Date: 5/22/2023       Subjective:   *Pt reports she wore thigh/knee compression applied at last session until 9pm that night; removed because it began to migrate inferiorly. Wore Right lower leg bandage system until last evening. *Reports she went shopping with a friend yesterday. Was surprised to see that she walked 1800 steps, which is something she has not done in a long time. Did not need to use her Rollator walker as she normally would; was able to use shopping cart only for support. *Last night and this morning she has been having soreness in her Right lower leg when pressure is put on the leg. Not sure why this is happening; does not think it is from the compression. *Second set of [employer] arrived. Reports has not been needing 1/4\" foam inserts under [employer] garments. Objective:  Arrived wearing Farrow Wrap garments on bilateral lower legs. OBSERVATION:  *Reduction in lymphedema density in of entire Right LE with improved superficial tissue mobility and anatomical shaping. *Right LE:  Tissue quality over thigh is slightly boggy to supple except for distal medial/posterior surfaces which are boggy to slightly boggy. Knee is boggy, non-pitting. Proximal 1/4 of lower leg is boggy to slightly boggy, with fair to good superficial tissue mobility; distal 3/4 is boggy with scattered areas of densely boggy quality, pitting with fair to good superficial tissue mobility. Ankle is slightly boggy. Dorsum of foot is slightly boggy. *Left LE:  Tissue quality over thigh is slightly boggy to supple; discoloration in area of prior cellulitis. Knee is slightly boggy to supple. Proximal 1/3 of lower leg is slightly boggy to supple; mid 1/3 is boggy, pitting with fair to good superficial tissue mobility; distal 1/3 is boggy with scattered areas of densely boggy quality, pitting with fair superficial tissue mobility; discoloration in area of prior cellulitis. Ankle is slightly boggy. Dorsum of foot is slightly boggy. MEASUREMENTS:   None taken   TREATMENT:   *Removed compression from Right lower leg. *Discussed pt's symptoms and assessed status of Right lower leg; pt stating discomfort is deeper vs superficial and in multiple areas of the leg. During discussion pt proposing that perhaps she is having muscular aching d/t yesterday's walking. *Right lower quadrant manual lymph drainage with soft tissue mobilization of dense areas of tissue. During [facility] pt reporting relief from soreness in lower leg. *Pt in agreement to try Right thigh/knee compression as before. Discussed possibility of transitioning to short stretch compression bandaging. *Applied Right lower leg bandage system. *Applied compression over Right thigh/knee/proximal lower leg  COMPRESSION:  *Right lower leg: stockinet; \" foam insert over tibial ridge and anterior ankle, second \" foam insert over malleoli/posterior ankle;  1/2\" foam insert over medial surface of lower leg; Rosidal wrap toes to knee; 4 short stretch compression bandages: 1, 8cm, 2, 10cm (herringbone pattern of distal leg with first bandage); 1, 12cm. Hospital bootie sock, post-op shoe. *Right thigh/knee/proximal lower le, 0.4cm Rosidal wrap; size \"J\" Tensogrip sleeve with 2-ply layer over knee  *Left lower leg: non-compressive liner; (DEFERRED -- \" foam insert over tibial ridge and anterior ankle, second \" foam insert over malleoli/posterior ankle); Farrow Wrap Classic footpiece, size Small, Regular length;  Farrow Wrap Classic lower leg, size Medium, Regular length    Assessment:   Pt with good tolerance of Right lower leg bandage system. Making good progress towards reduction of volume and improvement in tissue quality with related report of improvements in tolerance for ambulation. Goals:  (to be met in 24 visits)  1. Pt will wear Right lower leg Circaid garments on a daily basis. ACHIEVED   2. Pt will return to use of Flexitouch device 6-7 days/week. ACHIEVED   3. Pt will be independent in decongestive exercises. 4. Pt will tolerate Right lower leg short stretch compression bandaging for 20-23 hours. 5. Pt/Caregiver will be independent in Right lower leg short stretch compression bandaging. 6. Reduce Right LE lymphedema volume by 30-50cm to allow pt to transition back into custom-fit knee high and to reduce limb heaviness during ambulation. 7. Reduce bilateral lower leg density to boggy to reduce infection risk. 8. Pt will be independent in use of compression garments, self-manual lymph drainage, decongestive exercises, Flexitouch device and lymphedema precautions for life-long self-management of lymphedema. Plan:   Continue current plan. Assess response to Right thigh/knee/proximal lower leg compression; progress as able.      Charges: 4 Manual Therapy    Total Timed Treatment: 55 min  Total Treatment Time: 60 min      LE Circumferential Measurements (cm)   2/21/2023   LEFT  2/21/2023   RIGHT  3/21/2023   LEFT 3/21/2023   RIGHT   20cm above Superior Border of Patella (SBP)  81.3 77.0 84.5 83.8   10cm above SBP  65.5 75.1 72.0 74.8   SBP  63.3 66.6 62.3 66.0   5cm below SBP  57.0 60.1 57.0 60.8   10cm below SBP  52.4 60.6 53.0 59.5   20cm below SBP  54.3 64.0 51.5 59.2   30cm below SBP  39.0 47.6 38.4 46.0   35cm below SBP  30.5 35.4 30.4 33.5   40cm below SBP  29.2 30.5 25.7 27.5    Lateral Malleoli  29.8 30.8 27.2 29.0   7cm proximal to 1st toe web space  26.2 26.3 25.3 25.4   5cm proximal to 1st toe web space  24.3 25.0 24.7 24.5   TOTALS  552.8  Lower leg = 285.7 599.0  Lower leg = 320.2 552.0  Lower leg =   276.2 590.0  Lower leg = 304.6

## 2023-04-03 ENCOUNTER — OFFICE VISIT (OUTPATIENT)
Dept: OCCUPATIONAL MEDICINE | Facility: HOSPITAL | Age: 68
End: 2023-04-03
Attending: NURSE PRACTITIONER
Payer: MEDICARE

## 2023-04-03 PROCEDURE — 97140 MANUAL THERAPY 1/> REGIONS: CPT

## 2023-04-03 NOTE — PROGRESS NOTES
Diagnosis:   Lymphedema of both lower extremities (I89.0)      Referring Provider: Irene Moe  Date of Evaluation:    2/21/2023     Precautions:  Lymphedema; infection risk; cardiac; abdominal Next MD visit:   none scheduled  Date of Surgery: n/a   Insurance Primary/Secondary: MEDICARE / COMMERCIAL # Authorized Visits: 11/24            Next MD/Plan Renewal Date: 5/22/2023       Subjective:   *Pt reports she wore thigh/knee compression applied at last session until that night. Wore Right lower leg bandage system until morning of 4/1. *Did not try to use her Flexitouch device since last session. Objective:  Arrived wearing Farrow Wrap garments on bilateral lower legs. OBSERVATION:  *Higher lymphedema density in Right lower leg today. *Right LE:  Tissue quality over thigh is slightly boggy to supple except for distal medial/posterior surfaces which are boggy to slightly boggy. Knee is boggy, non-pitting. Proximal 1/4 of lower leg is boggy to slightly boggy, with fair to good superficial tissue mobility; distal 3/4 is densely boggy with scattered areas of boggy quality, pitting with fair to good superficial tissue mobility. Ankle is slightly boggy. Dorsum of foot is slightly boggy. *Left LE:  Tissue quality over thigh is slightly boggy to supple; discoloration in area of prior cellulitis. Knee is slightly boggy to supple. Proximal 1/3 of lower leg is slightly boggy to supple; mid 1/3 is boggy, pitting with fair to good superficial tissue mobility; distal 1/3 is boggy with scattered areas of densely boggy quality, pitting with fair superficial tissue mobility; discoloration in area of prior cellulitis. Ankle is slightly boggy. Dorsum of foot is slightly boggy. MEASUREMENTS:   None taken   TREATMENT:   *Removed compression from bilateral lower legs. *Bilateral lower quadrant manual lymph drainage with soft tissue mobilization of dense areas of tissue.    *During MLD discussed use of compression device; potential negative impact of diets with higher levels of sodium, carbohydrates. *Proposed progressing compression level over Right thigh via short stretch compression bandaging, using Farrow Wrap garments over Right lower leg during initial thigh compression. Pt in agreement. *[employer] garments over bilateral lower legs. *Applied compression over Right thigh/knee. COMPRESSION:  *Right lower leg: non-compressive liner; (DEFERRED -- 1/4\" foam insert over tibial ridge and anterior ankle, second 1/4\" foam insert over malleoli/posterior ankle); Farrow Wrap Classic footpiece, size Small, Regular length; Rometta Jordan Classic lower leg, size Medium, Regular length (DEFERRED -- stockinet; 14\" foam insert over tibial ridge and anterior ankle, second 1/4\" foam insert over malleoli/posterior ankle;  1/2\" foam insert over medial surface of lower leg; Rosidal wrap toes to knee; 4 short stretch compression bandages: 1, 8cm, 2, 10cm (herringbone pattern of distal leg with first bandage); 1, 12cm. [employer] bootie sock, post-op shoe.)  *Right thigh/knee/proximal lower le-ply layer size \"J\" Tensogrip sleeve over knee/proximal lower leg; stockinet over thigh; 1/4\" foam over medial/posterior surface of thigh covered with 1, 0.4cm Rosidal wrap; 1, 12cm short stretch compression bandage  *Left lower leg: non-compressive liner; (DEFERRED -- 14\" foam insert over tibial ridge and anterior ankle, second 14\" foam insert over malleoli/posterior ankle); Farrow Wrap Classic footpiece, size Small, Regular length; Farrow Wrap Classic lower leg, size Medium, Regular length    Assessment:   Pt making progress towards goals. Highly compliant with use of compression. Would benefit from using Flexitouch device, 6-7x/week on Left and when not in short stretch compression bandaging on the Right. Goals:  (to be met in 24 visits)  1. Pt will wear Right lower leg Circaid garments on a daily basis. ACHIEVED   2.  Pt will return to use of Flexitouch device 6-7 days/week. ACHIEVED   3. Pt will be independent in decongestive exercises. 4. Pt will tolerate Right lower leg short stretch compression bandaging for 20-23 hours. 5. Pt/Caregiver will be independent in Right lower leg short stretch compression bandaging. 6. Reduce Right LE lymphedema volume by 30-50cm to allow pt to transition back into custom-fit knee high and to reduce limb heaviness during ambulation. 7. Reduce bilateral lower leg density to boggy to reduce infection risk. 8. Pt will be independent in use of compression garments, self-manual lymph drainage, decongestive exercises, Flexitouch device and lymphedema precautions for life-long self-management of lymphedema. Plan:   Continue current plan. Assess response to Right thigh/knee/proximal lower leg compression; progress as able.      Charges: 4 Manual Therapy    Total Timed Treatment: 55 min  Total Treatment Time: 60 min      LE Circumferential Measurements (cm)   2/21/2023   LEFT  2/21/2023   RIGHT  3/21/2023   LEFT 3/21/2023   RIGHT   20cm above Superior Border of Patella (SBP)  81.3 77.0 84.5 83.8   10cm above SBP  65.5 75.1 72.0 74.8   SBP  63.3 66.6 62.3 66.0   5cm below SBP  57.0 60.1 57.0 60.8   10cm below SBP  52.4 60.6 53.0 59.5   20cm below SBP  54.3 64.0 51.5 59.2   30cm below SBP  39.0 47.6 38.4 46.0   35cm below SBP  30.5 35.4 30.4 33.5   40cm below SBP  29.2 30.5 25.7 27.5    Lateral Malleoli  29.8 30.8 27.2 29.0   7cm proximal to 1st toe web space  26.2 26.3 25.3 25.4   5cm proximal to 1st toe web space  24.3 25.0 24.7 24.5   TOTALS  552.8  Lower leg = 285.7 599.0  Lower leg = 320.2 552.0  Lower leg =   276.2 590.0  Lower leg = 304.6

## 2023-04-04 ENCOUNTER — OFFICE VISIT (OUTPATIENT)
Dept: OCCUPATIONAL MEDICINE | Facility: HOSPITAL | Age: 68
End: 2023-04-04
Attending: NURSE PRACTITIONER
Payer: MEDICARE

## 2023-04-04 PROCEDURE — 97140 MANUAL THERAPY 1/> REGIONS: CPT

## 2023-04-04 NOTE — PROGRESS NOTES
Diagnosis:   Lymphedema of both lower extremities (I89.0)      Referring Provider: Allyssa Hopkins  Date of Evaluation:    2/21/2023     Precautions:  Lymphedema; infection risk; cardiac; abdominal Next MD visit:   none scheduled  Date of Surgery: n/a   Insurance Primary/Secondary: MEDICARE / COMMERCIAL # Authorized Visits: 12/24            Next MD/Plan Renewal Date: 5/22/2023       Subjective:   *Pt reports she Right thigh bandage system until 4-5pm last evening. Removed as it began to migrate inferiorly. *Reports did not wear Farrow Wrap garments over lower legs last night as she was too hot. Had  apply them first thing this morning. *Reports has lost 13# since start of therapy. Objective:  Arrived wearing Farrow Wrap garments on bilateral lower legs. OBSERVATION:  *Reduction in lymphedema density in Right lower leg as compared to yesterday. *Reduced lymphedema density over Right thigh. *Right LE:  Tissue quality over thigh is slightly boggy to supple except for distal medial/posterior surfaces which are boggy to slightly boggy. Knee is boggy, non-pitting. Proximal 1/4 of lower leg is boggy to slightly boggy, with fair to good superficial tissue mobility; distal 3/4 is densely boggy with scattered areas of boggy quality, pitting with fair to good superficial tissue mobility. Ankle is slightly boggy. Dorsum of foot is slightly boggy. *Left LE:  Tissue quality over thigh is slightly boggy to supple; discoloration in area of prior cellulitis. Knee is slightly boggy to supple. Proximal 1/3 of lower leg is slightly boggy to supple; mid 1/3 is boggy, pitting with fair to good superficial tissue mobility; distal 1/3 is boggy with scattered areas of densely boggy quality, pitting with fair superficial tissue mobility; discoloration in area of prior cellulitis. Ankle is slightly boggy. Dorsum of foot is slightly boggy. MEASUREMENTS:   None taken   TREATMENT:   *Removed compression from Right lower leg. *Right lower quadrant manual lymph drainage with soft tissue mobilization of dense areas of tissue. *During MLD discussed need for progressing final compression through thighs; discussed compression capris vs thigh highs. *Discussed full Right LE short stretch compression bandaging; pt in agreement. Applied compression. COMPRESSION:  *Right lower leg:  stockinet; 1/4\" foam insert over tibial ridge and anterior ankle, second /4\" foam insert over malleoli/posterior ankle;  1/2\" foam insert over medial surface of lower leg; Rosidal wrap toes to knee; 4 short stretch compression bandages: 1, 8cm, 2, 10cm (herringbone pattern of distal leg with first bandage); 1, 12cm. Hospital bootie sock, post-op shoe. *Right thigh/knee/proximal lower le-ply layer size \"J\" Tensogrip sleeve over proximal lower leg, knee, thigh; 1, Rosidal wrap proximal lower leg through distal thigh; 1/4\" foam over medial/posterior surface of thigh held in place with 1 Rosidal wrap from distal thigh through proximal thigh; 2, 12 cm short stretch compression bandages proximal lower leg through thigh leaving open knee. *Left lower leg: non-compressive liner; Farrow Wrap Classic footpiece, size Small, Regular length; Ade Bloch Classic lower leg, size Medium, Regular length (DEFERRED -- 1/4\" foam insert over tibial ridge and anterior ankle, second /4\" foam insert over malleoli/posterior ankle)    Assessment:   Pt with good response to initial bandaging of Right thigh. Appears highly motivated to progress compression. Goals:  (to be met in 24 visits)  1. Pt will wear Right lower leg Circaid garments on a daily basis. ACHIEVED   2. Pt will return to use of Flexitouch device 6-7 days/week. ACHIEVED   3. Pt will be independent in decongestive exercises. 4. Pt will tolerate Right lower leg short stretch compression bandaging for 20-23 hours. ACHIEVED   5.  Pt/Caregiver will be independent in Right lower leg short stretch compression bandaging. 6. Reduce Right LE lymphedema volume by 30-50cm to allow pt to transition back into custom-fit knee high and to reduce limb heaviness during ambulation. 7. Reduce bilateral lower leg density to boggy to reduce infection risk. 8. Pt will be independent in use of compression garments, self-manual lymph drainage, decongestive exercises, Flexitouch device and lymphedema precautions for life-long self-management of lymphedema. Plan:   Continue current plan.       Charges: 4 Manual Therapy    Total Timed Treatment: 55 min  Total Treatment Time: 60 min      LE Circumferential Measurements (cm)   2/21/2023   LEFT  2/21/2023   RIGHT  3/21/2023   LEFT 3/21/2023   RIGHT   20cm above Superior Border of Patella (SBP)  81.3 77.0 84.5 83.8   10cm above SBP  65.5 75.1 72.0 74.8   SBP  63.3 66.6 62.3 66.0   5cm below SBP  57.0 60.1 57.0 60.8   10cm below SBP  52.4 60.6 53.0 59.5   20cm below SBP  54.3 64.0 51.5 59.2   30cm below SBP  39.0 47.6 38.4 46.0   35cm below SBP  30.5 35.4 30.4 33.5   40cm below SBP  29.2 30.5 25.7 27.5    Lateral Malleoli  29.8 30.8 27.2 29.0   7cm proximal to 1st toe web space  26.2 26.3 25.3 25.4   5cm proximal to 1st toe web space  24.3 25.0 24.7 24.5   TOTALS  552.8  Lower leg = 285.7 599.0  Lower leg = 320.2 552.0  Lower leg =   276.2 590.0  Lower leg = 304.6

## 2023-04-06 ENCOUNTER — OFFICE VISIT (OUTPATIENT)
Dept: OCCUPATIONAL MEDICINE | Facility: HOSPITAL | Age: 68
End: 2023-04-06
Attending: NURSE PRACTITIONER
Payer: MEDICARE

## 2023-04-06 PROCEDURE — 97140 MANUAL THERAPY 1/> REGIONS: CPT

## 2023-04-06 NOTE — PROGRESS NOTES
Diagnosis:   Lymphedema of both lower extremities (I89.0)      Referring Provider: Veronica Mane  Date of Evaluation:    2/21/2023     Precautions:  Lymphedema; infection risk; cardiac; abdominal Next MD visit:   none scheduled  Date of Surgery: n/a   Insurance Primary/Secondary: MEDICARE / COMMERCIAL # Authorized Visits: 13/24            Next MD/Plan Renewal Date: 5/22/2023       Subjective:   *Pt reports she Right thigh bandage system until the evening last session. Removed as it began to migrate inferiorly. *Reports it is easier to walk. Was able to fit into some new shoes that she bought in 2020 that she has never been able to wear. Objective:  Arrived wearing Farrow Wrap garments on bilateral lower legs. Right lower leg garment migrated inferiorly, leaving proximal lower leg exposed; pt sharing that  has been placing garment in correct position, but she finds it has been shifting down. OBSERVATION:    *Right LE:  Tissue quality over thigh is slightly boggy to supple except for distal medial/posterior surfaces which are boggy to slightly boggy. Knee is boggy, non-pitting. Proximal 1/4 of lower leg is boggy to slightly boggy, with fair to good superficial tissue mobility; distal 3/4 is densely boggy with scattered areas of boggy quality, pitting with fair to good superficial tissue mobility. Ankle is slightly boggy. Dorsum of foot is slightly boggy. *Left LE:  Tissue quality over thigh is slightly boggy to supple; discoloration in area of prior cellulitis. Knee is slightly boggy to supple. Proximal 1/3 of lower leg is slightly boggy to supple; mid 1/3 is boggy, pitting with fair to good superficial tissue mobility; distal 1/3 is boggy with scattered areas of densely boggy quality, pitting with fair superficial tissue mobility; discoloration in area of prior cellulitis. Ankle is slightly boggy. Dorsum of foot is slightly boggy.     MEASUREMENTS:   *Right LE circumferential volume decreased by 23.6cm for an overall reduction of 30.7cm. TREATMENT:   *Removed compression from bilateral lower legs. *Volume re-measurement   *Bilateral lower quadrant manual lymph drainage with soft tissue mobilization of dense areas of tissue. *During MLD discussed instructing pt's  in how to apply short stretch compression bandaging over thigh/knee. Pt stating she feels  would be agreeable to learning. *Applied compression over Left lower leg and over full Right LE.   COMPRESSION:  *Right lower leg:  stockinet; /\" foam insert over tibial ridge and anterior ankle, second /\" foam insert over malleoli/posterior ankle;  1/2\" foam insert over medial surface of lower leg; Rosidal wrap toes to knee; 4 short stretch compression bandages: 1, 8cm, 2, 10cm (herringbone pattern of distal leg with first bandage); 1, 12cm. Hospital bootie sock, post-op shoe. *Right thigh/knee/proximal lower le-ply layer size \"J\" Tensogrip sleeve over proximal lower leg, knee, thigh; 1, Rosidal wrap proximal lower leg through distal thigh; 1/4\" foam over medial/posterior surface of thigh held in place with 1 Rosidal wrap from distal thigh through proximal thigh; 2, 12 cm short stretch compression bandages proximal lower leg through thigh leaving open knee. Coban wrap around thigh at tape level. *Left lower leg: non-compressive liner; Farrow Wrap Classic footpiece, size Small, Regular length; [employer] Classic lower leg, size Medium, Regular length (DEFERRED -- /\" foam insert over tibial ridge and anterior ankle, second \" foam insert over malleoli/posterior ankle)    Assessment:   Pt making good progress towards reduction in lymphedema volume of Right LE. Good follow through with use of Velcro-closure garments. Goals:  (to be met in 24 visits)  1. Pt will wear Right lower leg Circaid garments on a daily basis. ACHIEVED   2. Pt will return to use of Flexitouch device 6-7 days/week. ACHIEVED   3.  Pt will be independent in decongestive exercises. 4. Pt will tolerate Right lower leg short stretch compression bandaging for 20-23 hours. ACHIEVED   5. Pt/Caregiver will be independent in Right lower leg short stretch compression bandaging. 6. Reduce Right LE lymphedema volume by 30-50cm to allow pt to transition back into custom-fit knee high and to reduce limb heaviness during ambulation. 7. Reduce bilateral lower leg density to boggy to reduce infection risk. 8. Pt will be independent in use of compression garments, self-manual lymph drainage, decongestive exercises, Flexitouch device and lymphedema precautions for life-long self-management of lymphedema. Plan:   Continue current plan. Instruct pt's  in short stretch compression bandaging of Right thigh/knee.      Charges: 5 Manual Therapy    Total Timed Treatment: 70 min  Total Treatment Time: 75 min      LE Circumferential Measurements (cm)   2/21/2023   LEFT  2/21/2023   RIGHT  3/21/2023   LEFT 3/21/2023   RIGHT 4/6/2023   RIGHT   20cm above Superior Border of Patella (SBP)  81.3 77.0 84.5 83.8 81.5   10cm above SBP  65.5 75.1 72.0 74.8 72.5   SBP  63.3 66.6 62.3 66.0 63.6   5cm below SBP  57.0 60.1 57.0 60.8 59.3   10cm below SBP  52.4 60.6 53.0 59.5 58.5   20cm below SBP  54.3 64.0 51.5 59.2 56.0   30cm below SBP  39.0 47.6 38.4 46.0 41.5   35cm below SBP  30.5 35.4 30.4 33.5 32.2   40cm below SBP  29.2 30.5 25.7 27.5 25.5    Lateral Malleoli  29.8 30.8 27.2 29.0 28.2   7cm proximal to 1st toe web space  26.2 26.3 25.3 25.4 24.3   5cm proximal to 1st toe web space  24.3 25.0 24.7 24.5 23.3   TOTALS  552.8  Lower leg = 285.7 599.0  Lower leg = 320.2 552.0  Lower leg =   276.2 590.0  Lower leg = 304.6 566.4  Lower leg = 289.5

## 2023-04-06 NOTE — PLAN OF CARE
PAIN - ADULT    • Verbalizes/displays adequate comfort level or patient's stated pain goal Progressing        SKIN/TISSUE INTEGRITY - ADULT    • Skin integrity remains intact Progressing        IV antibiotic changed to Ancef as per order.   KELVIN page [facility] Counseling: I discussed with the patient the risks of valacyclovir including but not limited to kidney damage, nausea, vomiting and severe allergy.  The patient understands that if the infection seems to be worsening or is not improving, they are to call.

## 2023-04-10 ENCOUNTER — APPOINTMENT (OUTPATIENT)
Dept: OCCUPATIONAL MEDICINE | Facility: HOSPITAL | Age: 68
End: 2023-04-10
Attending: NURSE PRACTITIONER
Payer: MEDICARE

## 2023-04-10 ENCOUNTER — TELEPHONE (OUTPATIENT)
Dept: PHYSICAL THERAPY | Facility: HOSPITAL | Age: 68
End: 2023-04-10

## 2023-04-11 ENCOUNTER — TELEPHONE (OUTPATIENT)
Dept: PHYSICAL THERAPY | Facility: HOSPITAL | Age: 68
End: 2023-04-11

## 2023-04-11 ENCOUNTER — APPOINTMENT (OUTPATIENT)
Dept: OCCUPATIONAL MEDICINE | Facility: HOSPITAL | Age: 68
End: 2023-04-11
Attending: NURSE PRACTITIONER
Payer: MEDICARE

## 2023-04-13 ENCOUNTER — APPOINTMENT (OUTPATIENT)
Dept: OCCUPATIONAL MEDICINE | Facility: HOSPITAL | Age: 68
End: 2023-04-13
Attending: NURSE PRACTITIONER
Payer: MEDICARE

## 2023-04-17 ENCOUNTER — OFFICE VISIT (OUTPATIENT)
Dept: OCCUPATIONAL MEDICINE | Facility: HOSPITAL | Age: 68
End: 2023-04-17
Attending: NURSE PRACTITIONER
Payer: MEDICARE

## 2023-04-17 PROCEDURE — 97140 MANUAL THERAPY 1/> REGIONS: CPT

## 2023-04-17 NOTE — PROGRESS NOTES
Diagnosis:   Lymphedema of both lower extremities (I89.0)      Referring Provider: Betina Villagran  Date of Evaluation:    2/21/2023     Precautions:  Lymphedema; infection risk; cardiac; abdominal Next MD visit:   none scheduled  Date of Surgery: n/a   Insurance Primary/Secondary: MEDICARE / COMMERCIAL # Authorized Visits: 14/24            Next MD/Plan Renewal Date: 5/22/2023       Subjective:   *Pt reports she wore Right thigh bandage system applied at last session until the following day. *Wearing Farrow Wrap garments daily on bilateral lower legs. However, did not wear overnight recently d/t feeling too warm in the and discomfort from Right lower leg garments at lateral ankle and over shin. Has changed to different liner over Right lower leg and that has helped with ankle discomfort. *Having difficulty with Right lower leg garment migrating down d/t amount of swelling in proximal lower leg.  places garment high on lower leg, but garment needs adjustment throughout the day. Objective:  Arrived wearing Farrow Wrap garments on bilateral lower legs. , Alanna Mcdermott, present for instruction in short stretch compression bandaging of Right thigh/knee. OBSERVATION:    *Right LE:  Tissue quality over thigh is slightly boggy to supple except for distal medial/posterior surfaces which are boggy to slightly boggy. Knee is boggy, non-pitting. Proximal 1/4 of lower leg is boggy to slightly boggy, with fair to good superficial tissue mobility; distal 3/4 is densely boggy with scattered areas of boggy quality, pitting with fair to good superficial tissue mobility. Ankle is slightly boggy. Dorsum of foot is slightly boggy. *Left LE:  Tissue quality over thigh is slightly boggy to supple; discoloration in area of prior cellulitis. Knee is slightly boggy to supple.  Proximal 1/3 of lower leg is slightly boggy to supple; mid 1/3 is boggy, pitting with fair to good superficial tissue mobility; distal 1/3 is boggy with scattered areas of densely boggy quality, pitting with fair superficial tissue mobility; discoloration in area of prior cellulitis. Ankle is slightly boggy. Dorsum of foot is slightly boggy. MEASUREMENTS:   None taken   TREATMENT:   *Discussed short stretch compression bandaging with pt/. Victor agreement that pt will wear Farrow Wrap garments on Right lower leg on days that  will need to bandage thigh/knee with Georges Notice lower leg garment being placed over distal end of bandaging. *Step-by-step instruction/demonstration of application of Right thigh/knee short stretch compression bandaging with extension of bandaging over proximal lower leg.  videotaped process on personal phone. *Removed bandage system followed by return demonstration of bandaging by . Able to complete with 4-5 verbal cues. COMPRESSION:  *Right lower leg: non-compressive liner; \" foam inserts over tibial ridge and around ankle; Farrow Wrap Classic footpiece, size Small, Regular length; Georges Notice Classic lower leg, size Medium, Regular length (DEFERRED -- stockinet; \" foam insert over tibial ridge and anterior ankle, second \" foam insert over malleoli/posterior ankle;  1/2\" foam insert over medial surface of lower leg; Rosidal wrap toes to knee; 4 short stretch compression bandages: 1, 8cm, 2, 10cm (herringbone pattern of distal leg with first bandage); 1, 12cm. Hospital bootie sock, post-op shoe.)  *Right thigh/knee/proximal lower le-ply layer size \"J\" Tensogrip sleeve over proximal lower leg, knee, thigh; 1, Rosidal wrap proximal lower leg through distal thigh; /\" foam over medial/posterior surface of thigh held in place with 1 Rosidal wrap from distal thigh through proximal thigh; 1 Rosidal wrap from proximal lower leg through thigh; 2, 12 cm short stretch compression bandages proximal lower leg through thigh leaving open knee. Covered all with size \"J\" Tensogrip sleeve.   *Left lower leg: non-compressive liner; Farrow Wrap Classic footpiece, size Small, Regular length; Theodor Haver Classic lower leg, size Medium, Regular length (DEFERRED -- 1/4\" foam insert over tibial ridge and anterior ankle, second 1/4\" foam insert over malleoli/posterior ankle)    Assessment:   Pt and  appear highly motivated to complete short stretch compression bandaging of Right thigh/knee in home setting. Goals:  (to be met in 24 visits)  1. Pt will wear Right lower leg Circaid garments on a daily basis. ACHIEVED   2. Pt will return to use of Flexitouch device 6-7 days/week. ACHIEVED   3. Pt will be independent in decongestive exercises. 4. Pt will tolerate Right lower leg short stretch compression bandaging for 20-23 hours. ACHIEVED   5. Pt/Caregiver will be independent in Right lower leg short stretch compression bandaging. 6. Reduce Right LE lymphedema volume by 30-50cm to allow pt to transition back into custom-fit knee high and to reduce limb heaviness during ambulation. 7. Reduce bilateral lower leg density to boggy to reduce infection risk. 8. Pt will be independent in use of compression garments, self-manual lymph drainage, decongestive exercises, Flexitouch device and lymphedema precautions for life-long self-management of lymphedema. Plan:   Continue current plan.       Charges: 4 Manual Therapy    Total Timed Treatment: 55 min  Total Treatment Time: 60 min      LE Circumferential Measurements (cm)   2/21/2023   LEFT  2/21/2023   RIGHT  3/21/2023   LEFT 3/21/2023   RIGHT 4/6/2023   RIGHT   20cm above Superior Border of Patella (SBP)  81.3 77.0 84.5 83.8 81.5   10cm above SBP  65.5 75.1 72.0 74.8 72.5   SBP  63.3 66.6 62.3 66.0 63.6   5cm below SBP  57.0 60.1 57.0 60.8 59.3   10cm below SBP  52.4 60.6 53.0 59.5 58.5   20cm below SBP  54.3 64.0 51.5 59.2 56.0   30cm below SBP  39.0 47.6 38.4 46.0 41.5   35cm below SBP  30.5 35.4 30.4 33.5 32.2   40cm below SBP  29.2 30.5 25.7 27.5 25.5    Lateral Malleoli  29.8 30.8 27.2 29.0 28.2   7cm proximal to 1st toe web space  26.2 26.3 25.3 25.4 24.3   5cm proximal to 1st toe web space  24.3 25.0 24.7 24.5 23.3   TOTALS  552.8  Lower leg = 285.7 599.0  Lower leg = 320.2 552.0  Lower leg =   276.2 590.0  Lower leg = 304.6 566.4  Lower leg = 289.5

## 2023-04-18 ENCOUNTER — OFFICE VISIT (OUTPATIENT)
Dept: OCCUPATIONAL MEDICINE | Facility: HOSPITAL | Age: 68
End: 2023-04-18
Attending: NURSE PRACTITIONER
Payer: MEDICARE

## 2023-04-18 PROCEDURE — 97140 MANUAL THERAPY 1/> REGIONS: CPT

## 2023-04-18 NOTE — PROGRESS NOTES
Diagnosis:   Lymphedema of both lower extremities (I89.0)      Referring Provider: Paulie Friday  Date of Evaluation:    2/21/2023     Precautions:  Lymphedema; infection risk; cardiac; abdominal Next MD visit:   none scheduled  Date of Surgery: n/a   Insurance Primary/Secondary: MEDICARE / COMMERCIAL # Authorized Visits: 15/24            Next MD/Plan Renewal Date: 5/22/2023       Subjective:   *Pt reports she wore Right thigh/knee bandage system applied at yesterday's session until this morning. Bandage system migrated down thigh overnight. Noticed reduction in thigh volume upon removal.   *Continues to get sensitivity over Right tibial ridge while wearing Farrow Wrap garments, h/e with return to use of foam insert not until 4:30pm yesterday. By bedtime, removed Right lower leg Farrow Wrap garments to sleep d/t discomfort over tibial ridge. Objective:  Arrived wearing Farrow Wrap garments on bilateral lower legs. OBSERVATION:  *Reduced Right thigh and knee volume with improved superficial tissue mobility and overall tissue quality. Emerging anatomical shaping of patella. *Emerging hair follicles over Right lower leg. *Right LE:  Tissue quality over thigh is slightly boggy to supple except for distal medial/posterior surfaces which are boggy to slightly boggy. Knee is boggy, pitting. Proximal 1/4 of lower leg is boggy to slightly boggy, with fair to good superficial tissue mobility; distal 3/4 is densely boggy with scattered areas of boggy quality, pitting with fair to good superficial tissue mobility. Ankle is slightly boggy. Dorsum of foot is slightly boggy. *Left LE:  Tissue quality over thigh is slightly boggy to supple; discoloration in area of prior cellulitis. Knee is slightly boggy to supple.  Proximal 1/3 of lower leg is slightly boggy to supple; mid 1/3 is boggy, pitting with fair to good superficial tissue mobility; distal 1/3 is boggy with scattered areas of densely boggy quality, pitting with fair superficial tissue mobility; discoloration in area of prior cellulitis. Ankle is slightly boggy. Dorsum of foot is slightly boggy. MEASUREMENTS:   None taken   TREATMENT:   *Proposed increasing depth of foam used over Right tibial ridge. Pt in agreement. Fabricated 1/2\" foam insert to be placed over tibial ridge when wearing Leamon Ramming Wrap garments. *Right lower quadrant manual lymph drainage with soft tissue mobilization of dense areas of tissue. Observed reduction of volume around patella with further emergence of anatomical shaping. *Skin care. *Applied bandaging over entire Right LE.   *Confirmed that pt should have her  re-apply Right thigh/knee compression once she removes it tomorrow. COMPRESSION:  *Right lower leg: stockinet; /\" foam insert over tibial ridge and anterior ankle, second \" foam insert over malleoli/posterior ankle;  1/2\" foam insert over medial surface of lower leg; Rosidal wrap toes to knee; 4 short stretch compression bandages: 1, 8cm, 2, 10cm (herringbone pattern of distal leg with first bandage); 1, 12cm. *Right thigh/knee/proximal lower le-ply layer size \"J\" Tensogrip sleeve over proximal lower leg, knee, thigh; 1, Rosidal wrap proximal lower leg through distal thigh; 1/4\" foam over medial/posterior surface of thigh held in place with 1 Rosidal wrap from distal thigh through proximal thigh; 1 Rosidal wrap from proximal lower leg through thigh; 2, 12 cm short stretch compression bandages proximal lower leg through thigh leaving open knee. Covered all with size \"J\" Tensogrip sleeve. *Left lower leg: non-compressive liner; Farrow Wrap Classic footpiece, size Small, Regular length; Branden Dandy Classic lower leg, size Medium, Regular length (DEFERRED -- \" foam insert over tibial ridge and anterior ankle, second \" foam insert over malleoli/posterior ankle)    Assessment:   Pt with good response to full Right LE compression.  Making good progress towards reduction of Right LE lymphedema volume and density. Good tolerance of full limb compression. Goals:  (to be met in 24 visits)  1. Pt will wear Right lower leg Circaid garments on a daily basis. ACHIEVED   2. Pt will return to use of Flexitouch device 6-7 days/week. ACHIEVED   3. Pt will be independent in decongestive exercises. 4. Pt will tolerate Right lower leg short stretch compression bandaging for 20-23 hours. ACHIEVED   5. Pt/Caregiver will be independent in Right lower leg short stretch compression bandaging. DEFERRED 4/17 4/17 NEW GOAL: Pt's  will be independent in Right thigh/knee short stretch compression bandaging. 6. Reduce Right LE lymphedema volume by 30-50cm to allow pt to transition back into custom-fit knee high and to reduce limb heaviness during ambulation. 7. Reduce bilateral lower leg density to boggy to reduce infection risk. 8. Pt will be independent in use of compression garments, self-manual lymph drainage, decongestive exercises, Flexitouch device and lymphedema precautions for life-long self-management of lymphedema. Plan:   Continue current plan.       Charges: 5 Manual Therapy    Total Timed Treatment: 70 min  Total Treatment Time: 75 min      LE Circumferential Measurements (cm)   2/21/2023   LEFT  2/21/2023   RIGHT  3/21/2023   LEFT 3/21/2023   RIGHT 4/6/2023   RIGHT   20cm above Superior Border of Patella (SBP)  81.3 77.0 84.5 83.8 81.5   10cm above SBP  65.5 75.1 72.0 74.8 72.5   SBP  63.3 66.6 62.3 66.0 63.6   5cm below SBP  57.0 60.1 57.0 60.8 59.3   10cm below SBP  52.4 60.6 53.0 59.5 58.5   20cm below SBP  54.3 64.0 51.5 59.2 56.0   30cm below SBP  39.0 47.6 38.4 46.0 41.5   35cm below SBP  30.5 35.4 30.4 33.5 32.2   40cm below SBP  29.2 30.5 25.7 27.5 25.5    Lateral Malleoli  29.8 30.8 27.2 29.0 28.2   7cm proximal to 1st toe web space  26.2 26.3 25.3 25.4 24.3   5cm proximal to 1st toe web space  24.3 25.0 24.7 24.5 23.3   TOTALS  552.8  Lower leg = 285.7 599.0  Lower leg = 320.2 552.0  Lower leg =   276.2 590.0  Lower leg = 304.6 566.4  Lower leg = 289.5

## 2023-04-19 ENCOUNTER — HOSPITAL ENCOUNTER (OUTPATIENT)
Dept: MAMMOGRAPHY | Facility: HOSPITAL | Age: 68
Discharge: HOME | End: 2023-04-19
Attending: SURGERY
Payer: MEDICARE

## 2023-04-19 ENCOUNTER — LAB ENCOUNTER (OUTPATIENT)
Dept: LAB | Age: 68
End: 2023-04-19
Attending: SURGERY
Payer: MEDICARE

## 2023-04-19 DIAGNOSIS — I77.9 BILATERAL CAROTID ARTERY DISEASE, UNSPECIFIED TYPE (HCC): ICD-10-CM

## 2023-04-19 DIAGNOSIS — E03.9 ACQUIRED HYPOTHYROIDISM: ICD-10-CM

## 2023-04-19 DIAGNOSIS — R92.8 ABNORMAL MAMMOGRAM OF LEFT BREAST: ICD-10-CM

## 2023-04-19 DIAGNOSIS — I48.91 ATRIAL FIBRILLATION (HCC): Primary | ICD-10-CM

## 2023-04-19 DIAGNOSIS — K21.9 GASTROESOPHAGEAL REFLUX DISEASE WITHOUT ESOPHAGITIS: ICD-10-CM

## 2023-04-19 DIAGNOSIS — N28.9 FUNCTION KIDNEY DECREASED: ICD-10-CM

## 2023-04-19 DIAGNOSIS — E66.01 CLASS 3 SEVERE OBESITY DUE TO EXCESS CALORIES WITH SERIOUS COMORBIDITY AND BODY MASS INDEX (BMI) OF 50.0 TO 59.9 IN ADULT (HCC): ICD-10-CM

## 2023-04-19 DIAGNOSIS — Z79.01 LONG TERM (CURRENT) USE OF ANTICOAGULANTS: ICD-10-CM

## 2023-04-19 DIAGNOSIS — D64.89 ANEMIA DUE TO OTHER CAUSE, NOT CLASSIFIED: ICD-10-CM

## 2023-04-19 DIAGNOSIS — E78.2 MIXED HYPERLIPIDEMIA: ICD-10-CM

## 2023-04-19 LAB
ALBUMIN SERPL-MCNC: 3.4 G/DL (ref 3.4–5)
ALBUMIN/GLOB SERPL: 1.1 {RATIO} (ref 1–2)
ALP LIVER SERPL-CCNC: 92 U/L
ALT SERPL-CCNC: 18 U/L
ANION GAP SERPL CALC-SCNC: 1 MMOL/L (ref 0–18)
AST SERPL-CCNC: 11 U/L (ref 15–37)
BASOPHILS # BLD AUTO: 0.02 X10(3) UL (ref 0–0.2)
BASOPHILS NFR BLD AUTO: 0.4 %
BILIRUB SERPL-MCNC: 0.6 MG/DL (ref 0.1–2)
BUN BLD-MCNC: 18 MG/DL (ref 7–18)
CALCIUM BLD-MCNC: 9 MG/DL (ref 8.5–10.1)
CHLORIDE SERPL-SCNC: 106 MMOL/L (ref 98–112)
CHOLEST SERPL-MCNC: 194 MG/DL (ref ?–200)
CO2 SERPL-SCNC: 30 MMOL/L (ref 21–32)
CREAT BLD-MCNC: 1.04 MG/DL
EOSINOPHIL # BLD AUTO: 0.13 X10(3) UL (ref 0–0.7)
EOSINOPHIL NFR BLD AUTO: 2.7 %
ERYTHROCYTE [DISTWIDTH] IN BLOOD BY AUTOMATED COUNT: 13.1 %
FASTING PATIENT LIPID ANSWER: YES
FASTING STATUS PATIENT QL REPORTED: YES
GFR SERPLBLD BASED ON 1.73 SQ M-ARVRAT: 59 ML/MIN/1.73M2 (ref 60–?)
GLOBULIN PLAS-MCNC: 3.2 G/DL (ref 2.8–4.4)
GLUCOSE BLD-MCNC: 91 MG/DL (ref 70–99)
HCT VFR BLD AUTO: 38.2 %
HDLC SERPL-MCNC: 65 MG/DL (ref 40–59)
HGB BLD-MCNC: 11.9 G/DL
IMM GRANULOCYTES # BLD AUTO: 0.01 X10(3) UL (ref 0–1)
IMM GRANULOCYTES NFR BLD: 0.2 %
LDLC SERPL CALC-MCNC: 111 MG/DL (ref ?–100)
LYMPHOCYTES # BLD AUTO: 1.25 X10(3) UL (ref 1–4)
LYMPHOCYTES NFR BLD AUTO: 26.2 %
MCH RBC QN AUTO: 30.1 PG (ref 26–34)
MCHC RBC AUTO-ENTMCNC: 31.2 G/DL (ref 31–37)
MCV RBC AUTO: 96.5 FL
MONOCYTES # BLD AUTO: 0.39 X10(3) UL (ref 0.1–1)
MONOCYTES NFR BLD AUTO: 8.2 %
NEUTROPHILS # BLD AUTO: 2.98 X10 (3) UL (ref 1.5–7.7)
NEUTROPHILS # BLD AUTO: 2.98 X10(3) UL (ref 1.5–7.7)
NEUTROPHILS NFR BLD AUTO: 62.3 %
NONHDLC SERPL-MCNC: 129 MG/DL (ref ?–130)
OSMOLALITY SERPL CALC.SUM OF ELEC: 285 MOSM/KG (ref 275–295)
PLATELET # BLD AUTO: 231 10(3)UL (ref 150–450)
POTASSIUM SERPL-SCNC: 4.1 MMOL/L (ref 3.5–5.1)
PROT SERPL-MCNC: 6.6 G/DL (ref 6.4–8.2)
RBC # BLD AUTO: 3.96 X10(6)UL
SODIUM SERPL-SCNC: 137 MMOL/L (ref 136–145)
TRIGL SERPL-MCNC: 100 MG/DL (ref 30–149)
TSI SER-ACNC: 2.47 MIU/ML (ref 0.36–3.74)
VLDLC SERPL CALC-MCNC: 17 MG/DL (ref 0–30)
WBC # BLD AUTO: 4.8 X10(3) UL (ref 4–11)

## 2023-04-19 PROCEDURE — 77066 DX MAMMO INCL CAD BI: CPT | Performed by: SURGERY

## 2023-04-19 PROCEDURE — 80053 COMPREHEN METABOLIC PANEL: CPT

## 2023-04-19 PROCEDURE — 80061 LIPID PANEL: CPT

## 2023-04-19 PROCEDURE — 85025 COMPLETE CBC W/AUTO DIFF WBC: CPT

## 2023-04-19 PROCEDURE — 36415 COLL VENOUS BLD VENIPUNCTURE: CPT

## 2023-04-19 PROCEDURE — 77062 BREAST TOMOSYNTHESIS BI: CPT | Performed by: SURGERY

## 2023-04-19 PROCEDURE — 84443 ASSAY THYROID STIM HORMONE: CPT

## 2023-04-20 ENCOUNTER — OFFICE VISIT (OUTPATIENT)
Dept: OCCUPATIONAL MEDICINE | Facility: HOSPITAL | Age: 68
End: 2023-04-20
Attending: NURSE PRACTITIONER
Payer: MEDICARE

## 2023-04-20 PROCEDURE — 97140 MANUAL THERAPY 1/> REGIONS: CPT

## 2023-04-20 NOTE — PROGRESS NOTES
Diagnosis:   Lymphedema of both lower extremities (I89.0)      Referring Provider: Lorna Madden  Date of Evaluation:    2/21/2023     Precautions:  Lymphedema; infection risk; cardiac; abdominal Next MD visit:   none scheduled  Date of Surgery: n/a   Insurance Primary/Secondary: MEDICARE / COMMERCIAL # Authorized Visits: 16/24            Next MD/Plan Renewal Date: 5/22/2023       Subjective:   *Pt reports she wore Right thigh/knee bandage system applied at yesterday's session until the following morning. Did not have  re-apply as she wanted to launder all of her bandages yesterday. Because of this was able to use Flexitouch device on Right lower quadrant. Feels like it was more effective yesterday than when used before start of treatment. Objective:  Arrived wearing Farrow Wrap garments on bilateral lower legs. OBSERVATION:  *Continued reduction in Right thigh and knee volume lymphedema density. Emerging anatomical shaping of patella. *Emerging hair follicles over Right lower leg. *Right LE:  Tissue quality over thigh is slightly boggy to supple except for distal medial/posterior surfaces which are boggy to slightly boggy. Knee is boggy, pitting. Proximal 1/4 of lower leg is boggy to slightly boggy, with fair to good superficial tissue mobility; distal 3/4 is densely boggy with scattered areas of boggy quality, pitting with fair to good superficial tissue mobility. Ankle is slightly boggy. Dorsum of foot is slightly boggy. *Left LE:  Tissue quality over thigh is slightly boggy to supple; discoloration in area of prior cellulitis. Knee is slightly boggy to supple. Proximal 1/3 of lower leg is slightly boggy to supple; mid 1/3 is boggy, pitting with fair to good superficial tissue mobility; distal 1/3 is boggy with scattered areas of densely boggy quality, pitting with fair superficial tissue mobility; discoloration in area of prior cellulitis. Ankle is slightly boggy. Dorsum of foot is slightly boggy. MEASUREMENTS:   None taken   TREATMENT:   *Right lower quadrant manual lymph drainage with extended focus over thigh/knee and soft tissue mobilization of dense areas of tissue. Observed reduction of volume around patella with further emergence of anatomical shaping. *Skin care. *Applied bandaging over entire Right LE.   COMPRESSION:  *Right lower leg: stockinet; 1/4\" foam insert over tibial ridge and anterior ankle, second /4\" foam insert over malleoli/posterior ankle;  1/2\" foam insert over medial surface of lower leg; Rosidal wrap toes to knee; 4 short stretch compression bandages: 1, 8cm, 2, 10cm (herringbone pattern of distal leg with first bandage); 1, 12cm. *Right thigh/knee/proximal lower le-ply layer size \"J\" Tensogrip sleeve over proximal lower leg, knee, thigh; 1, Rosidal wrap proximal lower leg through distal thigh; 1/4\" foam over medial/posterior surface of thigh held in place with 1 Rosidal wrap from distal thigh through proximal thigh; 1 Rosidal wrap from proximal lower leg through thigh; 2, 12 cm short stretch compression bandages proximal lower leg through thigh leaving open knee. Covered all with size \"J\" Tensogrip sleeve. *Left lower leg: non-compressive liner; Farrow Wrap Classic footpiece, size Small, Regular length; Georges Notice Classic lower leg, size Medium, Regular length (DEFERRED -- /\" foam insert over tibial ridge and anterior ankle, second /4\" foam insert over malleoli/posterior ankle)    Assessment:   Pt making good progress towards reduction of Right LE lymphedema volume and density. Good tolerance of full limb compression. Goals:  (to be met in 24 visits)  1. Pt will wear Right lower leg Circaid garments on a daily basis. ACHIEVED   2. Pt will return to use of Flexitouch device 6-7 days/week. ACHIEVED   3. Pt will be independent in decongestive exercises. 4. Pt will tolerate Right lower leg short stretch compression bandaging for 20-23 hours. ACHIEVED   5. Pt/Caregiver will be independent in Right lower leg short stretch compression bandaging. DEFERRED 4/17 4/17 NEW GOAL: Pt's  will be independent in Right thigh/knee short stretch compression bandaging. 6. Reduce Right LE lymphedema volume by 30-50cm to allow pt to transition back into custom-fit knee high and to reduce limb heaviness during ambulation. 7. Reduce bilateral lower leg density to boggy to reduce infection risk. 8. Pt will be independent in use of compression garments, self-manual lymph drainage, decongestive exercises, Flexitouch device and lymphedema precautions for life-long self-management of lymphedema. Plan:   Continue current plan.       Charges: 4 Manual Therapy    Total Timed Treatment: 55 min  Total Treatment Time: 60 min      LE Circumferential Measurements (cm)   2/21/2023   LEFT  2/21/2023   RIGHT  3/21/2023   LEFT 3/21/2023   RIGHT 4/6/2023   RIGHT   20cm above Superior Border of Patella (SBP)  81.3 77.0 84.5 83.8 81.5   10cm above SBP  65.5 75.1 72.0 74.8 72.5   SBP  63.3 66.6 62.3 66.0 63.6   5cm below SBP  57.0 60.1 57.0 60.8 59.3   10cm below SBP  52.4 60.6 53.0 59.5 58.5   20cm below SBP  54.3 64.0 51.5 59.2 56.0   30cm below SBP  39.0 47.6 38.4 46.0 41.5   35cm below SBP  30.5 35.4 30.4 33.5 32.2   40cm below SBP  29.2 30.5 25.7 27.5 25.5    Lateral Malleoli  29.8 30.8 27.2 29.0 28.2   7cm proximal to 1st toe web space  26.2 26.3 25.3 25.4 24.3   5cm proximal to 1st toe web space  24.3 25.0 24.7 24.5 23.3   TOTALS  552.8  Lower leg = 285.7 599.0  Lower leg = 320.2 552.0  Lower leg =   276.2 590.0  Lower leg = 304.6 566.4  Lower leg = 289.5

## 2023-04-24 ENCOUNTER — OFFICE VISIT (OUTPATIENT)
Dept: OCCUPATIONAL MEDICINE | Facility: HOSPITAL | Age: 68
End: 2023-04-24
Attending: INTERNAL MEDICINE
Payer: MEDICARE

## 2023-04-24 PROCEDURE — 97140 MANUAL THERAPY 1/> REGIONS: CPT

## 2023-04-25 NOTE — PROGRESS NOTES
Diagnosis:   Lymphedema of both lower extremities (I89.0)      Referring Provider: Diana Benito  Date of Evaluation:    2/21/2023     Precautions:  Lymphedema; infection risk; cardiac; abdominal Next MD visit:   none scheduled  Date of Surgery: n/a   Insurance Primary/Secondary: MEDICARE / COMMERCIAL # Authorized Visits: 17/24            Next MD/Plan Renewal Date: 5/22/2023        Progress Summary  Pt has attended 17/24 visits in Occupational Therapy. Subjective:   Pt reports she wore full Right LE bandage system applied at last session until late the following day. Reports that  re-applied Right thigh/knee bandage system only once since last session. Wearing Farrow Wrap garments every day; struggling with Right lower leg garment sliding down. Used Flexitouch device once. After being alerted in rise in Right LE as compared to lowest levels measured on 4/6, pt sharing that since last session she did eat foods that she normally doesn't eat. Went to [employer] one evening; last night had a homemade [place] chicken dish that had salsa and packaged chicken taco seasoning in it. Assessment:   As of 4/6, pt making steady progress towards reduction of Right LE lymphedema volume and density. However, limb volume measuring much higher today. Impacting factors may include dietary changes, time of day being measured (seen in late afternoon today / usually has morning appts) as gap in therapy d/t therapist illness and pt needing to accompany  to medical appt. Pt's  has recently been trained in short stretch compression bandaging of her Right thigh/knee and anticipate return to more steady progress towards goals. Recommend continued therapy to maximize volume loss and improvement in tissue quality to reduce limb heaviness during ambulation and to allow pt to transition back in to custom-fit garments.  Because of her aggressive, stage III lymphedema, it is highly likely she will require an extension of therapy services, especially as she also has lymphedema in her Left LE. Objective:  Pt attending Occupational Therapy x3/week for complete decongestive therapy of Right lower quadrant; instruction of  in short stretch compression bandaging of Right thigh/knee. EDEMA/TISSUE QUALITY:   *Right LE:  As of 4/6, pt had measured with an overall 32.6cm reduction in lymphedema volume since initial eval. Today, pt only measuring with a 3.1cm decrease since last progress summary for an overall reduction of 12.1cm. Tissue quality over thigh is slightly boggy to supple except for distal medial/posterior surfaces which are boggy to slightly boggy. Knee is boggy, pitting. Proximal 1/4 of lower leg is boggy, with fair to good superficial tissue mobility; distal 3/4 is densely boggy with scattered areas of boggy quality, pitting with fair to good superficial tissue mobility. Ankle is slightly boggy. Dorsum of foot is slightly boggy. *Left LE:  Circumferential lymphedema volume decreased by 5.7cm since last progress summary for an overall reduction of 6.5cm. Tissue quality over thigh is slightly boggy to supple; discoloration in area of prior cellulitis. Knee is slightly boggy to supple. Proximal 1/3 of lower leg is slightly boggy; mid 1/3 is boggy, pitting with fair to good superficial tissue mobility; distal 1/3 is boggy with scattered areas of densely boggy quality, pitting with fair superficial tissue mobility; discoloration in area of prior cellulitis. Ankle is slightly boggy. Dorsum of foot is slightly boggy. *Emerging hair follicles over bilateral lower legs. Tissue hydration is good. Negative Stemmer's sign. Medium to light discoloration to distal lower legs. Goals:  (to be met in 24 visits)  1. Pt will wear Right lower leg Circaid garments on a daily basis. ACHIEVED   2. Pt will return to use of Flexitouch device 6-7 days/week. ACHIEVED   3.  Pt will be independent in decongestive exercises. 4. Pt will tolerate Right lower leg short stretch compression bandaging for 20-23 hours. ACHIEVED   5. Pt/Caregiver will be independent in Right lower leg short stretch compression bandaging. DEFERRED 4/17 4/17 NEW GOAL: Pt's  will be independent in Right thigh/knee short stretch compression bandaging. 6. Reduce Right LE lymphedema volume by 30-50cm to allow pt to transition back into custom-fit knee high and to reduce limb heaviness during ambulation. 7. Reduce bilateral lower leg density to boggy to reduce infection risk. 8. Pt will be independent in use of compression garments, self-manual lymph drainage, decongestive exercises, Flexitouch device and lymphedema precautions for life-long self-management of lymphedema. Plan: Continue skilled Occupational Therapy 3 x/week or a total of 7 more visits. Treatment will include: complete decongestive therapy of Right lower quadrant. Patient/Family/Caregiver was advised of these findings, precautions, and treatment options and has agreed to actively participate in planning and for this course of care. Thank you for your referral. If you have any questions, please contact me at Dept: [phone]. Sincerely,  Electronically signed by therapist: Lorna Nation. Chris, NORAR/L, MINOR      Objective:  Arrived wearing Farrow Wrap garments on bilateral lower legs; Right lower leg garment migrated inferiorly. OBSERVATION:  *Continued reduction in Right thigh and knee volume lymphedema density. Emerging anatomical shaping of patella. *Emerging hair follicles over Right lower leg. *Right LE:  Tissue quality over thigh is slightly boggy to supple except for distal medial/posterior surfaces which are boggy to slightly boggy. Knee is boggy, pitting.  Proximal 1/4 of lower leg is boggy, with fair to good superficial tissue mobility; distal 3/4 is densely boggy with scattered areas of boggy quality, pitting with fair to good superficial tissue mobility. Ankle is slightly boggy. Dorsum of foot is slightly boggy. *Left LE:  Tissue quality over thigh is slightly boggy to supple; discoloration in area of prior cellulitis. Knee is slightly boggy to supple. Proximal 1/3 of lower leg is slightly boggy; mid 1/3 is boggy, pitting with fair to good superficial tissue mobility; distal 1/3 is boggy with scattered areas of densely boggy quality, pitting with fair superficial tissue mobility; discoloration in area of prior cellulitis. Ankle is slightly boggy. Dorsum of foot is slightly boggy. *Emerging hair follicles over bilateral lower legs. Tissue hydration is good. Negative Stemmer's sign. Medium to light discoloration to distal lower legs. MEASUREMENTS:   **As above   TREATMENT:   *Re-assessment of status   *Discussion of pt's recent dietary intake; negative impact of high sodium intake on lymphedema. *Skin care. *Right lower quadrant manual lymph drainage with soft tissue mobilization of dense areas of tissue. *Re-applied Left lower leg Velcro-closure garments. Re-applied full Right LE bandage system. *Stressed importance of daily thigh/knee bandaging for optimal results. COMPRESSION:  *Right lower leg: stockinet; 1/4\" foam insert over tibial ridge and anterior ankle, second 1/4\" foam insert over malleoli/posterior ankle;  1/2\" foam insert over medial surface of lower leg; Rosidal wrap toes to knee; 4 short stretch compression bandages: 1, 8cm, 2, 10cm (herringbone pattern of distal leg with first bandage); 1, 12cm.    *Right thigh/knee/proximal lower le-ply layer size \"J\" Tensogrip sleeve over proximal lower leg, knee, thigh; 1, Rosidal wrap proximal lower leg through distal thigh; 1/4\" foam over medial/posterior surface of thigh held in place with 1 Rosidal wrap from distal thigh through proximal thigh; 1 Rosidal wrap from proximal lower leg through thigh; 2, 12 cm short stretch compression bandages proximal lower leg through thigh leaving open knee. Covered all with size \"J\" Tensogrip sleeve. *Left lower leg: non-compressive liner; Farrow Wrap Classic footpiece, size Small, Regular length; Jesus Phillips Classic lower leg, size Medium, Regular length (DEFERRED -- 1/4\" foam insert over tibial ridge and anterior ankle, second 1/4\" foam insert over malleoli/posterior ankle)    Assessment:   **As above     Goals:  (to be met in 24 visits)  1. Pt will wear Right lower leg Circaid garments on a daily basis. ACHIEVED   2. Pt will return to use of Flexitouch device 6-7 days/week. ACHIEVED   3. Pt will be independent in decongestive exercises. 4. Pt will tolerate Right lower leg short stretch compression bandaging for 20-23 hours. ACHIEVED   5. Pt/Caregiver will be independent in Right lower leg short stretch compression bandaging. DEFERRED 4/17 4/17 NEW GOAL: Pt's  will be independent in Right thigh/knee short stretch compression bandaging. 6. Reduce Right LE lymphedema volume by 30-50cm to allow pt to transition back into custom-fit knee high and to reduce limb heaviness during ambulation. 7. Reduce bilateral lower leg density to boggy to reduce infection risk. 8. Pt will be independent in use of compression garments, self-manual lymph drainage, decongestive exercises, Flexitouch device and lymphedema precautions for life-long self-management of lymphedema. Plan:   Continue current plan.       Charges: 4 Manual Therapy    Total Timed Treatment: 55 min  Total Treatment Time: 60 min      LE Circumferential Measurements (cm)  2/21/2023   LEFT  2/21/2023   RIGHT  3/21/2023   LEFT 3/21/2023   RIGHT 4/6/2023   RIGHT   20cm above Superior Border of Patella (SBP)  81.3 77.0 84.5 83.8 81.5   10cm above SBP  65.5 75.1 72.0 74.8 72.5   SBP  63.3 66.6 62.3 66.0 63.6   5cm below SBP  57.0 60.1 57.0 60.8 59.3   10cm below SBP  52.4 60.6 53.0 59.5 58.5   20cm below SBP  54.3 64.0 51.5 59.2 56.0   30cm below SBP  39.0 47.6 38.4 46.0 41.5   35cm below SBP  30.5 35.4 30.4 33.5 32.2   40cm below SBP  29.2 30.5 25.7 27.5 25.5    Lateral Malleoli  29.8 30.8 27.2 29.0 28.2   7cm proximal to 1st toe web space  26.2 26.3 25.3 25.4 24.3   5cm proximal to 1st toe web space  24.3 25.0 24.7 24.5 23.3   TOTALS  552.8  Lower leg = 285.7 599.0  Lower leg = 320.2 552.0  Lower leg =   276.2 590.0  Lower leg = 304.6 566.4  Lower leg = 289.5         LE Circumferential Measurements (cm)  4/24/2023  LEFT  4/24/2023   RIGHT    20cm above Superior Border of Patella (SBP)  82.5 78.6   10cm above SBP  71.8 73.0   SBP  63.3 65.4   5cm below SBP  58.0 61.1   10cm below SBP  53.3 60.3   20cm below SBP  49.3 63.0   30cm below SBP  36.3 47.0   35cm below SBP  26.7 36.7   40cm below SBP  25.6 25.0    Lateral Malleoli  29.0 28.3   7cm proximal to 1st toe web space  25.7 25.0   5cm proximal to 1st toe web space  24.8 23.5   TOTALS  546.3  Lower leg = 270.7 586. 9  Lower leg = 308.8

## 2023-04-26 ENCOUNTER — OFFICE VISIT (OUTPATIENT)
Dept: OCCUPATIONAL MEDICINE | Facility: HOSPITAL | Age: 68
End: 2023-04-26
Attending: INTERNAL MEDICINE
Payer: MEDICARE

## 2023-04-26 PROCEDURE — 97140 MANUAL THERAPY 1/> REGIONS: CPT

## 2023-04-26 NOTE — PROGRESS NOTES
Diagnosis:   Lymphedema of both lower extremities (I89.0)      Referring Provider: Diana Benito  Date of Evaluation:    2/21/2023     Precautions:  Lymphedema; infection risk; cardiac; abdominal Next MD visit:   none scheduled  Date of Surgery: n/a   Insurance Primary/Secondary: MEDICARE / COMMERCIAL # Authorized Visits: 18/24            Next MD/Plan Renewal Date: 5/22/2023       Subjective:   *Pt reports she wore full Right LE bandage system until late yesterday afternoon. Replaced with [place] Notice garments on lower leg. This morning  applied bandage system over thigh/knee. *Reports continued difficulty of migration of Right Farrow Wrap lower leg garment d/t high proximal lower leg volume. *Wondering if it would be okay for her to attend her aquatic ex class tomorrow. Objective:  Arrived wearing Right Farrow Wrap garments / thigh-knee short stretch compression bandaging; Left lower leg Farrow Wrap garments   OBSERVATION:  *Continued reduction in Right thigh and knee volume lymphedema density. Continued emergence of anatomical shaping of patella. *Reduction in lymphedema density of medial surface of proximal lower leg. *Right LE:  Tissue quality over thigh is slightly boggy to supple except for distal medial/posterior surfaces which are boggy to slightly boggy. Knee is boggy, pitting. Proximal 1/4 of lower leg is boggy, with fair to good superficial tissue mobility; distal 3/4 is densely boggy with scattered areas of boggy quality, pitting with fair to good superficial tissue mobility. Ankle is slightly boggy. Dorsum of foot is slightly boggy. *Left LE:  Tissue quality over thigh is slightly boggy to supple; discoloration in area of prior cellulitis. Knee is slightly boggy to supple.  Proximal 1/3 of lower leg is slightly boggy; mid 1/3 is boggy, pitting with fair to good superficial tissue mobility; distal 1/3 is boggy with scattered areas of densely boggy quality, pitting with fair superficial tissue mobility; discoloration in area of prior cellulitis. Ankle is slightly boggy. Dorsum of foot is slightly boggy. *Emerging hair follicles over bilateral lower legs. Tissue hydration is good. Negative Stemmer's sign. Medium to light discoloration to distal lower legs. MEASUREMENTS:   None taken   TREATMENT:   *Removed Right lower leg compression. *Right lower quadrant manual lymph drainage with soft tissue mobilization of dense areas of tissue and extended focus over proxima lower leg. Observed reduction in proximal lower leg lymphedema density with improved superficial tissue mobility. *Discussed challenges of keeping Right lower leg Farrow Wrap garment in place. Requested pt to bring in her prior Circaid garment to assess possibility of incorporating into Right lower leg Velcro-closure garment approach. *Discussed Flexitouch device use. Advised pt to use over Left LE daily. Use over Right LE in-between sessions, as able, when not in full LE compression. *Supported pt going to aquatic ex class. Upon return to home, advised to use Flexitouch device over Right LE following showering and then have  apply full Right LE compression. *Re-applied full Right LE bandage system. COMPRESSION:  *Right lower leg: stockinet; 1/4\" foam insert over tibial ridge and anterior ankle, second /4\" foam insert over malleoli/posterior ankle;  1/2\" foam insert over medial surface of lower leg; Rosidal wrap toes to knee; 4 short stretch compression bandages: 1, 8cm, 2, 10cm (herringbone pattern of distal leg with first bandage); 1, 12cm.    *Right thigh/knee/proximal lower le-ply layer size \"J\" Tensogrip sleeve over proximal lower leg, knee, thigh; 1, Rosidal wrap proximal lower leg through distal thigh; 1/4\" foam over medial/posterior surface of thigh held in place with 1 Rosidal wrap from distal thigh through proximal thigh; 1 Rosidal wrap from proximal lower leg through thigh; 2, 12 cm short stretch compression bandages proximal lower leg through thigh leaving open knee. Covered all with size \"J\" Tensogrip sleeve. *Left lower leg: non-compressive liner; Farrow Wrap Classic footpiece, size Small, Regular length; Zachary Wright Classic lower leg, size Medium, Regular length (DEFERRED -- 1/4\" foam insert over tibial ridge and anterior ankle, second 1/4\" foam insert over malleoli/posterior ankle)    Assessment:   Pt and  with good compliance with re-applying Right thigh/knee bandage system. Demonstrating progress towards reduction of Right LE proximal volume. Goals:  (to be met in 24 visits)  1. Pt will wear Right lower leg Circaid garments on a daily basis. ACHIEVED   2. Pt will return to use of Flexitouch device 6-7 days/week. ACHIEVED   3. Pt will be independent in decongestive exercises. 4. Pt will tolerate Right lower leg short stretch compression bandaging for 20-23 hours. ACHIEVED   5. Pt/Caregiver will be independent in Right lower leg short stretch compression bandaging. DEFERRED 4/17 4/17 NEW GOAL: Pt's  will be independent in Right thigh/knee short stretch compression bandaging. 6. Reduce Right LE lymphedema volume by 30-50cm to allow pt to transition back into custom-fit knee high and to reduce limb heaviness during ambulation. 7. Reduce bilateral lower leg density to boggy to reduce infection risk. 8. Pt will be independent in use of compression garments, self-manual lymph drainage, decongestive exercises, Flexitouch device and lymphedema precautions for life-long self-management of lymphedema. Plan:   Continue current plan.       Charges: 5 Manual Therapy    Total Timed Treatment: 70 min  Total Treatment Time: 75 min      LE Circumferential Measurements (cm)  2/21/2023   LEFT  2/21/2023   RIGHT  3/21/2023   LEFT 3/21/2023   RIGHT 4/6/2023   RIGHT   20cm above Superior Border of Patella (SBP)  81.3 77.0 84.5 83.8 81.5   10cm above SBP  65.5 75.1 72.0 74.8 72.5   SBP  63.3 66.6 62.3 66.0 63.6   5cm below SBP  57.0 60.1 57.0 60.8 59.3   10cm below SBP  52.4 60.6 53.0 59.5 58.5   20cm below SBP  54.3 64.0 51.5 59.2 56.0   30cm below SBP  39.0 47.6 38.4 46.0 41.5   35cm below SBP  30.5 35.4 30.4 33.5 32.2   40cm below SBP  29.2 30.5 25.7 27.5 25.5    Lateral Malleoli  29.8 30.8 27.2 29.0 28.2   7cm proximal to 1st toe web space  26.2 26.3 25.3 25.4 24.3   5cm proximal to 1st toe web space  24.3 25.0 24.7 24.5 23.3   TOTALS  552.8  Lower leg = 285.7 599.0  Lower leg = 320.2 552.0  Lower leg =   276.2 590.0  Lower leg = 304.6 566.4  Lower leg = 289.5           LE Circumferential Measurements (cm)  4/24/2023  LEFT  4/24/2023   RIGHT    20cm above Superior Border of Patella (SBP)  82.5 78.6   10cm above SBP  71.8 73.0   SBP  63.3 65.4   5cm below SBP  58.0 61.1   10cm below SBP  53.3 60.3   20cm below SBP  49.3 63.0   30cm below SBP  36.3 47.0   35cm below SBP  26.7 36.7   40cm below SBP  25.6 25.0    Lateral Malleoli  29.0 28.3   7cm proximal to 1st toe web space  25.7 25.0   5cm proximal to 1st toe web space  24.8 23.5   TOTALS  546.3  Lower leg = 270.7 586. 9  Lower leg = 308.8

## 2023-05-02 ENCOUNTER — OFFICE VISIT (OUTPATIENT)
Dept: OCCUPATIONAL MEDICINE | Facility: HOSPITAL | Age: 68
End: 2023-05-02
Attending: INTERNAL MEDICINE
Payer: MEDICARE

## 2023-05-02 PROCEDURE — 97140 MANUAL THERAPY 1/> REGIONS: CPT

## 2023-05-02 NOTE — PROGRESS NOTES
Diagnosis:   Lymphedema of both lower extremities (I89.0)      Referring Provider: Nasrin Castillo  Date of Evaluation:    2/21/2023     Precautions:  Lymphedema; infection risk; cardiac; abdominal Next MD visit:   none scheduled  Date of Surgery: n/a   Insurance Primary/Secondary: MEDICARE / COMMERCIAL # Authorized Visits: 19/24            Next MD/Plan Renewal Date: 5/22/2023       Subjective:   *Pt reports she has attended aquatic ex class twice since last session. Has had some itching over different areas of her body following classes. *Reports  applied full Right LE compression that day and the following day. On 4/29 she did not use thigh/knee bandages as she used her Flexitouch device; wore bilateral lower leg compression on 4/30 and 5/1, but no thigh/knee compression.  applied full compression today. Objective:  Arrived wearing Right Farrow Wrap garments / thigh-knee short stretch compression bandaging; Left lower leg Farrow Wrap garments   OBSERVATION:    *Right LE:  Tissue quality over thigh is slightly boggy to supple except for distal medial/posterior surfaces which are boggy to slightly boggy. Knee is boggy, pitting. Proximal 1/4 of lower leg is boggy, with fair to good superficial tissue mobility; distal 3/4 is densely boggy with scattered areas of boggy quality, pitting with fair to good superficial tissue mobility. Ankle is slightly boggy. Dorsum of foot is slightly boggy. *Left LE:  Tissue quality over thigh is slightly boggy to supple; discoloration in area of prior cellulitis. Knee is slightly boggy to supple. Proximal 1/3 of lower leg is slightly boggy; mid 1/3 is boggy, pitting with fair to good superficial tissue mobility; distal 1/3 is boggy with scattered areas of densely boggy quality, pitting with fair superficial tissue mobility; discoloration in area of prior cellulitis. Ankle is slightly boggy. Dorsum of foot is slightly boggy.     *Emerging hair follicles over bilateral lower legs. Tissue hydration is good. Negative Stemmer's sign. Medium to light discoloration to distal lower legs. MEASUREMENTS:   None taken   TREATMENT:   *Removed compression. *Right lower quadrant manual lymph drainage with soft tissue mobilization of dense areas of tissue and extended focus over proxima lower leg. Observed reduction in proximal lower leg lymphedema density with improved superficial tissue mobility. *Re-applied full Right LE bandage system. *Reinforeced daily application of Right thigh/knee compression. Pt sharing that she most likely will not cover thigh/knee tomorrow due to pre-planned trip to [facility]. *Instruction in decongestive exercises. Written program provided. COMPRESSION:  *Right lower leg: stockinet; 1/\" foam insert over tibial ridge and anterior ankle, second \" foam insert over malleoli/posterior ankle;  1/2\" foam insert over medial surface of lower leg; Rosidal wrap toes to knee; 4 short stretch compression bandages: 1, 8cm, 2, 10cm (herringbone pattern of distal leg with first bandage); 1, 12cm. *Right thigh/knee/proximal lower le-ply layer size \"J\" Tensogrip sleeve over proximal lower leg, knee, thigh; 1, Rosidal wrap proximal lower leg through distal thigh; 1/4\" foam over medial/posterior surface of thigh held in place with 1 Rosidal wrap from distal thigh through proximal thigh; 1 Rosidal wrap from proximal lower leg through thigh; 2, 12 cm short stretch compression bandages proximal lower leg through thigh leaving open knee. Covered all with size \"J\" Tensogrip sleeve. *Left lower leg: non-compressive liner; Farrow Wrap Classic footpiece, size Small, Regular length; [place] Eth Classic lower leg, size Medium, Regular length (DEFERRED -- \" foam insert over tibial ridge and anterior ankle, second \" foam insert over malleoli/posterior ankle)    Assessment:   Pt making progress towards goals. Goals:  (to be met in 24 visits)  1.  Pt will wear Right lower leg Circaid garments on a daily basis. ACHIEVED   2. Pt will return to use of Flexitouch device 6-7 days/week. ACHIEVED   3. Pt will be independent in decongestive exercises. 4. Pt will tolerate Right lower leg short stretch compression bandaging for 20-23 hours. ACHIEVED   5. Pt/Caregiver will be independent in Right lower leg short stretch compression bandaging. DEFERRED 4/17 4/17 NEW GOAL: Pt's  will be independent in Right thigh/knee short stretch compression bandaging. 6. Reduce Right LE lymphedema volume by 30-50cm to allow pt to transition back into custom-fit knee high and to reduce limb heaviness during ambulation. 7. Reduce bilateral lower leg density to boggy to reduce infection risk. 8. Pt will be independent in use of compression garments, self-manual lymph drainage, decongestive exercises, Flexitouch device and lymphedema precautions for life-long self-management of lymphedema. Plan:   Continue current plan.       Charges: 5 Manual Therapy    Total Timed Treatment: 70 min  Total Treatment Time: 75 min      LE Circumferential Measurements (cm)  2/21/2023   LEFT  2/21/2023   RIGHT  3/21/2023   LEFT 3/21/2023   RIGHT 4/6/2023   RIGHT   20cm above Superior Border of Patella (SBP)  81.3 77.0 84.5 83.8 81.5   10cm above SBP  65.5 75.1 72.0 74.8 72.5   SBP  63.3 66.6 62.3 66.0 63.6   5cm below SBP  57.0 60.1 57.0 60.8 59.3   10cm below SBP  52.4 60.6 53.0 59.5 58.5   20cm below SBP  54.3 64.0 51.5 59.2 56.0   30cm below SBP  39.0 47.6 38.4 46.0 41.5   35cm below SBP  30.5 35.4 30.4 33.5 32.2   40cm below SBP  29.2 30.5 25.7 27.5 25.5    Lateral Malleoli  29.8 30.8 27.2 29.0 28.2   7cm proximal to 1st toe web space  26.2 26.3 25.3 25.4 24.3   5cm proximal to 1st toe web space  24.3 25.0 24.7 24.5 23.3   TOTALS  552.8  Lower leg = 285.7 599.0  Lower leg = 320.2 552.0  Lower leg =   276.2 590.0  Lower leg = 304.6 566.4  Lower leg = 289.5           LE Circumferential Measurements (cm)  4/24/2023  LEFT  4/24/2023   RIGHT    20cm above Superior Border of Patella (SBP)  82.5 78.6   10cm above SBP  71.8 73.0   SBP  63.3 65.4   5cm below SBP  58.0 61.1   10cm below SBP  53.3 60.3   20cm below SBP  49.3 63.0   30cm below SBP  36.3 47.0   35cm below SBP  26.7 36.7   40cm below SBP  25.6 25.0    Lateral Malleoli  29.0 28.3   7cm proximal to 1st toe web space  25.7 25.0   5cm proximal to 1st toe web space  24.8 23.5   TOTALS  546.3  Lower leg = 270.7 586. 9  Lower leg = 308.8

## 2023-05-04 ENCOUNTER — OFFICE VISIT (OUTPATIENT)
Dept: OCCUPATIONAL MEDICINE | Facility: HOSPITAL | Age: 68
End: 2023-05-04
Attending: INTERNAL MEDICINE
Payer: MEDICARE

## 2023-05-04 PROCEDURE — 97140 MANUAL THERAPY 1/> REGIONS: CPT

## 2023-05-04 NOTE — PROGRESS NOTES
Diagnosis:   Lymphedema of both lower extremities (I89.0)      Referring Provider: Diana Benito  Date of Evaluation:    2/21/2023     Precautions:  Lymphedema; infection risk; cardiac; abdominal Next MD visit:   none scheduled  Date of Surgery: n/a   Insurance Primary/Secondary: MEDICARE / COMMERCIAL # Authorized Visits: 20/24            Next MD/Plan Renewal Date: 5/22/2023       Subjective:   *Pt reports she wore full Right LE bandage system applied at last session until late yesterday afternoon.  re-applied all this morning. Objective:  Arrived wearing Right Farrow Wrap garments / thigh-knee short stretch compression bandaging; Left lower leg Farrow Wrap garments  Brought old Circaid lower leg garment to session. OBSERVATION:    *Right LE:  Tissue quality over thigh is slightly boggy to supple except for distal medial/posterior surfaces which are boggy to slightly boggy. Knee is boggy, pitting. Proximal 1/4 of lower leg is boggy, with fair to good superficial tissue mobility; distal 3/4 is densely boggy with scattered areas of boggy quality, pitting with fair to good superficial tissue mobility. Ankle is slightly boggy. Dorsum of foot is slightly boggy. *Left LE:  Tissue quality over thigh is slightly boggy to supple; discoloration in area of prior cellulitis. Knee is slightly boggy to supple. Proximal 1/3 of lower leg is slightly boggy; mid 1/3 is boggy, pitting with fair to good superficial tissue mobility; distal 1/3 is boggy with scattered areas of densely boggy quality, pitting with fair superficial tissue mobility; discoloration in area of prior cellulitis. Ankle is slightly boggy. Dorsum of foot is slightly boggy. *Emerging hair follicles over bilateral lower legs. Tissue hydration is good. Negative Stemmer's sign. Medium to light discoloration to distal lower legs. MEASUREMENTS:   None taken   TREATMENT:   *Removed Right lower leg compression.     *Discussed use of Circaid lower leg garment covered by Julia Villegas over area of increased volume/density on Right lower leg. Pt in agreement. Problem solved location of garment, donning technique of both garments. Pt stating feeling increased compression and support of aforementioned area with use of both garments. Requested pt's , Sarah Santoyo, to come to treatment room; method of application demonstrated. *Right lower quadrant manual lymph drainage with soft tissue mobilization of dense areas of tissue and extended focus over proxima lower leg. Observed reduction in proximal lower leg lymphedema density with improved superficial tissue mobility. *Re-applied full Right LE bandage system. COMPRESSION:  *Right lower leg: stockinet; 1/4\" foam insert over tibial ridge and anterior ankle, second /4\" foam insert over malleoli/posterior ankle;  1/2\" foam insert over medial surface of lower leg; Rosidal wrap toes to knee; 4 short stretch compression bandages: 1, 8cm, 2, 10cm (herringbone pattern of distal leg with first bandage); 1, 12cm. *Right thigh/knee/proximal lower le-ply layer size \"J\" Tensogrip sleeve over proximal lower leg, knee, thigh; 1, Rosidal wrap proximal lower leg through distal thigh; 1/4\" foam over medial/posterior surface of thigh held in place with 1 Rosidal wrap from distal thigh through proximal thigh; 1 Rosidal wrap from proximal lower leg through thigh; 2, 12 cm short stretch compression bandages proximal lower leg through thigh leaving open knee. Covered all with size \"J\" Tensogrip sleeve. *Left lower leg: non-compressive liner; Farrow Wrap Classic footpiece, size Small, Regular length; Ade Bloch Classic lower leg, size Medium, Regular length (DEFERRED -- /4\" foam insert over tibial ridge and anterior ankle, second /4\" foam insert over malleoli/posterior ankle)    Assessment:   Pt and  with good compliance with completing full compression over Right LE.  Hopefully, layering of Velcro-closure garments will assist with sustaining gains achieved over proximal lower leg that occurs with full leg bandaging. Goals:  (to be met in 24 visits)  1. Pt will wear Right lower leg Circaid garments on a daily basis. ACHIEVED   2. Pt will return to use of Flexitouch device 6-7 days/week. ACHIEVED   3. Pt will be independent in decongestive exercises. 4. Pt will tolerate Right lower leg short stretch compression bandaging for 20-23 hours. ACHIEVED   5. Pt/Caregiver will be independent in Right lower leg short stretch compression bandaging. DEFERRED 4/17 4/17 NEW GOAL: Pt's  will be independent in Right thigh/knee short stretch compression bandaging. 6. Reduce Right LE lymphedema volume by 30-50cm to allow pt to transition back into custom-fit knee high and to reduce limb heaviness during ambulation. 7. Reduce bilateral lower leg density to boggy to reduce infection risk. 8. Pt will be independent in use of compression garments, self-manual lymph drainage, decongestive exercises, Flexitouch device and lymphedema precautions for life-long self-management of lymphedema. Plan:   Continue current plan.       Charges: 4 Manual Therapy    Total Timed Treatment: 55 min  Total Treatment Time: 60 min      LE Circumferential Measurements (cm)  2/21/2023   LEFT  2/21/2023   RIGHT  3/21/2023   LEFT 3/21/2023   RIGHT 4/6/2023   RIGHT   20cm above Superior Border of Patella (SBP)  81.3 77.0 84.5 83.8 81.5   10cm above SBP  65.5 75.1 72.0 74.8 72.5   SBP  63.3 66.6 62.3 66.0 63.6   5cm below SBP  57.0 60.1 57.0 60.8 59.3   10cm below SBP  52.4 60.6 53.0 59.5 58.5   20cm below SBP  54.3 64.0 51.5 59.2 56.0   30cm below SBP  39.0 47.6 38.4 46.0 41.5   35cm below SBP  30.5 35.4 30.4 33.5 32.2   40cm below SBP  29.2 30.5 25.7 27.5 25.5    Lateral Malleoli  29.8 30.8 27.2 29.0 28.2   7cm proximal to 1st toe web space  26.2 26.3 25.3 25.4 24.3   5cm proximal to 1st toe web space  24.3 25.0 24.7 24.5 23.3   TOTALS  552.8  Lower leg = 285.7 599.0  Lower leg = 320.2 552.0  Lower leg =   276.2 590.0  Lower leg = 304.6 566.4  Lower leg = 289.5           LE Circumferential Measurements (cm)  4/24/2023  LEFT  4/24/2023   RIGHT    20cm above Superior Border of Patella (SBP)  82.5 78.6   10cm above SBP  71.8 73.0   SBP  63.3 65.4   5cm below SBP  58.0 61.1   10cm below SBP  53.3 60.3   20cm below SBP  49.3 63.0   30cm below SBP  36.3 47.0   35cm below SBP  26.7 36.7   40cm below SBP  25.6 25.0    Lateral Malleoli  29.0 28.3   7cm proximal to 1st toe web space  25.7 25.0   5cm proximal to 1st toe web space  24.8 23.5   TOTALS  546.3  Lower leg = 270.7 586. 9  Lower leg = 308.8

## 2023-05-07 DIAGNOSIS — J45.20 MILD INTERMITTENT ASTHMA WITHOUT COMPLICATION: ICD-10-CM

## 2023-05-09 ENCOUNTER — OFFICE VISIT (OUTPATIENT)
Dept: OCCUPATIONAL MEDICINE | Facility: HOSPITAL | Age: 68
End: 2023-05-09
Attending: INTERNAL MEDICINE
Payer: MEDICARE

## 2023-05-09 PROCEDURE — 97140 MANUAL THERAPY 1/> REGIONS: CPT

## 2023-05-09 RX ORDER — ALBUTEROL SULFATE 90 UG/1
AEROSOL, METERED RESPIRATORY (INHALATION)
Qty: 18 G | Refills: 0 | Status: SHIPPED | OUTPATIENT
Start: 2023-05-09

## 2023-05-09 NOTE — TELEPHONE ENCOUNTER
Last OV relevant to medication: 12/5/2022  Last refill date: 12/5/22     #/refills:18g/0  When pt was asked to return for OV: 6 months  Upcoming appt/reason: follow up-none scheduled  Was pt informed of any over due labs: none

## 2023-05-09 NOTE — PROGRESS NOTES
Diagnosis:   Lymphedema of both lower extremities (I89.0)      Referring Provider: Kai Gosselin  Date of Evaluation:    2/21/2023     Precautions:  Lymphedema; infection risk; cardiac; abdominal Next MD visit:   none scheduled  Date of Surgery: n/a   Insurance Primary/Secondary: MEDICARE / COMMERCIAL # Authorized Visits: 21/24            Next MD/Plan Renewal Date: 5/22/2023       Subjective:   *Pt reports she wore full Right LE bandage system applied at last session until the following day. Was out of thigh/knee compression x2 days d/t wanting to complete some heavier IADLs that she felt she would not be able to do with bandages on (weeding, houswork, etc). Reports does not plan on taking this approach again. *Reports success with use of Circaid and Farrow Wrap garments together, resulting in being able to keep proximal Right lower leg volume reduced. Feels is also softer. *Did use Flexitouch device once since last session. Objective:  Arrived wearing Right Farrow Wrap garments with Circaid lower leg garment covering proximal lower leg; Left lower leg Farrow Wrap garments  OBSERVATION:  *Reduced volume and lymphedema density over proximal Right lower leg observed as compared to when pt when [facility] only. Reducing lymphedema density in lower leg. *Increased volume over Right thigh/knee, h/e density stable. *Right LE:  Tissue quality over thigh is slightly boggy to supple except for distal medial/posterior surfaces which are boggy to slightly boggy. Knee is boggy, pitting. Proximal 1/4 of lower leg is boggy, good superficial tissue mobility; distal 3/4 is boggy, pitting over anterior/medial/posterior surfaces with fair to good superficial tissue mobility / densely boggy over lateral surface with fair to good superficial tissue mobility. Ankle is slightly boggy. Dorsum of foot is slightly boggy.     *Left LE:  Tissue quality over thigh is slightly boggy to supple; discoloration in area of prior cellulitis. Knee is slightly boggy to supple. Proximal 1/3 of lower leg is slightly boggy; mid 1/3 is boggy, pitting with fair to good superficial tissue mobility; distal 1/3 is boggy with scattered areas of densely boggy quality, pitting with fair superficial tissue mobility; discoloration in area of prior cellulitis. Ankle is slightly boggy. Dorsum of foot is slightly boggy. *Emerging hair follicles over bilateral lower legs. Tissue hydration is good. Negative Stemmer's sign. Medium to light discoloration to distal lower legs. MEASUREMENTS:   None taken   TREATMENT:   *Removed Right lower leg compression. Completed skin care. *Right lower quadrant manual lymph drainage with soft tissue mobilization of dense areas of tissue and extended focus over proxima lower leg. Observed reduction in proximal lower leg lymphedema density with improved superficial tissue mobility. *Re-applied full Right LE bandage system. COMPRESSION:  *Right lower leg: stockinet; /\" foam insert over tibial ridge and anterior ankle, second \" foam insert over malleoli/posterior ankle;  1/\" foam insert over medial surface of lower leg; Rosidal wrap toes to knee; 4 short stretch compression bandages: 1, 8cm, 2, 10cm (herringbone pattern of distal leg with first bandage); 1, 12cm. *Right thigh/knee/proximal lower le-ply layer size \"J\" Tensogrip sleeve over proximal lower leg, knee, thigh; 1, Rosidal wrap proximal lower leg through distal thigh; 1/4\" foam over medial/posterior surface of thigh held in place with 1 Rosidal wrap from distal thigh through proximal thigh; 1 Rosidal wrap from proximal lower leg through thigh; 2, 12 cm short stretch compression bandages proximal lower leg through thigh leaving open knee. Covered all with size \"J\" Tensogrip sleeve. *Left lower leg: non-compressive liner; Farrow Wrap Classic footpiece, size Small, Regular length;  Farrow Wrap Classic lower leg, size Medium, Regular length (DEFERRED -- 1/4\" foam insert over tibial ridge and anterior ankle, second 1/4\" foam insert over malleoli/posterior ankle)    Assessment:   Pt with good response to layering of Velcro-closure garments with more sustained response to gains made during short stretch compression bandaging. Pt with understandable reason to defer thigh/knee compression, h/e resulting in increased volume. Goals:  (to be met in 24 visits)  1. Pt will wear Right lower leg Circaid garments on a daily basis. ACHIEVED   2. Pt will return to use of Flexitouch device 6-7 days/week. ACHIEVED   3. Pt will be independent in decongestive exercises. 4. Pt will tolerate Right lower leg short stretch compression bandaging for 20-23 hours. ACHIEVED   5. Pt/Caregiver will be independent in Right lower leg short stretch compression bandaging. DEFERRED 4/17 4/17 NEW GOAL: Pt's  will be independent in Right thigh/knee short stretch compression bandaging. 6. Reduce Right LE lymphedema volume by 30-50cm to allow pt to transition back into custom-fit knee high and to reduce limb heaviness during ambulation. 7. Reduce bilateral lower leg density to boggy to reduce infection risk. 8. Pt will be independent in use of compression garments, self-manual lymph drainage, decongestive exercises, Flexitouch device and lymphedema precautions for life-long self-management of lymphedema. Plan:   Continue current plan.       Charges: 4 Manual Therapy    Total Timed Treatment: 55 min  Total Treatment Time: 60 min      LE Circumferential Measurements (cm)  2/21/2023   LEFT  2/21/2023   RIGHT  3/21/2023   LEFT 3/21/2023   RIGHT 4/6/2023   RIGHT   20cm above Superior Border of Patella (SBP)  81.3 77.0 84.5 83.8 81.5   10cm above SBP  65.5 75.1 72.0 74.8 72.5   SBP  63.3 66.6 62.3 66.0 63.6   5cm below SBP  57.0 60.1 57.0 60.8 59.3   10cm below SBP  52.4 60.6 53.0 59.5 58.5   20cm below SBP  54.3 64.0 51.5 59.2 56.0   30cm below SBP  39.0 47.6 38.4 46.0 41.5   35cm below SBP  30.5 35.4 30.4 33.5 32.2   40cm below SBP  29.2 30.5 25.7 27.5 25.5    Lateral Malleoli  29.8 30.8 27.2 29.0 28.2   7cm proximal to 1st toe web space  26.2 26.3 25.3 25.4 24.3   5cm proximal to 1st toe web space  24.3 25.0 24.7 24.5 23.3   TOTALS  552.8  Lower leg = 285.7 599.0  Lower leg = 320.2 552.0  Lower leg =   276.2 590.0  Lower leg = 304.6 566.4  Lower leg = 289.5           LE Circumferential Measurements (cm)  4/24/2023  LEFT  4/24/2023   RIGHT    20cm above Superior Border of Pat[place] (SBP)  82.5 78.6   10cm above SBP  71.8 73.0   SBP  63.3 65.4   5cm below SBP  58.0 61.1   10cm below SBP  53.3 60.3   20cm below SBP  49.3 63.0   30cm below SBP  36.3 47.0   35cm below SBP  26.7 36.7   40cm below SBP  25.6 25.0    Lateral Malleoli  29.0 28.3   7cm proximal to 1st toe web space  25.7 25.0   5cm proximal to 1st toe web space  24.8 23.5   TOTALS  546.3  Lower leg = 270.7 586. 9  Lower leg = 308.8

## 2023-05-10 ENCOUNTER — OFFICE VISIT (OUTPATIENT)
Dept: OCCUPATIONAL MEDICINE | Facility: HOSPITAL | Age: 68
End: 2023-05-10
Attending: INTERNAL MEDICINE
Payer: MEDICARE

## 2023-05-10 PROCEDURE — 97140 MANUAL THERAPY 1/> REGIONS: CPT

## 2023-05-11 NOTE — PROGRESS NOTES
Diagnosis:   Lymphedema of both lower extremities (I89.0)      Referring Provider: Tej Zuniga  Date of Evaluation:    2/21/2023     Precautions:  Lymphedema; infection risk; cardiac; abdominal Next MD visit:   none scheduled  Date of Surgery: n/a   Insurance Primary/Secondary: MEDICARE / COMMERCIAL # Authorized Visits: 22/24            Next MD/Plan Renewal Date: 5/22/2023       Subjective:   *Pt reports she wore full Right LE bandage system applied at last session until this morning; thigh/knee compression slid down. Replaced with Vickye Lather Wrap/Circaid garments. *Reports has been experiencing some discomfort over her medial Right lower leg when wearing Farrow Wrap garments. Objective:  Arrived wearing Right Farrow Wrap garments with Circaid lower leg garment covering proximal lower leg; Left lower leg Farrow Wrap garments  OBSERVATION:    *Right LE:  Tissue quality over thigh is slightly boggy to supple except for distal medial/posterior surfaces which are boggy to slightly boggy. Knee is boggy, pitting. Proximal 1/4 of lower leg is boggy, good superficial tissue mobility; distal 3/4 is boggy, pitting over anterior/medial/posterior surfaces with fair to good superficial tissue mobility / densely boggy over lateral surface with fair to good superficial tissue mobility. Ankle is slightly boggy. Dorsum of foot is slightly boggy. *Left LE:  Tissue quality over thigh is slightly boggy to supple; discoloration in area of prior cellulitis. Knee is slightly boggy to supple. Proximal 1/3 of lower leg is slightly boggy; mid 1/3 is boggy, pitting with fair to good superficial tissue mobility; distal 1/3 is boggy with scattered areas of densely boggy quality, pitting with fair superficial tissue mobility; discoloration in area of prior cellulitis. Ankle is slightly boggy. Dorsum of foot is slightly boggy. *Emerging hair follicles over bilateral lower legs. Tissue hydration is good. Negative Stemmer's sign.  Medium to light discoloration to distal lower legs. MEASUREMENTS:   None taken   TREATMENT:   *Removed Right lower leg compression. *Discussed discomfort under Right lower leg Farrow Wrap garment. Recommended placing 1/2\" foam under garment on medial side of lower leg; explained rationale and demonstrated; donned insert/garment; pt stating improved comfort. *Right lower quadrant manual lymph drainage with soft tissue mobilization of dense areas of tissue and extended focus over proxima lower leg. Observed reduction in proximal lower leg lymphedema density with improved superficial tissue mobility. *Re-applied full Right LE bandage system. COMPRESSION:  *Right lower leg: stockinet; 1/4\" foam insert over tibial ridge and anterior ankle, second /4\" foam insert over malleoli/posterior ankle;  1/2\" foam insert over medial surface of lower leg; Rosidal wrap toes to knee; 4 short stretch compression bandages: 1, 8cm, 2, 10cm (herringbone pattern of distal leg with first bandage); 1, 12cm. *Right thigh/knee/proximal lower le-ply layer size \"J\" Tensogrip sleeve over proximal lower leg, knee, thigh; 1, Rosidal wrap proximal lower leg through distal thigh; 1/4\" foam over medial/posterior surface of thigh held in place with 1 Rosidal wrap from distal thigh through proximal thigh; 1 Rosidal wrap from proximal lower leg through thigh; 2, 12 cm short stretch compression bandages proximal lower leg through thigh leaving open knee. Covered all with size \"J\" Tensogrip sleeve. *Left lower leg: non-compressive liner; Farrow Wrap Classic footpiece, size Small, Regular length; [employer] Classic lower leg, size Medium, Regular length (DEFERRED -- /4\" foam insert over tibial ridge and anterior ankle, second /4\" foam insert over malleoli/posterior ankle)    Assessment:   Pt demonstrating progress towards goals. Goals:  (to be met in 24 visits)  1. Pt will wear Right lower leg Circaid garments on a daily basis.   ACHIEVED 2. Pt will return to use of Flexitouch device 6-7 days/week. ACHIEVED   3. Pt will be independent in decongestive exercises. 4. Pt will tolerate Right lower leg short stretch compression bandaging for 20-23 hours. ACHIEVED   5. Pt/Caregiver will be independent in Right lower leg short stretch compression bandaging. DEFERRED 4/17 4/17 NEW GOAL: Pt's  will be independent in Right thigh/knee short stretch compression bandaging. 6. Reduce Right LE lymphedema volume by 30-50cm to allow pt to transition back into custom-fit knee high and to reduce limb heaviness during ambulation. 7. Reduce bilateral lower leg density to boggy to reduce infection risk. 8. Pt will be independent in use of compression garments, self-manual lymph drainage, decongestive exercises, Flexitouch device and lymphedema precautions for life-long self-management of lymphedema. Plan:   Continue current plan.       Charges: 4 Manual Therapy    Total Timed Treatment: 55 min  Total Treatment Time: 60 min      LE Circumferential Measurements (cm)  2/21/2023   LEFT  2/21/2023   RIGHT  3/21/2023   LEFT 3/21/2023   RIGHT 4/6/2023   RIGHT   20cm above Superior Border of Pat[place] (SBP)  81.3 77.0 84.5 83.8 81.5   10cm above SBP  65.5 75.1 72.0 74.8 72.5   SBP  63.3 66.6 62.3 66.0 63.6   5cm below SBP  57.0 60.1 57.0 60.8 59.3   10cm below SBP  52.4 60.6 53.0 59.5 58.5   20cm below SBP  54.3 64.0 51.5 59.2 56.0   30cm below SBP  39.0 47.6 38.4 46.0 41.5   35cm below SBP  30.5 35.4 30.4 33.5 32.2   40cm below SBP  29.2 30.5 25.7 27.5 25.5    Lateral Malleoli  29.8 30.8 27.2 29.0 28.2   7cm proximal to 1st toe web space  26.2 26.3 25.3 25.4 24.3   5cm proximal to 1st toe web space  24.3 25.0 24.7 24.5 23.3   TOTALS  552.8  Lower leg = 285.7 599.0  Lower leg = 320.2 552.0  Lower leg =   276.2 590.0  Lower leg = 304.6 566.4  Lower leg = 289.5           LE Circumferential Measurements (cm)  4/24/2023  LEFT  4/24/2023   RIGHT    20cm above Superior Border of Patella (SBP)  82.5 78.6   10cm above SBP  71.8 73.0   SBP  63.3 65.4   5cm below SBP  58.0 61.1   10cm below SBP  53.3 60.3   20cm below SBP  49.3 63.0   30cm below SBP  36.3 47.0   35cm below SBP  26.7 36.7   40cm below SBP  25.6 25.0    Lateral Malleoli  29.0 28.3   7cm proximal to 1st toe web space  25.7 25.0   5cm proximal to 1st toe web space  24.8 23.5   TOTALS  546.3  Lower leg = 270.7 586. 9  Lower leg = 308.8

## 2023-05-16 ENCOUNTER — OFFICE VISIT (OUTPATIENT)
Dept: OCCUPATIONAL MEDICINE | Facility: HOSPITAL | Age: 68
End: 2023-05-16
Attending: NURSE PRACTITIONER
Payer: MEDICARE

## 2023-05-16 PROCEDURE — 97140 MANUAL THERAPY 1/> REGIONS: CPT

## 2023-05-16 NOTE — PROGRESS NOTES
Diagnosis:   Lymphedema of both lower extremities (I89.0)      Referring Provider: Tej Zuniga  Date of Evaluation:    2/21/2023     Precautions:  Lymphedema; infection risk; cardiac; abdominal Next MD visit:   none scheduled  Date of Surgery: n/a   Insurance Primary/Secondary: MEDICARE / COMMERCIAL # Authorized Visits: 23/24            Next MD/Plan Renewal Date: 5/22/2023       Subjective:   *Pt reports wearing Right thigh/knee compression only twice since last removed after last session. Had social functions where it would have been difficult to move about in full leg compression. Did use Flexitouch device twice since last session. *Has been completing decongestive exercises. *Insertion of 1/2\" foam over medial Right lower leg under [facility] garment has improved comfort. Has found that she has some \"hot spots\" at times which are not always in the same location. Can get some \"pinching\" over posterior ankle wear leg garment and footpiece interconnect. *Yesterday, she felt like she had a \"turn around. \" Feels like she has been walking better; feels more assured in her walking. Has also noticed that with reduction in volume the pain she previously had in her Right foot is reducing. Objective:  Arrived wearing Right Farrow Wrap garments with Circaid lower leg garment covering proximal lower leg; Left lower leg Farrow Wrap garments,  Noted to have improved speed of ambulation with less waddling. OBSERVATION:  *Reducing size of proximal Right lower leg \"bulge\" with improved tissue quality. Improving tissue quality throughout entire Right lower leg. Able to visualize distal border of tibial ridge. *Right LE:  Tissue quality over thigh is slightly boggy to supple except for distal medial/posterior surfaces which are boggy to slightly boggy. Knee is boggy, pitting.  Proximal 1/4 of lower leg is boggy, good superficial tissue mobility; distal 3/4 is boggy, pitting over anterior/medial/posterior surfaces with fair to good superficial tissue mobility / densely boggy to boggy over lateral surface with fair to good superficial tissue mobility. Ankle is slightly boggy. Dorsum of foot is slightly boggy. *Left LE:  Tissue quality over thigh is slightly boggy to supple; discoloration in area of prior cellulitis. Knee is slightly boggy to supple. Proximal 1/3 of lower leg is slightly boggy; mid 1/3 is boggy, pitting with fair to good superficial tissue mobility; distal 1/3 is boggy with scattered areas of densely boggy quality, pitting with fair superficial tissue mobility; discoloration in area of prior cellulitis. Ankle is slightly boggy. Dorsum of foot is slightly boggy. *Emerging hair follicles over bilateral lower legs. Tissue hydration is good. Negative Stemmer's sign. Medium to light discoloration to distal lower legs. MEASUREMENTS:   None taken   TREATMENT:   *Recommended place of foam over posterior Right ankle prior to donning [employer] garments. *Removed Right lower leg compression. *Right lower quadrant manual lymph drainage with soft tissue mobilization of dense areas of tissue and extended focus over proxima lower leg. Observed reduction in lower leg lymphedema density with improved superficial tissue mobility. *Re-applied full Right LE bandage system. COMPRESSION:  *Right lower leg: stockinet; 1/4\" foam insert over tibial ridge and anterior ankle, second 1/4\" foam insert over malleoli/posterior ankle;  1/2\" foam insert over medial surface of lower leg; Rosidal wrap toes to knee; 4 short stretch compression bandages: 1, 8cm, 2, 10cm (herringbone pattern of distal leg with first bandage); 1, 12cm.    *Right thigh/knee/proximal lower le-ply layer size \"J\" Tensogrip sleeve over proximal lower leg, knee, thigh; 1, Rosidal wrap proximal lower leg through distal thigh; 1/4\" foam over medial/posterior surface of thigh held in place with 1 Rosidal wrap from distal thigh through proximal thigh; 1 Rosidal wrap from proximal lower leg through thigh; 2, 12 cm short stretch compression bandages proximal lower leg through thigh leaving open knee. Covered all with size \"J\" Tensogrip sleeve. *Left lower leg: non-compressive liner; Farrow Wrap Classic footpiece, size Small, Regular length; [employer] Classic lower leg, size Medium, Regular length (DEFERRED -- 1/4\" foam insert over tibial ridge and anterior ankle, second 1/4\" foam insert over malleoli/posterior ankle)    Assessment:   Pt demonstrating good progress towards improvement in tissue quality of Right lower leg. Reducing Right LE volume positively impacting pt's gait and speed of ambulation as well as assisting with pain reduction in Right foot. Goals:  (to be met in 24 visits)  1. Pt will wear Right lower leg Circaid garments on a daily basis. ACHIEVED   2. Pt will return to use of Flexitouch device 6-7 days/week. ACHIEVED   3. Pt will be independent in decongestive exercises. 4. Pt will tolerate Right lower leg short stretch compression bandaging for 20-23 hours. ACHIEVED   5. Pt/Caregiver will be independent in Right lower leg short stretch compression bandaging. DEFERRED 4/17 4/17 NEW GOAL: Pt's  will be independent in Right thigh/knee short stretch compression bandaging. 6. Reduce Right LE lymphedema volume by 30-50cm to allow pt to transition back into custom-fit knee high and to reduce limb heaviness during ambulation. 7. Reduce bilateral lower leg density to boggy to reduce infection risk. 8. Pt will be independent in use of compression garments, self-manual lymph drainage, decongestive exercises, Flexitouch device and lymphedema precautions for life-long self-management of lymphedema. Plan:   Continue current plan.       Charges: 4 Manual Therapy    Total Timed Treatment: 55 min  Total Treatment Time: 60 min      LE Circumferential Measurements (cm)  2/21/2023   LEFT  2/21/2023   RIGHT  3/21/2023   LEFT 3/21/2023   RIGHT 4/6/2023 RIGHT   20cm above Superior Border of Patella (SBP)  81.3 77.0 84.5 83.8 81.5   10cm above SBP  65.5 75.1 72.0 74.8 72.5   SBP  63.3 66.6 62.3 66.0 63.6   5cm below SBP  57.0 60.1 57.0 60.8 59.3   10cm below SBP  52.4 60.6 53.0 59.5 58.5   20cm below SBP  54.3 64.0 51.5 59.2 56.0   30cm below SBP  39.0 47.6 38.4 46.0 41.5   35cm below SBP  30.5 35.4 30.4 33.5 32.2   40cm below SBP  29.2 30.5 25.7 27.5 25.5    Lateral Malleoli  29.8 30.8 27.2 29.0 28.2   7cm proximal to 1st toe web space  26.2 26.3 25.3 25.4 24.3   5cm proximal to 1st toe web space  24.3 25.0 24.7 24.5 23.3   TOTALS  552.8  Lower leg = 285.7 599.0  Lower leg = 320.2 552.0  Lower leg =   276.2 590.0  Lower leg = 304.6 566.4  Lower leg = 289.5           LE Circumferential Measurements (cm)  4/24/2023  LEFT  4/24/2023   RIGHT    LEFT   RIGHT   20cm above Superior Border of Patella (SBP)  82.5 78.6     10cm above SBP  71.8 73.0     SBP  63.3 65.4     5cm below SBP  58.0 61.1     10cm below SBP  53.3 60.3     20cm below SBP  49.3 63.0     30cm below SBP  36.3 47.0     35cm below SBP  26.7 36.7     40cm below SBP  25.6 25.0      Lateral Malleoli  29.0 28.3     7cm proximal to 1st toe web space  25.7 25.0     5cm proximal to 1st toe web space  24.8 23.5     TOTALS  546.3  Lower leg = 270.7 586. 9  Lower leg = 308.8   Lower leg =    Lower leg =

## 2023-05-18 ENCOUNTER — TELEPHONE (OUTPATIENT)
Dept: PHYSICAL THERAPY | Facility: HOSPITAL | Age: 68
End: 2023-05-18

## 2023-05-18 ENCOUNTER — APPOINTMENT (OUTPATIENT)
Dept: OCCUPATIONAL MEDICINE | Facility: HOSPITAL | Age: 68
End: 2023-05-18
Attending: INTERNAL MEDICINE
Payer: MEDICARE

## 2023-05-22 ENCOUNTER — OFFICE VISIT (OUTPATIENT)
Dept: OCCUPATIONAL MEDICINE | Facility: HOSPITAL | Age: 68
End: 2023-05-22
Attending: INTERNAL MEDICINE
Payer: MEDICARE

## 2023-05-22 PROCEDURE — 97140 MANUAL THERAPY 1/> REGIONS: CPT

## 2023-05-23 ENCOUNTER — OFFICE VISIT (OUTPATIENT)
Dept: OCCUPATIONAL MEDICINE | Facility: HOSPITAL | Age: 68
End: 2023-05-23
Attending: INTERNAL MEDICINE
Payer: MEDICARE

## 2023-05-23 PROCEDURE — 97140 MANUAL THERAPY 1/> REGIONS: CPT

## 2023-05-23 NOTE — PROGRESS NOTES
Progress Summary  Pt has attended 24/24 visits in Occupational Therapy. Subjective:   Pt reports that she wore Right LE full compression applied at last session through the following day. Since then, wearing Shayan Netters garments daily. Admits she did not have her  replace compression over Right thigh/knee since then (5/17), although  asked her each day if she would like for him to put it on. Pt sharing that she is aware of the need to continue with daily thigh/knee compression; despite this she found excuses for not having it applied; sharing that she has been having \"fluctuating energy\" that she feels is not medically related and feels she had no energy yesterday. Used her Flexitouch device only once during this time. Feels like she has lost the progress she made towards her ambulation that she was feeling last week. Has been feeling less steady, so returned to using her straight cane. Reports her new Shayan Netters garments arrived; started wearing them today. Reports current weight of 264#. Assessment:   Since last progress summary, pt was demonstrating good progress towards reduction of her Right thigh/knee volume. However, with deferring compression of these areas since last week she has had a rise in her volume. Additionally, there has been a rise in her Left thigh/knee volume, with the possibility that dietary intake has had a contributory impact on pt's overall volume. Despite this, pt has been making good progress towards the improvement of tissue quality in her Right lower leg. Her  has been highly supportive in assisting her with garment donning and short stretch compression bandaging of her Right thigh/knee.      Presently, it is recommended that pt's course of lymphedema therapy be extended to return to daily compression of her entire Right LE in order to maximize and stabilize volume loss prior to being fit for custom-fit garments as well as to continue with improvement of tissue quality. Since it is pt's goal to be fit with custom-fit capris along with custom-fit knee highs, it is recommended that Left thigh/knee short stretch compression bandaging be included in her treatment plan. Pt is in agreement with this plan. Objective:  Pt attending Occupational Therapy for complete decongestive therapy of Right lower quadrant. EDEMA/TISSUE QUALITY:   *Right LE:  Visual rise in thigh/knee volume as compared to last week. Circumferential volume relatively stable since 4/24 progress summary for an overall reduction of 13.2cm. Tissue quality over thigh is slightly boggy except for distal medial/posterior surfaces which are boggy. Knee is boggy, pitting. Proximal 1/4 of lower leg is boggy, good superficial tissue mobility; distal 3/4 is boggy, pitting over anterior/medial/posterior surfaces with fair to good superficial tissue mobility / densely boggy to boggy over lateral surface with fair to good superficial tissue mobility. Ankle is slightly boggy. Dorsum of foot is slightly boggy over proximal surface / boggy over distal surface. *Left LE:  Circumferential volume has increased by 8.8cm since 4/24 progress summary for an overall rise of 2.3cm since initial eval with majority of volume increase in thigh/knee. Tissue quality over thigh is slightly boggy except over distal medial surface which is boggy to slightly boggy; discoloration in area of prior cellulitis. Knee is slightly boggy to supple. Proximal 1/3 of lower leg is slightly boggy; mid 1/3 is boggy, pitting with fair to good superficial tissue mobility; distal 1/3 is boggy with scattered areas of densely boggy quality, pitting with fair superficial tissue mobility; discoloration in area of prior cellulitis. Ankle is slightly boggy. Dorsum of foot is slightly boggy. *Emerging hair follicles over bilateral lower legs. Tissue hydration is good. Negative Stemmer's sign. Medium to light discoloration to distal lower legs. TREATMENT:   *Removed bilateral lower leg compression. *Re-assessment of status   *Right lower quadrant manual lymph drainage with soft tissue mobilization of dense areas of tissue and extended focus over proximal lower leg. *During MLD discussed importance of compliance with full LE bandaging; pt expressing understanding; expressing frustration with self for deferring bandaging. *Discussed rise in volume of both thighs; dietary intake; yesterday did have meal that most likely was higher in sodium. *At some point, pt tearful; expressing frustration at inability to achieve weight loss as she has before; feelings of just \"giving up\" on idea of weight loss. Provided active listening and therapeutic support; discussed available emotional support; impact of excess weight on pt's self-perception. Discussed [facility] services; recommended pt speaking to PCP office to determine if services would be of benefit to her. Discussed available resources in \"Lymphedema and Lipedema Nutrition Guide\" book; pt stating she owns the book, h/e has had difficulty with getting through the initial chapters d/t the technical/medical nature of information; therapist referred pt to other chapters of book which have recommended meal plans, shopping lists; encouraged pt to review these chapters to assist with her weigh loss goals. *Re-applied full Right LE bandage system. COMPRESSION:  *Right lower leg: stockinet; 1/4\" foam insert over tibial ridge and anterior ankle, second 1/4\" foam insert over malleoli/posterior ankle;  1/2\" foam insert over medial surface of lower leg; Rosidal wrap toes to knee; 4 short stretch compression bandages: 1, 8cm, 2, 10cm (herringbone pattern of distal leg with first bandage); 1, 12cm.    *Right thigh/knee/proximal lower le-ply layer size \"J\" Tensogrip sleeve over proximal lower leg, knee, thigh; 1, Rosidal wrap proximal lower leg through distal thigh; 1/4\" foam over medial/posterior surface of thigh held in place with 1 Rosidal wrap from distal thigh through proximal thigh; 1 Rosidal wrap from proximal lower leg through thigh; 2, 12 cm short stretch compression bandages proximal lower leg through thigh leaving open knee. Covered all with size \"J\" Tensogrip sleeve. *Left lower leg: non-compressive liner; Farrow Wrap Classic footpiece, size Small, Regular length; Shayan Netters Classic lower leg, size Medium, Regular length (DEFERRED -- 1/4\" foam insert over tibial ridge and anterior ankle, second 1/4\" foam insert over malleoli/posterior ankle)    Goals:  (to be met in 24 visits)  1. Pt will wear Right lower leg Circaid garments on a daily basis. ACHIEVED   2. Pt will return to use of Flexitouch device 6-7 days/week. ACHIEVED   3. Pt will be independent in decongestive exercises. ACHIEVED   4. Pt will tolerate Right lower leg short stretch compression bandaging for 20-23 hours. ACHIEVED   5. Pt/Caregiver will be independent in Right lower leg short stretch compression bandaging. DEFERRED 4/17 4/17 NEW GOAL: Pt's  will be independent in Right thigh/knee short stretch compression bandaging. ACHIEVED    5/22 NEW GOAL: Pt will tolerate short stretch compression bandaging over bilateral thighs/knees for 20-23 hours. 6. Reduce Right LE lymphedema volume by 30-50cm to allow pt to transition back into custom-fit knee high and to reduce limb heaviness during ambulation. 7. Reduce bilateral lower leg density to boggy to reduce infection risk. 8. Pt will be independent in use of compression garments, self-manual lymph drainage, decongestive exercises, Flexitouch device and lymphedema precautions for life-long self-management of lymphedema. Plan: Continue skilled Occupational Therapy 3 x/week or a total of 12 visits over a 90 day period.  Treatment will include: complete decongestive therapy of Right lower quadrant; progress bandaging to Left thigh/knee in anticipation of transitioning into custom-fit capris with custom-fit knee highs. Patient/Family/Caregiver was advised of these findings, precautions, and treatment options and has agreed to actively participate in planning and for this course of care. Thank you for your referral. If you have any questions, please contact me at Dept: [phone]. Sincerely,  Electronically signed by therapist: Coleen Lira. LAI Perez/L, MINOR      Physician's certification required:  Yes  Please co-sign or sign and return this letter via fax as soon as possible to [phone]. I certify the need for these services furnished under this plan of treatment and while under my care. X___________________________________________________ Date____________________    Certification From: 7/14/5476  To:8/20/2023      Plan:   Continue current plan.       Charges: 4 Manual Therapy    Total Timed Treatment: 55 min  Total Treatment Time: 60 min      LE Circumferential Measurements (cm)  2/21/2023   LEFT  2/21/2023   RIGHT  3/21/2023   LEFT 3/21/2023   RIGHT 4/6/2023   RIGHT   20cm above Superior Border of Patella (SBP)  81.3 77.0 84.5 83.8 81.5   10cm above SBP  65.5 75.1 72.0 74.8 72.5   SBP  63.3 66.6 62.3 66.0 63.6   5cm below SBP  57.0 60.1 57.0 60.8 59.3   10cm below SBP  52.4 60.6 53.0 59.5 58.5   20cm below SBP  54.3 64.0 51.5 59.2 56.0   30cm below SBP  39.0 47.6 38.4 46.0 41.5   35cm below SBP  30.5 35.4 30.4 33.5 32.2   40cm below SBP  29.2 30.5 25.7 27.5 25.5    Lateral Malleoli  29.8 30.8 27.2 29.0 28.2   7cm proximal to 1st toe web space  26.2 26.3 25.3 25.4 24.3   5cm proximal to 1st toe web space  24.3 25.0 24.7 24.5 23.3   TOTALS  552.8  Lower leg = 285.7 599.0  Lower leg = 320.2 552.0  Lower leg =   276.2 590.0  Lower leg = 304.6 566.4  Lower leg = 289.5           LE Circumferential Measurements (cm)  4/24/2023  LEFT  4/24/2023   RIGHT  5/22/2023   LEFT 5/22/2023   RIGHT   20cm above Superior Border of Patella (SBP)  82.5 78.6 82.5 77.3   10cm above SBP  71.8 73.0 73.0 74.6   SBP  63.3 65.4 65.0 67.0   5cm below SBP  58.0 61.1 59.1 62.5   10cm below SBP  53.3 60.3 54.7 60.5   20cm below SBP  49.3 63.0 51.7 59.4   30cm below SBP  36.3 47.0 37.2 45.3   35cm below SBP  26.7 36.7 28.5 36.5   40cm below SBP  25.6 25.0 25.5 26.5    Lateral Malleoli  29.0 28.3 28.2 27.8   7cm proximal to 1st toe web space  25.7 25.0 25.2 24.7   5cm proximal to 1st toe web space  24.8 23.5 24.5 23.7   TOTALS  546.3  Lower leg = 270.7 586. 9  Lower leg = 308.8 555.1  Lower leg =   275.5 585.8  Lower leg =   304.4

## 2023-05-23 NOTE — PROGRESS NOTES
Diagnosis:   Lymphedema of both lower extremities (I89.0)      Referring Provider: Rajani Angel  Date of Evaluation:    2/21/2023     Precautions:  Lymphedema; infection risk; cardiac; abdominal Next MD visit:   none scheduled  Date of Surgery: n/a   Insurance Primary/Secondary: MEDICARE / COMMERCIAL # Authorized Visits: 1/12           Next MD/Plan Renewal Date: 8/20/2023         Subjective:   *Pt reports that she had higher urinary output yesterday. Is feeling like she has more energy today. Right LE feels better when thigh/knee are compressed in addition to lower leg. Assessment:   Brief measurement of 2 areas of pt's thigh/knee showing reduction of size in these areas as compared to yesterday's measurements. Quick response to return to compression of Right thigh and knee. Objective:  Arrived wearing Right LE full compression applied at yesterday's session; Left lower leg Farrow Wrap garments. Using straight cane. OBSERVATION:  *Reduction in Right thigh/knee volume and lymphedema density throughout entire limb. *Right LE: Tissue quality over thigh is slightly boggy to supple except for distal medial/posterior surfaces which are boggy to slightly boggy. Knee is boggy to slightly boggy. Proximal 1/4 of lower leg is boggy to slightly boggy superficial tissue mobility; distal 3/4 is boggy to slightly boggy over anterior/medial/posterior surfaces with fair to good superficial tissue mobility / boggy over lateral surface with spotty areas of densely boggy quality with fair to good superficial tissue mobility. Ankle is slightly boggy. Dorsum of foot is slightly boggy over proximal surface / boggy over distal surface. *Left LE:  Tissue quality over thigh is slightly boggy except over distal medial surface which is boggy to slightly boggy; discoloration in area of prior cellulitis. Knee is slightly boggy to supple.  Proximal 1/3 of lower leg is slightly boggy; mid 1/3 is boggy, pitting with fair to good superficial tissue mobility; distal 1/3 is boggy with scattered areas of densely boggy quality, pitting with fair superficial tissue mobility; discoloration in area of prior cellulitis. Ankle is slightly boggy. Dorsum of foot is slightly boggy. *Emerging hair follicles over bilateral lower legs. Tissue hydration is good. Negative Stemmer's sign. Medium to light discoloration to distal lower legs. MEASUREMENTS:   *Circumfernential measurement around Right SBP reduced by 3.5cm; at level of 5cm inferior to SBP reduced by 4.5cm. TREATMENT:   *Removed Right LE compression. *Discussed future garments. Recommendation to progress short stretch compression bandaging to Left thigh/knee; provided pt with written supply list for this and online sites for ordering. *Right lower quadrant manual lymph drainage with soft tissue mobilization of dense areas of tissue. Observed improvement in lower leg tissue quality and superficial tissue mobility by end of session. *Completed skin hygiene. *Applied full Right LE bandage system. *Discussed use of body shaper to cover proximal Right thigh. COMPRESSION:  *Right lower leg: stockinet; 1/\" foam insert over tibial ridge and anterior ankle, second /4\" foam insert over malleoli/posterior ankle;  1/2\" foam insert over medial surface of lower leg; Rosidal wrap toes to knee; 4 short stretch compression bandages: 1, 8cm, 2, 10cm (herringbone pattern of distal leg with first bandage); 1, 12cm. *Right thigh/knee/proximal lower le-ply layer size \"J\" Tensogrip sleeve over proximal lower leg, knee, thigh; 1, Rosidal wrap proximal lower leg through distal thigh; 1/4\" foam over medial/posterior surface of thigh held in place with 1 Rosidal wrap from distal thigh through proximal thigh; 1 Rosidal wrap from proximal lower leg through thigh; 2, 12 cm short stretch compression bandages proximal lower leg through thigh leaving open knee.  Covered all with size \"J\" Tensogrip sleeve. *Left lower leg: non-compressive liner; Farrow Wrap Classic footpiece, size Small, Regular length; Shabana Scrape Classic lower leg, size Medium, Regular length (DEFERRED -- 1/4\" foam insert over tibial ridge and anterior ankle, second 1/4\" foam insert over malleoli/posterior ankle)    Goals:  (to be met in 24 visits)  1. Pt will wear Right lower leg Circaid garments on a daily basis. ACHIEVED   2. Pt will return to use of Flexitouch device 6-7 days/week. ACHIEVED   3. Pt will be independent in decongestive exercises. ACHIEVED   4. Pt will tolerate Right lower leg short stretch compression bandaging for 20-23 hours. ACHIEVED   5. Pt/Caregiver will be independent in Right lower leg short stretch compression bandaging. DEFERRED 4/17 4/17 NEW GOAL: Pt's  will be independent in Right thigh/knee short stretch compression bandaging. ACHIEVED    5/22 NEW GOAL: Pt will tolerate short stretch compression bandaging over bilateral thighs/knees for 20-23 hours. 6. Reduce Right LE lymphedema volume by 30-50cm to allow pt to transition back into custom-fit knee high and to reduce limb heaviness during ambulation. 7. Reduce bilateral lower leg density to boggy to reduce infection risk. 8. Pt will be independent in use of compression garments, self-manual lymph drainage, decongestive exercises, Flexitouch device and lymphedema precautions for life-long self-management of lymphedema. Plan:   Continue current plan.       Charges: 4 Manual Therapy    Total Timed Treatment: 75 min  Total Treatment Time: 80 min      LE Circumferential Measurements (cm)  2/21/2023   LEFT  2/21/2023   RIGHT  3/21/2023   LEFT 3/21/2023   RIGHT 4/6/2023   RIGHT   20cm above Superior Border of Patella (SBP)  81.3 77.0 84.5 83.8 81.5   10cm above SBP  65.5 75.1 72.0 74.8 72.5   SBP  63.3 66.6 62.3 66.0 63.6   5cm below SBP  57.0 60.1 57.0 60.8 59.3   10cm below SBP  52.4 60.6 53.0 59.5 58.5   20cm below SBP  54.3 64.0 51.5 59.2 56.0 30cm below SBP  39.0 47.6 38.4 46.0 41.5   35cm below SBP  30.5 35.4 30.4 33.5 32.2   40cm below SBP  29.2 30.5 25.7 27.5 25.5    Lateral Malleoli  29.8 30.8 27.2 29.0 28.2   7cm proximal to 1st toe web space  26.2 26.3 25.3 25.4 24.3   5cm proximal to 1st toe web space  24.3 25.0 24.7 24.5 23.3   TOTALS  552.8  Lower leg = 285.7 599.0  Lower leg = 320.2 552.0  Lower leg =   276.2 590.0  Lower leg = 304.6 566.4  Lower leg = 289.5           LE Circumferential Measurements (cm)  4/24/2023  LEFT  4/24/2023   RIGHT  5/22/2023   LEFT 5/22/2023   RIGHT   20cm above Superior Border of Patella (SBP)  82.5 78.6 82.5 77.3   10cm above SBP  71.8 73.0 73.0 74.6   SBP  63.3 65.4 65.0 67.0   5cm below SBP  58.0 61.1 59.1 62.5   10cm below SBP  53.3 60.3 54.7 60.5   20cm below SBP  49.3 63.0 51.7 59.4   30cm below SBP  36.3 47.0 37.2 45.3   35cm below SBP  26.7 36.7 28.5 36.5   40cm below SBP  25.6 25.0 25.5 26.5    Lateral Malleoli  29.0 28.3 28.2 27.8   7cm proximal to 1st toe web space  25.7 25.0 25.2 24.7   5cm proximal to 1st toe web space  24.8 23.5 24.5 23.7   TOTALS  546.3  Lower leg = 270.7 586. 9  Lower leg = 308.8 555.1  Lower leg =   275.5 585.8  Lower leg =   304.4

## 2023-05-25 ENCOUNTER — OFFICE VISIT (OUTPATIENT)
Dept: OCCUPATIONAL MEDICINE | Facility: HOSPITAL | Age: 68
End: 2023-05-25
Attending: INTERNAL MEDICINE
Payer: MEDICARE

## 2023-05-25 PROCEDURE — 97140 MANUAL THERAPY 1/> REGIONS: CPT

## 2023-05-25 NOTE — PROGRESS NOTES
Diagnosis:   Lymphedema of both lower extremities (I89.0)      Referring Provider: Jose Babcock  Date of Evaluation:    2/21/2023     Precautions:  Lymphedema; infection risk; cardiac; abdominal Next MD visit:   none scheduled  Date of Surgery: n/a   Insurance Primary/Secondary: MEDICARE / COMMERCIAL # Authorized Visits: 2/12           Next MD/Plan Renewal Date: 8/20/2023         Subjective:   *Pt reports that wore Right LE bandage system applied at last session until 5pm yesterday. Did have some discomfort/pain in her distal Right lower leg and had  remove top 2 bandages. *Has ordered more compression supplies. Anticipates delivery next week. *Last night laundered bandages applied at last session, h/e they were not dry enough for today's session. As a result, did not have  apply Right thigh/knee compression this morning. Hoping that therapist will be able to use remaining bandages for full leg compression today. Assessment:   Pt with high compliance with use of Right LE compression with good self-initiative to discuss adding increased compression over garments on Left lower leg. Making good progress. Objective:  Arrived wearing bilateral lower leg Farrow Wrap garments; Circaid lower leg garment over proximal Right lower leg. Using straight cane. OBSERVATION:    *Right LE: Tissue quality over thigh is slightly boggy to supple except for distal medial/posterior surfaces which are boggy to slightly boggy. Knee is boggy to slightly boggy. Proximal 1/4 of lower leg is boggy to slightly boggy superficial tissue mobility; distal 3/4 is boggy to slightly boggy over anterior/medial/posterior surfaces with fair to good superficial tissue mobility / boggy over lateral surface with spotty areas of densely boggy quality with fair to good superficial tissue mobility. Ankle is slightly boggy. Dorsum of foot is slightly boggy over proximal surface / boggy over distal surface.     *Left LE:  Tissue quality over thigh is slightly boggy except over distal medial surface which is boggy to slightly boggy; discoloration in area of prior cellulitis. Knee is slightly boggy to supple. Proximal 1/3 of lower leg is slightly boggy; mid 1/3 is boggy, pitting with fair to good superficial tissue mobility; distal 1/3 is boggy with scattered areas of densely boggy quality, pitting with fair superficial tissue mobility; discoloration in area of prior cellulitis. Ankle is slightly boggy. Dorsum of foot is slightly boggy. *Emerging hair follicles over bilateral lower legs. Tissue hydration is good. Negative Stemmer's sign. Medium to light discoloration to distal lower legs. MEASUREMENTS:   None taken   TREATMENT:   *Chose alternative bandages from pt's collection of bandages for today's bandage system. Advised pt of plan to add foam insert over Right lower leg for comfort/shaping of lower leg. *Right lower quadrant manual lymph drainage with soft tissue mobilization of dense areas of tissue. Lymphedema density over thigh quickly softening. *During MLD pt sharing noticing tissue bulging of lower leg above Velcro-closure garments on Left lower leg. Wondering if she should use one of her Circaid garments over this area as is being done on the Right. Therapist in agreement. *Applied full Right LE bandage system, substituting extra bandages for pt's usual bandage sizes. Constructed and applied 1/2\" foam insert over anterior lower leg; used 1/2\" foam insert around crease superior to ankle; used 1/2\" foam insert over anterior/medial/posterior thigh. COMPRESSION:  *Right lower leg: stockinet; 1/2\" foam insert over anterior ankle/distal 3/4 of anterior lower leg, 1/2\" foam insert around crease superior to ankle;  Rosidal wrap toes to knee; 4 short stretch compression bandages: 1, 8cm, 3, 10cm ( no herringbone pattern of distal leg with first bandage)   *Right thigh/knee/proximal lower le-ply layer size \"J\" Tensogrip sleeve over proximal lower leg, knee, thigh; 1, Rosidal wrap proximal lower leg through distal thigh; 1/2\" foam over anterior/medial/posterior surface of thigh held in place with 1 Rosidal wrap from distal thigh through proximal thigh; 1 Rosidal wrap from proximal lower leg through thigh; 1, 12cm and 1, 10cm short stretch compression bandages proximal lower leg through thigh with closed knee. Covered all with size \"J\" Tensogrip sleeve. *Left lower leg: non-compressive liner; Farrow Wrap Classic footpiece, size Small, Regular length; Norman Arabella Classic lower leg, size Medium, Regular length (DEFERRED -- 1/4\" foam insert over tibial ridge and anterior ankle, second 1/4\" foam insert over malleoli/posterior ankle)    Goals:  (to be met in 24 visits)  1. Pt will wear Right lower leg Circaid garments on a daily basis. ACHIEVED   2. Pt will return to use of Flexitouch device 6-7 days/week. ACHIEVED   3. Pt will be independent in decongestive exercises. ACHIEVED   4. Pt will tolerate Right lower leg short stretch compression bandaging for 20-23 hours. ACHIEVED   5. Pt/Caregiver will be independent in Right lower leg short stretch compression bandaging. DEFERRED 4/17 4/17 NEW GOAL: Pt's  will be independent in Right thigh/knee short stretch compression bandaging. ACHIEVED    5/22 NEW GOAL: Pt will tolerate short stretch compression bandaging over bilateral thighs/knees for 20-23 hours. 6. Reduce Right LE lymphedema volume by 30-50cm to allow pt to transition back into custom-fit knee high and to reduce limb heaviness during ambulation. 7. Reduce bilateral lower leg density to boggy to reduce infection risk. 8. Pt will be independent in use of compression garments, self-manual lymph drainage, decongestive exercises, Flexitouch device and lymphedema precautions for life-long self-management of lymphedema. Plan:   Continue current plan. Pt to return to therapy on 6/6.  With next week being a holiday and pt having a procedure she will be unavailable. Charges: 4 Manual Therapy    Total Timed Treatment: 75 min  Total Treatment Time: 80 min      LE Circumferential Measurements (cm)  2/21/2023   LEFT  2/21/2023   RIGHT  3/21/2023   LEFT 3/21/2023   RIGHT 4/6/2023   RIGHT   20cm above Superior Border of Patella (SBP)  81.3 77.0 84.5 83.8 81.5   10cm above SBP  65.5 75.1 72.0 74.8 72.5   SBP  63.3 66.6 62.3 66.0 63.6   5cm below SBP  57.0 60.1 57.0 60.8 59.3   10cm below SBP  52.4 60.6 53.0 59.5 58.5   20cm below SBP  54.3 64.0 51.5 59.2 56.0   30cm below SBP  39.0 47.6 38.4 46.0 41.5   35cm below SBP  30.5 35.4 30.4 33.5 32.2   40cm below SBP  29.2 30.5 25.7 27.5 25.5    Lateral Malleoli  29.8 30.8 27.2 29.0 28.2   7cm proximal to 1st toe web space  26.2 26.3 25.3 25.4 24.3   5cm proximal to 1st toe web space  24.3 25.0 24.7 24.5 23.3   TOTALS  552.8  Lower leg = 285.7 599.0  Lower leg = 320.2 552.0  Lower leg =   276.2 590.0  Lower leg = 304.6 566.4  Lower leg = 289.5           LE Circumferential Measurements (cm)  4/24/2023  LEFT  4/24/2023   RIGHT  5/22/2023   LEFT 5/22/2023   RIGHT   20cm above Superior Border of Patella (SBP)  82.5 78.6 82.5 77.3   10cm above SBP  71.8 73.0 73.0 74.6   SBP  63.3 65.4 65.0 67.0   5cm below SBP  58.0 61.1 59.1 62.5   10cm below SBP  53.3 60.3 54.7 60.5   20cm below SBP  49.3 63.0 51.7 59.4   30cm below SBP  36.3 47.0 37.2 45.3   35cm below SBP  26.7 36.7 28.5 36.5   40cm below SBP  25.6 25.0 25.5 26.5    Lateral Malleoli  29.0 28.3 28.2 27.8   7cm proximal to 1st toe web space  25.7 25.0 25.2 24.7   5cm proximal to 1st toe web space  24.8 23.5 24.5 23.7   TOTALS  546.3  Lower leg = 270.7 586. 9  Lower leg = 308.8 555.1  Lower leg =   275.5 585.8  Lower leg =   304.4

## 2023-06-06 ENCOUNTER — OFFICE VISIT (OUTPATIENT)
Dept: OCCUPATIONAL MEDICINE | Facility: HOSPITAL | Age: 68
End: 2023-06-06
Attending: INTERNAL MEDICINE
Payer: MEDICARE

## 2023-06-06 PROCEDURE — 97140 MANUAL THERAPY 1/> REGIONS: CPT

## 2023-06-06 NOTE — PROGRESS NOTES
Diagnosis:   Lymphedema of both lower extremities (I89.0)      Referring Provider: Irene Moe  Date of Evaluation:    2/21/2023     Precautions:  Lymphedema; infection risk; cardiac; abdominal Next MD visit:   none scheduled  Date of Surgery: n/a   Insurance Primary/Secondary: MEDICARE / COMMERCIAL # Authorized Visits: 3/12           Next MD/Plan Renewal Date: 8/20/2023         Subjective:   *Pt reports use of full Right LE compression and Left lower leg compression every day since last session except for 2. Was ill for 2 days and did not wear compression at that time. *Used Flexitouch device x3 since last session. *Is completing decongestive exercises as recommended. *Has decided that she would like to connect with [address] [place] [address] [facility] for assistance in weight loss. Assessment:   Pt and  with excellent compliance with use of compression during pt's hiatus from therapy, as allowed by pt's illness. Good progress towards reduction of Right lower leg volume with improving anatomical shaping. Objective:  Pt last seen on 5/25. Was scheduled for colonoscopy last week, which did not occur d/t illness r/t prep. Arrived wearing bilateral lower leg Farrow Wrap garments; Circaid lower leg garment over proximal lower legs; short stretch compression bandaging over Right thigh/knee. Using straight cane. Brought new bandage supplies to session. OBSERVATION:  *Improved anatomical shaping of Right lower leg as compared to Left lower leg. *Right LE: Tissue quality over thigh is slightly boggy to supple except for distal medial/posterior surfaces which are boggy to slightly boggy. Knee is boggy to slightly boggy.  Proximal 1/4 of lower leg is boggy to slightly boggy with good superficial tissue mobility; distal 3/4 is boggy to slightly boggy over anterior/medial/posterior surfaces with fair to good superficial tissue mobility / boggy over lateral surface with spotty areas of densely boggy quality with fair to good superficial tissue mobility. Ankle is slightly boggy. Dorsum of foot is slightly boggy over proximal surface / boggy over distal surface. *Left LE:  Tissue quality over thigh is slightly boggy except over distal medial surface which is boggy to slightly boggy; discoloration in area of prior cellulitis. Knee is slightly boggy to supple. Proximal 1/3 of lower leg is slightly boggy; mid 1/3 is boggy, pitting with fair to good superficial tissue mobility; distal 1/3 is boggy with scattered areas of densely boggy quality, pitting with fair superficial tissue mobility; discoloration in area of prior cellulitis. Ankle is slightly boggy. Dorsum of foot is slightly boggy. *Emerging hair follicles over bilateral lower legs. Tissue hydration is good. Negative Stemmer's sign. Medium to light discoloration to distal lower legs. MEASUREMENTS:   None taken   TREATMENT:   *Removed compression. *Discovered that pt's new bandage order containing only 12cm x 5m sized bandages. Recommended pt review recommended list and order upon return to home. *Bilateral lower quadrant manual lymph drainage with soft tissue mobilization of dense areas of tissue. *Fabricated three, 1/2\" foam inserts to be used under Left LE bandage system. *Applied full leg short stretch compression bandaging systems over bilateral LEs. *Provided pt with written information for [address] [place] [address] [facility]. COMPRESSION:  *Right lower leg: stockinet; 1/2\" foam insert over anterior/medial/posterior surface of distal lower leg; 1/2\" foam insert around crease superior to ankle; Rosidal wrap toes to knee; 4 short stretch compression bandages: 1, 8cm, 3, 10cm (herringbone pattern of distal leg with first bandage)   *Left lower leg: stockinet; 1/2\" foam insert over anterior/medial/posterior surface of distal lower leg; 1/2\" foam insert around crease superior to ankle;  Rosidal wrap toes to knee; 3 short stretch compression bandages: 1, 8cm, 2, 10cm (herringbone pattern of distal leg with first bandage)  *Bilateral thigh/knee/proximal lower leg: stockinet-lined 1/2\" foam over anterior/medial/posterior surface of thigh held in place with 1 Rosidal wrap from distal thigh through proximal thigh; 2 Rosidal wrap from proximal lower leg through thigh; 1, 10cm and 1, 12cm short stretch compression bandages proximal lower leg through thigh with closed knee. Covered all with size \"J\" Tensogrip sleeve. Goals:  (to be met in 24 visits)  1. Pt will wear Right lower leg Circaid garments on a daily basis. ACHIEVED   2. Pt will return to use of Flexitouch device 6-7 days/week. ACHIEVED   3. Pt will be independent in decongestive exercises. ACHIEVED   4. Pt will tolerate Right lower leg short stretch compression bandaging for 20-23 hours. ACHIEVED   5. Pt/Caregiver will be independent in Right lower leg short stretch compression bandaging. DEFERRED 4/17 4/17 NEW GOAL: Pt's  will be independent in Right thigh/knee short stretch compression bandaging. ACHIEVED    5/22 NEW GOAL: Pt will tolerate short stretch compression bandaging over bilateral thighs/knees for 20-23 hours. 6. Reduce Right LE lymphedema volume by 30-50cm to allow pt to transition back into custom-fit knee high and to reduce limb heaviness during ambulation. 7. Reduce bilateral lower leg density to boggy to reduce infection risk. 8. Pt will be independent in use of compression garments, self-manual lymph drainage, decongestive exercises, Flexitouch device and lymphedema precautions for life-long self-management of lymphedema. Plan:   Continue current plan.       Charges: 7 Manual Therapy    Total Timed Treatment: 105 min  Total Treatment Time: 110 min      LE Circumferential Measurements (cm)  2/21/2023   LEFT  2/21/2023   RIGHT  3/21/2023   LEFT 3/21/2023   RIGHT 4/6/2023   RIGHT   20cm above Superior Border of Patella (SBP)  81.3 77.0 84.5 83.8 81.5   10cm above SBP  65.5 75.1 72.0 74.8 72.5   SBP  63.3 66.6 62.3 66.0 63.6   5cm below SBP  57.0 60.1 57.0 60.8 59.3   10cm below SBP  52.4 60.6 53.0 59.5 58.5   20cm below SBP  54.3 64.0 51.5 59.2 56.0   30cm below SBP  39.0 47.6 38.4 46.0 41.5   35cm below SBP  30.5 35.4 30.4 33.5 32.2   40cm below SBP  29.2 30.5 25.7 27.5 25.5    Lateral Malleoli  29.8 30.8 27.2 29.0 28.2   7cm proximal to 1st toe web space  26.2 26.3 25.3 25.4 24.3   5cm proximal to 1st toe web space  24.3 25.0 24.7 24.5 23.3   TOTALS  552.8  Lower leg = 285.7 599.0  Lower leg = 320.2 552.0  Lower leg =   276.2 590.0  Lower leg = 304.6 566.4  Lower leg = 289.5           LE Circumferential Measurements (cm)  4/24/2023  LEFT  4/24/2023   RIGHT  5/22/2023   LEFT 5/22/2023   RIGHT   20cm above Superior Border of Patella (SBP)  82.5 78.6 82.5 77.3   10cm above SBP  71.8 73.0 73.0 74.6   SBP  63.3 65.4 65.0 67.0   5cm below SBP  58.0 61.1 59.1 62.5   10cm below SBP  53.3 60.3 54.7 60.5   20cm below SBP  49.3 63.0 51.7 59.4   30cm below SBP  36.3 47.0 37.2 45.3   35cm below SBP  26.7 36.7 28.5 36.5   40cm below SBP  25.6 25.0 25.5 26.5    Lateral Malleoli  29.0 28.3 28.2 27.8   7cm proximal to 1st toe web space  25.7 25.0 25.2 24.7   5cm proximal to 1st toe web space  24.8 23.5 24.5 23.7   TOTALS  546.3  Lower leg = 270.7 586. 9  Lower leg = 308.8 555.1  Lower leg =   275.5 585.8  Lower leg =   304.4

## 2023-06-08 ENCOUNTER — OFFICE VISIT (OUTPATIENT)
Dept: OCCUPATIONAL MEDICINE | Facility: HOSPITAL | Age: 68
End: 2023-06-08
Attending: INTERNAL MEDICINE
Payer: MEDICARE

## 2023-06-08 PROCEDURE — 97140 MANUAL THERAPY 1/> REGIONS: CPT

## 2023-06-08 NOTE — PROGRESS NOTES
Diagnosis:   Lymphedema of both lower extremities (I89.0)      Referring Provider: Michelle Villeda  Date of Evaluation:    2/21/2023     Precautions:  Lymphedema; infection risk; cardiac; abdominal Next MD visit:   none scheduled  Date of Surgery: n/a   Insurance Primary/Secondary: MEDICARE / COMMERCIAL # Authorized Visits: 4/12           Next MD/Plan Renewal Date: 8/20/2023         Subjective:   *Pt reports she wore bilateral LE full compression until last evening at 7pm. Reports modifications made to thigh/knee compression resulted in compression staying up on thighs. *Replaced compression with bilateral lower leg compression. Only wore bilateral lower leg compression today d/t social event. Assessment:   Pt demonstrating progress towards improvement of tissue quality in bilateral lower legs. Good tolerance to full LE compression. Objective:  Arrived wearing bilateral lower leg Farrow Wrap garments; Circaid lower leg garment over proximal lower legs. OBSERVATION:  *Right LE: Tissue quality over thigh is slightly boggy to supple except for distal medial/posterior surfaces which are boggy to slightly boggy. Knee is boggy to slightly boggy. Proximal 1/4 of lower leg is boggy to slightly boggy with good superficial tissue mobility; distal 3/4 is boggy to slightly boggy over anterior/medial/posterior surfaces with fair to good superficial tissue mobility / boggy over lateral surface with spotty areas of densely boggy quality with fair to good superficial tissue mobility. Ankle is slightly boggy. Dorsum of foot is slightly boggy over proximal surface / boggy over distal surface. *Left LE:  Tissue quality over thigh is slightly boggy except over distal medial surface which is boggy to slightly boggy; discoloration in area of prior cellulitis. Knee is slightly boggy to supple.  Proximal 1/3 of lower leg is slightly boggy; mid 1/3 is boggy, pitting with fair to good superficial tissue mobility; distal 1/3 is boggy with scattered areas of densely boggy quality, pitting with fair superficial tissue mobility; discoloration in area of prior cellulitis. Ankle is slightly boggy. Dorsum of foot is slightly boggy. *Emerging hair follicles over bilateral lower legs. Tissue hydration is good. Negative Stemmer's sign. Medium to light discoloration to distal lower legs. MEASUREMENTS:   None taken   TREATMENT:   *Removed compression. *Bilateral lower quadrant manual lymph drainage with soft tissue mobilization of dense areas of tissue. Observed reduction in lymphedema density of bilateral lower legs by end of session. *Applied full leg short stretch compression bandaging systems over bilateral LEs, with modifications as noted below. *Reinforced use of full LE compression on a daily basis to attain goal of maximum volume reduction prior to transitioning into custom-fit garments. COMPRESSION:  *Right lower leg: stockinet; 1/2\" foam insert over anterior/medial/posterior surface of distal lower leg; 1/2\" foam insert around crease superior to ankle; Rosidal wrap toes to knee; 4 short stretch compression bandages: 1, 8cm, 2, 10cm (herringbone pattern of distal leg with first bandage) , 1, 12cm  *Left lower leg: stockinet; 1/2\" foam insert over anterior/medial/posterior surface of distal lower leg; 1/2\" foam insert around crease superior to ankle; Rosidal wrap toes to knee; 3 short stretch compression bandages: 1, 8cm, 1, 10cm (herringbone pattern of distal leg), 1, 12cm  *Bilateral thigh/knee/proximal lower leg: stockinet-lined 1/2\" foam over anterior/medial/posterior surface of thigh held in place with 1 Rosidal wrap from distal thigh through proximal thigh; 2 Rosidal wrap from proximal lower leg through thigh; 2, 12cm short stretch compression bandages proximal lower leg through thigh with closed knee. Covered all with size \"J\" Tensogrip sleeve. Goals:  (to be met in 24 visits)  1.  Pt will wear Right lower leg Circaid garments on a daily basis. ACHIEVED   2. Pt will return to use of Flexitouch device 6-7 days/week. ACHIEVED   3. Pt will be independent in decongestive exercises. ACHIEVED   4. Pt will tolerate Right lower leg short stretch compression bandaging for 20-23 hours. ACHIEVED   5. Pt/Caregiver will be independent in Right lower leg short stretch compression bandaging. DEFERRED 4/17 4/17 NEW GOAL: Pt's  will be independent in Right thigh/knee short stretch compression bandaging. ACHIEVED    5/22 NEW GOAL: Pt will tolerate short stretch compression bandaging over bilateral thighs/knees for 20-23 hours. ACHIEVED     6. Reduce Right LE lymphedema volume by 30-50cm to allow pt to transition back into custom-fit knee high and to reduce limb heaviness during ambulation. 7. Reduce bilateral lower leg density to boggy to reduce infection risk. 8. Pt will be independent in use of compression garments, self-manual lymph drainage, decongestive exercises, Flexitouch device and lymphedema precautions for life-long self-management of lymphedema. Plan:   Continue current plan.       Charges: 6 Manual Therapy    Total Timed Treatment: 85 min  Total Treatment Time: 90 min      LE Circumferential Measurements (cm)  2/21/2023   LEFT  2/21/2023   RIGHT  3/21/2023   LEFT 3/21/2023   RIGHT 4/6/2023   RIGHT   20cm above Superior Border of Patella (SBP)  81.3 77.0 84.5 83.8 81.5   10cm above SBP  65.5 75.1 72.0 74.8 72.5   SBP  63.3 66.6 62.3 66.0 63.6   5cm below SBP  57.0 60.1 57.0 60.8 59.3   10cm below SBP  52.4 60.6 53.0 59.5 58.5   20cm below SBP  54.3 64.0 51.5 59.2 56.0   30cm below SBP  39.0 47.6 38.4 46.0 41.5   35cm below SBP  30.5 35.4 30.4 33.5 32.2   40cm below SBP  29.2 30.5 25.7 27.5 25.5    Lateral Malleoli  29.8 30.8 27.2 29.0 28.2   7cm proximal to 1st toe web space  26.2 26.3 25.3 25.4 24.3   5cm proximal to 1st toe web space  24.3 25.0 24.7 24.5 23.3   TOTALS  552.8  Lower leg = 285.7 599.0  Lower leg = 320.2 552.0  Lower leg =   276.2 590.0  Lower leg = 304.6 566.4  Lower leg = 289.5           LE Circumferential Measurements (cm)  4/24/2023  LEFT  4/24/2023   RIGHT  5/22/2023   LEFT 5/22/2023   RIGHT   20cm above Superior Border of Patella (SBP)  82.5 78.6 82.5 77.3   10cm above SBP  71.8 73.0 73.0 74.6   SBP  63.3 65.4 65.0 67.0   5cm below SBP  58.0 61.1 59.1 62.5   10cm below SBP  53.3 60.3 54.7 60.5   20cm below SBP  49.3 63.0 51.7 59.4   30cm below SBP  36.3 47.0 37.2 45.3   35cm below SBP  26.7 36.7 28.5 36.5   40cm below SBP  25.6 25.0 25.5 26.5    Lateral Malleoli  29.0 28.3 28.2 27.8   7cm proximal to 1st toe web space  25.7 25.0 25.2 24.7   5cm proximal to 1st toe web space  24.8 23.5 24.5 23.7   TOTALS  546.3  Lower leg = 270.7 586. 9  Lower leg = 308.8 555.1  Lower leg =   275.5 585.8  Lower leg =   304.4

## 2023-06-13 ENCOUNTER — OFFICE VISIT (OUTPATIENT)
Dept: OCCUPATIONAL MEDICINE | Facility: HOSPITAL | Age: 68
End: 2023-06-13
Attending: INTERNAL MEDICINE
Payer: MEDICARE

## 2023-06-13 PROCEDURE — 97140 MANUAL THERAPY 1/> REGIONS: CPT

## 2023-06-13 NOTE — PROGRESS NOTES
Diagnosis:   Lymphedema of both lower extremities (I89.0)      Referring Provider: Rajani Angel  Date of Evaluation:    2/21/2023     Precautions:  Lymphedema; infection risk; cardiac; abdominal Next MD visit:   none scheduled  Date of Surgery: n/a   Insurance Primary/Secondary: MEDICARE / COMMERCIAL # Authorized Visits: 5/12           Next MD/Plan Renewal Date: 8/20/2023         Subjective:   *Pt reports she wore bilateral LE full compression every day since last session, except removed bilateral thigh/knee compression last night to launder bandages. Wore only Velcro-closure garments on lower legs this morning/over night as she went to aquatics class this morning. Did not have  apply thigh/knee compression as she was coming to session. *Reports it felt good to go back to aquatics class this morning. *Did use Flexitouch device since last seen. Assessment:   Pt with aggressive bilateral LE lymphedema with volume/density exacerbations occurring quickly in absence of compression. Objective:  Arrived wearing bilateral lower leg Farrow Wrap garments; Circaid lower leg garment over proximal lower legs. OBSERVATION:  *Rise in volume of bilateral thighs/knees as compared to last session with increase in lymphedema density of bilateral lower legs. *Right LE: Tissue quality over thigh is slightly boggy to supple except for distal medial/posterior surfaces which are boggy to slightly boggy. Knee is boggy to slightly boggy. Proximal 1/4 of lower leg is boggy to slightly boggy with good superficial tissue mobility; distal 3/4 is densely boggy to boggy over anterior/medial/posterior surfaces with fair to good superficial tissue mobility / boggy over lateral surface with spotty areas of densely boggy quality with fair to good superficial tissue mobility. Ankle is slightly boggy. Dorsum of foot is slightly boggy over proximal surface / boggy over distal surface.     *Left LE:  Tissue quality over thigh is slightly boggy except over distal medial surface which is boggy to slightly boggy; discoloration in area of prior cellulitis. Knee is slightly boggy to supple. Proximal 1/3 of lower leg is slightly boggy; mid 1/3 is boggy, pitting with fair to good superficial tissue mobility; distal 1/3 is densely boggy quality, pitting with fair superficial tissue mobility; discoloration in area of prior cellulitis. Ankle is slightly boggy. Dorsum of foot is slightly boggy. *Emerging hair follicles over bilateral lower legs. Tissue hydration is good. Negative Stemmer's sign. Medium to light discoloration to distal lower legs. MEASUREMENTS:   None taken   TREATMENT:   *Removed compression. *Discussed rise in volume of bilateral thighs/knees, rise in lymphedema density in lower legs. Discussed anticipated timing for being measured for custom-fit garments. Education provided in need for consistency in use of compression over thighs/knees to avoid exacerbation of areas as well as increase in lymphedema density of lower legs. Pt expressing understanding. *Right lower quadrant manual lymph drainage with soft tissue mobilization of dense areas of tissue. *Applied bandage systems over bilateral lower legs. Due to shorter session length today, pt's  to apply bandage systems over thighs/knees. COMPRESSION:  *Right lower leg: stockinet; 1/2\" foam insert over anterior/medial/posterior surface of distal lower leg; 1/2\" foam insert around crease superior to ankle; Rosidal wrap toes to knee; 4 short stretch compression bandages: 1, 8cm, 2, 10cm (herringbone pattern of distal leg with first bandage) , 1, 12cm  *Left lower leg: stockinet; 1/2\" foam insert over anterior/medial/posterior surface of distal lower leg; 1/2\" foam insert around crease superior to ankle;  Rosidal wrap toes to knee; 3 short stretch compression bandages: 1, 8cm, 1, 10cm (herringbone pattern of distal leg), 1, 12cm  *Bilateral thigh/knee/proximal lower leg: stockinet-lined 1/2\" foam over anterior/medial/posterior surface of thigh held in place with 1 Rosidal wrap from distal thigh through proximal thigh; 2 Rosidal wrap from proximal lower leg through thigh; 2, 12cm short stretch compression bandages proximal lower leg through thigh with closed knee. Covered all with size \"J\" Tensogrip sleeve. Goals:  (to be met in 24 visits)  1. Pt will wear Right lower leg Circaid garments on a daily basis. ACHIEVED   2. Pt will return to use of Flexitouch device 6-7 days/week. ACHIEVED   3. Pt will be independent in decongestive exercises. ACHIEVED   4. Pt will tolerate Right lower leg short stretch compression bandaging for 20-23 hours. ACHIEVED   5. Pt/Caregiver will be independent in Right lower leg short stretch compression bandaging. DEFERRED 4/17 4/17 NEW GOAL: Pt's  will be independent in Right thigh/knee short stretch compression bandaging. ACHIEVED    5/22 NEW GOAL: Pt will tolerate short stretch compression bandaging over bilateral thighs/knees for 20-23 hours. ACHIEVED     6. Reduce Right LE lymphedema volume by 30-50cm to allow pt to transition back into custom-fit knee high and to reduce limb heaviness during ambulation. 7. Reduce bilateral lower leg density to boggy to reduce infection risk. 8. Pt will be independent in use of compression garments, self-manual lymph drainage, decongestive exercises, Flexitouch device and lymphedema precautions for life-long self-management of lymphedema. Plan:   Continue current plan.       Charges: 4 Manual Therapy    Total Timed Treatment: 55 min  Total Treatment Time: 60 min      LE Circumferential Measurements (cm)  2/21/2023   LEFT  2/21/2023   RIGHT  3/21/2023   LEFT 3/21/2023   RIGHT 4/6/2023   RIGHT   20cm above Superior Border of Patella (SBP)  81.3 77.0 84.5 83.8 81.5   10cm above SBP  65.5 75.1 72.0 74.8 72.5   SBP  63.3 66.6 62.3 66.0 63.6   5cm below SBP  57.0 60.1 57.0 60.8 59.3   10cm below SBP  52.4 60.6 53.0 59.5 58.5   20cm below SBP  54.3 64.0 51.5 59.2 56.0   30cm below SBP  39.0 47.6 38.4 46.0 41.5   35cm below SBP  30.5 35.4 30.4 33.5 32.2   40cm below SBP  29.2 30.5 25.7 27.5 25.5    Lateral Malleoli  29.8 30.8 27.2 29.0 28.2   7cm proximal to 1st toe web space  26.2 26.3 25.3 25.4 24.3   5cm proximal to 1st toe web space  24.3 25.0 24.7 24.5 23.3   TOTALS  552.8  Lower leg = 285.7 599.0  Lower leg = 320.2 552.0  Lower leg =   276.2 590.0  Lower leg = 304.6 566.4  Lower leg = 289.5           LE Circumferential Measurements (cm)  4/24/2023  LEFT  4/24/2023   RIGHT  5/22/2023   LEFT 5/22/2023   RIGHT   20cm above Superior Border of Patella (SBP)  82.5 78.6 82.5 77.3   10cm above SBP  71.8 73.0 73.0 74.6   SBP  63.3 65.4 65.0 67.0   5cm below SBP  58.0 61.1 59.1 62.5   10cm below SBP  53.3 60.3 54.7 60.5   20cm below SBP  49.3 63.0 51.7 59.4   30cm below SBP  36.3 47.0 37.2 45.3   35cm below SBP  26.7 36.7 28.5 36.5   40cm below SBP  25.6 25.0 25.5 26.5    Lateral Malleoli  29.0 28.3 28.2 27.8   7cm proximal to 1st toe web space  25.7 25.0 25.2 24.7   5cm proximal to 1st toe web space  24.8 23.5 24.5 23.7   TOTALS  546.3  Lower leg = 270.7 586. 9  Lower leg = 308.8 555.1  Lower leg =   275.5 585.8  Lower leg =   304.4

## 2023-06-15 ENCOUNTER — OFFICE VISIT (OUTPATIENT)
Dept: OCCUPATIONAL MEDICINE | Facility: HOSPITAL | Age: 68
End: 2023-06-15
Attending: INTERNAL MEDICINE
Payer: MEDICARE

## 2023-06-15 PROCEDURE — 97140 MANUAL THERAPY 1/> REGIONS: CPT

## 2023-06-15 NOTE — PROGRESS NOTES
Diagnosis:   Lymphedema of both lower extremities (I89.0)      Referring Provider: Ruth Raygoza  Date of Evaluation:    2/21/2023     Precautions:  Lymphedema; infection risk; cardiac; abdominal Next MD visit:   none scheduled  Date of Surgery: n/a   Insurance Primary/Secondary: MEDICARE / COMMERCIAL # Authorized Visits: 6/12           Next MD/Plan Renewal Date: 8/20/2023         Subjective:   *Pt reports she wore bilateral LE full compression applied at last session until 1am this morning. Removed and had  don Velcro-closure garments over her lower legs. Wore Velcro-closure garments to aquatics class, removed and had applied immediately following class. Assessment:   Pt with good response to return to full LE compression with return to progress towards volume reduction in LEs and improvement in tissue quality in bilateral lower legs. Objective:  Arrived wearing bilateral lower leg Farrow Wrap garments; Circaid lower leg garment over proximal lower legs. OBSERVATION:  *Reduction in bilateral LE volume with reduction in lymphedema density in bilateral lower legs. *Right LE: Tissue quality over thigh is slightly boggy to supple except for distal medial/posterior surfaces which are boggy to slightly boggy. Knee is boggy to slightly boggy. Proximal 1/4 of lower leg is boggy to slightly boggy with good superficial tissue mobility; distal 3/4 is densely boggy to boggy over anterior/medial/posterior surfaces with fair to good superficial tissue mobility / boggy over lateral surface with spotty areas of densely boggy quality with good superficial tissue mobility. Ankle is slightly boggy. Dorsum of foot is slightly boggy over proximal surface / boggy over distal surface. *Left LE:  Tissue quality over thigh is slightly boggy except over distal medial surface which is boggy to slightly boggy; discoloration in area of prior cellulitis. Knee is slightly boggy to supple.  Proximal 1/3 of lower leg is slightly boggy; mid 1/3 is boggy to slightly boggy, pitting with good superficial tissue mobility; distal 1/3 is densely boggy to boggy, pitting with fair to good superficial tissue mobility; discoloration in area of prior cellulitis. Ankle is slightly boggy. Dorsum of foot is slightly boggy. *Emerging hair follicles over bilateral lower legs. Tissue hydration is good. Negative Stemmer's sign. Medium to light discoloration to distal lower legs. MEASUREMENTS:   None taken   TREATMENT:   *Removed compression. *Applied full LE compression over bilateral LEs. *Discussed future plan for being measured for custom-fit garments; importance with diligence with compression. COMPRESSION:  *Right lower leg: stockinet; 1/2\" foam insert over anterior/medial/posterior surface of distal lower leg; 1/2\" foam insert around crease superior to ankle; Rosidal wrap toes to knee; 4 short stretch compression bandages: 1, 8cm, 2, 10cm (herringbone pattern of distal leg with first bandage) , 1, 12cm  *Left lower leg: stockinet; 1/2\" foam insert over anterior/medial/posterior surface of distal lower leg; 1/2\" foam insert around crease superior to ankle; Rosidal wrap toes to knee; 3 short stretch compression bandages: 1, 8cm, 1, 10cm (herringbone pattern of distal leg), 1, 12cm  *Bilateral thigh/knee/proximal lower leg: stockinet-lined 1/2\" foam over anterior/medial/posterior surface of thigh held in place with 1 Rosidal wrap from distal thigh through proximal thigh; 2 Rosidal wrap from proximal lower leg through thigh; 2, 12cm short stretch compression bandages proximal lower leg through thigh with closed knee. Covered all with size \"J\" Tensogrip sleeve. Goals:  (to be met in 24 visits)  1. Pt will wear Right lower leg Circaid garments on a daily basis. ACHIEVED   2. Pt will return to use of Flexitouch device 6-7 days/week. ACHIEVED   3. Pt will be independent in decongestive exercises. ACHIEVED   4.  Pt will tolerate Right lower leg short stretch compression bandaging for 20-23 hours. ACHIEVED   5. Pt/Caregiver will be independent in Right lower leg short stretch compression bandaging. DEFERRED 4/17 4/17 NEW GOAL: Pt's  will be independent in Right thigh/knee short stretch compression bandaging. ACHIEVED    5/22 NEW GOAL: Pt will tolerate short stretch compression bandaging over bilateral thighs/knees for 20-23 hours. ACHIEVED     6. Reduce Right LE lymphedema volume by 30-50cm to allow pt to transition back into custom-fit knee high and to reduce limb heaviness during ambulation. 7. Reduce bilateral lower leg density to boggy to reduce infection risk. 8. Pt will be independent in use of compression garments, self-manual lymph drainage, decongestive exercises, Flexitouch device and lymphedema precautions for life-long self-management of lymphedema. Plan:   Continue current plan.       Charges: 4 Manual Therapy    Total Timed Treatment: 55 min  Total Treatment Time: 60 min      LE Circumferential Measurements (cm)  2/21/2023   LEFT  2/21/2023   RIGHT  3/21/2023   LEFT 3/21/2023   RIGHT 4/6/2023   RIGHT   20cm above Superior Border of Patella (SBP)  81.3 77.0 84.5 83.8 81.5   10cm above SBP  65.5 75.1 72.0 74.8 72.5   SBP  63.3 66.6 62.3 66.0 63.6   5cm below SBP  57.0 60.1 57.0 60.8 59.3   10cm below SBP  52.4 60.6 53.0 59.5 58.5   20cm below SBP  54.3 64.0 51.5 59.2 56.0   30cm below SBP  39.0 47.6 38.4 46.0 41.5   35cm below SBP  30.5 35.4 30.4 33.5 32.2   40cm below SBP  29.2 30.5 25.7 27.5 25.5    Lateral Malleoli  29.8 30.8 27.2 29.0 28.2   7cm proximal to 1st toe web space  26.2 26.3 25.3 25.4 24.3   5cm proximal to 1st toe web space  24.3 25.0 24.7 24.5 23.3   TOTALS  552.8  Lower leg = 285.7 599.0  Lower leg = 320.2 552.0  Lower leg =   276.2 590.0  Lower leg = 304.6 566.4  Lower leg = 289.5           LE Circumferential Measurements (cm)  4/24/2023  LEFT  4/24/2023   RIGHT  5/22/2023   LEFT 5/22/2023   RIGHT   20cm above Superior Border of Patella (SBP)  82.5 78.6 82.5 77.3   10cm above SBP  71.8 73.0 73.0 74.6   SBP  63.3 65.4 65.0 67.0   5cm below SBP  58.0 61.1 59.1 62.5   10cm below SBP  53.3 60.3 54.7 60.5   20cm below SBP  49.3 63.0 51.7 59.4   30cm below SBP  36.3 47.0 37.2 45.3   35cm below SBP  26.7 36.7 28.5 36.5   40cm below SBP  25.6 25.0 25.5 26.5    Lateral Malleoli  29.0 28.3 28.2 27.8   7cm proximal to 1st toe web space  25.7 25.0 25.2 24.7   5cm proximal to 1st toe web space  24.8 23.5 24.5 23.7   TOTALS  546.3  Lower leg = 270.7 586. 9  Lower leg = 308.8 555.1  Lower leg =   275.5 585.8  Lower leg =   304.4

## 2023-06-20 ENCOUNTER — OFFICE VISIT (OUTPATIENT)
Dept: OCCUPATIONAL MEDICINE | Facility: HOSPITAL | Age: 68
End: 2023-06-20
Attending: INTERNAL MEDICINE
Payer: MEDICARE

## 2023-06-20 PROCEDURE — 97140 MANUAL THERAPY 1/> REGIONS: CPT

## 2023-06-20 NOTE — PROGRESS NOTES
Diagnosis:   Lymphedema of both lower extremities (I89.0)      Referring Provider: Kindra Yan  Date of Evaluation:    2/21/2023     Precautions:  Lymphedema; infection risk; cardiac; abdominal Next MD visit:   none scheduled  Date of Surgery: n/a   Insurance Primary/Secondary: MEDICARE / COMMERCIAL # Authorized Visits: 7/12           Next MD/Plan Renewal Date: 8/20/2023        Progress Summary  Pt has attended 7/12 visits in Occupational Therapy. Subjective:   *Pt reports she wore bilateral LE full compression every day since last session for recommended hours. Has been going to aquatics class; wears Velcro-closure garments to aquatics class, removes and has applied immediately following class. Has not been using Flexitouch device as much as she is in compression nearly all the time. Feels like she has been walking better; feels more assured in her walking. Has also noticed that with reduction in volume the pain she previously had in her Right foot is reducing. Assessment:   Pt continuing to demonstrate progress towards reduction of Right LE lymphedema volume along with improving tissue quality in bilateral LEs. With reduction in volume, pt reporting improvement in her ambulatory skills. Recommend continued therapy to maximize volume loss and improvement in tissue quality prior to pt transitioning into custom-fit compression capris and custom-fit compression knee highs. Objective:  Pt attending Occupational Therapy for complete decongestive therapy of bilateral lower quadrants. Her  is assisting in the treatment plan by not only donning Velcro-closure garments on a daily basis, but also applying short stretch compression bandaging over bilateral thighs/knees/proximal lower legs. *Right LE:  Circumferential lymphedema volume has decreased by 19.5cm since last progress summary for an overall reduction of 32.7cm.  Tissue quality over proximal thigh is supple transitioning to slightly boggy to supple distally except for distal medial/posterior surfaces which are boggy to slightly boggy. Knee is boggy to slightly boggy. Proximal 1/2 of lower leg is boggy to slightly boggy with good superficial tissue mobility; distal 1/2 is densely boggy to boggy over anterior/medial/posterior surfaces with fair to good superficial tissue mobility / boggy over lateral surface with spotty areas of densely boggy quality with good superficial tissue mobility. Ankle is slightly boggy. Dorsum of foot is slightly boggy over proximal surface / boggy over distal surface. *Left LE:  Circumferential lymphedema volume has decreased by 10.4cm since last progress summary for an overall reduction of 8.1cm. Tissue quality over proximal thigh is supple transitioning to slightly boggy to supple distally except over distal medial surface which is boggy to slightly boggy; discoloration in area of prior cellulitis. Knee is slightly boggy to supple. Proximal 1/3 of lower leg is slightly boggy; mid 1/3 is boggy to slightly boggy, pitting with good superficial tissue mobility; distal 1/3 is densely boggy to boggy, pitting with fair to good superficial tissue mobility; discoloration in area of prior cellulitis. Ankle is slightly boggy. Dorsum of foot is slightly boggy. *Emerging hair follicles over bilateral lower legs. Tissue hydration is good. Negative Stemmer's sign. Medium to light discoloration to distal lower legs. TREATMENT:   *Removed compression. *Re-assessment of status   *Applied full leg short stretch compression bandaging over bilateral LEs. COMPRESSION:  *Right lower leg: stockinet; 1/2\" foam insert over anterior/medial/posterior surface of distal lower leg; 1/2\" foam insert around crease superior to ankle;  Rosidal wrap toes to knee; 4 short stretch compression bandages: 1, 8cm, 2, 10cm (herringbone pattern of distal leg with first bandage) , 1, 12cm  *Left lower leg: stockinet; 1/2\" foam insert over anterior/medial/posterior surface of distal lower leg; 1/2\" foam insert around crease superior to ankle; Rosidal wrap toes to knee; 3 short stretch compression bandages: 1, 8cm, 1, 10cm (herringbone pattern of distal leg), 1, 12cm  *Bilateral thigh/knee/proximal lower leg: stockinet-lined 1/2\" foam over anterior/medial/posterior surface of thigh held in place with 1 Rosidal wrap from distal thigh through proximal thigh; 2 Rosidal wrap from proximal lower leg through thigh; 2, 12cm short stretch compression bandages proximal lower leg through thigh with closed knee. Covered all with size \"J\" Tensogrip sleeve. Goals:  (to be met in 24 visits)  1. Pt will wear Right lower leg Circaid garments on a daily basis. ACHIEVED   2. Pt will return to use of Flexitouch device 6-7 days/week. ACHIEVED   3. Pt will be independent in decongestive exercises. ACHIEVED   4. Pt will tolerate Right lower leg short stretch compression bandaging for 20-23 hours. ACHIEVED   5. Pt/Caregiver will be independent in Right lower leg short stretch compression bandaging. DEFERRED 4/17 4/17 NEW GOAL: Pt's  will be independent in Right thigh/knee short stretch compression bandaging. ACHIEVED    5/22 NEW GOAL: Pt will tolerate short stretch compression bandaging over bilateral thighs/knees for 20-23 hours. ACHIEVED     6. Reduce Right LE lymphedema volume by 30-50cm to allow pt to transition back into custom-fit knee high and to reduce limb heaviness during ambulation. 7. Reduce bilateral lower leg density to boggy to reduce infection risk. 8. Pt will be independent in use of compression garments, self-manual lymph drainage, decongestive exercises, Flexitouch device and lymphedema precautions for life-long self-management of lymphedema. Plan: Continue skilled Occupational Therapy 2 x/week or a total of 5 more visits.  Treatment will include: complete decongestive therapy of bilateral lower quadrants; assist with custom-fit garment prescription; assessment of garment fit / response to garment use. Patient/Family/Caregiver was advised of these findings, precautions, and treatment options and has agreed to actively participate in planning and for this course of care. Thank you for your referral. If you have any questions, please contact me at Dept: [phone]. Sincerely,  Electronically signed by therapist: Antwan Eagle. Chris, NORAR/L, MINOR       Plan:   Continue current plan.       Charges: 5 Manual Therapy    Total Timed Treatment: 70 min  Total Treatment Time: 75 min      LE Circumferential Measurements (cm)  2/21/2023   LEFT  2/21/2023   RIGHT  3/21/2023   LEFT 3/21/2023   RIGHT 4/6/2023   RIGHT   20cm above Superior Border of Patella (SBP)  81.3 77.0 84.5 83.8 81.5   10cm above SBP  65.5 75.1 72.0 74.8 72.5   SBP  63.3 66.6 62.3 66.0 63.6   5cm below SBP  57.0 60.1 57.0 60.8 59.3   10cm below SBP  52.4 60.6 53.0 59.5 58.5   20cm below SBP  54.3 64.0 51.5 59.2 56.0   30cm below SBP  39.0 47.6 38.4 46.0 41.5   35cm below SBP  30.5 35.4 30.4 33.5 32.2   40cm below SBP  29.2 30.5 25.7 27.5 25.5    Lateral Malleoli  29.8 30.8 27.2 29.0 28.2   7cm proximal to 1st toe web space  26.2 26.3 25.3 25.4 24.3   5cm proximal to 1st toe web space  24.3 25.0 24.7 24.5 23.3   TOTALS  552.8  Lower leg = 285.7 599.0  Lower leg = 320.2 552.0  Lower leg =   276.2 590.0  Lower leg = 304.6 566.4  Lower leg = 289.5           LE Circumferential Measurements (cm)  4/24/2023  LEFT  4/24/2023   RIGHT  5/22/2023   LEFT 5/22/2023   RIGHT 6/20/2023   LEFT 6/20/2023   RIGHT   20cm above Superior Border of Patella (SBP)  82.5 78.6 82.5 77.3 82.5 79.8   10cm above SBP  71.8 73.0 73.0 74.6 71.9 72.8   SBP  63.3 65.4 65.0 67.0 63.0 63.3   5cm below SBP  58.0 61.1 59.1 62.5 57.0 58.7   10cm below SBP  53.3 60.3 54.7 60.5 51.3 57.0   20cm below SBP  49.3 63.0 51.7 59.4 49.3 54.5   30cm below SBP  36.3 47.0 37.2 45.3 34.6 40.8   35cm below SBP  26.7 36.7 28.5 36.5 28.3 33.8   40cm below SBP  25.6 25.0 25.5 26.5 25.6 26.2    Lateral Malleoli  29.0 28.3 28.2 27.8 29.2 29.5   7cm proximal to 1st toe web space  25.7 25.0 25.2 24.7 26.5 25.4   5cm proximal to 1st toe web space  24.8 23.5 24.5 23.7 25.5 24.5   TOTALS  546.3  Lower leg = 270.7 586. 9  Lower leg = 308.8 555.1  Lower leg =   275.5 585.8  Lower leg =   304.4 544.7  Lower leg = 270.3 566.3  Lower leg = 291.7

## 2023-06-22 ENCOUNTER — OFFICE VISIT (OUTPATIENT)
Dept: OCCUPATIONAL MEDICINE | Facility: HOSPITAL | Age: 68
End: 2023-06-22
Attending: INTERNAL MEDICINE
Payer: MEDICARE

## 2023-06-22 PROCEDURE — 97140 MANUAL THERAPY 1/> REGIONS: CPT

## 2023-06-22 NOTE — PROGRESS NOTES
Diagnosis:   Lymphedema of both lower extremities (I89.0)      Referring Provider: Kristal Stanford  Date of Evaluation:    2/21/2023     Precautions:  Lymphedema; infection risk; cardiac; abdominal Next MD visit:   none scheduled  Date of Surgery: n/a   Insurance Primary/Secondary: MEDICARE / COMMERCIAL # Authorized Visits: 8/12           Next MD/Plan Renewal Date: 8/20/2023       Subjective:   *Pt reports she wore bilateral LE full compression applied at last session until last evening. *Noticed that when she removed bandages that she had a little more volume over dorsum of feet. Assessment:   Pt continuing to demonstrate progress towards reduction of Right LE lymphedema volume along with improving tissue quality. Responds well to soft tissue mobilization/manual lymph drainage. Objective:  Arrived wearing bilateral lower leg Farrow Wrap garments; Circaid lower leg garment over proximal lower legs. OBSERVATION:    *Right LE:  Tissue quality over proximal thigh is supple transitioning to slightly boggy to supple distally except for distal medial/posterior surfaces which are boggy to slightly boggy. Knee is boggy to slightly boggy. Proximal 1/2 of lower leg is boggy to slightly boggy with good superficial tissue mobility; distal 1/2 is densely boggy to boggy over anterior/medial/posterior surfaces with fair to good superficial tissue mobility / boggy over lateral surface with spotty areas of densely boggy quality with good superficial tissue mobility. Ankle is slightly boggy. Dorsum of foot is slightly boggy over proximal surface / boggy over distal surface. *Left LE:  Tissue quality over proximal thigh is supple transitioning to slightly boggy to supple distally except over distal medial surface which is boggy to slightly boggy; discoloration in area of prior cellulitis. Knee is slightly boggy to supple.  Proximal 1/3 of lower leg is slightly boggy; mid 1/3 is boggy to slightly boggy, pitting with good superficial tissue mobility; distal 1/3 is densely boggy to boggy, pitting with fair to good superficial tissue mobility; discoloration in area of prior cellulitis. Ankle is slightly boggy. Dorsum of foot is slightly boggy. *Emerging hair follicles over bilateral lower legs. Tissue hydration is good. Negative Stemmer's sign. Medium to light discoloration to distal lower legs. MEASUREMENTS:   None taken   TREATMENT:   *Removed compression. *Right lower quadrant manual lymph drainage with soft tissue mobilization of dense areas of tissue. Observed reduction in lymphedema density over lateral surface of lower leg with improved superficial tissue mobility by end of session. *Proposed addition of Mollelast toe compression; pt in agreement. Applied full leg short stretch compression bandaging over bilateral LEs. COMPRESSION:  *Right lower leg: Mollelast toe compression; stockinet; 1/2\" foam insert over anterior/medial/posterior surface of distal lower leg; 1/2\" foam insert around crease superior to ankle; Rosidal wrap toes to knee; 4 short stretch compression bandages: 1, 8cm, 2, 10cm (herringbone pattern of distal leg with first bandage) , 1, 12cm  *Left lower leg: Mollelast toe compression; stockinet; 1/2\" foam insert over anterior/medial/posterior surface of distal lower leg; 1/2\" foam insert around crease superior to ankle; Rosidal wrap toes to knee; 3 short stretch compression bandages: 1, 8cm, 1, 10cm (herringbone pattern of distal leg), 1, 12cm  *Bilateral thigh/knee/proximal lower leg: stockinet-lined 1/2\" foam over anterior/medial/posterior surface of thigh held in place with 1 Rosidal wrap from distal thigh through proximal thigh; 2 Rosidal wrap from proximal lower leg through thigh; 2, 12cm short stretch compression bandages proximal lower leg through thigh with closed knee. Covered all with size \"J\" Tensogrip sleeve. Goals:  (to be met in 24 visits)  1.  Pt will wear Right lower leg Circaid garments on a daily basis. ACHIEVED   2. Pt will return to use of Flexitouch device 6-7 days/week. ACHIEVED   3. Pt will be independent in decongestive exercises. ACHIEVED   4. Pt will tolerate Right lower leg short stretch compression bandaging for 20-23 hours. ACHIEVED   5. Pt/Caregiver will be independent in Right lower leg short stretch compression bandaging. DEFERRED 4/17 4/17 NEW GOAL: Pt's  will be independent in Right thigh/knee short stretch compression bandaging. ACHIEVED    5/22 NEW GOAL: Pt will tolerate short stretch compression bandaging over bilateral thighs/knees for 20-23 hours. ACHIEVED     6. Reduce Right LE lymphedema volume by 30-50cm to allow pt to transition back into custom-fit knee high and to reduce limb heaviness during ambulation. 7. Reduce bilateral lower leg density to boggy to reduce infection risk. 8. Pt will be independent in use of compression garments, self-manual lymph drainage, decongestive exercises, Flexitouch device and lymphedema precautions for life-long self-management of lymphedema. Plan: Continue skilled Occupational Therapy 2 x/week or a total of 5 more visits. Treatment will include: complete decongestive therapy of bilateral lower quadrants; assist with custom-fit garment prescription; assessment of garment fit / response to garment use. Patient/Family/Caregiver was advised of these findings, precautions, and treatment options and has agreed to actively participate in planning and for this course of care. Thank you for your referral. If you have any questions, please contact me at Dept: [phone]. Sincerely,  Electronically signed by therapist: LAI Apodaca/L, MINOR       Plan:   Continue current plan.       Charges: 5 Manual Therapy    Total Timed Treatment: 70 min  Total Treatment Time: 75 min      LE Circumferential Measurements (cm)  2/21/2023   LEFT  2/21/2023   RIGHT  3/21/2023   LEFT 3/21/2023   RIGHT 4/6/2023   RIGHT 20cm above Superior Border of Patella (SBP)  81.3 77.0 84.5 83.8 81.5   10cm above SBP  65.5 75.1 72.0 74.8 72.5   SBP  63.3 66.6 62.3 66.0 63.6   5cm below SBP  57.0 60.1 57.0 60.8 59.3   10cm below SBP  52.4 60.6 53.0 59.5 58.5   20cm below SBP  54.3 64.0 51.5 59.2 56.0   30cm below SBP  39.0 47.6 38.4 46.0 41.5   35cm below SBP  30.5 35.4 30.4 33.5 32.2   40cm below SBP  29.2 30.5 25.7 27.5 25.5    Lateral Malleoli  29.8 30.8 27.2 29.0 28.2   7cm proximal to 1st toe web space  26.2 26.3 25.3 25.4 24.3   5cm proximal to 1st toe web space  24.3 25.0 24.7 24.5 23.3   TOTALS  552.8  Lower leg = 285.7 599.0  Lower leg = 320.2 552.0  Lower leg =   276.2 590.0  Lower leg = 304.6 566.4  Lower leg = 289.5           LE Circumferential Measurements (cm)  4/24/2023  LEFT  4/24/2023   RIGHT  5/22/2023   LEFT 5/22/2023   RIGHT 6/20/2023   LEFT 6/20/2023   RIGHT   20cm above Superior Border of Patella (SBP)  82.5 78.6 82.5 77.3 82.5 79.8   10cm above SBP  71.8 73.0 73.0 74.6 71.9 72.8   SBP  63.3 65.4 65.0 67.0 63.0 63.3   5cm below SBP  58.0 61.1 59.1 62.5 57.0 58.7   10cm below SBP  53.3 60.3 54.7 60.5 51.3 57.0   20cm below SBP  49.3 63.0 51.7 59.4 49.3 54.5   30cm below SBP  36.3 47.0 37.2 45.3 34.6 40.8   35cm below SBP  26.7 36.7 28.5 36.5 28.3 33.8   40cm below SBP  25.6 25.0 25.5 26.5 25.6 26.2    Lateral Malleoli  29.0 28.3 28.2 27.8 29.2 29.5   7cm proximal to 1st toe web space  25.7 25.0 25.2 24.7 26.5 25.4   5cm proximal to 1st toe web space  24.8 23.5 24.5 23.7 25.5 24.5   TOTALS  546.3  Lower leg = 270.7 586. 9  Lower leg = 308.8 555.1  Lower leg =   275.5 585.8  Lower leg =   304.4 544.7  Lower leg = 270.3 566.3  Lower leg = 291.7

## 2023-06-27 ENCOUNTER — OFFICE VISIT (OUTPATIENT)
Dept: OCCUPATIONAL MEDICINE | Facility: HOSPITAL | Age: 68
End: 2023-06-27
Attending: INTERNAL MEDICINE
Payer: MEDICARE

## 2023-06-27 PROCEDURE — 97140 MANUAL THERAPY 1/> REGIONS: CPT

## 2023-06-29 ENCOUNTER — OFFICE VISIT (OUTPATIENT)
Dept: OCCUPATIONAL MEDICINE | Facility: HOSPITAL | Age: 68
End: 2023-06-29
Attending: INTERNAL MEDICINE
Payer: MEDICARE

## 2023-06-29 PROCEDURE — 97140 MANUAL THERAPY 1/> REGIONS: CPT

## 2023-07-03 DIAGNOSIS — F39 MOOD DISORDER (HCC): ICD-10-CM

## 2023-07-06 ENCOUNTER — OFFICE VISIT (OUTPATIENT)
Dept: OCCUPATIONAL MEDICINE | Facility: HOSPITAL | Age: 68
End: 2023-07-06
Attending: INTERNAL MEDICINE
Payer: MEDICARE

## 2023-07-06 PROCEDURE — 97140 MANUAL THERAPY 1/> REGIONS: CPT

## 2023-07-06 RX ORDER — ESCITALOPRAM OXALATE 5 MG/1
TABLET ORAL
Qty: 90 TABLET | Refills: 0 | Status: SHIPPED | OUTPATIENT
Start: 2023-07-06

## 2023-07-06 RX ORDER — ESCITALOPRAM OXALATE 10 MG/1
TABLET ORAL
Qty: 90 TABLET | Refills: 0 | Status: SHIPPED | OUTPATIENT
Start: 2023-07-06

## 2023-07-06 NOTE — PROGRESS NOTES
Diagnosis:   Lymphedema of both lower extremities (I89.0)      Referring Provider: Inez Foote  Date of Evaluation:    2/21/2023     Precautions:  Lymphedema; infection risk; cardiac; abdominal Next MD visit:   none scheduled  Date of Surgery: n/a   Insurance Primary/Secondary: MEDICARE / COMMERCIAL # Authorized Visits: 11/12           Next MD/Plan Renewal Date: 8/20/2023       Subjective:   *Pt reports daily use of bilateral LE full leg compression since last session for nearly 23 hours every day, except for one day when she delayed having her  don her bilateral thigh/knee compression until a little later in the morning. *Has noticed fluctuating volume over the dorsum of her feet. Assessment:   Good compliance by pt and  with daily use of full LE compression since last seen. Increase in dorsal foot volume possibly r/t more use of Farrow Wrap footpieces over the past week vs short stretch compression bandaging of feet. Pt has appt to be measured for custom-fit garments tomorrow. Objective:  Arrived wearing bilateral lower leg Farrow Wrap garments; Circaid lower leg garment over proximal lower legs. Had removed thigh/knee compression prior to session. OBSERVATION:  *Reduction in volume and lymphedema density of Left lower leg. *Increased volume over the dorsum of bilateral feet with related increase in lymphedema density. *Right LE:  Tissue quality over proximal thigh is supple transitioning to slightly boggy to supple distally except for distal medial/posterior surfaces which are boggy to slightly boggy. Knee is boggy to slightly boggy. Proximal 1/2 of lower leg is boggy to slightly boggy with good superficial tissue mobility; distal 1/2 is densely boggy to boggy over anterior/medial/posterior surfaces with fair to good superficial tissue mobility / boggy over lateral surface with spotty areas of densely boggy quality with good superficial tissue mobility. Ankle is slightly boggy.  Dorsum of foot is slightly boggy over proximal surface / boggy over distal surface. *Left LE:  Tissue quality over proximal thigh is supple transitioning to slightly boggy to supple distally except over distal medial surface which is boggy to slightly boggy; discoloration in area of prior cellulitis. Knee is slightly boggy to supple. Proximal 1/3 of lower leg is slightly boggy; mid 1/3 is boggy to slightly boggy, pitting with good superficial tissue mobility; distal 1/3 is densely boggy to boggy, pitting with fair to good superficial tissue mobility; discoloration in area of prior cellulitis. Ankle is slightly boggy. Dorsum of foot is slightly boggy. *Emerging hair follicles over bilateral lower legs. Tissue hydration is good. Negative Stemmer's sign. Medium to light discoloration to distal lower legs. MEASUREMENTS:   None taken   TREATMENT:   *Removed compression. *Education in nighttime garments. Pt expressing desire to discuss nighttime garments with ; agreeable for therapist to forward email to fitter re: nighttime garments. *Applied full leg short stretch compression bandaging over bilateral LEs. COMPRESSION:  *Right lower leg:  stockinet; 1/2\" foam insert over anterior/medial/posterior surface of distal lower leg; 1/2\" foam insert around crease superior to ankle; Rosidal wrap toes to knee; 4 short stretch compression bandages: 1, 8cm, 2, 10cm (herringbone pattern of distal leg with first bandage) , 1, 12cm  *Left lower leg:  stockinet; 1/2\" foam insert over anterior/medial/posterior surface of distal lower leg; 1/2\" foam insert around crease superior to ankle;  Rosidal wrap toes to knee; 3 short stretch compression bandages: 1, 8cm, 1, 10cm (herringbone pattern of distal leg), 1, 12cm  *Bilateral thigh/knee/proximal lower leg: stockinet-lined 1/2\" foam over anterior/medial/posterior surface of thigh held in place with 1 Rosidal wrap from distal thigh through proximal thigh; 2 Rosidal wrap from proximal lower leg through thigh; 2, 12cm short stretch compression bandages proximal lower leg through thigh with closed knee. Covered all with size \"J\" Tensogrip sleeve. Goals:  (to be met in 24 visits)  1. Pt will wear Right lower leg Circaid garments on a daily basis. ACHIEVED   2. Pt will return to use of Flexitouch device 6-7 days/week. ACHIEVED   3. Pt will be independent in decongestive exercises. ACHIEVED   4. Pt will tolerate Right lower leg short stretch compression bandaging for 20-23 hours. ACHIEVED   5. Pt/Caregiver will be independent in Right lower leg short stretch compression bandaging. DEFERRED 4/17 4/17 NEW GOAL: Pt's  will be independent in Right thigh/knee short stretch compression bandaging. ACHIEVED    5/22 NEW GOAL: Pt will tolerate short stretch compression bandaging over bilateral thighs/knees for 20-23 hours. ACHIEVED     6. Reduce Right LE lymphedema volume by 30-50cm to allow pt to transition back into custom-fit knee high and to reduce limb heaviness during ambulation. 7. Reduce bilateral lower leg density to boggy to reduce infection risk. 8. Pt will be independent in use of compression garments, self-manual lymph drainage, decongestive exercises, Flexitouch device and lymphedema precautions for life-long self-management of lymphedema. Plan:   Continue current plan. Pt has appt to be measured for custom-fit garments on 7/7.      Charges: 4 Manual Therapy    Total Timed Treatment: 55 min  Total Treatment Time: 60 min      LE Circumferential Measurements (cm)  2/21/2023   LEFT  2/21/2023   RIGHT  3/21/2023   LEFT 3/21/2023   RIGHT 4/6/2023   RIGHT   20cm above Superior Border of Patella (SBP)  81.3 77.0 84.5 83.8 81.5   10cm above SBP  65.5 75.1 72.0 74.8 72.5   SBP  63.3 66.6 62.3 66.0 63.6   5cm below SBP  57.0 60.1 57.0 60.8 59.3   10cm below SBP  52.4 60.6 53.0 59.5 58.5   20cm below SBP  54.3 64.0 51.5 59.2 56.0   30cm below SBP  39.0 47.6 38.4 46.0 41.5 35cm below SBP  30.5 35.4 30.4 33.5 32.2   40cm below SBP  29.2 30.5 25.7 27.5 25.5    Lateral Malleoli  29.8 30.8 27.2 29.0 28.2   7cm proximal to 1st toe web space  26.2 26.3 25.3 25.4 24.3   5cm proximal to 1st toe web space  24.3 25.0 24.7 24.5 23.3   TOTALS  552.8  Lower leg = 285.7 599.0  Lower leg = 320.2 552.0  Lower leg =   276.2 590.0  Lower leg = 304.6 566.4  Lower leg = 289.5           LE Circumferential Measurements (cm)  4/24/2023  LEFT  4/24/2023   RIGHT  5/22/2023   LEFT 5/22/2023   RIGHT 6/20/2023   LEFT 6/20/2023   RIGHT   20cm above Superior Border of Patella (SBP)  82.5 78.6 82.5 77.3 82.5 79.8   10cm above SBP  71.8 73.0 73.0 74.6 71.9 72.8   SBP  63.3 65.4 65.0 67.0 63.0 63.3   5cm below SBP  58.0 61.1 59.1 62.5 57.0 58.7   10cm below SBP  53.3 60.3 54.7 60.5 51.3 57.0   20cm below SBP  49.3 63.0 51.7 59.4 49.3 54.5   30cm below SBP  36.3 47.0 37.2 45.3 34.6 40.8   35cm below SBP  26.7 36.7 28.5 36.5 28.3 33.8   40cm below SBP  25.6 25.0 25.5 26.5 25.6 26.2    Lateral Malleoli  29.0 28.3 28.2 27.8 29.2 29.5   7cm proximal to 1st toe web space  25.7 25.0 25.2 24.7 26.5 25.4   5cm proximal to 1st toe web space  24.8 23.5 24.5 23.7 25.5 24.5   TOTALS  546.3  Lower leg = 270.7 586. 9  Lower leg = 308.8 555.1  Lower leg =   275.5 585.8  Lower leg =   304.4 544.7  Lower leg = 270.3 566.3  Lower leg = 291.7       LE Circumferential Measurements (cm)  6/27/2023   LEFT     RIGHT    20cm above Superior Border of Patella (SBP)  83.0    10cm above SBP  74.0    SBP  63.7    5cm below SBP  57.0    10cm below SBP  52.0    20cm below SBP  49.5    30cm below SBP  34.7    35cm below SBP  28.1    40cm below SBP  25.5     Lateral Malleoli  29.2    7cm proximal to 1st toe web space  26.0    5cm proximal to 1st toe web space  25.1    TOTALS  547.8  Lower leg = 270.1   Lower leg =

## 2023-07-06 NOTE — TELEPHONE ENCOUNTER
Last OV relevant to medication: 12/5/22  Last refill date: 12/5/22 #90/refills: 1  When pt was asked to return for OV: 6/5/23  Upcoming appt/reason: no future apts at [facility]  Was pt informed of any over due labs: none overdue    Please call pt to schedule overdue med check, thanks!

## 2023-07-10 ENCOUNTER — APPOINTMENT (OUTPATIENT)
Dept: OCCUPATIONAL MEDICINE | Facility: HOSPITAL | Age: 68
End: 2023-07-10
Payer: MEDICARE

## 2023-07-12 ENCOUNTER — OFFICE VISIT (OUTPATIENT)
Dept: INTERNAL MEDICINE CLINIC | Facility: CLINIC | Age: 68
End: 2023-07-12
Payer: MEDICARE

## 2023-07-12 VITALS
OXYGEN SATURATION: 98 % | HEART RATE: 60 BPM | BODY MASS INDEX: 50.03 KG/M2 | DIASTOLIC BLOOD PRESSURE: 70 MMHG | RESPIRATION RATE: 20 BRPM | WEIGHT: 265 LBS | TEMPERATURE: 98 F | HEIGHT: 61 IN | SYSTOLIC BLOOD PRESSURE: 126 MMHG

## 2023-07-12 DIAGNOSIS — E55.9 VITAMIN D DEFICIENCY: ICD-10-CM

## 2023-07-12 DIAGNOSIS — E66.01 CLASS 3 SEVERE OBESITY DUE TO EXCESS CALORIES WITHOUT SERIOUS COMORBIDITY WITH BODY MASS INDEX (BMI) OF 50.0 TO 59.9 IN ADULT (HCC): ICD-10-CM

## 2023-07-12 DIAGNOSIS — E78.00 PURE HYPERCHOLESTEROLEMIA: ICD-10-CM

## 2023-07-12 DIAGNOSIS — K21.9 GASTROESOPHAGEAL REFLUX DISEASE WITHOUT ESOPHAGITIS: ICD-10-CM

## 2023-07-12 DIAGNOSIS — I10 BENIGN ESSENTIAL HTN: Primary | ICD-10-CM

## 2023-07-12 DIAGNOSIS — F39 MOOD DISORDER (HCC): ICD-10-CM

## 2023-07-12 DIAGNOSIS — I89.0 LYMPHEDEMA OF BOTH LOWER EXTREMITIES: ICD-10-CM

## 2023-07-12 DIAGNOSIS — J45.20 MILD INTERMITTENT ASTHMA WITHOUT COMPLICATION: ICD-10-CM

## 2023-07-12 DIAGNOSIS — F41.9 ANXIETY: ICD-10-CM

## 2023-07-12 DIAGNOSIS — R73.03 PRE-DIABETES: ICD-10-CM

## 2023-07-12 DIAGNOSIS — E03.9 ACQUIRED HYPOTHYROIDISM: ICD-10-CM

## 2023-07-12 PROCEDURE — 99214 OFFICE O/P EST MOD 30 MIN: CPT | Performed by: NURSE PRACTITIONER

## 2023-07-12 RX ORDER — ALBUTEROL SULFATE 90 UG/1
2 AEROSOL, METERED RESPIRATORY (INHALATION) EVERY 4 HOURS PRN
Qty: 1 EACH | Refills: 0 | Status: SHIPPED | OUTPATIENT
Start: 2023-07-12

## 2023-07-12 RX ORDER — LEVOTHYROXINE SODIUM 112 UG/1
112 TABLET ORAL
Qty: 90 TABLET | Refills: 1 | Status: SHIPPED | OUTPATIENT
Start: 2023-07-12

## 2023-07-12 RX ORDER — BENAZEPRIL HYDROCHLORIDE 10 MG/1
10 TABLET ORAL DAILY
Qty: 90 TABLET | Refills: 1 | Status: SHIPPED | OUTPATIENT
Start: 2023-07-12

## 2023-07-12 RX ORDER — FAMOTIDINE 20 MG/1
20 TABLET, FILM COATED ORAL DAILY
COMMUNITY
End: 2023-07-12 | Stop reason: ALTCHOICE

## 2023-07-12 RX ORDER — FUROSEMIDE 40 MG/1
40 TABLET ORAL DAILY
Qty: 90 TABLET | Refills: 1 | Status: SHIPPED | OUTPATIENT
Start: 2023-07-12

## 2023-07-12 RX ORDER — OMEPRAZOLE 40 MG/1
40 CAPSULE, DELAYED RELEASE ORAL DAILY
Qty: 90 CAPSULE | Refills: 1 | Status: SHIPPED | OUTPATIENT
Start: 2023-07-12

## 2023-07-12 RX ORDER — ALBUTEROL SULFATE 90 UG/1
2 AEROSOL, METERED RESPIRATORY (INHALATION) EVERY 4 HOURS PRN
Qty: 18 G | Refills: 0 | Status: CANCELLED | OUTPATIENT
Start: 2023-07-12

## 2023-07-12 RX ORDER — INHALER, ASSIST DEVICES
SPACER (EA) MISCELLANEOUS
Qty: 1 EACH | Refills: 0 | Status: SHIPPED | OUTPATIENT
Start: 2023-07-12

## 2023-07-12 RX ORDER — ESCITALOPRAM OXALATE 10 MG/1
TABLET ORAL
Qty: 90 TABLET | Refills: 0 | Status: SHIPPED | OUTPATIENT
Start: 2023-07-12

## 2023-07-12 RX ORDER — ESCITALOPRAM OXALATE 5 MG/1
TABLET ORAL
Qty: 90 TABLET | Refills: 0 | Status: SHIPPED | OUTPATIENT
Start: 2023-07-12

## 2023-07-12 NOTE — PATIENT INSTRUCTIONS
Continue your same medication    Do low-fat diet and exercise    See the weight loss clinic    Continue to see the specialists     Get your labs done in December before your next appointment. You should be fasting for at least 10 hours. If you take a multivitamin with Biotin or any biotin product it should be held for 3 days prior to getting your labs done.      Follow-up in 6 months for your annual visit or sooner as needed

## 2023-07-13 ENCOUNTER — OFFICE VISIT (OUTPATIENT)
Dept: OCCUPATIONAL MEDICINE | Facility: HOSPITAL | Age: 68
End: 2023-07-13
Attending: INTERNAL MEDICINE
Payer: MEDICARE

## 2023-07-13 PROCEDURE — 97140 MANUAL THERAPY 1/> REGIONS: CPT

## 2023-07-13 NOTE — PROGRESS NOTES
Diagnosis:   Lymphedema of both lower extremities (I89.0)      Referring Provider: Inez Foote  Date of Evaluation:    2/21/2023     Precautions:  Lymphedema; infection risk; cardiac; abdominal Next MD visit:   none scheduled  Date of Surgery: n/a   Insurance Primary/Secondary: MEDICARE / COMMERCIAL # Authorized Visits: 12/12       Next MD/Plan Renewal Date: 10/11/2023        Progress Summary  Pt has attended 12/12 visits in Occupational Therapy since course of therapy was extended on 5/22/2023. Subjective:   Pt reports daily use of bilateral LE full leg compression since last session for nearly 23 hours every day. Had appointment with  on 7/7 to be measured for OTS thigh/knee Velcro-closure garments and custom-fit lower body garments. Anticipates delivery in 7-10 business days. Reports reduction in heaviness of bilateral lower extremities with improved comfort; has noticed this has also improved her overall mood. Has returned to aquatic exercise class. Assessment:   Pt has made more gradual progress towards reduction of her Right LE lymphedema volume since last progress summary with continued improvement in tissue quality. The volume in her bilateral lower extremities has stabilized and she was measured for custom-fit garments last week. Although pt and her  are compliant with daily use of full LE compression, due to pt's highly aggressive, stage III lymphedema (can swell at times even when in full compression) it is highly recommended that pt continue with therapy on a reduced frequency until custom-fit garments have arrived in order to be assured that pt does not experience any volume increases that would prevent her from fitting into her new garments. Following receiving her garments she will need a follow-up visit/s to assess her response to garment use and her success with home self-management of her lymphedema.      Objective:  Pt receiving Occupational Therapy for Right lower quadrant complete decongestive therapy with addition of Left full leg short stretch compression bandaging to reduce Left LE to lowest level prior to being fit for custom-fit kacey; assist with garment prescription. Pt was measured on 7/7 for Jessica Strong CCL2 custom-fit bilateral knee highs, Jessica Expert CCL1 custom-fit kacey, Juxta[employer] Essentials upper leg with knee Velcro-closure garments to use with current Farrow Wrap garments for nighttime compression. EDEMA/TISSUE QUALITY:   *Right LE circumferential volume decreased by 10.3cm since last progress summary for an overall reduction of 43.0cm. *Left LE volume relatively stable since last progress summary for an overall reduction of 9.0cm. *Right LE:  Tissue quality over proximal thigh is supple transitioning to slightly boggy to supple distally except for distal medial/posterior surfaces which are slightly boggy with spotty areas of boggy quality. Knee is boggy to slightly boggy. Proximal 1/2 of lower leg is boggy to slightly boggy with good superficial tissue mobility; distal 1/2 is densely boggy to boggy over anterior/medial/posterior surfaces with fair to good superficial tissue mobility / boggy over lateral surface with spotty areas of densely boggy quality with good superficial tissue mobility. Ankle is slightly boggy. Dorsum of foot is slightly boggy over proximal surface / boggy over distal surface. *Left LE:  Tissue quality over proximal thigh is supple transitioning to slightly boggy to supple distally except over distal medial surface which is boggy to slightly boggy; discoloration in area of prior cellulitis. Knee is slightly boggy to supple. Proximal 1/3 of lower leg is slightly boggy; mid 1/3 is boggy to slightly boggy, pitting with good superficial tissue mobility; distal 1/3 is densely boggy to boggy, pitting with fair to good superficial tissue mobility; discoloration in area of prior cellulitis. Ankle is slightly boggy.  Dorsum of foot is slightly boggy. *Emerging hair follicles over bilateral lower legs. Tissue hydration is good. Negative Stemmer's sign. Medium to light discoloration to distal lower legs. TREATMENT:   *Re-assessment of status   *Applied full leg short stretch compression bandaging over bilateral LEs. COMPRESSION:  *Right lower leg:  stockinet; 1/2\" foam insert over anterior/medial/posterior surface of distal lower leg; 1/2\" foam insert around crease superior to ankle; Rosidal wrap toes to knee; 4 short stretch compression bandages: 1, 8cm, 2, 10cm (herringbone pattern of distal leg with first bandage) , 1, 12cm  *Left lower leg:  stockinet; 1/2\" foam insert over anterior/medial/posterior surface of distal lower leg; 1/2\" foam insert around crease superior to ankle; Rosidal wrap toes to knee; 3 short stretch compression bandages: 1, 8cm, 1, 10cm (herringbone pattern of distal leg), 1, 12cm  *Bilateral thigh/knee/proximal lower leg: stockinet-lined 1/2\" foam over anterior/medial/posterior surface of thigh held in place with 1 Rosidal wrap from distal thigh through proximal thigh; 2 Rosidal wrap from proximal lower leg through thigh; 2, 12cm short stretch compression bandages proximal lower leg through thigh with closed knee. Covered all with size \"J\" Tensogrip sleeve. Goals:  (to be met in 24 visits)  1. Pt will wear Right lower leg Circaid garments on a daily basis. ACHIEVED   2. Pt will return to use of Flexitouch device 6-7 days/week. ACHIEVED   3. Pt will be independent in decongestive exercises. ACHIEVED   4. Pt will tolerate Right lower leg short stretch compression bandaging for 20-23 hours. ACHIEVED   5. Pt/Caregiver will be independent in Right lower leg short stretch compression bandaging. DEFERRED 4/17 4/17 NEW GOAL: Pt's  will be independent in Right thigh/knee short stretch compression bandaging.  ACHIEVED    5/22 NEW GOAL: Pt will tolerate short stretch compression bandaging over bilateral thighs/knees for 20-23 hours. ACHIEVED     7/13 NEW GOAL: With assist from clinician and from  in home setting, pt will sustain progress made during course of therapy until new custom-fit garments arrive. 6. Reduce Right LE lymphedema volume by 30-50cm to allow pt to transition back into custom-fit knee high and to reduce limb heaviness during ambulation. ACHIEVED   7. Reduce bilateral lower leg density to boggy to reduce infection risk. PROGRESSING  8. Pt will be independent in use of compression garments, self-manual lymph drainage, decongestive exercises, Flexitouch device and lymphedema precautions for life-long self-management of lymphedema. Plan: Continue skilled Occupational Therapy 1 x/week or a total of 4-6 visits over a 90 day period. Treatment will include: short stretch compression bandaging of bilateral lower extremities; manual lymph drainage of bilateral lower quadrants, when time available; assessment of fit of new compression garments. Patient/Family/Caregiver was advised of these findings, precautions, and treatment options and has agreed to actively participate in planning and for this course of care. Thank you for your referral. If you have any questions, please contact me at Dept: [phone]. Sincerely,  Electronically signed by therapist: Antwan Eagle. LAI Perez/L, MINOR      Physician's certification required:  Yes  Please co-sign or sign and return this letter via fax as soon as possible to [phone]. I certify the need for these services furnished under this plan of treatment and while under my care. X___________________________________________________ Date____________________    Certification From: 7/10/8453  To:10/11/2023     Plan:   Continue current plan.       Charges: 4 Manual Therapy    Total Timed Treatment: 55 min  Total Treatment Time: 60 min      LE Circumferential Measurements (cm)  2/21/2023   LEFT  2/21/2023   RIGHT  3/21/2023   LEFT 3/21/2023   RIGHT 4/6/2023   RIGHT   20cm above Superior Border of Patella (SBP)  81.3 77.0 84.5 83.8 81.5   10cm above SBP  65.5 75.1 72.0 74.8 72.5   SBP  63.3 66.6 62.3 66.0 63.6   5cm below SBP  57.0 60.1 57.0 60.8 59.3   10cm below SBP  52.4 60.6 53.0 59.5 58.5   20cm below SBP  54.3 64.0 51.5 59.2 56.0   30cm below SBP  39.0 47.6 38.4 46.0 41.5   35cm below SBP  30.5 35.4 30.4 33.5 32.2   40cm below SBP  29.2 30.5 25.7 27.5 25.5    Lateral Malleoli  29.8 30.8 27.2 29.0 28.2   7cm proximal to 1st toe web space  26.2 26.3 25.3 25.4 24.3   5cm proximal to 1st toe web space  24.3 25.0 24.7 24.5 23.3   TOTALS  552.8  Lower leg = 285.7 599.0  Lower leg = 320.2 552.0  Lower leg =   276.2 590.0  Lower leg = 304.6 566.4  Lower leg = 289.5           LE Circumferential Measurements (cm)  4/24/2023  LEFT  4/24/2023   RIGHT  5/22/2023   LEFT 5/22/2023   RIGHT 6/20/2023   LEFT 6/20/2023   RIGHT   20cm above Superior Border of Patella (SBP)  82.5 78.6 82.5 77.3 82.5 79.8   10cm above SBP  71.8 73.0 73.0 74.6 71.9 72.8   SBP  63.3 65.4 65.0 67.0 63.0 63.3   5cm below SBP  58.0 61.1 59.1 62.5 57.0 58.7   10cm below SBP  53.3 60.3 54.7 60.5 51.3 57.0   20cm below SBP  49.3 63.0 51.7 59.4 49.3 54.5   30cm below SBP  36.3 47.0 37.2 45.3 34.6 40.8   35cm below SBP  26.7 36.7 28.5 36.5 28.3 33.8   40cm below SBP  25.6 25.0 25.5 26.5 25.6 26.2    Lateral Malleoli  29.0 28.3 28.2 27.8 29.2 29.5   7cm proximal to 1st toe web space  25.7 25.0 25.2 24.7 26.5 25.4   5cm proximal to 1st toe web space  24.8 23.5 24.5 23.7 25.5 24.5   TOTALS  546.3  Lower leg = 270.7 586. 9  Lower leg = 308.8 555.1  Lower leg =   275.5 585.8  Lower leg =   304.4 544.7  Lower leg = 270.3 566.3  Lower leg = 291.7       LE Circumferential Measurements (cm)  6/27/2023   LEFT  7/13/2023  LEFT  7/13/2023  RIGHT   20cm above Superior Border of Patella (SBP)  83.0 83.0 78.0   10cm above SBP  74.0 73.5 71.0   SBP  63.7 62.5 62.3   5cm below SBP  57.0 57.3 56.7   10cm below SBP  52.0 51.8 55.5   20cm below SBP  49.5 50.7 55.4   30cm below SBP  34.7 35.2 41.6   35cm below SBP  28.1 27.6 31.5   40cm below SBP  25.5 24.7 25.8    Lateral Malleoli  29.2 28.5 28.6   7cm proximal to 1st toe web space  26.0 25.0 25.4   5cm proximal to 1st toe web space  25.1 24.0 24.2   TOTALS  547.8  Lower leg = 270.1 543.8  Lower leg = 267.5  556.0  Lower leg = 288.0

## 2023-07-17 ENCOUNTER — PATIENT MESSAGE (OUTPATIENT)
Dept: OCCUPATIONAL MEDICINE | Facility: HOSPITAL | Age: 68
End: 2023-07-17

## 2023-07-19 ENCOUNTER — OFFICE VISIT (OUTPATIENT)
Dept: OCCUPATIONAL MEDICINE | Facility: HOSPITAL | Age: 68
End: 2023-07-19
Attending: INTERNAL MEDICINE
Payer: MEDICARE

## 2023-07-19 PROCEDURE — 97140 MANUAL THERAPY 1/> REGIONS: CPT

## 2023-07-19 NOTE — PROGRESS NOTES
Diagnosis:   Lymphedema of both lower extremities (I89.0)      Referring Provider: Rajani Angel  Date of Evaluation:    2/21/2023     Precautions:  Lymphedema; infection risk; cardiac; abdominal Next MD visit:   none scheduled  Date of Surgery: n/a   Insurance Primary/Secondary: MEDICARE / COMMERCIAL # Authorized Visits: 1/4-6       Next MD/Plan Renewal Date: 10/11/2023       Subjective:   *Pt reports daily use of bilateral LE full leg compression since last session for nearly 23 hours every day, except for last night. Last night removed Right lower leg Velcro-closure garments at 11:30pm, but left on thigh/knee bandages, because her garments were rubbing her. *Reports she has not received notification that her garments are ready to be shipped. Assessment:   Pt with relatively stable bilateral LE volume with some slight rise in volume of feet. Good follow through with daily use of short stretch compression bandaging in the home setting. Hopefully, garments will arrive for fitting day next week. Objective:  Arrived with compression over bilateral lower legs. Had just removed thigh/knee bandages prior to session. OBSERVATION:    *Right LE:  Tissue quality over proximal thigh is supple transitioning to slightly boggy to supple distally except for distal medial/posterior surfaces which are slightly boggy with spotty areas of boggy quality. Knee is boggy to slightly boggy. Proximal 1/2 of lower leg is boggy to slightly boggy with good superficial tissue mobility; distal 1/2 is densely boggy to boggy over anterior/medial/posterior surfaces with fair to good superficial tissue mobility / boggy over lateral surface with spotty areas of densely boggy quality with good superficial tissue mobility. Ankle is slightly boggy. Dorsum of foot is slightly boggy over proximal surface / boggy over distal surface.     *Left LE:  Tissue quality over proximal thigh is supple transitioning to slightly boggy to supple distally except over distal medial surface which is boggy to slightly boggy; discoloration in area of prior cellulitis. Knee is slightly boggy to supple. Proximal 1/3 of lower leg is slightly boggy; mid 1/3 is boggy to slightly boggy, pitting with good superficial tissue mobility; distal 1/3 is densely boggy to boggy, pitting with fair to good superficial tissue mobility; discoloration in area of prior cellulitis. Ankle is slightly boggy. Dorsum of foot is slightly boggy. *Emerging hair follicles over bilateral lower legs. Tissue hydration is good. Negative Stemmer's sign. Medium to light discoloration to distal lower legs. MEASUREMENTS:   Collected measurements in 4 key areas of bilateral lower legs and 2 of bilateral feet. Measurements relatively stable in bilateral lower legs with increase in bilateral feet, Left more than Right. TREATMENT:   *Re-measurement as above. *Applied full leg short stretch compression bandaging over bilateral LEs. *Discussed possible arrival of garments next week. Pt and therapist in agreement to set up appt with  in the event garments arrive in time for fitting day. COMPRESSION:  *Right lower leg:  stockinet; 1/2\" foam insert over anterior/medial/posterior surface of distal lower leg; 1/2\" foam insert around crease superior to ankle; Rosidal wrap toes to knee; 4 short stretch compression bandages: 1, 8cm, 2, 10cm (herringbone pattern of distal leg with first bandage) , 1, 12cm  *Left lower leg:  stockinet; 1/2\" foam insert over anterior/medial/posterior surface of distal lower leg; 1/2\" foam insert around crease superior to ankle;  Rosidal wrap toes to knee; 3 short stretch compression bandages: 1, 8cm, 1, 10cm (herringbone pattern of distal leg), 1, 12cm  *Bilateral thigh/knee/proximal lower leg: stockinet-lined 1/2\" foam over anterior/medial/posterior surface of thigh held in place with 1 Rosidal wrap from distal thigh through proximal thigh; 2 Rosidal wrap from proximal lower leg through thigh; 2, 12cm short stretch compression bandages proximal lower leg through thigh with closed knee. Covered all with size \"J\" Tensogrip sleeve. Goals:  (to be met in 24 visits)  1. Pt will wear Right lower leg Circaid garments on a daily basis. ACHIEVED   2. Pt will return to use of Flexitouch device 6-7 days/week. ACHIEVED   3. Pt will be independent in decongestive exercises. ACHIEVED   4. Pt will tolerate Right lower leg short stretch compression bandaging for 20-23 hours. ACHIEVED   5. Pt/Caregiver will be independent in Right lower leg short stretch compression bandaging. DEFERRED 4/17 4/17 NEW GOAL: Pt's  will be independent in Right thigh/knee short stretch compression bandaging. ACHIEVED    5/22 NEW GOAL: Pt will tolerate short stretch compression bandaging over bilateral thighs/knees for 20-23 hours. ACHIEVED     6. Reduce Right LE lymphedema volume by 30-50cm to allow pt to transition back into custom-fit knee high and to reduce limb heaviness during ambulation. ACHIEVED   7. Reduce bilateral lower leg density to boggy to reduce infection risk. PROGRESSING  8. Pt will be independent in use of compression garments, self-manual lymph drainage, decongestive exercises, Flexitouch device and lymphedema precautions for life-long self-management of lymphedema. To be met in 4-6 visits:  7/13 NEW GOAL: With assist from clinician and from  in home setting, pt will sustain progress made during course of therapy until new custom-fit garments arrive. Plan:   Continue current plan. Tentative garment fitting on 7/25.      Charges: 4 Manual Therapy    Total Timed Treatment: 55 min  Total Treatment Time: 60 min      LE Circumferential Measurements (cm)  2/21/2023   LEFT  2/21/2023   RIGHT  3/21/2023   LEFT 3/21/2023   RIGHT 4/6/2023   RIGHT   20cm above Superior Border of Patella (SBP)  81.3 77.0 84.5 83.8 81.5   10cm above SBP  65.5 75.1 72.0 74.8 72.5   SBP  63.3 66.6 62.3 66.0 63.6   5cm below SBP  57.0 60.1 57.0 60.8 59.3   10cm below SBP  52.4 60.6 53.0 59.5 58.5   20cm below SBP  54.3 64.0 51.5 59.2 56.0   30cm below SBP  39.0 47.6 38.4 46.0 41.5   35cm below SBP  30.5 35.4 30.4 33.5 32.2   40cm below SBP  29.2 30.5 25.7 27.5 25.5    Lateral Malleoli  29.8 30.8 27.2 29.0 28.2   7cm proximal to 1st toe web space  26.2 26.3 25.3 25.4 24.3   5cm proximal to 1st toe web space  24.3 25.0 24.7 24.5 23.3   TOTALS  552.8  Lower leg = 285.7 599.0  Lower leg = 320.2 552.0  Lower leg =   276.2 590.0  Lower leg = 304.6 566.4  Lower leg = 289.5           LE Circumferential Measurements (cm)  4/24/2023  LEFT  4/24/2023   RIGHT  5/22/2023   LEFT 5/22/2023   RIGHT 6/20/2023   LEFT 6/20/2023   RIGHT   20cm above Superior Border of Patella (SBP)  82.5 78.6 82.5 77.3 82.5 79.8   10cm above SBP  71.8 73.0 73.0 74.6 71.9 72.8   SBP  63.3 65.4 65.0 67.0 63.0 63.3   5cm below SBP  58.0 61.1 59.1 62.5 57.0 58.7   10cm below SBP  53.3 60.3 54.7 60.5 51.3 57.0   20cm below SBP  49.3 63.0 51.7 59.4 49.3 54.5   30cm below SBP  36.3 47.0 37.2 45.3 34.6 40.8   35cm below SBP  26.7 36.7 28.5 36.5 28.3 33.8   40cm below SBP  25.6 25.0 25.5 26.5 25.6 26.2    Lateral Malleoli  29.0 28.3 28.2 27.8 29.2 29.5   7cm proximal to 1st toe web space  25.7 25.0 25.2 24.7 26.5 25.4   5cm proximal to 1st toe web space  24.8 23.5 24.5 23.7 25.5 24.5   TOTALS  546.3  Lower leg = 270.7 586. 9  Lower leg = 308.8 555.1  Lower leg =   275.5 585.8  Lower leg =   304.4 544.7  Lower leg = 270.3 566.3  Lower leg = 291.7       LE Circumferential Measurements (cm)  6/27/2023   LEFT  7/13/2023  LEFT  7/13/2023  RIGHT 7/19/2023   LEFT 7/19/2023   RIGHT   20cm above Superior Border of Patella (SBP)  83.0 83.0 78.0     10cm above SBP  74.0 73.5 71.0     SBP  63.7 62.5 62.3 62.5 62.3   5cm below SBP  57.0 57.3 56.7     10cm below SBP  52.0 51.8 55.5 51.8 55.5   20cm below SBP  49.5 50.7 55.4 49.5 (-1.2) 55.4   30cm below SBP  34.7 35.2 41.6 37.0 (+1.8) 42.6 (+1.0)   35cm below SBP  28.1 27.6 31.5     40cm below SBP  25.5 24.7 25.8      Lateral Malleoli  29.2 28.5 28.6     7cm proximal to 1st toe web space  26.0 25.0 25.4 26.7 (+1.7) 25.7 (+0.3)   5cm proximal to 1st toe web space  25.1 24.0 24.2 26.1 (+2.1) 25.1 (+0.9)   TOTALS  547.8  Lower leg = 270.1 543.8  Lower leg = 267.5  556.0  Lower leg = 288.0

## 2023-07-20 ENCOUNTER — TELEPHONE (OUTPATIENT)
Dept: OCCUPATIONAL MEDICINE | Facility: HOSPITAL | Age: 68
End: 2023-07-20

## 2023-07-20 NOTE — TELEPHONE ENCOUNTER
Pt called to share that her garments were delivered to her home today. Advised pt to leave packages unopened and bring garments to fitting appointment so that  can fit her into her garments. Pt in agreement.

## 2023-07-25 ENCOUNTER — TELEPHONE (OUTPATIENT)
Dept: PHYSICAL THERAPY | Facility: HOSPITAL | Age: 68
End: 2023-07-25

## 2023-08-10 ENCOUNTER — OFFICE VISIT (OUTPATIENT)
Dept: OCCUPATIONAL MEDICINE | Facility: HOSPITAL | Age: 68
End: 2023-08-10
Attending: INTERNAL MEDICINE
Payer: MEDICARE

## 2023-08-10 PROCEDURE — 97140 MANUAL THERAPY 1/> REGIONS: CPT

## 2023-08-10 NOTE — PROGRESS NOTES
Discharge Summary  Pt has attended a total of 38 visits in Occupational Therapy from 2/21-8/10/2023. Subjective:   Pt reports she received her correct compression capris last week. Has been wearing in conjunction with her new compression knee highs on a daily basis since then. Prior to that had been wearing compression pantyhose that she had received (incorrectly ordered) on most days; when not, was wearing new knee highs. Reports capris/knee highs combination are very comfortable. Reports she has had some difficulty with consistently getting capris applied correctly; has difficulty with panty area. Has also received Velcro-closure garments for her bilateral thighs/knees for nighttime use. Wore them initially when she first got them along with Velcro-closure footpieces, but was confused about why lower legs were exposed. Reports thigh/knee Velcro-closure garments would slide down during the night so she abandoned wearing them and returned to wearing her Michaelene Southward lower leg and footpiece garments with old Circaid lower leg garments covering proximal lower legs. Reports that she has been going to aquatic exercise, and notes that on those days there is a delay in getting full compression over her lower extremities as she only wears her knee highs home; did go to aquatic exercise this morning and feels like her Right lower leg looks a little larger. Also sharing that she did not wear any nighttime compression last night. Pt reports that since returning to therapy her legs \"feel much better;\" she is able to do more activity during the day, needing less rests; overall she feels \"much more energetic\" and more positive. Pt sharing plan to use Flexitouch device this evening between removing daytime garments and applying nighttime garments. Assessment:   Pt returns today with good fitting custom-fit compression capris and knee highs.  She demonstrated progress towards reduction of her Right LE volume, albeit today is measuring higher than when full limb measurements were collected on 7/13. Volume increase may have occurred d/t deferring nighttime compression last night and being out of full compression following aquatics class today. Over her course of treatment pt has also demonstrated improvement in the tissue quality of her Right LE and has demonstrated compliance with use of compression. Because of pt's aggressive  stage III lymphedema, it is recommended that pt return for a follow-up visit in 6 months. She will need a new prescription to return if this is approved by her PCP office. Objective:  Pt attended Occupational Therapy for complete decongestive therapy of bilateral lower quadrants; instruction in decongestive exercises and self-manual lymph drainage; assist with garment prescription; assist with return to use of Flexitouch device. Therapist was able to obtain a donated Right LE compression device garment for pt as the Velcro on pt's garment had become worn. Pt was measured for custom-fit compression capris and knee highs and bilateralt thigh/knee Velcro-closure garments for nighttime compression on 7/7. However, when her garments arrived a custom-fit pantyhose had been fabricated vs the capris. Her replacement garment arrived last week and she has been advised that she will be allowed to keep the pantyhose garment. She is aware of the need to replace her older Velcro-closure garments. NEW GARMENT SPECIFICS: Jessica Expert custom-fit compression capris, CCL1; Kayleezo Strong custom-fit knee highs, CLL2; Juxtafit Essentials upper leg with knees. PRIOR GARMENT SPECIFICS: Thanh Galeazzi Classic lower leg garments (which will soon need replacement); Circaid Juxtafit lower leg garments (which need replacement); Farrow Wrap Classic footpieces. Today's session activities consisting of:    --Discussion of pt's use of nigttime garments and confusion about what she is to wear.  Discovered pt didn't realize she was to supplement the new thigh/knee garments with her lower leg garments. Recommended to add lower leg garments to thigh/knee and foot Velcro-closure garments for nighttime compression. Reminded of need to replace older Velcro-closure garments. --Re-assessment of status. Discussed rise in Right LE volume relative to deferring compression last night and time out of compression today following aquatic exercise. Advised to avoid prolonged periods of being out of compression. --Fit donated Right LE compression device garment to pt; garment with good fit. Operated LE program to test garment for any issues; air leaks occurring through hosing; garment hosing exposed and hose connections re-fitted. --Instruction in donning techniques for ease of donning of garments and assuring correct distribution of fabric. Discussed benefit of using donning lotion on thighs to assist with pulling capris over thighs. Pt needing verbal and physical direction to apply recommended techniques. EDEMA/TISSUE QUALITY:   *Right LE circumferential volume has increased by 27.1cm since last full limb measurement on 7/13 for an overall reduction of 15.9cm. *Left LE with slight rise since 7/13, h/e not back to baseline. *Right LE:  Tissue quality over proximal thigh is supple transitioning to slightly boggy to supple distally except for distal medial/posterior surfaces which are slightly boggy with spotty areas of boggy quality. Knee is boggy to slightly boggy. Proximal 1/2 of lower leg is boggy to slightly boggy with good superficial tissue mobility; distal 1/2 is densely boggy to boggy over anterior/medial/posterior surfaces with fair to good superficial tissue mobility / boggy over lateral surface with spotty areas of densely boggy quality with good superficial tissue mobility. Ankle is slightly boggy. Dorsum of foot is slightly boggy over proximal surface / boggy over distal surface.     *Left LE:  Tissue quality over proximal thigh is supple transitioning to slightly boggy to supple distally except over distal medial surface which is boggy to slightly boggy; discoloration in area of prior cellulitis. Knee is slightly boggy to supple. Proximal 1/3 of lower leg is slightly boggy; mid 1/3 is boggy to slightly boggy, pitting with good superficial tissue mobility; distal 1/3 is densely boggy to boggy, pitting with fair to good superficial tissue mobility; discoloration in area of prior cellulitis. Ankle is slightly boggy. Dorsum of foot is slightly boggy. *Emerging hair follicles over bilateral lower legs. Tissue hydration is good. Negative Stemmer's sign. Medium to light discoloration to distal lower legs. Recommendations:  1. Use compression garments on a daily/nightly basis. Avoid prolonged periods out of compression. Replace older Velcro-closure garments. Replace all garments on a timely basis. 2.  Use Flexitouch device 1-2x/day. If unable to use device, complete self-manual lymph drainage . 3. Continue with daily completion of skin care and decongestive exercises. 4.  Return in 6 months for follow-up visit. 5.  Contact this therapist/department for any questions/concerns. Goals:  (to be met in 24 visits)  1. Pt will wear Right lower leg Circaid garments on a daily basis. ACHIEVED   2. Pt will return to use of Flexitouch device 6-7 days/week. ACHIEVED   3. Pt will be independent in decongestive exercises. ACHIEVED   4. Pt will tolerate Right lower leg short stretch compression bandaging for 20-23 hours. ACHIEVED   5. Pt/Caregiver will be independent in Right lower leg short stretch compression bandaging. DEFERRED 4/17 4/17 NEW GOAL: Pt's  will be independent in Right thigh/knee short stretch compression bandaging. ACHIEVED    5/22 NEW GOAL: Pt will tolerate short stretch compression bandaging over bilateral thighs/knees for 20-23 hours. ACHIEVED     6.  Reduce Right LE lymphedema volume by 30-50cm to allow pt to transition back into custom-fit knee high and to reduce limb heaviness during ambulation. ACHIEVED   7. Reduce bilateral lower leg density to boggy to reduce infection risk. PROGRESSING  8. Pt will be independent in use of compression garments, self-manual lymph drainage, decongestive exercises, Flexitouch device and lymphedema precautions for life-long self-management of lymphedema. ACHIEVED     To be met in 4-6 visits:  7/13 NEW GOAL: With assist from clinician and from  in home setting, pt will sustain progress made during course of therapy until new custom-fit garments arrive. ACHIEVED     Thank you for your referral. If you have any questions, please contact me at Dept: [phone]. Sincerely,  Electronically signed by therapist: Shannon Cooper          Charges: 6 Manual Therapy    Total Timed Treatment: 85 min  Total Treatment Time: 90 min      LE Circumferential Measurements (cm)  2/21/2023   LEFT  2/21/2023   RIGHT  3/21/2023   LEFT 3/21/2023   RIGHT 4/6/2023   RIGHT   20cm above Superior Border of Patella (SBP)  81.3 77.0 84.5 83.8 81.5   10cm above SBP  65.5 75.1 72.0 74.8 72.5   SBP  63.3 66.6 62.3 66.0 63.6   5cm below SBP  57.0 60.1 57.0 60.8 59.3   10cm below SBP  52.4 60.6 53.0 59.5 58.5   20cm below SBP  54.3 64.0 51.5 59.2 56.0   30cm below SBP  39.0 47.6 38.4 46.0 41.5   35cm below SBP  30.5 35.4 30.4 33.5 32.2   40cm below SBP  29.2 30.5 25.7 27.5 25.5    Lateral Malleoli  29.8 30.8 27.2 29.0 28.2   7cm proximal to 1st toe web space  26.2 26.3 25.3 25.4 24.3   5cm proximal to 1st toe web space  24.3 25.0 24.7 24.5 23.3   TOTALS  552.8  Lower leg = 285.7 599.0  Lower leg = 320.2 552.0  Lower leg =   276.2 590.0  Lower leg = 304.6 566.4  Lower leg = 289.5           LE Circumferential Measurements (cm)  4/24/2023  LEFT  4/24/2023   RIGHT  5/22/2023   LEFT 5/22/2023   RIGHT 6/20/2023   LEFT 6/20/2023   RIGHT   20cm above Superior Border of Patella (SBP)  82.5 78.6 82.5 77.3 82.5 79.8   10cm above SBP  71.8 73.0 73.0 74.6 71.9 72.8   SBP  63.3 65.4 65.0 67.0 63.0 63.3   5cm below SBP  58.0 61.1 59.1 62.5 57.0 58.7   10cm below SBP  53.3 60.3 54.7 60.5 51.3 57.0   20cm below SBP  49.3 63.0 51.7 59.4 49.3 54.5   30cm below SBP  36.3 47.0 37.2 45.3 34.6 40.8   35cm below SBP  26.7 36.7 28.5 36.5 28.3 33.8   40cm below SBP  25.6 25.0 25.5 26.5 25.6 26.2    Lateral Malleoli  29.0 28.3 28.2 27.8 29.2 29.5   7cm proximal to 1st toe web space  25.7 25.0 25.2 24.7 26.5 25.4   5cm proximal to 1st toe web space  24.8 23.5 24.5 23.7 25.5 24.5   TOTALS  546.3  Lower leg = 270.7 586. 9  Lower leg = 308.8 555.1  Lower leg =   275.5 585.8  Lower leg =   304.4 544.7  Lower leg = 270.3 566.3  Lower leg = 291.7       LE Circumferential Measurements (cm)  6/27/2023   LEFT  7/13/2023  LEFT  7/13/2023  RIGHT 7/19/2023   LEFT 7/19/2023   RIGHT   20cm above Superior Border of Patella (SBP)  83.0 83.0 78.0     10cm above SBP  74.0 73.5 71.0     SBP  63.7 62.5 62.3 62.5 62.3   5cm below SBP  57.0 57.3 56.7     10cm below SBP  52.0 51.8 55.5 51.8 55.5   20cm below SBP  49.5 50.7 55.4 49.5 (-1.2) 55.4   30cm below SBP  34.7 35.2 41.6 37.0 (+1.8) 42.6 (+1.0)   35cm below SBP  28.1 27.6 31.5     40cm below SBP  25.5 24.7 25.8      Lateral Malleoli  29.2 28.5 28.6     7cm proximal to 1st toe web space  26.0 25.0 25.4 26.7 (+1.7) 25.7 (+0.3)   5cm proximal to 1st toe web space  25.1 24.0 24.2 26.1 (+2.1) 25.1 (+0.9)   TOTALS  547.8  Lower leg = 270.1 543.8  Lower leg = 267.5  556.0  Lower leg = 288.0         LE Circumferential Measurements (cm)  8/10/2023    LEFT  8/10/2023   LEFT    20cm above Superior Border of Patella (SBP)    82.3 81.4   10cm above SBP    72.3 72.3   SBP    62.5 64.1   5cm below SBP    57.3 58.3   10cm below SBP    53.0 57.5   20cm below SBP    52.6 60.8   30cm below SBP    35.7 46.4   35cm below SBP    28.7 34.0   40cm below SBP    26.6 28.6    Lateral Malleoli    29.0 29.5   7cm proximal to 1st toe web space    25.4 25.5   5cm proximal to 1st toe web space    24.7 24.8   TOTALS  550.1  Lower leg =   275.7 583.1  Lower leg =   307.1

## 2023-08-16 ENCOUNTER — TELEPHONE (OUTPATIENT)
Dept: OCCUPATIONAL MEDICINE | Facility: HOSPITAL | Age: 68
End: 2023-08-16

## 2023-08-16 NOTE — TELEPHONE ENCOUNTER
Contacted pt to discuss 6 month follow-up visit. Requested that she request an order to return for follow-up visit at her annual wellness appt in December. Advised her that once order is placed she should call central scheduling to set-up return visit for in February; advised her that she will only need one visit. Advised pt that this phone encounter is being entered in the event central scheduling has any questions about number of visits that need to be scheduled.

## 2023-08-28 ENCOUNTER — HOSPITAL ENCOUNTER (OUTPATIENT)
Facility: HOSPITAL | Age: 68
Setting detail: HOSPITAL OUTPATIENT SURGERY
Discharge: HOME | End: 2023-08-28
Attending: INTERNAL MEDICINE | Admitting: INTERNAL MEDICINE
Payer: MEDICARE

## 2023-08-28 ENCOUNTER — ANESTHESIA EVENT (OUTPATIENT)
Dept: ENDOSCOPY | Facility: HOSPITAL | Age: 68
End: 2023-08-28
Payer: MEDICARE

## 2023-08-28 ENCOUNTER — ANESTHESIA (OUTPATIENT)
Dept: ENDOSCOPY | Facility: HOSPITAL | Age: 68
End: 2023-08-28
Payer: MEDICARE

## 2023-08-28 VITALS
DIASTOLIC BLOOD PRESSURE: 66 MMHG | TEMPERATURE: 98 F | OXYGEN SATURATION: 100 % | BODY MASS INDEX: 52.42 KG/M2 | WEIGHT: 267 LBS | HEART RATE: 51 BPM | SYSTOLIC BLOOD PRESSURE: 136 MMHG | RESPIRATION RATE: 16 BRPM | HEIGHT: 60 IN

## 2023-08-28 DIAGNOSIS — Z12.11 COLON CANCER SCREENING: ICD-10-CM

## 2023-08-28 PROCEDURE — 0DBK8ZX EXCISION OF ASCENDING COLON, VIA NATURAL OR ARTIFICIAL OPENING ENDOSCOPIC, DIAGNOSTIC: ICD-10-PCS | Performed by: INTERNAL MEDICINE

## 2023-08-28 PROCEDURE — 0DBH8ZX EXCISION OF CECUM, VIA NATURAL OR ARTIFICIAL OPENING ENDOSCOPIC, DIAGNOSTIC: ICD-10-PCS | Performed by: INTERNAL MEDICINE

## 2023-08-28 PROCEDURE — 88305 TISSUE EXAM BY PATHOLOGIST: CPT | Performed by: INTERNAL MEDICINE

## 2023-08-28 RX ORDER — ONDANSETRON 2 MG/ML
4 INJECTION INTRAMUSCULAR; INTRAVENOUS AS NEEDED
Status: DISCONTINUED | OUTPATIENT
Start: 2023-08-28 | End: 2023-08-28

## 2023-08-28 RX ORDER — NALOXONE HYDROCHLORIDE 0.4 MG/ML
80 INJECTION, SOLUTION INTRAMUSCULAR; INTRAVENOUS; SUBCUTANEOUS AS NEEDED
Status: DISCONTINUED | OUTPATIENT
Start: 2023-08-28 | End: 2023-08-28

## 2023-08-28 RX ORDER — SODIUM CHLORIDE, SODIUM LACTATE, POTASSIUM CHLORIDE, CALCIUM CHLORIDE 600; 310; 30; 20 MG/100ML; MG/100ML; MG/100ML; MG/100ML
INJECTION, SOLUTION INTRAVENOUS CONTINUOUS
Status: DISCONTINUED | OUTPATIENT
Start: 2023-08-28 | End: 2023-08-28

## 2023-08-28 RX ORDER — LIDOCAINE HYDROCHLORIDE 10 MG/ML
INJECTION, SOLUTION EPIDURAL; INFILTRATION; INTRACAUDAL; PERINEURAL AS NEEDED
Status: DISCONTINUED | OUTPATIENT
Start: 2023-08-28 | End: 2023-08-28 | Stop reason: SURG

## 2023-08-28 RX ADMIN — SODIUM CHLORIDE, SODIUM LACTATE, POTASSIUM CHLORIDE, CALCIUM CHLORIDE: 600; 310; 30; 20 INJECTION, SOLUTION INTRAVENOUS at 15:41:00

## 2023-08-28 RX ADMIN — LIDOCAINE HYDROCHLORIDE 50 MG: 10 INJECTION, SOLUTION EPIDURAL; INFILTRATION; INTRACAUDAL; PERINEURAL at 15:35:00

## 2023-08-28 NOTE — ANESTHESIA POSTPROCEDURE EVALUATION
[address] [facility] Patient Status:  Hospital Outpatient Surgery   Age/Gender 75year old female MRN [id number]   Location [address] [id number] Attending 3 Sheryl Almanzar, [facility] Day # 0 PCP Sulema Coleman MD       Anesthesia Post-op Note    COLONOSCOPY WITH COLD SNARE POLYPECTOMY    Procedure Summary       Date: 08/28/23 Room / Location: [facility] ENDOSCOPY 02 / [facility] ENDOSCOPY    Anesthesia Start: 1922 Anesthesia Stop: 1600    Procedure: COLONOSCOPY WITH COLD SNARE POLYPECTOMY Diagnosis:       Colon cancer screening      (POLYPS, HEMORRHOIDS)    Surgeons: Gale Saxena DO Anesthesiologist: Albania Ballesteros MD    Anesthesia Type: MAC ASA Status: 3            Anesthesia Type: MAC    Vitals Value Taken Time   /60 08/28/23 1615   Temp  08/28/23 1616   Pulse 55 08/28/23 1616   Resp 18 08/28/23 1615   SpO2 97 % 08/28/23 1616   Vitals shown include unvalidated device data. Patient Location: Endoscopy    Anesthesia Type: MAC    Airway Patency: patent    Postop Pain Control: adequate    Mental Status: mildly sedated but able to meaningfully participate in the post-anesthesia evaluation    Nausea/Vomiting: none    Cardiopulmonary/Hydration status: stable euvolemic    Complications: no apparent anesthesia related complications    Postop vital signs: stable    Dental Exam: Unchanged from Preop    Patient to be discharged home when criteria met.

## 2023-08-29 DIAGNOSIS — I10 BENIGN ESSENTIAL HTN: ICD-10-CM

## 2023-08-30 RX ORDER — FUROSEMIDE 40 MG/1
40 TABLET ORAL DAILY
Qty: 90 TABLET | Refills: 1 | Status: SHIPPED | OUTPATIENT
Start: 2023-08-30

## 2023-09-07 DIAGNOSIS — J45.20 MILD INTERMITTENT ASTHMA WITHOUT COMPLICATION: ICD-10-CM

## 2023-09-07 DIAGNOSIS — E03.9 ACQUIRED HYPOTHYROIDISM: ICD-10-CM

## 2023-09-07 RX ORDER — LEVOTHYROXINE SODIUM 112 UG/1
112 TABLET ORAL
Qty: 90 TABLET | Refills: 1 | Status: SHIPPED | OUTPATIENT
Start: 2023-09-07

## 2023-09-07 RX ORDER — INHALER, ASSIST DEVICES
SPACER (EA) MISCELLANEOUS
Qty: 1 EACH | Refills: 0 | Status: SHIPPED | OUTPATIENT
Start: 2023-09-07

## 2023-09-07 NOTE — TELEPHONE ENCOUNTER
Noted.   Thyroid Supplements Protocol [id number]/07/2023 12:43 PM   Protocol Details TSH test in past 12 months    TSH value between 0.350 and 5.500 IU/ml    Appointment in past 12 or next 3 months   6. Acquired hypothyroidism  - stable. Continue current management   Future Appointments   Date Time Provider Raffaele Kaur   12/11/2023 10:20 AM MARIELA Lea [facility]   1/8/2024 12:40 PM MARIELA Castellon [facility] [id number]         Spoke to pt, she states [facility] and no longer wants meds sent there. [facility] scripts were removed from pharmacy list. Pt requesting spacer for inhaler and levothyroxine be sent to [facility] instead. Rxes sent per protocol.

## 2023-09-07 NOTE — TELEPHONE ENCOUNTER
Called patient. Lmtcb. Called [facility] states patients account terminated, not active. #90 tabs with 1 refill sent on 7/12/23 per epic. Calling to clarify with patient.

## 2023-10-06 RX ORDER — ESCITALOPRAM OXALATE 5 MG/1
TABLET ORAL
Qty: 90 TABLET | Refills: 0 | Status: SHIPPED | OUTPATIENT
Start: 2023-10-06

## 2023-10-06 NOTE — TELEPHONE ENCOUNTER
Last OV relevant to medication: 7/12/23  Last refill date: 7/12/23 #90/refills: 0  When pt was asked to return for OV: 6 mo  Upcoming appt/reason:   Future Appointments   Date Time Provider Raffaele Kaur   12/11/2023 10:20 AM MARIELA Bowles [facility]   3/26/2024 11:00 AM Binta Ty MD [facility]   Was pt informed of any over due labs: prior to apt

## 2023-10-09 DIAGNOSIS — F39 MOOD DISORDER (HCC): ICD-10-CM

## 2023-10-11 RX ORDER — ESCITALOPRAM OXALATE 10 MG/1
TABLET ORAL
Qty: 90 TABLET | Refills: 0 | OUTPATIENT
Start: 2023-10-11

## 2023-10-12 DIAGNOSIS — F39 MOOD DISORDER (HCC): ICD-10-CM

## 2023-10-16 RX ORDER — ESCITALOPRAM OXALATE 10 MG/1
TABLET ORAL
Qty: 90 TABLET | Refills: 0 | Status: SHIPPED | OUTPATIENT
Start: 2023-10-16

## 2023-10-16 NOTE — TELEPHONE ENCOUNTER
Last OV relevant to medication: 7/12/23  Last refill date: 7/12/23 #90/refills: 0  When pt was asked to return for OV: 1/12/24  Upcoming appt/reason:   Future Appointments   Date Time Provider Raffaele Kaur   12/11/2023 10:20 AM MARIELA Hurtado [facility] [address] [facility]   3/26/2024 11:00 AM Kimberly Horner MD [facility]   Was pt informed of any over due labs: due in Dec

## 2023-12-04 ENCOUNTER — LAB ENCOUNTER (OUTPATIENT)
Dept: LAB | Age: 68
End: 2023-12-04
Attending: NURSE PRACTITIONER
Payer: MEDICARE

## 2023-12-04 DIAGNOSIS — I10 BENIGN ESSENTIAL HTN: ICD-10-CM

## 2023-12-04 DIAGNOSIS — E55.9 VITAMIN D DEFICIENCY: ICD-10-CM

## 2023-12-04 DIAGNOSIS — E78.00 PURE HYPERCHOLESTEROLEMIA: ICD-10-CM

## 2023-12-04 LAB
ALBUMIN SERPL-MCNC: 3.5 G/DL (ref 3.4–5)
ALBUMIN/GLOB SERPL: 1 {RATIO} (ref 1–2)
ALP LIVER SERPL-CCNC: 101 U/L
ALT SERPL-CCNC: 19 U/L
ANION GAP SERPL CALC-SCNC: 5 MMOL/L (ref 0–18)
AST SERPL-CCNC: 10 U/L (ref 15–37)
BILIRUB SERPL-MCNC: 0.6 MG/DL (ref 0.1–2)
BUN BLD-MCNC: 22 MG/DL (ref 9–23)
CALCIUM BLD-MCNC: 9.2 MG/DL (ref 8.5–10.1)
CHLORIDE SERPL-SCNC: 105 MMOL/L (ref 98–112)
CHOLEST SERPL-MCNC: 205 MG/DL (ref ?–200)
CO2 SERPL-SCNC: 30 MMOL/L (ref 21–32)
CREAT BLD-MCNC: 1.16 MG/DL
EGFRCR SERPLBLD CKD-EPI 2021: 51 ML/MIN/1.73M2 (ref 60–?)
FASTING PATIENT LIPID ANSWER: YES
FASTING STATUS PATIENT QL REPORTED: YES
GLOBULIN PLAS-MCNC: 3.4 G/DL (ref 2.8–4.4)
GLUCOSE BLD-MCNC: 92 MG/DL (ref 70–99)
HDLC SERPL-MCNC: 58 MG/DL (ref 40–59)
LDLC SERPL CALC-MCNC: 121 MG/DL (ref ?–100)
NONHDLC SERPL-MCNC: 147 MG/DL (ref ?–130)
OSMOLALITY SERPL CALC.SUM OF ELEC: 293 MOSM/KG (ref 275–295)
POTASSIUM SERPL-SCNC: 4.2 MMOL/L (ref 3.5–5.1)
PROT SERPL-MCNC: 6.9 G/DL (ref 6.4–8.2)
SODIUM SERPL-SCNC: 140 MMOL/L (ref 136–145)
TRIGL SERPL-MCNC: 147 MG/DL (ref 30–149)
VIT D+METAB SERPL-MCNC: 29.5 NG/ML (ref 30–100)
VLDLC SERPL CALC-MCNC: 26 MG/DL (ref 0–30)

## 2023-12-04 PROCEDURE — 36415 COLL VENOUS BLD VENIPUNCTURE: CPT

## 2023-12-04 PROCEDURE — 80053 COMPREHEN METABOLIC PANEL: CPT

## 2023-12-04 PROCEDURE — 82306 VITAMIN D 25 HYDROXY: CPT

## 2023-12-04 PROCEDURE — 80061 LIPID PANEL: CPT

## 2023-12-11 ENCOUNTER — OFFICE VISIT (OUTPATIENT)
Dept: INTERNAL MEDICINE CLINIC | Facility: CLINIC | Age: 68
End: 2023-12-11
Payer: MEDICARE

## 2023-12-11 VITALS
BODY MASS INDEX: 41.3 KG/M2 | RESPIRATION RATE: 14 BRPM | HEIGHT: 60 IN | DIASTOLIC BLOOD PRESSURE: 64 MMHG | HEART RATE: 60 BPM | WEIGHT: 210.38 LBS | SYSTOLIC BLOOD PRESSURE: 128 MMHG | TEMPERATURE: 99 F | OXYGEN SATURATION: 98 %

## 2023-12-11 DIAGNOSIS — F39 MOOD DISORDER (HCC): ICD-10-CM

## 2023-12-11 DIAGNOSIS — I89.0 LYMPHEDEMA: ICD-10-CM

## 2023-12-11 DIAGNOSIS — Z12.83 SCREENING FOR SKIN CANCER: ICD-10-CM

## 2023-12-11 DIAGNOSIS — I48.0 PAF (PAROXYSMAL ATRIAL FIBRILLATION) (HCC): ICD-10-CM

## 2023-12-11 DIAGNOSIS — M15.9 OSTEOARTHRITIS OF MULTIPLE JOINTS, UNSPECIFIED OSTEOARTHRITIS TYPE: ICD-10-CM

## 2023-12-11 DIAGNOSIS — E55.9 VITAMIN D DEFICIENCY: ICD-10-CM

## 2023-12-11 DIAGNOSIS — G47.33 OSA ON CPAP: ICD-10-CM

## 2023-12-11 DIAGNOSIS — E66.01 CLASS 3 SEVERE OBESITY DUE TO EXCESS CALORIES WITH SERIOUS COMORBIDITY AND BODY MASS INDEX (BMI) OF 50.0 TO 59.9 IN ADULT (HCC): ICD-10-CM

## 2023-12-11 DIAGNOSIS — R73.03 PRE-DIABETES: ICD-10-CM

## 2023-12-11 DIAGNOSIS — Z91.09 OTHER ALLERGY, OTHER THAN TO MEDICINAL AGENTS: ICD-10-CM

## 2023-12-11 DIAGNOSIS — E03.9 ACQUIRED HYPOTHYROIDISM: ICD-10-CM

## 2023-12-11 DIAGNOSIS — Z12.31 ENCOUNTER FOR SCREENING MAMMOGRAM FOR BREAST CANCER: ICD-10-CM

## 2023-12-11 DIAGNOSIS — L30.9 ECZEMA, UNSPECIFIED TYPE: ICD-10-CM

## 2023-12-11 DIAGNOSIS — M85.80 OSTEOPENIA, UNSPECIFIED LOCATION: ICD-10-CM

## 2023-12-11 DIAGNOSIS — Z78.0 POSTMENOPAUSAL: ICD-10-CM

## 2023-12-11 DIAGNOSIS — K21.9 GASTROESOPHAGEAL REFLUX DISEASE WITHOUT ESOPHAGITIS: ICD-10-CM

## 2023-12-11 DIAGNOSIS — D64.89 ANEMIA DUE TO OTHER CAUSE, NOT CLASSIFIED: ICD-10-CM

## 2023-12-11 DIAGNOSIS — I10 PRIMARY HYPERTENSION: ICD-10-CM

## 2023-12-11 DIAGNOSIS — J45.20 MILD INTERMITTENT ASTHMA WITHOUT COMPLICATION: ICD-10-CM

## 2023-12-11 DIAGNOSIS — Z00.00 ENCOUNTER FOR ANNUAL HEALTH EXAMINATION: Primary | ICD-10-CM

## 2023-12-11 DIAGNOSIS — E78.00 PURE HYPERCHOLESTEROLEMIA: ICD-10-CM

## 2023-12-11 DIAGNOSIS — I77.9 BILATERAL CAROTID ARTERY DISEASE, UNSPECIFIED TYPE (HCC): ICD-10-CM

## 2023-12-11 RX ORDER — ESCITALOPRAM OXALATE 5 MG/1
TABLET ORAL
Qty: 90 TABLET | Refills: 0 | Status: SHIPPED | OUTPATIENT
Start: 2023-12-11

## 2023-12-11 RX ORDER — LEVOTHYROXINE SODIUM 112 UG/1
112 TABLET ORAL
Qty: 90 TABLET | Refills: 1 | Status: SHIPPED | OUTPATIENT
Start: 2023-12-11

## 2023-12-11 RX ORDER — ALBUTEROL SULFATE 90 UG/1
2 AEROSOL, METERED RESPIRATORY (INHALATION) EVERY 4 HOURS PRN
Qty: 1 EACH | Refills: 0 | Status: SHIPPED | OUTPATIENT
Start: 2023-12-11

## 2023-12-11 RX ORDER — FUROSEMIDE 40 MG/1
40 TABLET ORAL DAILY
Qty: 90 TABLET | Refills: 1 | Status: SHIPPED | OUTPATIENT
Start: 2023-12-11

## 2023-12-11 RX ORDER — ESCITALOPRAM OXALATE 10 MG/1
TABLET ORAL
Qty: 90 TABLET | Refills: 0 | Status: SHIPPED | OUTPATIENT
Start: 2023-12-11

## 2023-12-11 RX ORDER — BENAZEPRIL HYDROCHLORIDE 10 MG/1
10 TABLET ORAL DAILY
Qty: 90 TABLET | Refills: 0 | Status: SHIPPED | OUTPATIENT
Start: 2023-12-11

## 2024-01-04 DIAGNOSIS — F39 MOOD DISORDER (HCC): ICD-10-CM

## 2024-01-10 RX ORDER — ESCITALOPRAM OXALATE 5 MG/1
TABLET ORAL
Qty: 90 TABLET | Refills: 0 | OUTPATIENT
Start: 2024-01-10

## 2024-01-22 ENCOUNTER — OFFICE VISIT (OUTPATIENT)
Dept: INTERNAL MEDICINE CLINIC | Facility: CLINIC | Age: 69
End: 2024-01-22
Payer: MEDICARE

## 2024-01-22 ENCOUNTER — HOSPITAL ENCOUNTER (OUTPATIENT)
Dept: GENERAL RADIOLOGY | Age: 69
Discharge: HOME | End: 2024-01-22
Attending: STUDENT IN AN ORGANIZED HEALTH CARE EDUCATION/TRAINING PROGRAM
Payer: MEDICARE

## 2024-01-22 VITALS
RESPIRATION RATE: 20 BRPM | SYSTOLIC BLOOD PRESSURE: 132 MMHG | DIASTOLIC BLOOD PRESSURE: 60 MMHG | HEIGHT: 61 IN | OXYGEN SATURATION: 98 % | BODY MASS INDEX: 50.41 KG/M2 | TEMPERATURE: 98 F | HEART RATE: 58 BPM | WEIGHT: 267 LBS

## 2024-01-22 DIAGNOSIS — Z28.21 REFUSED INFLUENZA VACCINE: ICD-10-CM

## 2024-01-22 DIAGNOSIS — R10.9 LEFT SIDED ABDOMINAL PAIN OF UNKNOWN CAUSE: ICD-10-CM

## 2024-01-22 DIAGNOSIS — R10.9 LEFT SIDED ABDOMINAL PAIN OF UNKNOWN CAUSE: Primary | ICD-10-CM

## 2024-01-22 DIAGNOSIS — K57.90 DIVERTICULOSIS: ICD-10-CM

## 2024-01-22 DIAGNOSIS — R10.9 LEFT FLANK PAIN: ICD-10-CM

## 2024-01-22 LAB
BILIRUB UR QL STRIP.AUTO: NEGATIVE
CLARITY UR REFRACT.AUTO: CLEAR
COLOR UR AUTO: YELLOW
GLUCOSE UR STRIP.AUTO-MCNC: NORMAL MG/DL
KETONES UR STRIP.AUTO-MCNC: NEGATIVE MG/DL
LEUKOCYTE ESTERASE UR QL STRIP.AUTO: NEGATIVE
NITRITE UR QL STRIP.AUTO: NEGATIVE
PH UR STRIP.AUTO: 7 [PH] (ref 5–8)
PROT UR STRIP.AUTO-MCNC: NEGATIVE MG/DL
RBC UR QL AUTO: NEGATIVE
SP GR UR STRIP.AUTO: 1.02 (ref 1–1.03)
UROBILINOGEN UR STRIP.AUTO-MCNC: NORMAL MG/DL

## 2024-01-22 PROCEDURE — 81003 URINALYSIS AUTO W/O SCOPE: CPT | Performed by: STUDENT IN AN ORGANIZED HEALTH CARE EDUCATION/TRAINING PROGRAM

## 2024-01-22 PROCEDURE — 99213 OFFICE O/P EST LOW 20 MIN: CPT | Performed by: STUDENT IN AN ORGANIZED HEALTH CARE EDUCATION/TRAINING PROGRAM

## 2024-01-22 PROCEDURE — 74021 RADEX ABDOMEN 3+ VIEWS: CPT | Performed by: STUDENT IN AN ORGANIZED HEALTH CARE EDUCATION/TRAINING PROGRAM

## 2024-01-22 RX ORDER — MELOXICAM 7.5 MG/1
7.5 TABLET ORAL DAILY
Qty: 10 TABLET | Refills: 0 | Status: SHIPPED | OUTPATIENT
Start: 2024-01-22

## 2024-01-22 NOTE — PROGRESS NOTES
OFFICE NOTE     Patient ID: Serena Rivera is a 68 year old female.  Today's Date: 01/22/24  Chief Complaint: Pain (Pain left side flank area x 5 days history of diverticulitis since last Oct) and Flu (REFUSED/)    Patient is a 69 yo Female with PMH of diverticulosis and colonic polyps, who presents to the office today for evaluation of abdominal pain started about 1 week ago. She states that she ve been constipated for the previous 1.5 week, and only after taking prune juice she was able to move er bowels in the past 3 days regularly with soft brown stool, no blood in it. Her left upper quadrant pain somewhat improved as well with BM.  She mentioned she ate nuts and had lots of popcorn several days prior to onset of her pain.    Abdominal Pain  This is a new problem. The current episode started in the past 7 days. The onset quality is sudden. The problem occurs constantly. The problem has been gradually improving. The pain is located in the LUQ and left flank. The pain is at a severity of 6/10. The pain is moderate. The quality of the pain is dull and cramping. The abdominal pain does not radiate. Associated symptoms include constipation and nausea. Pertinent negatives include no diarrhea or vomiting. The pain is aggravated by eating, palpation, deep breathing and certain positions. The pain is relieved by Bowel movements, belching and passing flatus. Treatments tried: Prune juice. The treatment provided mild relief. Her past medical history is significant for abdominal surgery. There is no history of colon cancer, Crohn's disease, gallstones, pancreatitis or ulcerative colitis. Had removal of uterine mass in the past.       Vitals:    01/22/24 1027   BP: 132/60   Pulse: 58   Resp: 20   Temp: 98.4 °F (36.9 °C)   SpO2: 98%   Weight: 267 lb (121.1 kg)   Height: 5' 1\" (1.549 m)     body mass index is 50.45 kg/m².  BP Readings from Last 3 Encounters:   01/22/24 132/60   12/11/23 128/64   08/28/23  136/66     The 10-year ASCVD risk score (Sourav WU, et al., 2019) is: 12%    Values used to calculate the score:      Age: 68 years      Sex: Female      Is Non- : No      Diabetic: No      Tobacco smoker: No      Systolic Blood Pressure: 132 mmHg      Is BP treated: Yes      HDL Cholesterol: 58 mg/dL      Total Cholesterol: 205 mg/dL      Medications reviewed:  Current Outpatient Medications   Medication Sig Dispense Refill    Meloxicam 7.5 MG Oral Tab Take 1 tablet (7.5 mg total) by mouth daily. With food. 10 tablet 0    albuterol (PROAIR HFA) 108 (90 Base) MCG/ACT Inhalation Aero Soln Inhale 2 puffs into the lungs every 4 (four) hours as needed for Wheezing. 1 each 0    Benazepril HCl 10 MG Oral Tab Take 1 tablet (10 mg total) by mouth daily. 90 tablet 0    escitalopram 10 MG Oral Tab TAKE 1 TABLET BY MOUTH DAILY WITH 5 MG FOR TOTAL 15 MG DAILY 90 tablet 0    escitalopram 5 MG Oral Tab TAKE 1 TABLET BY MOUTH DAILY WITH 10 MG FOR TOTAL 15 MG DAILY 90 tablet 0    furosemide 40 MG Oral Tab Take 1 tablet (40 mg total) by mouth daily. 90 tablet 1    levothyroxine 112 MCG Oral Tab Take 1 tablet (112 mcg total) by mouth before breakfast. 90 tablet 1    Spacer/Aero-Holding Chambers (AEROCHAMBER MINI CHAMBER) Does not apply Device Use with inhaler as needed 1 each 0    VITAMIN D, CHOLECALCIFEROL, OR Take by mouth.      APPLE CIDER VINEGAR OR Take by mouth. gummies      Omeprazole 40 MG Oral Capsule Delayed Release Take 1 capsule (40 mg total) by mouth daily. 90 capsule 1    ELIQUIS 5 MG Oral Tab Take 1 tablet (5 mg total) by mouth 2 (two) times daily.      acetaminophen 500 MG Oral Tab Take 2 tablets (1,000 mg total) by mouth every 6 (six) hours as needed for Pain.      Calcium Carbonate-Vitamin D 250-125 MG-UNIT Oral Tab Take 1 tablet by mouth daily.      Bacillus Coagulans-Inulin (ALIGN PREBIOTIC-PROBIOTIC) 5-1.25 MG-GM Oral Chew Tab Chew 2 tablets by mouth daily.      Iron, Ferrous Sulfate, 325  (65 Fe) MG Oral Tab Take 1 tablet by mouth daily. As needed      flecainide 100 MG Oral Tab Take 1 tablet (100 mg total) by mouth 2 (two) times daily.           Assessment & Plan    1. Left sided abdominal pain of unknown cause (Primary)  2. Diverticulosis  Patient with h/o diverticulosis, recent constipation for 1.5 week and recent popcorn/nut intake presenting with left sided abdominal pain. Bowel sounds normal on exam, no fevers, no N, V, she is passing BM and flatus. She never had this type of pain previously. Suspect mild diverticulitis. Will do XR to exclude significant constipation.        -  Clear and full liquid diet for the next 2-3 days and then Low fiber diet. Print out instructions with dietary recs provided to the patient.        - Continue Tylenol as needed for pain        - Will try lower dose of Meloxicam daily with food. If patient has GI upset - instructed to stop the medication right away and let us know.  -     XR ABDOMEN 3 OR MORE VIEWS (CPT=74021); Future; Expected date: 01/22/2024  -     Meloxicam; Take 1 tablet (7.5 mg total) by mouth daily. With food.  Dispense: 10 tablet; Refill: 0        - If pain does not improve with conservative measures, will need to do CT scan    3. Left flank pain  Given tenderness in the left flank area during percussion - would get UA to exclude possibility of kidney stones.       -     Urinalysis, Routine; Future; Expected date: 01/22/2024  -     Urinalysis, Routine    4. Refused influenza vaccine  -     Influenza Refused        Follow Up: As needed/if symptoms worsen    Objective/ Results:   Physical Exam  Constitutional:       General: She is not in acute distress.     Appearance: Normal appearance. She is obese. She is not ill-appearing, toxic-appearing or diaphoretic.   HENT:      Head: Normocephalic and atraumatic.   Eyes:      Extraocular Movements: Extraocular movements intact.      Conjunctiva/sclera: Conjunctivae normal.      Pupils: Pupils are equal,  round, and reactive to light.   Abdominal:      General: Abdomen is flat. Bowel sounds are normal. There is no distension.      Palpations: Abdomen is soft. There is no shifting dullness, fluid wave, hepatomegaly, splenomegaly, mass or pulsatile mass.      Tenderness: There is abdominal tenderness in the left upper quadrant. There is left CVA tenderness. There is no right CVA tenderness or guarding.      Hernia: No hernia is present.       Neurological:      Mental Status: She is alert.        Reviewed:    Patient Active Problem List    Diagnosis    Diverticulosis    Pure hypercholesterolemia    Pre-diabetes    Carotid disease, bilateral ([facility])     mild      Mild intermittent asthma without complication    Anemia     Nc/nc, borderline % iron, , very mild and stable      Class 3 severe obesity due to excess calories with serious comorbidity and body mass index (BMI) of 50.0 to 59.9 in adult ([facility])    HTN (hypertension)    PAF (paroxysmal atrial fibrillation) ([facility])    Lymphedema of both lower extremities    Osteopenia    BHUPINDER on CPAP     2014, CPAP      Esophageal reflux     GERD      Eczema    Hypothyroidism    OA (osteoarthritis)     knee      Mood disorder ([facility])     hx severe anxiety      Other allergy, other than to medicinal agents     Alleriges        Allergies   Allergen Reactions    Dye [Iodine (Topical)] OTHER (SEE COMMENTS)     Blisters    Latex SWELLING    Shellfish SWELLING    Augmentin [Amoxicillin-Pot Clavulanate] DIARRHEA and NAUSEA AND VOMITING           Pcn [Bicillin C-R,] NAUSEA AND VOMITING    Sulfa Antibiotics OTHER (SEE COMMENTS)     Flushed      Penicillin G Proc & Benzathine OTHER (SEE COMMENTS)     unknown    Sulfamethoxazole OTHER (SEE COMMENTS)     flushed    Adhesive Tape RASH     Can tolerate paper tape    Verapamil RASH and OTHER (SEE COMMENTS)        Social History     Socioeconomic History    Marital status:    Tobacco Use    Smoking status: Never     Passive exposure: Never     Smokeless tobacco: Never   Vaping Use    Vaping Use: Never used   Substance and Sexual Activity    Alcohol use: Yes     Comment: Social drinking    Drug use: Never   Other Topics Concern    Caffeine Concern Yes    Stress Concern No    Weight Concern No    Special Diet Yes     Comment: Low Salt Diet    Exercise Yes    Seat Belt Yes      Review of Systems   HENT: Negative.     Respiratory: Negative.     Gastrointestinal:  Positive for abdominal pain, constipation and nausea. Negative for blood in stool, diarrhea and vomiting.   Genitourinary: Negative.    Musculoskeletal: Negative.    Skin: Negative.      All other systems negative unless otherwise stated in ROS or HPI above.       Nikky Esteves MD  Internal Medicine       Call office with any questions or seek emergency care if necessary.   Patient understands and agrees to follow directions and advice.      ----------------------------------------- PATIENT INSTRUCTIONS-----------------------------------------     There are no Patient Instructions on file for this visit.

## 2024-01-23 DIAGNOSIS — I10 PRIMARY HYPERTENSION: ICD-10-CM

## 2024-01-23 DIAGNOSIS — F39 MOOD DISORDER (HCC): ICD-10-CM

## 2024-01-23 NOTE — TELEPHONE ENCOUNTER
90 day sent to [facility] 12/11/23 and now receiving request from [facility]-message sent to pt to see if she needs refill at this time

## 2024-01-24 RX ORDER — BENAZEPRIL HYDROCHLORIDE 10 MG/1
10 TABLET ORAL DAILY
Qty: 90 TABLET | Refills: 0 | Status: SHIPPED | OUTPATIENT
Start: 2024-01-24

## 2024-01-24 RX ORDER — ESCITALOPRAM OXALATE 10 MG/1
TABLET ORAL
Qty: 90 TABLET | Refills: 0 | Status: SHIPPED | OUTPATIENT
Start: 2024-01-24

## 2024-01-24 RX ORDER — ESCITALOPRAM OXALATE 5 MG/1
TABLET ORAL
Qty: 90 TABLET | Refills: 0 | Status: SHIPPED | OUTPATIENT
Start: 2024-01-24

## 2024-01-24 NOTE — TELEPHONE ENCOUNTER
Pt not using mail order.     Last OV relevant to medication PE 12/11/23  Last refill date: 12/11/23 90     #/refills: 0  When pt was asked to return for OV: 6 months   Upcoming appt/reason:   N/A    Was pt informed of any over due labs: due in April   Lab Results   Component Value Date    GLU 92 12/04/2023    BUN 22 12/04/2023    BUNCREA 21.1 (H) 08/18/2020    CREATSERUM 1.16 (H) 12/04/2023    ANIONGAP 5 12/04/2023    GFR 71 05/10/2017    GFRNAA 47 (L) 05/05/2022    GFRAA 54 (L) 05/05/2022    CA 9.2 12/04/2023    OSMOCALC 293 12/04/2023    ALKPHO 101 12/04/2023    AST 10 (L) 12/04/2023    ALT 19 12/04/2023    BILT 0.6 12/04/2023    TP 6.9 12/04/2023    ALB 3.5 12/04/2023    GLOBULIN 3.4 12/04/2023     12/04/2023    K 4.2 12/04/2023     12/04/2023    CO2 30.0 12/04/2023

## 2024-01-31 ENCOUNTER — ORDER TRANSCRIPTION (OUTPATIENT)
Dept: PHYSICAL THERAPY | Facility: HOSPITAL | Age: 69
End: 2024-01-31

## 2024-01-31 DIAGNOSIS — I89.0 LYMPHEDEMA: Primary | ICD-10-CM

## 2024-02-07 ENCOUNTER — OFFICE VISIT (OUTPATIENT)
Dept: OCCUPATIONAL MEDICINE | Facility: HOSPITAL | Age: 69
End: 2024-02-07
Attending: INTERNAL MEDICINE
Payer: MEDICARE

## 2024-02-07 DIAGNOSIS — I89.0 LYMPHEDEMA: Primary | ICD-10-CM

## 2024-02-07 PROCEDURE — 97535 SELF CARE MNGMENT TRAINING: CPT

## 2024-02-07 PROCEDURE — 97166 OT EVAL MOD COMPLEX 45 MIN: CPT

## 2024-02-07 NOTE — PROGRESS NOTES
LE LYMPHEDEMA EVALUATION:     Diagnosis:   Lymphedema of both lower extremities (I89.0)        Referring Provider: Yari  Date of Evaluation:   2/7/2024     Precautions:  Lymphedema; infection risk; cardiac; abdominal  Next MD visit:   none scheduled  Date of Surgery: n/a     PATIENT SUMMARY   Serena Rivera is a 68 year old female who presents to therapy today for assessment of her success with self-management of her bilateral LE stage III lymphedema.    History of current condition: H/O of bilateral LE lymphedema since 1991, Right > Left. Has h/o prior LE cellulitis, including hospital admit for sepsis needing IV antibiotics. She has received prior treatment in this clinic in [id number], 2018 and in 2023. When she was discharged in 8/2023, because of her aggressive stage III lymphedema and h/o cellulitis, it was recommended that she return for a 6 month follow-up visit to assess her status and possible need for return to therapy. When she was last seen, she was wearing custom-fit compression capris over custom-fit knee highs; she had Circaid thigh/knee Velcro-closure garments for nighttime compression along with Farrow Wrap Classic garments for her lower legs. Was using her Flexitouch device.     Serena returns today with report of the following self-management routine:   *Use of Flexitouch device 2-3 days week. Reports finds it difficult to use in sitting; sits in chair with legs supported on an ottoman.   *For daytime, wears custom-fit knee highs over lower legs in the community. Wears Farrow Wrap Classic garments in the home setting with placement of Circaid lower leg garment over proximal lower legs; reports Right Circaid garment often migrates down exposing top of lower leg. Is not wearing custom-fit kacey. Explained that she does not mind wearing it as it is comfortable, but she takes a diuretic and since it can be difficult to get down for toileting she has discontinued wearing it.   *For nighttime,  she wears Velcro-closure garments over her lower legs 1or 2 times a week; has not been wearing compression on her thighs/knees at night.     Pain level 0/10 periodic bilateral knee pain    Current functional limitations  denies r/t her lymphedema  Serena describes prior level of function as independent in ADL/IADLs. 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 Has RW, but is not using it; uses a straight cane in the community.    Social History:  Lives with supportive . Enjoys reading; goes to aquatic arthritis class 2 days a week.  Reported sleeping position: sleeps in a bed; has a new bed that has capability of elevating head/legs.   Self Reported Weight: 270 lbs; [facility]  Pt goals include confirm she is being successful with her lymphedema self-management.     Past medical history was reviewed with Serena. Significant findings include h/o Left LE cellulitis with sepsis / was discharged home on IV antibiotics; bilateral LE \"vein sx;\" Right LE cellulitis with \"blood poisoning;\" HTN; PAF; CAD; BHUPINDER / CPAP; hypothyroidism; venous insufficiency; eczema; GERD; severe bilateral knee OA (\"bone on bone\"); morbid obesity   Precautions:  Lymphedema; infection risk; cardiac; abdominal    ASSESSMENT   Serena returns to Occupational Therapy today with stable Left thigh/knee volume and reduce lower leg volume with improvement in the tissue quality of her mid-lower leg. Regarding her Right LE, while there has been a reduction in the volume of her lower leg, there has been an increase in volume over her thigh/knee. It is highly likely that this is r/t to deferring the use of her compression kacey during the day and of her thigh/knee garment at night; lack of daily use of her Flexitouch device may also be a contributing factor.     Serena's self-management routine was discussed during today's session and she was provided with the recommendations noted below. In regards to her deferring  use of her compression kacey d/t prescribed diuretic use, she reported she would be open to returning to use of her garment if toileting was less of a challenge. Since she is unclear if there is a medical reason for her to take a diuretic, she was open to dialogue with her primary care re: this. If it is determined that her dosing can be reduced/discontinued, it is recommended that Serena return for skilled Occupational Therapy for a follow-up visit/s to assess her volumes to determine if reducing/discontinuing her diuretic has a negative impact on her bilateral LE lymphedema volume/tissue quality. As such, her treatment plan will remain open at this time.     OBJECTIVE:     Edema/Tissue Observations:    *Right LE circumferential volume= 580.6cm. Tissue quality over thigh is slightly boggy to supple except for distal medial/posterior surfaces which are boggy to slightly boggy. Knee is boggy, non-pitting. Lower leg is densely boggy, pitting with fair superficial tissue mobility over proximal 1/4, poor superficial tissue mobility over distal 3/4. Ankle is boggy, pitting. Dorsum of foot is slightly boggy over medial surface; boggy to slightly boggy over lateral surface.    *Left LE circumferential volume= 542.1cm.  Tissue quality over thigh is slightly boggy to supple; slight discoloration in area of prior cellulitis. Knee is slightly boggy to supple. Proximal 1/3 of lower leg is slightly boggy to supple; mid 1/3 is slightly boggy over anterior surface / boggy to slightly boggy with fair superficial tissue mobility over posterior surface; distal 1/3 is boggy, pitting with poor to fair superficial tissue mobility; discoloration in area of prior cellulitis. Ankle is boggy, pitting. Dorsum of foot is slightly boggy over medial surface; boggy to slightly boggy over lateral surface.   *Tissue hydration is good. Negative Stemmer's sign. Light discoloration to distal lower legs. Emerging hair follicles over lower legs.      Sensation:  intact    AROM/Strength:  WFL bilateral LEs     Bed mobility/Transfers: independent    Gait: Antalgic gait; uses straight cane    Lymphedema Life Impact Scale: Score: 23%. (0% is no impairment, 100% total impairment)      Today’s Treatment and Response:   Self-Care Management/Education:   *Advised pt of need to increase frequency of use of Flexitouch device; discussed using in the evenings / may be easier to use with new bed.   *Shared results of higher volume in Right thigh/knee than when last seen. Advised of recommendation to resume use of compression capris. Discussed diuretic prescription; barriers to garment use. Serena stating she believes diuretic was prescribed by her prior primary care provider to help with reducing her volume when her legs began to swell; is not sure if she has a medical reason to be taking the diuretic; is open to exploring this with her current primary care provider.  *Recommended pt return to wearing Velcro-closure garments over bilateral thighs/knees for overnight compression.   *Discussed custom-fit knee high replacement; 2024 launch of Medicare benefits for garments and current challenges that garment providers are having with current Medicare billing process.   *Discussed replacing current Velcro-closure garments with recommendation to have custom-fit garment for Right lower leg to avoid need for adding second Velcro-closure garment over her proximal lower leg.     Charges: Occupational Therapy Eval: Moderate Complexity, Self-Care/Home Management Training x2      Total Timed Treatment: 30 min     Total Treatment Time: 65 min     PLAN OF CARE:    Pending Goals:  1-3 visits  Pt will continue with current self-management routine adding above recommendations, sans compression capris at this time.     Frequency / Duration: TBD following contact with primary care    Treatment will include: Re-assessment of status; recommendations for home self-management.     Education or  treatment limitation: None  Rehab Potential:good    Patient/Family/Caregiver was advised of these findings, precautions, and treatment options and has agreed to actively participate in planning and for this course of care.    Thank you for your referral. Please co-sign or sign and return this letter via fax as soon as possible to [phone]. If you have any questions, please contact me at Dept: [phone]    Sincerely,  Electronically signed by therapist: LAI Davidson/L, MINOR   Physician's certification required: Yes  I certify the need for these services furnished under this plan of treatment and while under my care.    X___________________________________________________ Date____________________    Certification From: 2/7/2024   To:5/7/2024             LE Circumferential Measurements (cm)  2/7/2024   LEFT  2/7/2024   RIGHT    20cm above Superior Border of Patella (SBP)  81.5 81.1   10cm above SBP  71.1 73.9   SBP  62.8 66.2   5cm below SBP  57.3 61.2   10cm below SBP  52.6 58.7   20cm below SBP  50.7 59.5   30cm below SBP  35.3 44.5   35cm below SBP  27.0 32.5   40cm below SBP  25.2 25.8    Lateral Malleoli  28.5 28.1   7cm proximal to 1st toe web space  25.4 24.8   5cm proximal to 1st toe web space  24.7 24.3   TOTALS  542.1 580.6

## 2024-02-09 ENCOUNTER — TELEPHONE (OUTPATIENT)
Dept: INTERNAL MEDICINE CLINIC | Facility: CLINIC | Age: 69
End: 2024-02-09

## 2024-02-14 ENCOUNTER — TELEPHONE (OUTPATIENT)
Dept: OCCUPATIONAL MEDICINE | Facility: HOSPITAL | Age: 69
End: 2024-02-14

## 2024-02-14 ENCOUNTER — OFFICE VISIT (OUTPATIENT)
Dept: INTERNAL MEDICINE CLINIC | Facility: CLINIC | Age: 69
End: 2024-02-14
Payer: MEDICARE

## 2024-02-14 VITALS
WEIGHT: 267.81 LBS | OXYGEN SATURATION: 96 % | DIASTOLIC BLOOD PRESSURE: 60 MMHG | BODY MASS INDEX: 50.56 KG/M2 | TEMPERATURE: 98 F | HEIGHT: 61 IN | SYSTOLIC BLOOD PRESSURE: 110 MMHG | RESPIRATION RATE: 14 BRPM | HEART RATE: 58 BPM

## 2024-02-14 DIAGNOSIS — I89.0 LYMPHEDEMA OF BOTH LOWER EXTREMITIES: ICD-10-CM

## 2024-02-14 DIAGNOSIS — I10 PRIMARY HYPERTENSION: Primary | ICD-10-CM

## 2024-02-14 PROCEDURE — 99214 OFFICE O/P EST MOD 30 MIN: CPT | Performed by: NURSE PRACTITIONER

## 2024-02-14 RX ORDER — TRIAMCINOLONE ACETONIDE 1 MG/G
CREAM TOPICAL
COMMUNITY
Start: 2024-02-09

## 2024-02-14 RX ORDER — FUROSEMIDE 20 MG/1
20 TABLET ORAL DAILY
Qty: 30 TABLET | Refills: 0 | Status: SHIPPED | OUTPATIENT
Start: 2024-02-14

## 2024-02-14 NOTE — PROGRESS NOTES
Serena Rivera is a 68 year old female.  CHIEF COMPLAINT   Med check    HPI:   The patient is seeing the lymphedema clinic.  She has wraps and garments that she is supposed to wear to reduce the lymphedema.  One of the garments is very difficult to get on and off and because of the Lasix she has to go to the bathroom often preventing her from being adherent to the wrap is much as she would like to be.  Blood pressure has been stable.  Swelling is significantly reduced after working with the lymphedema clinic.    Current Outpatient Medications   Medication Sig Dispense Refill    triamcinolone 0.1 % External Cream       furosemide 40 MG Oral Tab Take 1 tablet (40 mg total) by mouth daily. 30 tablet 0    escitalopram 5 MG Oral Tab TAKE 1 TABLET BY MOUTH DAILY WITH 10 MG FOR TOTAL 15 MG 90 tablet 0    escitalopram 10 MG Oral Tab TAKE 1 TABLET BY MOUTH DAILY. TAKE WITH 5 MG FOR TOTAL 15 MG DAILY 90 tablet 0    Benazepril HCl 10 MG Oral Tab Take 1 tablet (10 mg total) by mouth daily. 90 tablet 0    albuterol (PROAIR HFA) 108 (90 Base) MCG/ACT Inhalation Aero Soln Inhale 2 puffs into the lungs every 4 (four) hours as needed for Wheezing. 1 each 0    levothyroxine 112 MCG Oral Tab Take 1 tablet (112 mcg total) by mouth before breakfast. 90 tablet 1    Spacer/Aero-Holding Chambers (AEROCHAMBER MINI CHAMBER) Does not apply Device Use with inhaler as needed 1 each 0    VITAMIN D, CHOLECALCIFEROL, OR Take by mouth.      APPLE CIDER VINEGAR OR Take by mouth. gummies      Omeprazole 40 MG Oral Capsule Delayed Release Take 1 capsule (40 mg total) by mouth daily. 90 capsule 1    ELIQUIS 5 MG Oral Tab Take 1 tablet (5 mg total) by mouth 2 (two) times daily.      acetaminophen 500 MG Oral Tab Take 2 tablets (1,000 mg total) by mouth every 6 (six) hours as needed for Pain.      Calcium Carbonate-Vitamin D 250-125 MG-UNIT Oral Tab Take 1 tablet by mouth daily.      Bacillus Coagulans-Inulin (ALIGN PREBIOTIC-PROBIOTIC) 5-1.25 MG-GM  Oral Chew Tab Chew 2 tablets by mouth daily.      flecainide 100 MG Oral Tab Take 1 tablet (100 mg total) by mouth 2 (two) times daily.        Past Medical History:   Diagnosis Date    Abnormal mammogram     Acute nasopharyngitis     Adjustment reaction 12/12/2000    adjustment reaction    Allergic rhinitis     Anemia 03/11/2019    Ankle strain     Anxiety     hx severe anxiety    Arrhythmia     A-fib DX YRS AGO    Arthritis     Asthma 09/15/2015    Atherosclerosis 01/12/2009    mild, at the carotid bifurcations, L>R    Breast mass 05/21/2012    left breast, s/p bx-benign; hx R breast mass    Breast mass, left 05/29/2012    Bronchitis     and acute bronchitis    Bronchospasm     Carotid disease, bilateral ([facility]) 04/29/2021    Cellulitis     IN LEGS    Cephalgia 03/15/2012    viral    Chest pain 11/03/2012    Chickenpox     Coughing 04/01/2010    coughing paroxysm    Dermatitis     stress    Disorder of thyroid     Easy bruising On blood thinners    Eczema     Edema     Epigastric fullness 04/27/2011    Esophageal reflux     GERD    ETD (eustachian tube dysfunction)     Exertional chest pain 03/05/2012    Extrinsic asthma, unspecified     Fibroid tumor     in uterine wall    Heart palpitations     Heartburn     Hemorrhoids     High blood pressure     HTN    History of anemia     History of bone density study 01/12/2009    HTN (hypertension) 08/04/2016    Hypothyroid     Hypothyroidism     Itch of skin Had a reaction to Xorelto    Laryngitis     Leg pain     Leg swelling     Lipid screening 02/06/2009    Lymphedema of both lower extremities 09/15/2015    Measles     Migraines     Mumps     OA (osteoarthritis)     knee    Obesity 03/05/2012    Occult blood positive stool     Osteopenia 10/08/2014    Other allergy, other than to medicinal agents 03/05/2012    Alleriges    Otitis media     PAF (paroxysmal atrial fibrillation) ([facility]) 05/23/2016    PMS (premenstrual syndrome)     Pneumonia     PONV (postoperative nausea and  vomiting)     Pre-diabetes 11/04/2021    Rash     Rhinitis     Sinobronchitis     Sinusitis     and acute sinusitis    Stasis dermatitis 05/31/2003    3/14/2000: severe    Stasis ulcer (HCC)     Thumb laceration 02/20/2006    Unspecified sleep apnea     cpap    URI (upper respiratory infection)     Vasculitis (HCC)     Venous insufficiency     severe    Venous stasis dermatitis 05/07/2002    severe    Visual impairment     glasses    Vomiting and diarrhea     Wears glasses       Social History:  Social History     Socioeconomic History    Marital status:    Tobacco Use    Smoking status: Never     Passive exposure: Never    Smokeless tobacco: Never   Vaping Use    Vaping Use: Never used   Substance and Sexual Activity    Alcohol use: Yes     Comment: Social drinking    Drug use: Never   Other Topics Concern    Caffeine Concern Yes    Stress Concern No    Weight Concern No    Special Diet Yes     Comment: Low Salt Diet    Exercise Yes    Seat Belt Yes        REVIEW OF SYSTEMS:   See HPI     EXAM:     /60 (BP Location: Right arm, Patient Position: Sitting, Cuff Size: large)   Pulse 58   Temp 97.5 °F (36.4 °C) (Temporal)   Resp 14   Ht 5' 1\" (1.549 m)   Wt 267 lb 12.8 oz (121.5 kg)   LMP 01/01/2007   SpO2 96%   BMI 50.60 kg/m²   Body mass index is 50.6 kg/m².   GENERAL: well developed, well nourished,in no apparent distress  HEENT: atraumatic, normocephalic  LUNGS: clear to auscultation; no rhonchi, rales, or wheezing  CARDIO: RRR without murmur  GI: no tenderness  MUSCULOSKELETAL:  Normal gait.  EXTREMITIES: BLE edema  NEURO: Oriented times three       LABS:      Lab Results   Component Value Date    WBC 4.8 04/19/2023    RBC 3.96 04/19/2023    HGB 11.9 (L) 04/19/2023    HCT 38.2 04/19/2023    MCV 96.5 04/19/2023    MCH 30.1 04/19/2023    MCHC 31.2 04/19/2023    RDW 13.1 04/19/2023    .0 04/19/2023      Lab Results   Component Value Date    GLU 92 12/04/2023    BUN 22 12/04/2023    BUNCREA  21.1 (H) 08/18/2020    CREATSERUM 1.16 (H) 12/04/2023    ANIONGAP 5 12/04/2023    GFR 71 05/10/2017    GFRNAA 47 (L) 05/05/2022    GFRAA 54 (L) 05/05/2022    CA 9.2 12/04/2023    OSMOCALC 293 12/04/2023    ALKPHO 101 12/04/2023    AST 10 (L) 12/04/2023    ALT 19 12/04/2023    BILT 0.6 12/04/2023    TP 6.9 12/04/2023    ALB 3.5 12/04/2023    GLOBULIN 3.4 12/04/2023     12/04/2023    K 4.2 12/04/2023     12/04/2023    CO2 30.0 12/04/2023      Lab Results   Component Value Date    CHOLEST 205 (H) 12/04/2023    TRIG 147 12/04/2023    HDL 58 12/04/2023     (H) 12/04/2023    VLDL 26 12/04/2023    TCHDLRATIO 2.65 05/10/2017    NONHDLC 147 (H) 12/04/2023      Lab Results   Component Value Date    T4F 1.2 08/18/2020    TSH 2.470 04/19/2023      Lab Results   Component Value Date     12/14/2022    A1C 5.8 (H) 12/14/2022        ASSESSMENT AND PLAN:   1. Primary hypertension  -Advised to lower the dose of Lasix and monitor for increased swelling.  Also monitor urine frequency for improvement.    -If still is not improved enough to where garment may be discontinued.  -She was advised to monitor blood pressure closely and send readings in a couple of weeks.  If blood pressure goes up BenzePrO may be increased if needed  - furosemide 20 MG Oral Tab; Take 1 tablet (20 mg total) by mouth daily.  Dispense: 30 tablet; Refill: 0    2. Lymphedema of both lower extremities  -Continue to see the lymphedema clinic.      The patient indicates understanding of these issues and agrees to the plan.  The patient is asked to return in June as planned or sooner as needed.

## 2024-02-14 NOTE — TELEPHONE ENCOUNTER
Received message from MARIELA Wagner at pt's PCP office, re: plan for pt to reduce her diuretic dose. APRN wondering if pt could don her compression capris in the afternoon following the excessive amount of urination that occurs after pt takes her diuretic in the morning.    Contacted pt to discuss APRN's thoughts. Advised pt that this would be a reasonable approach and recommended that she begin this routine starting in 2 days time. Pt in agreement. Advised pt of APRN/therapist's contact re: monitoring of pt's bilateral LE volume and that APRN is recommending pt return to therapy for a follow-up visit in 1-2 weeks to assess the impact of reducing diuretic on pt's lymphedema volume. Due to clinic volumes being high, therapist's first available appt is 2/29, h/e pt was placed on wait list for sooner appt. Pt has therapist's direct contact information for any questions.

## 2024-02-22 ENCOUNTER — OFFICE VISIT (OUTPATIENT)
Dept: INTERNAL MEDICINE CLINIC | Facility: CLINIC | Age: 69
End: 2024-02-22
Payer: MEDICARE

## 2024-02-22 VITALS
SYSTOLIC BLOOD PRESSURE: 140 MMHG | BODY MASS INDEX: 50.41 KG/M2 | DIASTOLIC BLOOD PRESSURE: 60 MMHG | OXYGEN SATURATION: 97 % | RESPIRATION RATE: 20 BRPM | WEIGHT: 267 LBS | TEMPERATURE: 98 F | HEART RATE: 68 BPM | HEIGHT: 61 IN

## 2024-02-22 DIAGNOSIS — R09.89 CHEST CONGESTION: ICD-10-CM

## 2024-02-22 DIAGNOSIS — B34.9 VIRAL INFECTION: Primary | ICD-10-CM

## 2024-02-22 DIAGNOSIS — R09.81 NASAL CONGESTION: ICD-10-CM

## 2024-02-22 DIAGNOSIS — R05.1 ACUTE COUGH: ICD-10-CM

## 2024-02-22 LAB
COVID19 BINAX NOW ANTIGEN: NOT DETECTED
OPERATOR ID: NORMAL

## 2024-02-22 PROCEDURE — 99213 OFFICE O/P EST LOW 20 MIN: CPT | Performed by: STUDENT IN AN ORGANIZED HEALTH CARE EDUCATION/TRAINING PROGRAM

## 2024-02-22 RX ORDER — ALBUTEROL SULFATE 2.5 MG/3ML
2.5 SOLUTION RESPIRATORY (INHALATION) EVERY 6 HOURS PRN
Qty: 75 ML | Refills: 1 | Status: SHIPPED | OUTPATIENT
Start: 2024-02-22 | End: 2024-03-23

## 2024-02-22 NOTE — PROGRESS NOTES
OFFICE NOTE     Patient ID: Serena Rivera is a 68 year old female.  Today's Date: 02/22/24  Chief Complaint: Upper Respiratory Infection (URL symptoms  x 2 day dry cough, headaches, body felt hot possible fever and nasal congestion  stated taking ROBITUSSIN DM today pt's states still very fatigue and week)    Upper Respiratory Infection   Associated symptoms include congestion, coughing, ear pain, rhinorrhea, a sore throat and wheezing. Pertinent negatives include no abdominal pain, chest pain, diarrhea, nausea, sinus pain or vomiting.     Patient is a 68-year-old female, who presents today for evaluation of dry cough, headaches and feeling warm and feverish for the past 2 days.  The cough is unproductive, nagging, she took Robitussin and was able to sleep at night with that.  Last night she had a little bit of fever and some chills, also felt like her glands in the neck is mildly swollen and scratchy throat.    Patient has a history of asthma, which was very well controlled , and she needed albuterol less than once a month.       Vitals:    02/22/24 1107   BP: 140/60   Pulse: 68   Resp: 20   Temp: 98.4 °F (36.9 °C)   SpO2: 97%   Weight: 267 lb (121.1 kg)   Height: 5' 1\" (1.549 m)     body mass index is 50.45 kg/m².  BP Readings from Last 3 Encounters:   02/22/24 140/60   02/14/24 110/60   01/22/24 132/60     The 10-year ASCVD risk score (Sourav WU, et al., 2019) is: 13.4%    Values used to calculate the score:      Age: 68 years      Sex: Female      Is Non- : No      Diabetic: No      Tobacco smoker: No      Systolic Blood Pressure: 140 mmHg      Is BP treated: Yes      HDL Cholesterol: 58 mg/dL      Total Cholesterol: 205 mg/dL      Medications reviewed:  Current Outpatient Medications   Medication Sig Dispense Refill    dextromethorphan-guaiFENesin  mg/5mL Oral Liquid Take 5 mL by mouth as needed.      albuterol (2.5 MG/3ML) 0.083% Inhalation Nebu Soln Take 3 mL  (2.5 mg total) by nebulization every 6 (six) hours as needed for Wheezing or Shortness of Breath. 75 mL 1    triamcinolone 0.1 % External Cream       furosemide 20 MG Oral Tab Take 1 tablet (20 mg total) by mouth daily. 30 tablet 0    escitalopram 5 MG Oral Tab TAKE 1 TABLET BY MOUTH DAILY WITH 10 MG FOR TOTAL 15 MG 90 tablet 0    escitalopram 10 MG Oral Tab TAKE 1 TABLET BY MOUTH DAILY. TAKE WITH 5 MG FOR TOTAL 15 MG DAILY 90 tablet 0    Benazepril HCl 10 MG Oral Tab Take 1 tablet (10 mg total) by mouth daily. 90 tablet 0    albuterol (PROAIR HFA) 108 (90 Base) MCG/ACT Inhalation Aero Soln Inhale 2 puffs into the lungs every 4 (four) hours as needed for Wheezing. 1 each 0    levothyroxine 112 MCG Oral Tab Take 1 tablet (112 mcg total) by mouth before breakfast. 90 tablet 1    Spacer/Aero-Holding Chambers (AEROCHAMBER MINI CHAMBER) Does not apply Device Use with inhaler as needed 1 each 0    VITAMIN D, CHOLECALCIFEROL, OR Take by mouth.      APPLE CIDER VINEGAR OR Take by mouth. gummies      Omeprazole 40 MG Oral Capsule Delayed Release Take 1 capsule (40 mg total) by mouth daily. 90 capsule 1    ELIQUIS 5 MG Oral Tab Take 1 tablet (5 mg total) by mouth 2 (two) times daily.      acetaminophen 500 MG Oral Tab Take 2 tablets (1,000 mg total) by mouth every 6 (six) hours as needed for Pain.      Calcium Carbonate-Vitamin D 250-125 MG-UNIT Oral Tab Take 1 tablet by mouth daily.      Bacillus Coagulans-Inulin (ALIGN PREBIOTIC-PROBIOTIC) 5-1.25 MG-GM Oral Chew Tab Chew 2 tablets by mouth daily.      flecainide 100 MG Oral Tab Take 1 tablet (100 mg total) by mouth 2 (two) times daily.           Assessment & Plan    1. Viral infection (Primary)  2. Acute cough  3. Nasal congestion  4. Chest congestion  With her mild upper respiratory viral infection, associated with dry cough, nasal congestion, ear congestion and rhinorrhea.  Her lungs are clear to auscultation.  Her asthma was very well-controlled prior to the symptoms  onset.  Would continue with conservative management at this time with addition of nasal sprays.  Will also prescribe albuterol nebulizer treatments as patient states that she benefited from this previously and has nebulizer at home.  -     Albuterol Sulfate; Take 3 mL (2.5 mg total) by nebulization every 6 (six) hours as needed for Wheezing or Shortness of Breath.  Dispense: 75 mL; Refill: 1  - Tylenol 500 mg every 6 hours as needed for fever and muscle aches  - Can take Ibuprofen  400 mg every 8 hours with food/milk  as needed  - Can take over the counter  Mucinex two times a day as needed to help to thin the mucus and help with its production during the day  - Take Loratadine (Claritin) 10 mg daily for 10 days  - Ponaris nasal emollient 3 drops before bedtime  - Can take Dextromethorphan (Delsym or Robitussin) at bedtime to help with cough at night if needed  - Use Flonase  1 puffs in each nostril once a day for 1- 2 weeks for nasal congestion  - Stay well hydrated with warm tea. Can use lemon, honey, tumeric, ginger.  - Call back to the clinic or go to the ED if symptoms do not improve or get worse.        Follow Up: As needed/if symptoms worsen        Objective/ Results:   Physical Exam  Constitutional:       General: She is not in acute distress.     Appearance: Normal appearance. She is obese. She is not ill-appearing, toxic-appearing or diaphoretic.   HENT:      Head: Normocephalic and atraumatic.      Right Ear: Tympanic membrane, ear canal and external ear normal.      Left Ear: Ear canal and external ear normal. Tympanic membrane is injected.      Nose: Congestion and rhinorrhea present.      Mouth/Throat:      Mouth: Mucous membranes are moist.      Pharynx: Oropharynx is clear. No oropharyngeal exudate or posterior oropharyngeal erythema.   Eyes:      Extraocular Movements: Extraocular movements intact.      Conjunctiva/sclera: Conjunctivae normal.      Pupils: Pupils are equal, round, and reactive to  light.   Cardiovascular:      Rate and Rhythm: Normal rate and regular rhythm.      Pulses: Normal pulses.      Heart sounds: Normal heart sounds.   Pulmonary:      Effort: Pulmonary effort is normal. No respiratory distress.      Breath sounds: Normal breath sounds. No stridor. No wheezing, rhonchi or rales.   Chest:      Chest wall: No tenderness.   Musculoskeletal:      Cervical back: Normal range of motion. No rigidity.   Lymphadenopathy:      Cervical: No cervical adenopathy.   Skin:     General: Skin is warm.      Capillary Refill: Capillary refill takes less than 2 seconds.   Neurological:      General: No focal deficit present.      Mental Status: She is alert and oriented to person, place, and time. Mental status is at baseline.      Cranial Nerves: No cranial nerve deficit.      Sensory: No sensory deficit.      Motor: No weakness.   Psychiatric:         Mood and Affect: Mood normal.         Behavior: Behavior normal.         Thought Content: Thought content normal.         Judgment: Judgment normal.        Reviewed:    Patient Active Problem List    Diagnosis    Diverticulosis    Pure hypercholesterolemia    Pre-diabetes    Carotid disease, bilateral ([facility])     mild      Mild intermittent asthma without complication ([facility])    Anemia     Nc/nc, borderline % iron, , very mild and stable      Class 3 severe obesity due to excess calories with serious comorbidity and body mass index (BMI) of 50.0 to 59.9 in adult ([facility])    HTN (hypertension)    PAF (paroxysmal atrial fibrillation) ([facility])    Lymphedema of both lower extremities    Osteopenia    BHUPINDER on CPAP     2014, CPAP      Esophageal reflux     GERD      Eczema    Hypothyroidism    OA (osteoarthritis)     knee      Mood disorder ([facility])     hx severe anxiety      Other allergy, other than to medicinal agents     Alleriges        Allergies   Allergen Reactions    Dye [Iodine (Topical)] OTHER (SEE COMMENTS)     Blisters    Latex SWELLING    Shellfish SWELLING     Augmentin [Amoxicillin-Pot Clavulanate] DIARRHEA and NAUSEA AND VOMITING           Pcn [Bicillin C-R,] NAUSEA AND VOMITING    Sulfa Antibiotics OTHER (SEE COMMENTS)     Flushed      Penicillin G Proc & Benzathine OTHER (SEE COMMENTS)     unknown    Sulfamethoxazole OTHER (SEE COMMENTS)     flushed    Adhesive Tape RASH     Can tolerate paper tape    Verapamil RASH and OTHER (SEE COMMENTS)        Social History     Socioeconomic History    Marital status:    Tobacco Use    Smoking status: Never     Passive exposure: Never    Smokeless tobacco: Never   Vaping Use    Vaping Use: Never used   Substance and Sexual Activity    Alcohol use: Yes     Comment: Social drinking    Drug use: Never   Other Topics Concern    Caffeine Concern Yes    Stress Concern No    Weight Concern No    Special Diet Yes     Comment: Low Salt Diet    Exercise Yes    Seat Belt Yes      Review of Systems   Constitutional:  Positive for fatigue and fever. Negative for appetite change and chills.   HENT:  Positive for congestion, ear pain, postnasal drip, rhinorrhea, sinus pressure and sore throat. Negative for sinus pain, trouble swallowing and voice change.    Eyes: Negative.    Respiratory:  Positive for cough and wheezing. Negative for chest tightness and shortness of breath.    Cardiovascular:  Negative for chest pain, palpitations and leg swelling.   Gastrointestinal:  Negative for abdominal pain, constipation, diarrhea, nausea and vomiting.   Genitourinary: Negative.    Musculoskeletal:  Negative for joint swelling and myalgias.   Skin: Negative.    Neurological: Negative.    Hematological:  Positive for adenopathy.     All other systems negative unless otherwise stated in ROS or HPI above.       Nikky Esteves MD  Internal Medicine       Call office with any questions or seek emergency care if necessary.   Patient understands and agrees to follow directions and advice.      ----------------------------------------- PATIENT  INSTRUCTIONS-----------------------------------------     Patient Instructions   - Tylenol 500 mg every 6 hours as needed for fever and muscle aches    - Can take Ibuprofen  400 mg every 8 hours with food/milk  as needed     - Can take over the counter  Mucinex two times a day as needed to help to thin the mucus and help with its production during the day    - Take Loratadine (Claritin) 10 mg daily for 10 days    - Ponaris nasal emollient 3 drops before bedtime     - Can take Dextromethorphan (Delsym or Robitussin) at bedtime to help with cough at night if needed     - Use Flonase  1 puffs in each nostril once a day for 1- 2 weeks for nasal congestion    - Use nebulizer up to 3 times a day    - Stay well hydrated with warm tea. Can use lemon, honey, tumeric, ginger.    - Call back to the clinic or go to the ED if symptoms do not improve or get worse.

## 2024-02-22 NOTE — PATIENT INSTRUCTIONS
- Tylenol 500 mg every 6 hours as needed for fever and muscle aches    - Can take Ibuprofen  400 mg every 8 hours with food/milk  as needed     - Can take over the counter  Mucinex two times a day as needed to help to thin the mucus and help with its production during the day    - Take Loratadine (Claritin) 10 mg daily for 10 days    - Ponaris nasal emollient 3 drops before bedtime     - Can take Dextromethorphan (Delsym or Robitussin) at bedtime to help with cough at night if needed     - Use Flonase  1 puffs in each nostril once a day for 1- 2 weeks for nasal congestion    - Use nebulizer up to 3 times a day    - Stay well hydrated with warm tea. Can use lemon, honey, tumeric, ginger.    - Call back to the clinic or go to the ED if symptoms do not improve or get worse.

## 2024-02-25 ENCOUNTER — PATIENT MESSAGE (OUTPATIENT)
Dept: INTERNAL MEDICINE CLINIC | Facility: CLINIC | Age: 69
End: 2024-02-25

## 2024-02-26 ENCOUNTER — TELEPHONE (OUTPATIENT)
Dept: INTERNAL MEDICINE CLINIC | Facility: CLINIC | Age: 69
End: 2024-02-26

## 2024-02-26 NOTE — TELEPHONE ENCOUNTER
These look great. If her swelling is still okay we can stop the other 20 mg daily of lasix all together and send me readings again in 2 weeks with an update on the swelling. Thanks.

## 2024-02-26 NOTE — TELEPHONE ENCOUNTER
From: Serena Rivera  To: Ermelinda Greenberg  Sent: 2/25/2024 4:03 PM CST  Subject: Blood pressure     Andre Hayden, it’s Serena Rivera. You told me to monitor my blood pressure for a couple weeks after we cut my water pill in half. Here they are.  February /60  February /64  February /68  February /58  [id number]  Let me know your decision on the water pill.  Thank you, Serena

## 2024-02-26 NOTE — TELEPHONE ENCOUNTER
Incoming (mail or fax):  fax  Received from:  [facility]  Documentation given to:  Triage Rx    Needs signature for Albuterol

## 2024-02-29 ENCOUNTER — OFFICE VISIT (OUTPATIENT)
Dept: OCCUPATIONAL MEDICINE | Facility: HOSPITAL | Age: 69
End: 2024-02-29
Attending: INTERNAL MEDICINE
Payer: MEDICARE

## 2024-02-29 ENCOUNTER — TELEPHONE (OUTPATIENT)
Dept: INTERNAL MEDICINE CLINIC | Facility: CLINIC | Age: 69
End: 2024-02-29

## 2024-02-29 PROCEDURE — 97535 SELF CARE MNGMENT TRAINING: CPT

## 2024-02-29 NOTE — PROGRESS NOTES
Diagnosis:   Lymphedema of both lower extremities (I89.0)      Referring Provider: Yari  Date of Evaluation:    2/7/2024    Precautions:  Lymphedema; infection risk; cardiac; abdominal  Next MD visit:   none scheduled  Date of Surgery: n/a   Insurance Primary/Secondary: MEDICARE / COMMERCIAL # Authorized Visits:  1/1-3              Next MD/Plan Renewal Date: 5/7/2024          Therapy-related medical history:  H/O of bilateral LE lymphedema since 1991, Right > Left. Has h/o prior LE cellulitis, including hospital admit for sepsis needing IV antibiotics. She has received prior treatment in this clinic in [id number], 2018 and in 2023.      Subjective:   *Pt reports she has been following the recommended reduction in her diuretic dose. Reports she doesn't feel she's had any \"big gains\" in her leg volume. Reports BP has been okay.   *Reports she has been putting her compression capris on around 1pm on the days she wears her knee highs, but has not been using them on the days she wears her Velcro-closure garments on her lower legs. When at home, she mostly wears her Velcro-closure garments on her lower legs. Did not think to wear capris with her Velcro-closure garments.   *Reports that she was feeling unwell for several days last week d/t an allergy/asthma attach. Did not wear her compression as she was \"mostly in bed.\"   *Reports that after she got up yesterday morning she \"got busy\" and forgot to put her compression on until 11:30am (was up about 3 1/2 hours without compression on). At that point she put her Velcro-closure garments on her bilateral lower legs and used them all day. Reports she does not always get her compression of first thing in the morning.   *Reports she did not use her Flexitouch device last night. Has been using device \"at least every other day.\" Sharing, \"I can't seem to get into a routine.\"   *Has appointment with cardiologist tomorrow.     Pain: 0/10 related to lymphedema     Assessment:   Pt  with unfortunate rise in the volume of her bilateral legs. Because of recently deferring her garments, it is difficult to fully assess whether it is r/t the reduction in her diuretic dose vs the deferment of garment use vs a combination of both. While she did wear her compression capris more frequently than she has been wearing them, it did not occur to her to wear them when she wears her Velcro-closure garments on her bilateral lower legs.     While pt is able to verbally discuss what her home self-management program should look like, there appears to be underlying factors that are impeding her ability to fully apply her program. She seems motivated to use the tracking sheets created at today's session to assist her with becoming more consistent with getting compression on her legs first thing in the morning and more consistent with the use of her Flexitouch device. It is recommended that she continued skilled Occupational Therapy to assist her with solidifying the program that she verbally ascribes to needing to do for the lifelong self-management of her stage III secondary lymphedema.     Objective:  Arrived wearing custom-fit knee highs.      OBSERVATION:   *Right LE: Tissue quality over thigh is slightly boggy to supple except for distal medial/posterior surfaces which are boggy to slightly boggy. Knee is boggy, non-pitting. Lower leg is densely boggy, pitting with fair superficial tissue mobility over proximal 1/4, poor superficial tissue mobility over distal 3/4. Ankle is boggy, pitting. Dorsum of foot is slightly boggy over medial surface; boggy to slightly boggy over lateral surface.    *Left LE : Tissue quality over thigh is slightly boggy to supple; slight discoloration in area of prior cellulitis. Knee is slightly boggy to supple. Proximal 1/3 of lower leg is slightly boggy to supple; mid 1/3 is slightly boggy over anterior surface / boggy to slightly boggy with fair superficial tissue mobility over  posterior surface; distal 1/3 is boggy, pitting with poor to fair superficial tissue mobility; discoloration in area of prior cellulitis. Ankle is boggy, pitting. Dorsum of foot is slightly boggy over medial surface; boggy to slightly boggy over lateral surface.   *Tissue hydration is good. Negative Stemmer's sign. Light discoloration to distal lower legs. Emerging hair follicles over lower legs.    MEASUREMENTS:   *Left LE circumferential volume increased by 7.9cm with a 5.9cm increase in lower leg.   *Right LE circumferential volume increased by 9.5cm with a 15.9cm increase in lower leg.   TREATMENT:  *Re-assessment of status   *Re-education provided in aggressiveness of pt's lymphedema and need to don compression first thing in the morning if it is not on overnight / wear compression for 20-23 hours. In regards to compression capris, advised pt that she can wear them with her Velcro-closure garments as well as her knee highs.   *Discussed pt's barriers to self-management. Pt sharing there are things \"that I need to do\" and things \"that I want to do.\" When she thinks about all of them combined, she \"gets stuck, and then I just shut down and don't do any of them.\" In response to things that she feels she \"needs to do,\" she replied, \"I need to take care of my legs;\" \"I know I have to do this every day.\" Discussed development of positive habits and length of time that is usually required. Suggested choosing a set of achievable goals to begin working on. Collaborated with pt on 2 goals and tracking charts devised for home use. Written tracking form provided.   *Discussed how it is difficult to assess pt's response to reduction in diuretic use d/t recent deferment of garment use. Advised pt that volume increase could be r/t deferment of garment use vs reduction in dose of diuretic vs a combination of the both. Advised pt that therapist would like to share today's session results with APRN at PCP office that pt is  seeing; pt in agreement.   HEP:   *2/29: Begin following tracking sheet to record personal goals of self-management.     Goals: (1-3 visits)    2/29/2024 NEW GOALS:   Pt will follow personal tracking sheet.   2.   Pt will put compression on lower legs first thing in the morning, 4 mornings a week -- personally chosen goal.   3.   Pt will use pump 4 days a week -- personally chosen goal.   4.   Pt will use compression capris 4-5 days/week.      2/27/2024 Goal:  1-3 visits  Pt will continue with current self-management routine adding above recommendations, sans compression capris at this time.     Plan:   Pt to return in 1-2 weeks for follow-up visit. Therapist to share results of todays' session with pt's Ermelinda SIERRA.     Charges: 4 Self-Care/Home Management Training    Total Timed Treatment: 55 min  Total Treatment Time: 60 min      LE Circumferential Measurements (cm)  2/7/2024   LEFT  2/7/2024   RIGHT  2/29/2024  LEFT 2/29/2024   RIGHT   20cm above Superior Border of Patella (SBP)  81.5 81.1 83.2 78.5   10cm above SBP  71.1 73.9 71.0 73.5   SBP  62.8 66.2 62.5 65.0   5cm below SBP  57.3 61.2 58.0 59.0   10cm below SBP  52.6 58.7 51.8 57.8   20cm below SBP  50.7 59.5 51.2 62.0   30cm below SBP  35.3 44.5 38.3 49.3   35cm below SBP  27.0 32.5 29.1 35.5   40cm below SBP  25.2 25.8 26.3 29.8    Lateral Malleoli  28.5 28.1 28.3 29.0   7cm proximal to 1st toe web space  25.4 24.8 25.3 25.8   5cm proximal to 1st toe web space  24.7 24.3 25.0 24.9   TOTALS  542.1 580.6 550.0 590.1

## 2024-03-01 NOTE — TELEPHONE ENCOUNTER
That's okay. She can watch it and if > 140/80 most of the time she can let me know. Keep same dose and see lymphedema clinic as planned. Thanks.

## 2024-03-01 NOTE — TELEPHONE ENCOUNTER
Rec call back from patient. Patient endorses she is taking 20 mg daily of furosemide as she was previously taking 40 mg. . Endorses saw cards and they said if that dose is working, no reservation staying at that dose. States the swelling is fine. No sob. Endorses her bp this morning at cards office visit was 112/unknown.  Was monitoring daily last week. Has not since. Advised to monitor and send us readings. Aware. Verbalizes understanding.    I advised patient per your notes below, however, let us know if you want any changes since patient didn't really have bp readings. Or let us know if you want us to give patient bp parameters. Thanks!

## 2024-03-01 NOTE — TELEPHONE ENCOUNTER
Pts lasix were put in half due to urgency with urine. Ask if the swelling increase is still tolerable. Is she having any sob. How is her BP.    If BP is stable. No sob. And edema is more but still okay keep the same dose and see lymphedema clinic next week. Do the wraps. CTM closely. Thanks.

## 2024-03-04 ENCOUNTER — TELEPHONE (OUTPATIENT)
Dept: INTERNAL MEDICINE CLINIC | Facility: CLINIC | Age: 69
End: 2024-03-04

## 2024-03-04 NOTE — TELEPHONE ENCOUNTER
Incoming (mail or fax):  fax  Received from:  [facility]  Documentation given to:  Triage IN

## 2024-03-05 DIAGNOSIS — E03.9 ACQUIRED HYPOTHYROIDISM: ICD-10-CM

## 2024-03-05 RX ORDER — LEVOTHYROXINE SODIUM 112 UG/1
112 TABLET ORAL
Qty: 90 TABLET | Refills: 0 | Status: SHIPPED | OUTPATIENT
Start: 2024-03-05

## 2024-03-05 NOTE — TELEPHONE ENCOUNTER
[facility]- LM to call back to confirm if she wants this transferred to [facility].

## 2024-03-05 NOTE — TELEPHONE ENCOUNTER
Thyroid Medication Protocol Vaqseb8203/05/2024 12:47 PM   Protocol Details TSH in past 12 months    Last TSH value is normal    In person appointment or virtual visit in the past 12 mos or appointment in next 3 mos     Spoke to patient. [facility] use [facility]. Requesting refill to local. Has 4 more days left of levo. [facility] patients pharmacies [facility].    The patient is asked to return in June as planned or sooner as needed.     Future Appointments   Date Time Provider Department Center   3/6/2024  2:30 PM Evita Perez, OT [facility]   6/17/2024 10:20 AM Roxanna Rice MD [facility] [place]   7/2/2024  9:00 AM Yadira Avilez MD [facility]      Acquired hypothyroidism  - Assay, Thyroid Stim Hormone; Future  - levothyroxine 112 MCG Oral Tab; Take 1 tablet (112 mcg total) by mouth before breakfast.  Dispense: 90 tablet; Refill: 1    Rx refilled per protocol.

## 2024-03-06 ENCOUNTER — OFFICE VISIT (OUTPATIENT)
Dept: OCCUPATIONAL MEDICINE | Facility: HOSPITAL | Age: 69
End: 2024-03-06
Attending: INTERNAL MEDICINE
Payer: MEDICARE

## 2024-03-06 PROCEDURE — 97535 SELF CARE MNGMENT TRAINING: CPT

## 2024-03-07 NOTE — PROGRESS NOTES
Diagnosis:   Lymphedema of both lower extremities (I89.0)      Referring Provider: Yari  Date of Evaluation:    2/7/2024    Precautions:  Lymphedema; infection risk; cardiac; abdominal  Next MD visit:   none scheduled  Date of Surgery: n/a   Insurance Primary/Secondary: MEDICARE / COMMERCIAL # Authorized Visits:  2/1-3              Next MD/Plan Renewal Date: 5/7/2024          Therapy-related medical history:  H/O of bilateral LE lymphedema since 1991, Right > Left. Has h/o prior LE cellulitis, including hospital admit for sepsis needing IV antibiotics. She has received prior treatment in this clinic in [id number], 2018 and in 2023.      Subjective:   *Pt reports her cardiologist was agreeable to the reduction in her diuretic as long as it doesn't affect her BP or Afib. She reports she did have a BP \"in the 137-140's\" since last session; unable to remember the bottom number. Sharing that she is supposed to be taking her BP daily to report to PCP office, h/e has been forgetting to do that. Is trying to think of a way to remind herself.   *Reports the following re: self-established goals since last session:   -donned compression knee highs first thing in the morning 5 of 6 days; on remaining days used Velcro-closure garments, h/e did not don until around 11am (personal goal of 4)   -used Flexitouch device 3 of 6 days (personal goal of 4 days)  *Reports that she did not use compression over her thighs/knees until today when she donned her compression capris at around 11am. Reports she did not think to use them on the other days as she was concentrating on the goals from her prior session.   *Stating that once she donned her compression capris, \"I don't mind them at all.\" Reports that the full compression over her lower extremities feels \"good for her knees\" and she did not need to use her cane to come to her session.     Pain: 0/10 related to lymphedema     Assessment:   Pt with good follow through with using  tracking sheet developed at last session and has achieved this goal. Surpassed goal of donning lower leg compression first thing in the mornings, h/e struggled with use of Flexitouch Plus device as personally desired. Despite donning her compression first thing in the morning, rising bilateral lower leg volume is continued to be seen and has impacted her ability to use her custom-fit knee highs. Therefore, she has been advised to return to using her Velcro-closure garments on her lower legs. In today's session, difficulties with other medically-based routines was discussed and a new tracking sheet including these was developed.     Pt appears highly motivated to continue with use of tracking sheets to assist her with her self-management. She was advised that the results of today's session would be shared with her primary care office, and it is possible they may request that she return to her prior diuretic dose. Pt expressed understanding.     Recommend to continue with skilled intervention relative to current plan, pending primary care office input, with plan for pt to return in 3-4 weeks to assess success with home self-management as well as assess overall volume/tissue quality and need for any further skilled Occupational Therapy.     Objective:  Arrived wearing custom-fit knee highs and custom-fit capris.      OBSERVATION:     *Constriction lines over bilateral distal lower legs from knee highs, Left more than Right.   *Right LE: Tissue quality over thigh is slightly boggy to supple except for distal medial/posterior surfaces which are boggy to slightly boggy. Knee is boggy, non-pitting. Lower leg is densely boggy, pitting with fair superficial tissue mobility over proximal 1/4, poor superficial tissue mobility over distal 3/4. Ankle is boggy, pitting. Dorsum of foot is slightly boggy over medial surface; boggy to slightly boggy over lateral surface.    *Left LE : Tissue quality over thigh is slightly boggy to  supple; slight discoloration in area of prior cellulitis. Knee is slightly boggy to supple. Proximal 1/3 of lower leg is slightly boggy to supple; mid 1/3 is slightly boggy over anterior surface / boggy with fair superficial tissue mobility over posterior surface; distal 1/3 is boggy, pitting with poor superficial tissue mobility; discoloration in area of prior cellulitis. Ankle is boggy, pitting. Dorsum of foot is slightly boggy over medial surface; boggy to slightly boggy over lateral surface.   *Tissue hydration is good. Negative Stemmer's sign. Light discoloration to distal lower legs. Emerging hair follicles over lower legs.    MEASUREMENTS:   *Since 2/29, Left LE circumferential volume increased by 6.1cm with a 5.7cm increase in lower leg for an overall total limb increase of 14.0cm with increase of lower leg volume of 11.6cm since 2/7.   *Right LE circumferential volume increased by 9.4cm with a 3.3cm increase in lower leg for an overall total limb increase of 18.9cm with increase of lower leg volume of 19.2cm since 2/7.   TREATMENT:  *Re-assessment of status   *Discussed rising LE volume, specifically in lower legs; ill-fit of compression knee highs. Advised pt to return to use of Velcro-closure garments over lower legs to avoid constriction from knee highs.   *Discussed difficulties with developing routine of taking BP and of timing of diuretic dosing. Recommended adding these routines to pt's tracking sheet; pt in agreement. Discussed routine of taking BP first thing in the morning (leaving BP cuff by her garments as a reminder); followed by donning lower leg Velcro-closure garments; followed by taking diuretic (so that she is finished with bathroom breaks earlier in the day); followed by donning compression capris once bathroom breaks are finished. Pt in agreement. Developed new tracking sheet inclusive of the aforementioned with addition of compression device use. With pt being unsure of her ability to  follow first personally chosen goal of donning compression capris 4-5 days a week, she requested to reduce that to 3 days a week.   HEP:   *2/29: Begin following tracking sheet to record personal goals of self-management.   *3/6: Begin following new tracking sheet to record personal goals of self-management along with recording daily BP and earlier diuretic dosing.     Goals: (1-3 visits)    3/6/2024 NEW/ADJUSTED GOALS: 1 visit  Pt will use compression capris 3 days/week.   Pt will put compression on lower legs first thing in the morning, 6 mornings a week (return to use of Velcro-closure garments).     2/29/2024 NEW GOALS:   Pt will follow personal tracking sheet. ACHIEVED   2.   Pt will put compression on lower legs first thing in the morning, 4 mornings a week -- personally chosen goal. ACHIEVED   3.   Pt will use pump 4 days a week -- personally chosen goal.   4.   Pt will use compression capris 4-5 days/week.  MODIFIED ABOVE    2/7/2024 Goal:  1-3 visits  Pt will continue with current self-management routine adding above recommendations, sans compression capris at this time.     Plan:   Pt to return in 3-4 weeks for follow-up visit. Therapist to share results of todays' session with pt's Ermelinda SIERRA.     Charges: 4 Self-Care/Home Management Training    Total Timed Treatment: 55 min  Total Treatment Time: 60 min      LE Circumferential Measurements (cm)  2/7/2024   LEFT  2/7/2024   RIGHT  2/29/2024  LEFT 2/29/2024   RIGHT 3/6/2024   LEFT 3/6/2024   RIGHT   20cm above Superior Border of Patella (SBP)  81.5 81.1 83.2 78.5 82.2 80.6   10cm above SBP  71.1 73.9 71.0 73.5 71.4 73.5   SBP  62.8 66.2 62.5 65.0 63.5 66.3   5cm below SBP  57.3 61.2 58.0 59.0 58.0 61.7   10cm below SBP  52.6 58.7 51.8 57.8 52.5 61.7   20cm below SBP  50.7 59.5 51.2 62.0 54.0 63.8   30cm below SBP  35.3 44.5 38.3 49.3 40.5 49.4   35cm below SBP  27.0 32.5 29.1 35.5 27.7 34.3   40cm below SBP  25.2 25.8 26.3 29.8 27.7 29.0     Lateral Malleoli  28.5 28.1 28.3 29.0 28.7 29.0   7cm proximal to 1st toe web space  25.4 24.8 25.3 25.8 25.4 25.4   5cm proximal to 1st toe web space  24.7 24.3 25.0 24.9 24.5 24.8   TOTALS  542.1 580.6 550.0 590.1 556.1 599.5

## 2024-03-08 DIAGNOSIS — I10 BENIGN ESSENTIAL HTN: ICD-10-CM

## 2024-03-11 RX ORDER — FUROSEMIDE 40 MG/1
40 TABLET ORAL DAILY
Qty: 90 TABLET | Refills: 1 | OUTPATIENT
Start: 2024-03-11

## 2024-03-19 ENCOUNTER — HOSPITAL ENCOUNTER (OUTPATIENT)
Dept: BONE DENSITY | Age: 69
Discharge: HOME | End: 2024-03-19
Attending: NURSE PRACTITIONER
Payer: MEDICARE

## 2024-03-19 DIAGNOSIS — M85.80 OSTEOPENIA, UNSPECIFIED LOCATION: ICD-10-CM

## 2024-03-19 DIAGNOSIS — M81.0 OSTEOPOROSIS, UNSPECIFIED OSTEOPOROSIS TYPE, UNSPECIFIED PATHOLOGICAL FRACTURE PRESENCE: Primary | ICD-10-CM

## 2024-03-19 DIAGNOSIS — Z78.0 POSTMENOPAUSAL: ICD-10-CM

## 2024-03-19 PROCEDURE — 77080 DXA BONE DENSITY AXIAL: CPT | Performed by: NURSE PRACTITIONER

## 2024-03-21 DIAGNOSIS — I10 PRIMARY HYPERTENSION: ICD-10-CM

## 2024-03-22 RX ORDER — FUROSEMIDE 20 MG/1
20 TABLET ORAL DAILY
Qty: 90 TABLET | Refills: 0 | Status: SHIPPED | OUTPATIENT
Start: 2024-03-22

## 2024-03-22 NOTE — TELEPHONE ENCOUNTER
Last OV relevant to medication: 2/14/24  Last refill date: 2/14/24 #30/refills: 0  When pt was asked to return for OV: June as planned  Upcoming appt/reason:   Future Appointments   Date Time Provider Department Center   3/27/2024  4:30 PM Evita Perez OT [facility] O[facility]   4/20/2024 10:00 AM [facility] MANDIE RM1 [facility] M[facility]   6/3/2024 10:00 AM [facility] LABTECHS [facility] LAB [facility]   6/17/2024 10:20 AM Roxanna Rice MD [facility] [place]   7/2/2024  9:00 AM Yadira Avilez MD [facility]   Was pt informed of any over due labs: utd  Lab Results   Component Value Date    GLU 92 12/04/2023    BUN 22 12/04/2023    BUNCREA 21.1 (H) 08/18/2020    CREATSERUM 1.16 (H) 12/04/2023    ANIONGAP 5 12/04/2023    GFR 71 05/10/2017    GFRNAA 47 (L) 05/05/2022    GFRAA 54 (L) 05/05/2022    CA 9.2 12/04/2023    OSMOCALC 293 12/04/2023    ALKPHO 101 12/04/2023    AST 10 (L) 12/04/2023    ALT 19 12/04/2023    BILT 0.6 12/04/2023    TP 6.9 12/04/2023    ALB 3.5 12/04/2023    GLOBULIN 3.4 12/04/2023     12/04/2023    K 4.2 12/04/2023     12/04/2023    CO2 30.0 12/04/2023

## 2024-03-27 ENCOUNTER — OFFICE VISIT (OUTPATIENT)
Dept: OCCUPATIONAL MEDICINE | Facility: HOSPITAL | Age: 69
End: 2024-03-27
Attending: INTERNAL MEDICINE
Payer: MEDICARE

## 2024-03-27 PROCEDURE — 97535 SELF CARE MNGMENT TRAINING: CPT

## 2024-03-28 NOTE — PROGRESS NOTES
Diagnosis:   Lymphedema of both lower extremities (I89.0)      Referring Provider: Yari  Date of Evaluation:    2/7/2024    Precautions:  Lymphedema; infection risk; cardiac; abdominal  Next MD visit:   none scheduled  Date of Surgery: n/a   Insurance Primary/Secondary: MEDICARE / COMMERCIAL # Authorized Visits:  2/1-3              Next MD/Plan Renewal Date: 5/7/2024          Therapy-related medical history:  H/O of bilateral LE lymphedema since 1991, Right > Left. Has h/o prior LE cellulitis, including hospital admit for sepsis needing IV antibiotics. She has received prior treatment in this clinic in [id number], 2018 and in 2023.       Progress Summary  Pt has attended 3/3 visits in Occupational Therapy.     Subjective:   *Pt reports the following re: success with home self-management since last seen:   -found that she is more consistent with taking her BP in the evenings, so she switched to an evening routine and has been taking and recording her BP on a daily basis.    -is donning Velcro-closure garments over bilateral lower legs first thing in the morning on a daily basis.    -is taking her diuretic in the morning following donning compression 5 days a week; on Tuesday/Thursday has water exercise in the morning so she takes it when she gets home.    -using compression capris 2 days a week; dons at around noon or 1pm after bathroom session is complete.    -using Flexitouch Plus device 3 days a week.   *Reports she if feeling more positive about her ability to be consistent with following a plan for self-management of her lymphedema as well as in daily recording of her BP and she \"feels good about it\"; is aware of areas where she wants to be more consistent.     Pain: 0/10 related to lymphedema     Assessment:       Pt has demonstrated good progress towards following clinic-provided tracking sheets to increase her ability to follow through with the home self-management of her lymphedema as well as to daily  record her BP. She has also demonstrated a reduction in previously gained lymphedema volume ***  ***garment statement    ***Pt with good follow through with using tracking sheet developed at last session and has achieved this goal. Surpassed goal of donning lower leg compression first thing in the mornings, h/e struggled with use of Flexitouch Plus device as personally desired. Despite donning her compression first thing in the morning, rising bilateral lower leg volume is continued to be seen and has impacted her ability to use her custom-fit knee highs. Therefore, she has been advised to return to using her Velcro-closure garments on her lower legs. In today's session, difficulties with other medically-based routines was discussed and a new tracking sheet including these was developed.     Pt appears highly motivated to continue with use of tracking sheets to assist her with her self-management. She was advised that the results of today's session would be shared with her primary care office, and it is possible they may request that she return to her prior diuretic dose. Pt expressed understanding.     Recommend to continue with skilled intervention relative to current plan, pending primary care office input, with plan for pt to return in 3-4 weeks to assess success with home self-management as well as assess overall volume/tissue quality and need for any further skilled Occupational Therapy.     Objective:  Pt attending Occupational Therapy for follow-up visits to assess her success with ability to establish a routine of home self-management of her lymphedema and to assess her response to reduction of the dose of her diuretic in the setting of increasing her self-management skills.     At today's session, discussed pt's success with moving forward with establishing a more successful routine of home self-management of her lymphedema as well as daily recording of her BP; intended plan for replacing her compression  knee highs; need for replacing her Velcro-closure garments for both her lower legs and thighs/knees.   Also discussed consideration of return to active therapy, which would require a return to short stretch compression bandaging. After extended discussion, pt sharing that in regards to the future care of her lymphedema, it is currently more important to her to continue to solidify her skills in her home self-management, following the tracking logs provided by therapist, vs returning to active treatment and she is concerned about the impact of resuming bandaging on her gains made towards developing a self-management routine as well as her involvement in her water exercise class, which she finds very important to her health, and which this therapist is in agreement with. [place] agreement made to move forward with setting up a garment fitting appointment with  of her choice to be measured for new Velcro-closure garments and new compression knee highs. Since her compression capris have been less used since she got them last year, agreement for her to continue to use them. Presently, although she has prior Velcro-closure garments for her thighs/knees for nighttime use, these will not be replaced as they have been less used.      EDEMA/TISSUE QUALITY:   *Since 3/6, Left LE circumferential volume decreased by 8.0cm with a 12.5cm decrease in lower leg for an overall total limb increase of 6.0cm with reduction of lower leg volume of 0.9cm since 2/7.   *Right LE circumferential volume decreased by 12.7cm with a 13.0cm decrease in lower leg for an overall total limb increase of 6.2cm with increase of lower leg volume of 6.2cm since 2/7.     *Left LE : Tissue quality over thigh is slightly boggy to supple; slight discoloration in area of prior cellulitis. Knee is slightly boggy to supple. Proximal 1/3 of lower leg is slightly boggy to supple; mid 1/3 is slightly boggy over anterior surface / boggy with fair  superficial tissue mobility over posterior surface; distal 1/3 is boggy, pitting with poor superficial tissue mobility; discoloration in area of prior cellulitis. Ankle is boggy, pitting. Dorsum of foot is slightly boggy over medial surface; boggy to slightly boggy over lateral surface.   *Right LE: Tissue quality over thigh is slightly boggy to supple except for distal medial/posterior surfaces which are boggy to slightly boggy. Knee is boggy, non-pitting. Lower leg is densely boggy, pitting with fair superficial tissue mobility over proximal 1/4, poor superficial tissue mobility over distal 3/4. Ankle is boggy, pitting. Dorsum of foot is slightly boggy over medial surface; boggy to slightly boggy over lateral surface.    *Tissue hydration is good. Negative Stemmer's sign. Light discoloration to distal lower legs. Emerging hair follicles over lower legs.      TREATMENT:  *Pt arrived wearing custom-fit compression capris; bilateral lower leg Farrow Wrap lower leg garments and footpieces with Circaid lower leg garments placed over proximal lower legs.   *Re-assessment of status   *Discussion as noted above.   *During re-donning of compression capris, pt provided with instruction in adaptive approaches to assist with donning of garment.   *Applied bilateral lower leg compression.   HEP:   *2/29: Begin following tracking sheet to record personal goals of self-management.   *3/6: Begin following new tracking sheet to record personal goals of self-management along with recording daily BP and earlier diuretic dosing.   *3/27: Continue with following current tracking sheets, self-upgrading goals for use of compression capris and Flexitouch Plus device.     Goals: (1-3 visits)    3/27/2024 GOALS:  Pt will meet with  to be measured for and obtain recommended garments.   Pt will use new garments on a daily basis, preferably Velcro-closure garments over lower legs in the home setting / custom-fit knee highs in  the community setting.   Pt will continue to follow personal tracking sheet.     3/6/2024 NEW/ADJUSTED GOALS: 1 visit  Pt will use compression capris 3 days/week. PARTIALLY ACHIEVED -- used 2 days a week  Pt will put compression on lower legs first thing in the morning, 6 mornings a week (return to use of Velcro-closure garments). ACHIEVED     2/29/2024 NEW GOALS:   Pt will follow personal tracking sheet. ACHIEVED   2.   Pt will put compression on lower legs first thing in the morning, 4 mornings a week -- personally chosen goal. ACHIEVED   3.   Pt will use pump 4 days a week -- personally chosen goal. PARTIALLY ACHIEVED -- used 3 days a week  4.   Pt will use compression capris 4-5 days/week.  MODIFIED ABOVE    2/7/2024 Goal:  1-3 visits  Pt will continue with current self-management routine adding above recommendations, sans compression capris at this time.     Plan: Return for skilled Occupational Therapy once compression garments received to assess garment fit; assess success with home self-management program; provide further recommendations. Following this visit, recommend at least one more follow-up visit to assess response to garments; adherence to home self-management program.        Patient/Family/Caregiver was advised of these findings, precautions, and treatment options and has agreed to actively participate in planning and for this course of care.    Thank you for your referral. If you have any questions, please contact me at Dept: [phone].    Sincerely,  Electronically signed by therapist: LAI Davidson/L, CLT-SHAKIRA         Physician's certification required:  Yes  Please co-sign or sign and return this letter via fax as soon as possible to [phone].   I certify the need for these services furnished under this plan of treatment and while under my care.    X___________________________________________________ Date____________________    Certification From: 3/27/2024  To:6/25/2024      Plan:    Pt to return in 3-4 weeks for follow-up visit. Therapist to share results of todays' session with pt's Ermelinda SIERRA.     Charges: 4 Self-Care/Home Management Training    Total Timed Treatment: 55 min  Total Treatment Time: 60 min      LE Circumferential Measurements (cm)  2/7/2024   LEFT  2/7/2024   RIGHT  2/29/2024  LEFT 2/29/2024   RIGHT 3/6/2024   LEFT 3/6/2024   RIGHT   20cm above Superior Border of Patella (SBP)  81.5 81.1 83.2 78.5 82.2 80.6   10cm above SBP  71.1 73.9 71.0 73.5 71.4 73.5   SBP  62.8 66.2 62.5 65.0 63.5 66.3   5cm below SBP  57.3 61.2 58.0 59.0 58.0 61.7   10cm below SBP  52.6 58.7 51.8 57.8 52.5 61.7   20cm below SBP  50.7 59.5 51.2 62.0 54.0 63.8   30cm below SBP  35.3 44.5 38.3 49.3 40.5 49.4   35cm below SBP  27.0 32.5 29.1 35.5 27.7 34.3   40cm below SBP  25.2 25.8 26.3 29.8 27.7 29.0    Lateral Malleoli  28.5 28.1 28.3 29.0 28.7 29.0   7cm proximal to 1st toe web space  25.4 24.8 25.3 25.8 25.4 25.4   5cm proximal to 1st toe web space  24.7 24.3 25.0 24.9 24.5 24.8   TOTALS  542.1 580.6 550.0 590.1 556.1 599.5       LE Circumferential Measurements (cm)  3/27/2024   LEFT  3/27/2024   RIGHT    20cm above Superior Border of Patella (SBP)  82.6 80.4   10cm above SBP  71.2 73.6   SBP  62.7 66.7   5cm below SBP  56.8 61.7   10cm below SBP  52.0 61.6   20cm below SBP  50.0 59.2   30cm below SBP  36.2 45.8   35cm below SBP  26.3 34.0   40cm below SBP  25.8 26.1    Lateral Malleoli  27.5 27.5   7cm proximal to 1st toe web space  26.0 26.0   5cm proximal to 1st toe web space  24.7 24.2   TOTALS  548.1 586.8

## 2024-04-02 ENCOUNTER — MED REC SCAN ONLY (OUTPATIENT)
Dept: INTERNAL MEDICINE CLINIC | Facility: CLINIC | Age: 69
End: 2024-04-02

## 2024-04-04 ENCOUNTER — TELEPHONE (OUTPATIENT)
Dept: INTERNAL MEDICINE CLINIC | Facility: CLINIC | Age: 69
End: 2024-04-04

## 2024-04-04 NOTE — TELEPHONE ENCOUNTER
Incoming (mail or fax):  fax  Received from:  [facility]  Documentation given to:  Triage incoming     Approval for exercise

## 2024-04-17 ENCOUNTER — TELEPHONE (OUTPATIENT)
Dept: INTERNAL MEDICINE CLINIC | Facility: CLINIC | Age: 69
End: 2024-04-17

## 2024-04-20 ENCOUNTER — HOSPITAL ENCOUNTER (OUTPATIENT)
Dept: MAMMOGRAPHY | Facility: HOSPITAL | Age: 69
Discharge: HOME | End: 2024-04-20
Attending: NURSE PRACTITIONER
Payer: MEDICARE

## 2024-04-20 DIAGNOSIS — Z12.31 ENCOUNTER FOR SCREENING MAMMOGRAM FOR BREAST CANCER: ICD-10-CM

## 2024-04-20 PROCEDURE — 77063 BREAST TOMOSYNTHESIS BI: CPT | Performed by: NURSE PRACTITIONER

## 2024-04-20 PROCEDURE — 77067 SCR MAMMO BI INCL CAD: CPT | Performed by: NURSE PRACTITIONER

## 2024-04-23 DIAGNOSIS — I10 PRIMARY HYPERTENSION: ICD-10-CM

## 2024-04-23 DIAGNOSIS — F39 MOOD DISORDER (HCC): ICD-10-CM

## 2024-04-24 RX ORDER — ESCITALOPRAM OXALATE 5 MG/1
TABLET ORAL
Qty: 90 TABLET | Refills: 0 | Status: SHIPPED | OUTPATIENT
Start: 2024-04-24

## 2024-04-24 RX ORDER — BENAZEPRIL HYDROCHLORIDE 10 MG/1
10 TABLET ORAL DAILY
Qty: 90 TABLET | Refills: 0 | Status: SHIPPED | OUTPATIENT
Start: 2024-04-24

## 2024-04-24 RX ORDER — ESCITALOPRAM OXALATE 10 MG/1
TABLET ORAL
Qty: 90 TABLET | Refills: 0 | Status: SHIPPED | OUTPATIENT
Start: 2024-04-24

## 2024-04-24 NOTE — TELEPHONE ENCOUNTER
Last OV relevant to medication: 12/11/23  Last refill date: 1/24/24 #90/refills: 0  When pt was asked to return for OV: 6/11/24  Upcoming appt/reason:   Future Appointments   Date Time Provider Department Center   6/3/2024 10:00 AM  [facility]   6/17/2024 10:20 AM Roxanna Rice MD [facility] [place]   7/2/2024  9:00 AM Yadira Avilez MD [facility]   Was pt informed of any over due labs:   Lab Results   Component Value Date    GLU 92 12/04/2023    BUN 22 12/04/2023    BUNCREA 21.1 (H) 08/18/2020    CREATSERUM 1.16 (H) 12/04/2023    ANIONGAP 5 12/04/2023    GFR 71 05/10/2017    GFRNAA 47 (L) 05/05/2022    GFRAA 54 (L) 05/05/2022    CA 9.2 12/04/2023    OSMOCALC 293 12/04/2023    ALKPHO 101 12/04/2023    AST 10 (L) 12/04/2023    ALT 19 12/04/2023    BILT 0.6 12/04/2023    TP 6.9 12/04/2023    ALB 3.5 12/04/2023    GLOBULIN 3.4 12/04/2023     12/04/2023    K 4.2 12/04/2023     12/04/2023    CO2 30.0 12/04/2023       Psychiatric Non-Scheduled (Anti-Anxiety) [id number]/23/2024 08:53 AM   Protocol Details In person appointment or virtual visit in the past 6 mos or appointment in next 3 mos    Depression Screening completed within the past 12 months      4. Mood disorder (HCC)  Stable. CTM.  Orders:

## 2024-05-27 DIAGNOSIS — E03.9 ACQUIRED HYPOTHYROIDISM: ICD-10-CM

## 2024-05-29 RX ORDER — LEVOTHYROXINE SODIUM 112 UG/1
112 TABLET ORAL
Qty: 90 TABLET | Refills: 0 | Status: SHIPPED | OUTPATIENT
Start: 2024-05-29

## 2024-05-29 NOTE — TELEPHONE ENCOUNTER
Last OV relevant to medication: 2/14/24  Last refill date: 3/5/24 #90/refills: 0  When pt was asked to return for OV: June as planned  Upcoming appt/reason:   Future Appointments   Date Time Provider Department Center   6/3/2024 10:00 AM  [facility]  [facility]   6/17/2024 10:20 AM Roxanna Rice MD [facility] [place]   7/2/2024  9:00 AM Yadira Avilez MD [facility]   Was pt informed of any over due labs: prior to appt  Free T4 (ng/dL)   Date Value   08/18/2020 1.2     FREE T4 (ng/dL)   Date Value   02/23/2014 1.2     TSH (mIU/mL)   Date Value   04/19/2023 2.470   02/23/2014 0.686

## 2024-06-03 ENCOUNTER — LAB ENCOUNTER (OUTPATIENT)
Dept: LAB | Facility: HOSPITAL | Age: 69
End: 2024-06-03
Attending: NURSE PRACTITIONER
Payer: MEDICARE

## 2024-06-03 DIAGNOSIS — E78.00 PURE HYPERCHOLESTEROLEMIA: ICD-10-CM

## 2024-06-03 DIAGNOSIS — E03.9 ACQUIRED HYPOTHYROIDISM: ICD-10-CM

## 2024-06-03 DIAGNOSIS — Z00.00 ENCOUNTER FOR ANNUAL HEALTH EXAMINATION: ICD-10-CM

## 2024-06-03 LAB
ALBUMIN SERPL-MCNC: 3.3 G/DL (ref 3.4–5)
ALBUMIN/GLOB SERPL: 0.9 {RATIO} (ref 1–2)
ALP LIVER SERPL-CCNC: 104 U/L
ALT SERPL-CCNC: 15 U/L
ANION GAP SERPL CALC-SCNC: 5 MMOL/L (ref 0–18)
AST SERPL-CCNC: 10 U/L (ref 15–37)
BASOPHILS # BLD AUTO: 0.03 X10(3) UL (ref 0–0.2)
BASOPHILS NFR BLD AUTO: 0.6 %
BILIRUB SERPL-MCNC: 0.5 MG/DL (ref 0.1–2)
BUN BLD-MCNC: 31 MG/DL (ref 9–23)
CALCIUM BLD-MCNC: 8.8 MG/DL (ref 8.5–10.1)
CHLORIDE SERPL-SCNC: 105 MMOL/L (ref 98–112)
CHOLEST SERPL-MCNC: 190 MG/DL (ref ?–200)
CO2 SERPL-SCNC: 27 MMOL/L (ref 21–32)
CREAT BLD-MCNC: 0.98 MG/DL
EGFRCR SERPLBLD CKD-EPI 2021: 62 ML/MIN/1.73M2 (ref 60–?)
EOSINOPHIL # BLD AUTO: 0.15 X10(3) UL (ref 0–0.7)
EOSINOPHIL NFR BLD AUTO: 2.9 %
ERYTHROCYTE [DISTWIDTH] IN BLOOD BY AUTOMATED COUNT: 13.2 %
FASTING PATIENT LIPID ANSWER: NO
FASTING STATUS PATIENT QL REPORTED: NO
GLOBULIN PLAS-MCNC: 3.7 G/DL (ref 2.8–4.4)
GLUCOSE BLD-MCNC: 93 MG/DL (ref 70–99)
HCT VFR BLD AUTO: 37.4 %
HDLC SERPL-MCNC: 60 MG/DL (ref 40–59)
HGB BLD-MCNC: 11.8 G/DL
IMM GRANULOCYTES # BLD AUTO: 0.01 X10(3) UL (ref 0–1)
IMM GRANULOCYTES NFR BLD: 0.2 %
LDLC SERPL CALC-MCNC: 107 MG/DL (ref ?–100)
LYMPHOCYTES # BLD AUTO: 1.31 X10(3) UL (ref 1–4)
LYMPHOCYTES NFR BLD AUTO: 25.3 %
MCH RBC QN AUTO: 30.2 PG (ref 26–34)
MCHC RBC AUTO-ENTMCNC: 31.6 G/DL (ref 31–37)
MCV RBC AUTO: 95.7 FL
MONOCYTES # BLD AUTO: 0.43 X10(3) UL (ref 0.1–1)
MONOCYTES NFR BLD AUTO: 8.3 %
NEUTROPHILS # BLD AUTO: 3.24 X10 (3) UL (ref 1.5–7.7)
NEUTROPHILS # BLD AUTO: 3.24 X10(3) UL (ref 1.5–7.7)
NEUTROPHILS NFR BLD AUTO: 62.7 %
NONHDLC SERPL-MCNC: 130 MG/DL (ref ?–130)
OSMOLALITY SERPL CALC.SUM OF ELEC: 290 MOSM/KG (ref 275–295)
PLATELET # BLD AUTO: 219 10(3)UL (ref 150–450)
POTASSIUM SERPL-SCNC: 4.6 MMOL/L (ref 3.5–5.1)
PROT SERPL-MCNC: 7 G/DL (ref 6.4–8.2)
RBC # BLD AUTO: 3.91 X10(6)UL
SODIUM SERPL-SCNC: 137 MMOL/L (ref 136–145)
TRIGL SERPL-MCNC: 130 MG/DL (ref 30–149)
TSI SER-ACNC: 2.24 MIU/ML (ref 0.36–3.74)
VLDLC SERPL CALC-MCNC: 22 MG/DL (ref 0–30)
WBC # BLD AUTO: 5.2 X10(3) UL (ref 4–11)

## 2024-06-03 PROCEDURE — 36415 COLL VENOUS BLD VENIPUNCTURE: CPT

## 2024-06-03 PROCEDURE — 80061 LIPID PANEL: CPT

## 2024-06-03 PROCEDURE — 85025 COMPLETE CBC W/AUTO DIFF WBC: CPT

## 2024-06-03 PROCEDURE — 84443 ASSAY THYROID STIM HORMONE: CPT

## 2024-06-03 PROCEDURE — 80053 COMPREHEN METABOLIC PANEL: CPT

## 2024-06-05 DIAGNOSIS — I10 PRIMARY HYPERTENSION: ICD-10-CM

## 2024-06-05 RX ORDER — FUROSEMIDE 20 MG/1
20 TABLET ORAL DAILY
Qty: 90 TABLET | Refills: 0 | Status: SHIPPED | OUTPATIENT
Start: 2024-06-05

## 2024-06-05 NOTE — TELEPHONE ENCOUNTER
Last OV relevant to medication: 2/14/24  Last refill date: 3/22/24 90     #/refills: 0  When pt was asked to return for OV: June   Upcoming appt/reason:   Future Appointments   Date Time Provider Department Center   6/17/2024 10:20 AM Roxanna Rice MD [facility] [address] [place]   7/2/2024  9:00 AM Yadira Avilez MD [facility]       Was pt informed of any over due labs: n/a   Lab Results   Component Value Date    GLU 93 06/03/2024    BUN 31 (H) 06/03/2024    BUNCREA 21.1 (H) 08/18/2020    CREATSERUM 0.98 06/03/2024    ANIONGAP 5 06/03/2024    GFR 71 05/10/2017    GFRNAA 47 (L) 05/05/2022    GFRAA 54 (L) 05/05/2022    CA 8.8 06/03/2024    OSMOCALC 290 06/03/2024    ALKPHO 104 06/03/2024    AST 10 (L) 06/03/2024    ALT 15 06/03/2024    BILT 0.5 06/03/2024    TP 7.0 06/03/2024    ALB 3.3 (L) 06/03/2024    GLOBULIN 3.7 06/03/2024     06/03/2024    K 4.6 06/03/2024     06/03/2024    CO2 27.0 06/03/2024

## 2024-06-12 ENCOUNTER — OFFICE VISIT (OUTPATIENT)
Dept: INTERNAL MEDICINE CLINIC | Facility: CLINIC | Age: 69
End: 2024-06-12
Payer: MEDICARE

## 2024-06-12 VITALS
HEART RATE: 63 BPM | OXYGEN SATURATION: 93 % | SYSTOLIC BLOOD PRESSURE: 122 MMHG | DIASTOLIC BLOOD PRESSURE: 60 MMHG | TEMPERATURE: 98 F | RESPIRATION RATE: 16 BRPM | BODY MASS INDEX: 51.66 KG/M2 | HEIGHT: 61 IN | WEIGHT: 273.63 LBS

## 2024-06-12 DIAGNOSIS — M81.6 LOCALIZED OSTEOPOROSIS WITHOUT CURRENT PATHOLOGICAL FRACTURE: ICD-10-CM

## 2024-06-12 DIAGNOSIS — R10.32 LEFT LOWER QUADRANT ABDOMINAL PAIN: Primary | ICD-10-CM

## 2024-06-12 PROCEDURE — 99214 OFFICE O/P EST MOD 30 MIN: CPT | Performed by: NURSE PRACTITIONER

## 2024-06-12 NOTE — PATIENT INSTRUCTIONS
Get your CT of the abdomen completed    Do heat on the neck and shoulders couple times a day    Do Claritin or Flonase for your allergy symptoms    See endocrinology for the osteoporosis    Go to the ER for any emergent symptoms. If you cannot get there safely call 911.     Follow-up as needed or when routine care is due

## 2024-06-12 NOTE — PROGRESS NOTES
Serena Rivera is a 68 year old female.  CHIEF COMPLAINT   Abdominal pain, DEXA scan results    HPI:   The patient has had pain to left side abd since jan on and off.  She was diagnosed with diverticulitis at that time and treated with antibiotics.  It was 10 out of 10 previously.  Is now still intermittent 5/10.  Mainly after eating certain foods.  No diarrhea, fever, chills, poor appetite, nausea, vomiting.  She is having bowel movements daily without constipation.  Is more gassy at times.    She would like to discuss her DEXA scan results.      Current Outpatient Medications   Medication Sig Dispense Refill    FUROSEMIDE 20 MG Oral Tab TAKE 1 TABLET(20 MG) BY MOUTH DAILY 90 tablet 0    LEVOTHYROXINE 112 MCG Oral Tab TAKE 1 TABLET(112 MCG) BY MOUTH BEFORE BREAKFAST 90 tablet 0    Benazepril HCl 10 MG Oral Tab Take 1 tablet (10 mg total) by mouth daily. 90 tablet 0    ESCITALOPRAM 5 MG Oral Tab TAKE 1 TABLET BY MOUTH DAILY WITH 10 MG FOR TOTAL 15 MG 90 tablet 0    escitalopram 10 MG Oral Tab TAKE 1 TABLET BY MOUTH DAILY WITH 5 MG FOR TOTAL 15 MG 90 tablet 0    triamcinolone 0.1 % External Cream       albuterol (PROAIR HFA) 108 (90 Base) MCG/ACT Inhalation Aero Soln Inhale 2 puffs into the lungs every 4 (four) hours as needed for Wheezing. 1 each 0    Spacer/Aero-Holding Chambers (AEROCHAMBER MINI CHAMBER) Does not apply Device Use with inhaler as needed 1 each 0    VITAMIN D, CHOLECALCIFEROL, OR Take by mouth.      APPLE CIDER VINEGAR OR Take by mouth. gummies      Omeprazole 40 MG Oral Capsule Delayed Release Take 1 capsule (40 mg total) by mouth daily. 90 capsule 1    ELIQUIS 5 MG Oral Tab Take 1 tablet (5 mg total) by mouth 2 (two) times daily.      acetaminophen 500 MG Oral Tab Take 2 tablets (1,000 mg total) by mouth every 6 (six) hours as needed for Pain.      Calcium Carbonate-Vitamin D 250-125 MG-UNIT Oral Tab Take 1 tablet by mouth daily.      Bacillus Coagulans-Inulin (ALIGN PREBIOTIC-PROBIOTIC)  5-1.25 MG-GM Oral Chew Tab Chew 2 tablets by mouth daily.        Past Medical History:    Abnormal mammogram    Acute nasopharyngitis    Adjustment reaction    adjustment reaction    Allergic rhinitis    Anemia    Ankle strain    Anxiety    hx severe anxiety    Arrhythmia    A-fib DX YRS AGO    Arthritis    Asthma (HCC)    Atherosclerosis    mild, at the carotid bifurcations, L>R    Breast mass    left breast, s/p bx-benign; hx R breast mass    Breast mass, left    Bronchitis    and acute bronchitis    Bronchospasm    Carotid disease, bilateral (HCC)    Cellulitis    IN LEGS    Cephalgia    viral    Chest pain    Chickenpox    Coughing    coughing paroxysm    Dermatitis    stress    Disorder of thyroid    Easy bruising    Eczema    Edema    Epigastric fullness    Esophageal reflux    GERD    ETD (eustachian tube dysfunction)    Exertional chest pain    Extrinsic asthma, unspecified    Fibroid tumor    in uterine wall    Heart palpitations    Heartburn    Hemorrhoids    High blood pressure    HTN    History of anemia    History of bone density study    HTN (hypertension)    Hypothyroid    Hypothyroidism    Itch of skin    Laryngitis    Leg pain    Leg swelling    Lipid screening    Lymphedema of both lower extremities    Measles    Migraines    Mumps    OA (osteoarthritis)    knee    Obesity    Occult blood positive stool    Osteopenia    Other allergy, other than to medicinal agents    Alleriges    Otitis media    PAF (paroxysmal atrial fibrillation) (HCC)    PMS (premenstrual syndrome)    Pneumonia    PONV (postoperative nausea and vomiting)    Pre-diabetes    Rash    Rhinitis    Sinobronchitis    Sinusitis    and acute sinusitis    Stasis dermatitis    3/14/2000: severe    Stasis ulcer (HCC)    Thumb laceration    Unspecified sleep apnea    cpap    URI (upper respiratory infection)    Vasculitis (HCC)    Venous insufficiency    severe    Venous stasis dermatitis    severe    Visual impairment    glasses     Vomiting and diarrhea    Wears glasses      Social History:  Social History     Socioeconomic History    Marital status:    Tobacco Use    Smoking status: Never     Passive exposure: Never    Smokeless tobacco: Never   Vaping Use    Vaping status: Never Used   Substance and Sexual Activity    Alcohol use: Yes     Comment: Social drinking    Drug use: Never   Other Topics Concern    Caffeine Concern Yes    Stress Concern No    Weight Concern No    Special Diet Yes     Comment: Low Salt Diet    Exercise Yes    Seat Belt Yes     Social Determinants of Health     Physical Activity: Insufficiently Active (12/16/2019)    Received from [facility] [employer], [facility] [employer]    Exercise Vital Sign     Days of Exercise per Week: 2 days     Minutes of Exercise per Session: 60 min        REVIEW OF SYSTEMS:   See HPI     EXAM:     /60 (BP Location: Left arm, Patient Position: Sitting, Cuff Size: large)   Pulse 63   Temp 97.7 °F (36.5 °C) (Temporal)   Resp 16   Ht 5' 1\" (1.549 m)   Wt 273 lb 9.6 oz (124.1 kg)   LMP 01/01/2007   SpO2 93%   BMI 51.70 kg/m²   Body mass index is 51.7 kg/m².   GENERAL: well developed, well nourished,in no apparent distress  HEENT: atraumatic, normocephalic,  LUNGS: clear to auscultation; no rhonchi, rales, or wheezing  CARDIO: RRR without murmur  GI: Left mid to lower quadrant tenderness, bowel sounds active.  No masses.  MUSCULOSKELETAL: No obvious joint deformity or swelling.   EXTREMITIES: Nonpitting edema right greater than left, no calf tenderness.  NEURO: Oriented times three    LABS:      Lab Results   Component Value Date    WBC 5.2 06/03/2024    RBC 3.91 06/03/2024    HGB 11.8 (L) 06/03/2024    HCT 37.4 06/03/2024    MCV 95.7 06/03/2024    MCH 30.2 06/03/2024    MCHC 31.6 06/03/2024    RDW 13.2 06/03/2024    .0 06/03/2024      Lab Results   Component Value Date    GLU 93 06/03/2024    BUN 31 (H) 06/03/2024    BUNCREA 21.1 (H) 08/18/2020    CREATSERUM 0.98  06/03/2024    ANIONGAP 5 06/03/2024    GFR 71 05/10/2017    GFRNAA 47 (L) 05/05/2022    GFRAA 54 (L) 05/05/2022    CA 8.8 06/03/2024    OSMOCALC 290 06/03/2024    ALKPHO 104 06/03/2024    AST 10 (L) 06/03/2024    ALT 15 06/03/2024    BILT 0.5 06/03/2024    TP 7.0 06/03/2024    ALB 3.3 (L) 06/03/2024    GLOBULIN 3.7 06/03/2024     06/03/2024    K 4.6 06/03/2024     06/03/2024    CO2 27.0 06/03/2024      Lab Results   Component Value Date    CHOLEST 190 06/03/2024    TRIG 130 06/03/2024    HDL 60 (H) 06/03/2024     (H) 06/03/2024    VLDL 22 06/03/2024    TCHDLRATIO 2.65 05/10/2017    NONHDLC 130 (H) 06/03/2024      Lab Results   Component Value Date    T4F 1.2 08/18/2020    TSH 2.240 06/03/2024      Lab Results   Component Value Date     12/14/2022    A1C 5.8 (H) 12/14/2022        IMAGING:     DEXA- 3/1/24       LUMBAR SPINE ANALYSIS RESULTS:      Bone mineral density (BMD) (g/cm2):  .845    Lumbar T-Score:  -1.8      % young normals:  81      % age matched controls:  103      Change from prior spine examination:  -0.5#%              TOTAL HIP ANALYSIS RESULTS:        Bone mineral density (BMD) (g/cm2):  .696      Total Hip T-Score:  -2.0      % young normals:  74      % age matched controls:  91      Change from prior hip examination:  -2.8%              FEMORAL NECK ANALYSIS RESULTS:        Bone mineral density (BMD) (g/cm2):  .563      Femoral neck T-Score:  -2.6      % young normals:  66      % age matched controls:  86      Change from prior hip examination:  -4.8%            ADDITIONAL FINDINGS:  No significant additional findings.                     Impression   CONCLUSION:  Bone mineral density values for the left femoral neck are compatible with the diagnosis of osteoporosis by WHO definition (T score less than -2.5).  Bone mineral density of the left total hip and lumbar spine are compatible with osteopenia.   If therapy is initiated, a follow-up study in 1 year may aid in  evaluation of therapeutic efficacy.     ASSESSMENT AND PLAN:   1. Left lower quadrant abdominal pain  -Ongoing abdominal pain to the left side 5 out of 10 since January after having an episode of diverticulitis.  Denies any urine symptoms.  Is tender on exam.  -Get repeat CT abdomen to eval further  -Return to ER as needed for emergent pain  - CT ABDOMEN+PELVIS(CONTRAST ONLY)(CPT=74177); Future    2. Localized osteoporosis without current pathological fracture  -See endocrinology for management of osteoporosis.  May need injections due to history of GERD.     The patient indicates understanding of these issues and agrees to the plan.  The patient is asked to return as needed or when routine care is due.

## 2024-06-17 ENCOUNTER — LAB ENCOUNTER (OUTPATIENT)
Dept: LAB | Age: 69
End: 2024-06-17
Attending: INTERNAL MEDICINE

## 2024-06-17 ENCOUNTER — OFFICE VISIT (OUTPATIENT)
Dept: INTERNAL MEDICINE CLINIC | Facility: CLINIC | Age: 69
End: 2024-06-17
Payer: MEDICARE

## 2024-06-17 VITALS
TEMPERATURE: 98 F | DIASTOLIC BLOOD PRESSURE: 72 MMHG | HEART RATE: 64 BPM | SYSTOLIC BLOOD PRESSURE: 112 MMHG | WEIGHT: 268.31 LBS | RESPIRATION RATE: 16 BRPM | BODY MASS INDEX: 50.66 KG/M2 | HEIGHT: 61 IN

## 2024-06-17 DIAGNOSIS — K21.9 GASTROESOPHAGEAL REFLUX DISEASE, UNSPECIFIED WHETHER ESOPHAGITIS PRESENT: ICD-10-CM

## 2024-06-17 DIAGNOSIS — E03.9 ACQUIRED HYPOTHYROIDISM: ICD-10-CM

## 2024-06-17 DIAGNOSIS — R73.03 PRE-DIABETES: ICD-10-CM

## 2024-06-17 DIAGNOSIS — J45.20 MILD INTERMITTENT ASTHMA WITHOUT COMPLICATION (HCC): ICD-10-CM

## 2024-06-17 DIAGNOSIS — I48.0 PAF (PAROXYSMAL ATRIAL FIBRILLATION) (HCC): ICD-10-CM

## 2024-06-17 DIAGNOSIS — G47.33 OSA ON CPAP: ICD-10-CM

## 2024-06-17 DIAGNOSIS — F39 MOOD DISORDER (HCC): ICD-10-CM

## 2024-06-17 DIAGNOSIS — I89.0 LYMPHEDEMA OF BOTH LOWER EXTREMITIES: ICD-10-CM

## 2024-06-17 DIAGNOSIS — D50.9 IRON DEFICIENCY ANEMIA, UNSPECIFIED IRON DEFICIENCY ANEMIA TYPE: ICD-10-CM

## 2024-06-17 DIAGNOSIS — E66.01 CLASS 3 SEVERE OBESITY DUE TO EXCESS CALORIES WITH SERIOUS COMORBIDITY AND BODY MASS INDEX (BMI) OF 50.0 TO 59.9 IN ADULT (HCC): ICD-10-CM

## 2024-06-17 DIAGNOSIS — I10 PRIMARY HYPERTENSION: ICD-10-CM

## 2024-06-17 LAB
DEPRECATED HBV CORE AB SER IA-ACNC: 35 NG/ML
IRON SATN MFR SERPL: 20 %
IRON SERPL-MCNC: 77 UG/DL
TIBC SERPL-MCNC: 377 UG/DL (ref 240–450)
TRANSFERRIN SERPL-MCNC: 253 MG/DL (ref 200–360)

## 2024-06-17 PROCEDURE — 82728 ASSAY OF FERRITIN: CPT

## 2024-06-17 PROCEDURE — G2211 COMPLEX E/M VISIT ADD ON: HCPCS | Performed by: INTERNAL MEDICINE

## 2024-06-17 PROCEDURE — 36415 COLL VENOUS BLD VENIPUNCTURE: CPT

## 2024-06-17 PROCEDURE — 83550 IRON BINDING TEST: CPT

## 2024-06-17 PROCEDURE — 83540 ASSAY OF IRON: CPT

## 2024-06-17 PROCEDURE — 99214 OFFICE O/P EST MOD 30 MIN: CPT | Performed by: INTERNAL MEDICINE

## 2024-06-17 RX ORDER — FLECAINIDE ACETATE 100 MG/1
100 TABLET ORAL 2 TIMES DAILY
COMMUNITY

## 2024-06-17 RX ORDER — FUROSEMIDE 20 MG/1
TABLET ORAL
COMMUNITY
Start: 2024-06-17

## 2024-06-17 NOTE — PROGRESS NOTES
[facility]    CHIEF COMPLAINT:    Chief Complaint   Patient presents with    Medication Follow-Up     9/15/15-pap. 4/20/24-mammo.  8/28/23-colon-repeat 3 years         HISTORY OF PRESENT ILLNESS:  here for med check.   First visit with me today.     Htn: Taking meds regularly. No chest pain, no shortness of breath.     Afib: on eliquis. Also on flecainide. Sees cardiology.     Asthma: ACT 25 today. Feeling well controlled.     Kale: uses cpap regularly.     Pre diabetes: working on low carb diet.     Hypothyroid: on levothyroxine. No fatigue or palpitations.     Gerd: on omeprazole 40mg daily. Has tried to wean in the past and has symptoms if she tries to cut it down in half. Had an egd in 2020.     Mood disorder: on lexapro. Tolerating well. Works well for her. Takes it for depression and anxiety.     Sees the lymphedema clinic. On lasix 20mg daily but doesn't feel it works well for her. She used to be on 40mg daily and that worked better. Hands feel swollen at times.     Oa knees: sees ortho. Working on weight loss to get knee replacement. She will be seeing the weight loss clinic.     She has mild anemia that is chronic. Has only been taking iron supplements prn sporadically.     REVIEW OF SYSTEMS:  See HPI    Current Medications:    Current Outpatient Medications   Medication Sig Dispense Refill    flecainide 100 MG Oral Tab Take 1 tablet (100 mg total) by mouth 2 (two) times daily.      Potassium Gluconate 595 MG Oral Cap Take by mouth as needed.      furosemide 20 MG Oral Tab Take 20mg every other day alternating with 40mg every other day.      LEVOTHYROXINE 112 MCG Oral Tab TAKE 1 TABLET(112 MCG) BY MOUTH BEFORE BREAKFAST 90 tablet 0    Benazepril HCl 10 MG Oral Tab Take 1 tablet (10 mg total) by mouth daily. 90 tablet 0    ESCITALOPRAM 5 MG Oral Tab TAKE 1 TABLET BY MOUTH DAILY WITH 10 MG FOR TOTAL 15 MG 90 tablet 0    escitalopram 10 MG Oral Tab TAKE 1 TABLET BY MOUTH DAILY WITH 5 MG FOR TOTAL 15  MG 90 tablet 0    albuterol (PROAIR HFA) 108 (90 Base) MCG/ACT Inhalation Aero Soln Inhale 2 puffs into the lungs every 4 (four) hours as needed for Wheezing. 1 each 0    Spacer/Aero-Holding Chambers (AEROCHAMBER MINI CHAMBER) Does not apply Device Use with inhaler as needed 1 each 0    VITAMIN D, CHOLECALCIFEROL, OR Take 50 mcg by mouth daily.      APPLE CIDER VINEGAR OR Take by mouth. gummies      Omeprazole 40 MG Oral Capsule Delayed Release Take 1 capsule (40 mg total) by mouth daily. 90 capsule 1    ELIQUIS 5 MG Oral Tab Take 1 tablet (5 mg total) by mouth 2 (two) times daily.      acetaminophen 500 MG Oral Tab Take 2 tablets (1,000 mg total) by mouth every 6 (six) hours as needed for Pain.      Calcium Carbonate-Vitamin D 250-125 MG-UNIT Oral Tab Take 1 tablet by mouth daily.      Bacillus Coagulans-Inulin (ALIGN PREBIOTIC-PROBIOTIC) 5-1.25 MG-GM Oral Chew Tab Chew 2 tablets by mouth daily.         PAST MEDICAL, SOCIAL, AND FAMILY HISTORY:  Tobacco:    History   Smoking Status    Never   Smokeless Tobacco    Never       PHYSICAL EXAM:  /72 (BP Location: Right arm, Patient Position: Sitting, Cuff Size: large)   Pulse 64   Temp 97.8 °F (36.6 °C) (Temporal)   Resp 16   Ht 5' 1\" (1.549 m)   Wt 268 lb 4.8 oz (121.7 kg)   LMP 01/01/2007   Breastfeeding No   BMI 50.69 kg/m²    GENERAL: well developed, well nourished,in no apparent distress  LUNGS: clear to auscultation  CARDIO: RRR without murmur  GI: good BS's,no masses, HSM or tenderness  MUSCULOSKELETAL:  no edema, muscle strength normal.     DATA:  Results for orders placed or performed in visit on 06/03/24   Comp Metabolic Panel (14)   Result Value Ref Range    Glucose 93 70 - 99 mg/dL    Sodium 137 136 - 145 mmol/L    Potassium 4.6 3.5 - 5.1 mmol/L    Chloride 105 98 - 112 mmol/L    CO2 27.0 21.0 - 32.0 mmol/L    Anion Gap 5 0 - 18 mmol/L    BUN 31 (H) 9 - 23 mg/dL    Creatinine 0.98 0.55 - 1.02 mg/dL    Calcium, Total 8.8 8.5 - 10.1 mg/dL     Calculated Osmolality 290 275 - 295 mOsm/kg    eGFR-Cr 62 >=60 mL/min/1.73m2    AST 10 (L) 15 - 37 U/L    ALT 15 13 - 56 U/L    Alkaline Phosphatase 104 55 - 142 U/L    Bilirubin, Total 0.5 0.1 - 2.0 mg/dL    Total Protein 7.0 6.4 - 8.2 g/dL    Albumin 3.3 (L) 3.4 - 5.0 g/dL    Globulin  3.7 2.8 - 4.4 g/dL    A/G Ratio 0.9 (L) 1.0 - 2.0    Patient Fasting for CMP? No    Lipid Panel   Result Value Ref Range    Cholesterol, Total 190 <200 mg/dL    HDL Cholesterol 60 (H) 40 - 59 mg/dL    Triglycerides 130 30 - 149 mg/dL    LDL Cholesterol 107 (H) <100 mg/dL    VLDL 22 0 - 30 mg/dL    Non HDL Chol 130 (H) <130 mg/dL    Patient Fasting for Lipid? No    Assay, Thyroid Stim Hormone   Result Value Ref Range    TSH 2.240 0.358 - 3.740 mIU/mL   CBC W/ DIFFERENTIAL   Result Value Ref Range    WBC 5.2 4.0 - 11.0 x10(3) uL    RBC 3.91 3.80 - 5.30 x10(6)uL    HGB 11.8 (L) 12.0 - 16.0 g/dL    HCT 37.4 35.0 - 48.0 %    .0 150.0 - 450.0 10(3)uL    MCV 95.7 80.0 - 100.0 fL    MCH 30.2 26.0 - 34.0 pg    MCHC 31.6 31.0 - 37.0 g/dL    RDW 13.2 %    Neutrophil Absolute Prelim 3.24 1.50 - 7.70 x10 (3) uL    Neutrophil Absolute 3.24 1.50 - 7.70 x10(3) uL    Lymphocyte Absolute 1.31 1.00 - 4.00 x10(3) uL    Monocyte Absolute 0.43 0.10 - 1.00 x10(3) uL    Eosinophil Absolute 0.15 0.00 - 0.70 x10(3) uL    Basophil Absolute 0.03 0.00 - 0.20 x10(3) uL    Immature Granulocyte Absolute 0.01 0.00 - 1.00 x10(3) uL    Neutrophil % 62.7 %    Lymphocyte % 25.3 %    Monocyte % 8.3 %    Eosinophil % 2.9 %    Basophil % 0.6 %    Immature Granulocyte % 0.2 %            ASSESSMENT AND PLAN:  1. Primary hypertension  Continue benazepril.   She can try taking lasix 40mg alternating with 20mg. Let me know if this works and I can send in a rx.   - furosemide 20 MG Oral Tab; Take 20mg every other day alternating with 40mg every other day.    2. Iron deficiency anemia, unspecified iron deficiency anemia type  Will check iron levels.   - Iron And Tibc [E];  Future  - Ferritin [E]; Future    3. PAF (paroxysmal atrial fibrillation) ([facility])  Continue eliquis and flecainide.   follow up with cardiology.     4. Mild intermittent asthma without complication ([facility])  ACT 25.   AAP reveiwed and sent to pt via [employer] today.     5. BHUPINDER on CPAP  Continue cpap.     6. Pre-diabetes  Continue low carb diet.   Exercise is limited due to pain.     7. Acquired hypothyroidism  Continue levothyroxine.     8. Gastroesophageal reflux disease, unspecified whether esophagitis present  Discussed. She can try to wean the omeprazole to 40mg alternating with 20mg daily.     9. Mood disorder ([facility])  Continue lexapro.     10. Lymphedema of both lower extremities  Continue lymphedema clinic.   May improve with weight loss.     11. Class 3 severe obesity due to excess calories with serious comorbidity and body mass index (BMI) of 50.0 to 59.9 in adult ([facility])  See the weight loss clinic as planned.         Return in about 6 months (around 12/17/2024) for annual wellness visit, med check.      Roxanna Rice MD

## 2024-07-02 ENCOUNTER — LAB ENCOUNTER (OUTPATIENT)
Dept: LAB | Age: 69
End: 2024-07-02
Attending: INTERNAL MEDICINE
Payer: MEDICARE

## 2024-07-02 DIAGNOSIS — Z51.81 THERAPEUTIC DRUG MONITORING: ICD-10-CM

## 2024-07-02 DIAGNOSIS — E66.01 CLASS 3 SEVERE OBESITY DUE TO EXCESS CALORIES WITH SERIOUS COMORBIDITY AND BODY MASS INDEX (BMI) OF 50.0 TO 59.9 IN ADULT (HCC): ICD-10-CM

## 2024-07-02 DIAGNOSIS — E53.8 B12 DEFICIENCY: ICD-10-CM

## 2024-07-02 DIAGNOSIS — E03.9 ACQUIRED HYPOTHYROIDISM: ICD-10-CM

## 2024-07-02 DIAGNOSIS — E55.9 VITAMIN D DEFICIENCY: ICD-10-CM

## 2024-07-02 DIAGNOSIS — E78.00 PURE HYPERCHOLESTEROLEMIA: ICD-10-CM

## 2024-07-02 DIAGNOSIS — R73.03 PREDIABETES: ICD-10-CM

## 2024-07-02 DIAGNOSIS — G47.33 OSA ON CPAP: ICD-10-CM

## 2024-07-02 DIAGNOSIS — I10 PRIMARY HYPERTENSION: ICD-10-CM

## 2024-07-02 DIAGNOSIS — F39 MOOD DISORDER (HCC): ICD-10-CM

## 2024-07-02 LAB
EST. AVERAGE GLUCOSE BLD GHB EST-MCNC: 126 MG/DL (ref 68–126)
FOLATE SERPL-MCNC: 11.4 NG/ML (ref 8.7–?)
HBA1C MFR BLD: 6 % (ref ?–5.7)
VIT B12 SERPL-MCNC: 258 PG/ML (ref 193–986)
VIT D+METAB SERPL-MCNC: 39.3 NG/ML (ref 30–100)

## 2024-07-02 PROCEDURE — 82306 VITAMIN D 25 HYDROXY: CPT

## 2024-07-02 PROCEDURE — 36415 COLL VENOUS BLD VENIPUNCTURE: CPT

## 2024-07-02 PROCEDURE — 83036 HEMOGLOBIN GLYCOSYLATED A1C: CPT

## 2024-07-02 PROCEDURE — 82746 ASSAY OF FOLIC ACID SERUM: CPT

## 2024-07-02 PROCEDURE — 82607 VITAMIN B-12: CPT

## 2024-07-16 DIAGNOSIS — I10 PRIMARY HYPERTENSION: ICD-10-CM

## 2024-07-17 DIAGNOSIS — F39 MOOD DISORDER (HCC): ICD-10-CM

## 2024-07-17 RX ORDER — ESCITALOPRAM OXALATE 10 MG/1
TABLET ORAL
Qty: 90 TABLET | Refills: 0 | Status: SHIPPED | OUTPATIENT
Start: 2024-07-17

## 2024-07-17 RX ORDER — BENAZEPRIL HYDROCHLORIDE 10 MG/1
10 TABLET ORAL DAILY
Qty: 90 TABLET | Refills: 1 | Status: SHIPPED | OUTPATIENT
Start: 2024-07-17

## 2024-07-17 RX ORDER — ESCITALOPRAM OXALATE 5 MG/1
TABLET ORAL
Qty: 90 TABLET | Refills: 0 | Status: SHIPPED | OUTPATIENT
Start: 2024-07-17

## 2024-07-17 NOTE — TELEPHONE ENCOUNTER
Psychiatric Non-Scheduled (Anti-Anxiety) [id number]/17/2024 03:48 PM   Protocol Details In person appointment or virtual visit in the past 6 mos or appointment in next 3 mos    Depression Screening completed within the past 12 months       Mood disorder (HCC)  Continue lexapro.   Future Appointments   Date Time Provider Department Center   7/31/2024  8:40 AM Yadira Avilez MD [facility]   8/7/2024 11:30 AM Dottie Mcleod RD [facility]   8/26/2024  2:30 PM Ludmila Harmon DO [id number]   10/2/2024 10:00 AM Yadira Avilez MD [facility]   1/8/2025  8:40 AM Yadira Avilez MD [facility]

## 2024-07-30 NOTE — PROGRESS NOTES
HISTORY OF PRESENT ILLNESS  Chief Complaint   Patient presents with    Weight Check     Up 1        Serena Rivera is a 68 year old female here for follow up in medical weight loss program.     Denies chest pain, shortness of breath, dizziness, blurred vision, headache, paresthesia, nausea/vomiting.   Reviewed labwork A1c: 6.0 , b12 and vitamin D   Doing protein yogurt and protein bar in the morning   Working on better water intake   Doing water exercises on tues / Thursday   Doing a lot around the house       Wt Readings from Last 6 Encounters:   07/31/24 270 lb (122.5 kg)   07/02/24 269 lb (122 kg)   06/17/24 268 lb 4.8 oz (121.7 kg)   06/12/24 273 lb 9.6 oz (124.1 kg)   02/22/24 267 lb (121.1 kg)   02/14/24 267 lb 12.8 oz (121.5 kg)            Breakfast Lunch Dinner Snacks Fluids              REVIEW OF SYSTEMS  GENERAL HEALTH: feels well otherwise, denied any fevers chills or night sweats   RESPIRATORY: denies shortness of breath   CARDIOVASCULAR: denies chest pain  GI: denies abdominal pain    EXAM  /68   Pulse 62   Resp 16   Ht 5' 1.5\" (1.562 m)   Wt 270 lb (122.5 kg)   LMP 01/01/2007   BMI 50.19 kg/m²   GENERAL: well developed, well nourished,in no apparent distress, A/O x3  SKIN: no rashes,no suspicious lesions  HEENT: atraumatic, normocephalic, OP-clear, PERRL  NECK: supple,no adenopathy  LUNGS: clear to auscultation bilaterally   CARDIO: RRR without murmur  GI: good BS's,NT/ND, no masses or HSM  EXTREMITIES: no cyanosis, no clubbing, no edema    Lab Results   Component Value Date    WBC 5.2 06/03/2024    RBC 3.91 06/03/2024    HGB 11.8 (L) 06/03/2024    HCT 37.4 06/03/2024    MCV 95.7 06/03/2024    MCH 30.2 06/03/2024    MCHC 31.6 06/03/2024    RDW 13.2 06/03/2024    .0 06/03/2024     Lab Results   Component Value Date    GLU 93 06/03/2024    BUN 31 (H) 06/03/2024    BUNCREA 21.1 (H) 08/18/2020    CREATSERUM 0.98 06/03/2024    ANIONGAP 5 06/03/2024    GFR 71 05/10/2017    GFRNAA 47  (L) 05/05/2022    GFRAA 54 (L) 05/05/2022    CA 8.8 06/03/2024    OSMOCALC 290 06/03/2024    ALKPHO 104 06/03/2024    AST 10 (L) 06/03/2024    ALT 15 06/03/2024    BILT 0.5 06/03/2024    TP 7.0 06/03/2024    ALB 3.3 (L) 06/03/2024    GLOBULIN 3.7 06/03/2024     06/03/2024    K 4.6 06/03/2024     06/03/2024    CO2 27.0 06/03/2024     Lab Results   Component Value Date     07/02/2024    A1C 6.0 (H) 07/02/2024     Lab Results   Component Value Date    CHOLEST 190 06/03/2024    TRIG 130 06/03/2024    HDL 60 (H) 06/03/2024     (H) 06/03/2024    VLDL 22 06/03/2024    TCHDLRATIO 2.65 05/10/2017    NONHDLC 130 (H) 06/03/2024     Lab Results   Component Value Date    T4F 1.2 08/18/2020    TSH 2.240 06/03/2024     Lab Results   Component Value Date    B12 258 07/02/2024     Lab Results   Component Value Date    VITD 39.3 07/02/2024       Current Outpatient Medications on File Prior to Visit   Medication Sig Dispense Refill    Benazepril HCl 10 MG Oral Tab TAKE 1 TABLET(10 MG) BY MOUTH DAILY 90 tablet 1    escitalopram 10 MG Oral Tab TAKE 1 TABLET BY MOUTH DAILY WITH 5 MG FOR TOTAL 15 MG 90 tablet 0    escitalopram 5 MG Oral Tab TAKE 1 TABLET BY MOUTH DAILY WITH 10 MG FOR TOTAL 15 MG 90 tablet 0    flecainide 100 MG Oral Tab Take 1 tablet (100 mg total) by mouth 2 (two) times daily.      Potassium Gluconate 595 MG Oral Cap Take by mouth as needed.      furosemide 20 MG Oral Tab Take 20mg every other day alternating with 40mg every other day.      LEVOTHYROXINE 112 MCG Oral Tab TAKE 1 TABLET(112 MCG) BY MOUTH BEFORE BREAKFAST 90 tablet 0    albuterol (PROAIR HFA) 108 (90 Base) MCG/ACT Inhalation Aero Soln Inhale 2 puffs into the lungs every 4 (four) hours as needed for Wheezing. 1 each 0    Spacer/Aero-Holding Chambers (AEROCHAMBER MINI CHAMBER) Does not apply Device Use with inhaler as needed 1 each 0    VITAMIN D, CHOLECALCIFEROL, OR Take 50 mcg by mouth daily.      APPLE CIDER VINEGAR OR Take by  mouth. gummies      Omeprazole 40 MG Oral Capsule Delayed Release Take 1 capsule (40 mg total) by mouth daily. 90 capsule 1    ELIQUIS 5 MG Oral Tab Take 1 tablet (5 mg total) by mouth 2 (two) times daily.      Calcium Carbonate-Vitamin D 250-125 MG-UNIT Oral Tab Take 1 tablet by mouth daily.      Bacillus Coagulans-Inulin (ALIGN PREBIOTIC-PROBIOTIC) 5-1.25 MG-GM Oral Chew Tab Chew 2 tablets by mouth daily.       No current facility-administered medications on file prior to visit.       ASSESSMENT  Analyzed weight data:       Diagnoses and all orders for this visit:    PAF (paroxysmal atrial fibrillation) (HCC)    Primary hypertension [I10]    B12 deficiency [E53.8]    Vitamin D deficiency [E55.9]    Therapeutic drug monitoring [Z51.81]    Prediabetes [R73.03]    Morbid obesity (HCC)    BHUPINDER on CPAP [G47.33]    At high risk for cardiovascular disease    Family history of heart disease    Other orders  -     semaglutide-weight management 0.5 MG/0.5ML Subcutaneous Solution Auto-injector; Inject 0.5 mL (0.5 mg total) into the skin once a week for 4 doses.        PLAN   Initial consult : 7/2/24 at 269 lb / BMI 50   Trial of wegovy   -advised of side effects and adverse effects of this medication  Reviewed high risk for cardiovascular disease  and this medication is used for cardiovascular disease.     This medication is being used in this patient for cardiovascular risk reduction  Wegovy is a GLP-1 receptor agonist anti-obesity medication that has several cardiovascular benefits, based on data from the SELECT Trial published last fall in The [place] Journal of Medicine. The trial tracked [id number] adults ages 45 and older who had overweight or obesity and a cardiovascular condition, but no history of diabetes. These adults were given either 2.4mg of semaglutide (Wegovy) weekly or a placebo for an average of about three years.    In addition to having a 20% lower risk overall of major cardiac events, such as heart  attack, stroke, or cardiovascular death, participants who took Wegovy had a 28% reduction in heart attacks (for those already taking heart medications, such as statins, to reduce cholesterol), a 7% decrease in non-fatal strokes, and a 15% drop in cardiovascular-related deaths.    There was one non-heart-related finding: Among those who took Wegovy, there was a 19% lower death rate from any cause.    Obesity or overweight affects more than 70% of American adults, and both are considered serious health issues that increase the risk for premature death and a variety of health problems, including heart attack and stroke.   Nutrition: low carb diet/ recommended to eat breakfast daily/ regular protein intake  Medication use and side effects reviewed with patient.  Medication contraindications: n/a  Follow up with dietitian and psychologist as recommended.  Discussed the role of sleep and stress in weight management.  Counseled on comprehensive weight loss plan including attention to nutrition, exercise and behavior/stress management for success. See patient instruction below for more details.  Discussed strategies to overcome barriers to successful weight loss and weight maintenance  FITTE: ACSM recommendations (150-300 minutes/ week in active weight loss)   Weight Loss consent to treat reviewed and signed    There are no Patient Instructions on file for this visit.    Return in about 8 weeks (around 9/25/2024).    Patient verbalizes understanding.    Yadira Avilez MD

## 2024-07-31 ENCOUNTER — OFFICE VISIT (OUTPATIENT)
Dept: INTERNAL MEDICINE CLINIC | Facility: CLINIC | Age: 69
End: 2024-07-31
Payer: MEDICARE

## 2024-07-31 VITALS
RESPIRATION RATE: 16 BRPM | SYSTOLIC BLOOD PRESSURE: 110 MMHG | BODY MASS INDEX: 50.32 KG/M2 | DIASTOLIC BLOOD PRESSURE: 68 MMHG | WEIGHT: 270 LBS | HEART RATE: 62 BPM | HEIGHT: 61.5 IN

## 2024-07-31 DIAGNOSIS — I10 PRIMARY HYPERTENSION: ICD-10-CM

## 2024-07-31 DIAGNOSIS — E53.8 B12 DEFICIENCY: Primary | ICD-10-CM

## 2024-07-31 DIAGNOSIS — Z82.49 FAMILY HISTORY OF HEART DISEASE: ICD-10-CM

## 2024-07-31 DIAGNOSIS — E66.01 MORBID OBESITY (HCC): ICD-10-CM

## 2024-07-31 DIAGNOSIS — I48.0 PAF (PAROXYSMAL ATRIAL FIBRILLATION) (HCC): ICD-10-CM

## 2024-07-31 DIAGNOSIS — G47.33 OSA ON CPAP: ICD-10-CM

## 2024-07-31 DIAGNOSIS — Z91.89 AT HIGH RISK FOR CARDIOVASCULAR DISEASE: ICD-10-CM

## 2024-07-31 DIAGNOSIS — R73.03 PREDIABETES: ICD-10-CM

## 2024-07-31 DIAGNOSIS — E55.9 VITAMIN D DEFICIENCY: ICD-10-CM

## 2024-07-31 DIAGNOSIS — Z51.81 THERAPEUTIC DRUG MONITORING: ICD-10-CM

## 2024-07-31 PROCEDURE — 99214 OFFICE O/P EST MOD 30 MIN: CPT | Performed by: INTERNAL MEDICINE

## 2024-07-31 RX ORDER — CYANOCOBALAMIN 1000 UG/ML
1000 INJECTION, SOLUTION INTRAMUSCULAR; SUBCUTANEOUS
Status: CANCELLED | OUTPATIENT
Start: 2024-07-31 | End: 2025-01-27

## 2024-08-02 ENCOUNTER — TELEPHONE (OUTPATIENT)
Dept: INTERNAL MEDICINE CLINIC | Facility: CLINIC | Age: 69
End: 2024-08-02

## 2024-08-02 NOTE — TELEPHONE ENCOUNTER
Pa for wegovy 0.5 mg entered in epic as CV risk primary  Awaiting insurance decision.    Could also be entered in [facility]  [id number]

## 2024-08-05 NOTE — TELEPHONE ENCOUNTER
Pa in epic denied  Listed as CV risk as she is medicare  Must print denial and have MD review.  Received 8/2/24 in faxes

## 2024-08-06 NOTE — PROGRESS NOTES
FOLLOW UP NUTRITION CONSULTATION      Nutrition Assessment    Number of consultations with dietitian: 1    Height:  Ht Readings from Last 1 Encounters:   07/31/24 5' 1.5\" (1.562 m)       Weight:   Wt Readings from Last 2 Encounters:   07/31/24 270 lb   07/02/24 269 lb       BMI:  BMI Readings from Last 1 Encounters:   07/31/24 50.19 kg/m²       Weight change: Decrease of 5 lbs in the past 1 month          Diet/Lifestyle Modifications Following Previous Nutrition Consultation      On wegovy as of last week, states she is making better choices and having more protein, eating more fruits and vegetables   She is feeling great     Food/Beverage Intake: updated 8/7/24  Breakfast: english muffin with fruit   with baby bell cheese   Yogurt (yoplait protein) with kodiak bar (7 g protein)  Lunch: sandwich with low jori bread and lunch meat (turkey) sometimes a slice of cheese  Veggie straws and fruit 2 small cookies   Lunch meat no nitrates rolled up (5 pieces) fresh radishes and fruit (berries/watermelon) cottage cheese + veggie chips or wavy chips with a baby bell cheese   Dinner: chicken sausage, baked beans, hormel chili with beans and cheese, chicken breast with corn on the cob, pickled cucumbers bush's vegetarian baked beans and pork tenderloin with a salad   Will eat out (mexican or burrito bowl from [employer] or [employer] or chick [employer]re[employer] a)  Pork tenderloin, pork chops   Premade burger   Snacks: chocolate (1 piece/night), unsalted nuts, popcorn chocolate covered blueberries   Beverages: water      Estimated caloric needs for weight loss: 1100-7648 cals/d for 1-2 pounds/week weight loss     Physical Activity: water exercise 2x/week, yard work and house cleaning, chair yoga      Food Journal: WW    Spent 15 minutes in consultation with the patient.      Nutrition Assessment and Intervention/Education:    Nutrition follow up for weight loss was provided. Changes in Eating Habits: She is very happy with the effect of wegovy  on her appetite/decrease of food noise - she is able to make wise choices when eating and has started planning meals. She feels great and is focusing on protein and produce. Congratulated pt on her success and discussed next steps. She is going to make a follow up appt as needed.    Goals:   Continue to follow the MyPlate method    Monitoring/Evaluation: {Patient encouraged to schedule follow up appt        Dottie Mcleod RD, JEAN PAULN

## 2024-08-07 ENCOUNTER — OFFICE VISIT (OUTPATIENT)
Dept: INTERNAL MEDICINE CLINIC | Facility: CLINIC | Age: 69
End: 2024-08-07
Payer: MEDICARE

## 2024-08-07 VITALS — WEIGHT: 265 LBS | BODY MASS INDEX: 49 KG/M2

## 2024-08-07 DIAGNOSIS — E66.01 CLASS 3 SEVERE OBESITY WITH BODY MASS INDEX (BMI) OF 50.0 TO 59.9 IN ADULT, UNSPECIFIED OBESITY TYPE, UNSPECIFIED WHETHER SERIOUS COMORBIDITY PRESENT (HCC): ICD-10-CM

## 2024-08-07 PROCEDURE — G0447 BEHAVIOR COUNSEL OBESITY 15M: HCPCS

## 2024-08-08 ENCOUNTER — PATIENT MESSAGE (OUTPATIENT)
Dept: INTERNAL MEDICINE CLINIC | Facility: CLINIC | Age: 69
End: 2024-08-08

## 2024-08-08 NOTE — TELEPHONE ENCOUNTER
From: Serena Rivera  To: Yadira Avilez  Sent: 8/8/2024 7:25 AM CDT  Subject: Wegovy    I just wanted to let you know how my first week went being on Wegovy. It been very interesting!  For the first 3 days I Juan Jose nauseated. Has gotten better.    On Saturday night we went out for dinner and for the first time I was enjoying the conversation rather than thinking about the food!    Sunday afternoon I was watching a show on tv when all of a sudden I felt calm and relaxed. I can’t remember that feeling is been so long!    I feel happy again, I have more energy, I want to do things again. My swelling in my legs and feet due to lymphedema, has gone down so much . My shoes are a little loose on me! Even my knees are better not much pain compared to when I walked in you office.    Oh and by the way I’ve lost 5 pounds.     To be honest, I am over the moon! I viewed this as a last resort and I am so happy it’s working!  Thank you so much for being my Dr. your team has been fabulous too! You are all so kind, caring, and understanding . You don’t know how much I needed that. I blamed myself for not being able to lose the weight. I look forward to more wonderful results while on this journey with you and your staff!    Serena Rivera

## 2024-08-08 NOTE — TELEPHONE ENCOUNTER
Got it!   Is she interested in trying in compounded    Compound semaglutide is a custom-made medication that mimics the GLP-1 hormone. It is used to treat type 2 diabetes and lower the risk of heart or blood vessel disease. It works by increasing insulin release, lowering glucagon release, delaying gastric emptying and reducing appetite. Compound semaglutide is prescribed when an FDA-approved medication, dose, or dosage form is unavailable (ie. Nationwide shortage or no obesity coverage for GLP-1 meds). Patients are aware of the difference between these medications.   Cost of these medications is  dollars monthly based on dose.

## 2024-08-08 NOTE — TELEPHONE ENCOUNTER
There is another message sent by [employer] stating it was denied and she printed the letter and placed on your desk

## 2024-08-12 NOTE — TELEPHONE ENCOUNTER
Reason for denial letter from [employer] on 8/2/24 for wegovy 0.5mg (formulary exclusion) :    \"Your request was denied.  Medicare Part D rules limit coverage of drugs.  They limit it to those that are being used for a medically accepted condition.  This request for coverage was based on our Formulary Exception criteria.  It cannot be approved at this time.  You must meet our criteria for this drug to be covered by your plan.  You must have a FDA approved diagnosis for the requested drug.  The requested drug is excluded and cannot be covered when used for weight loss only.\"

## 2024-08-12 NOTE — TELEPHONE ENCOUNTER
Ok so an appeal would not go through.     Can consider compounded  Compound semaglutide is a custom-made medication that mimics the GLP-1 hormone. It is used to treat type 2 diabetes and lower the risk of heart or blood vessel disease. It works by increasing insulin release, lowering glucagon release, delaying gastric emptying and reducing appetite. Compound semaglutide is prescribed when an FDA-approved medication, dose, or dosage form is unavailable (ie. Nationwide shortage or no obesity coverage for GLP-1 meds). Patients are aware of the difference between these medications.   Cost of these medications is  dollars monthly based on dose.

## 2024-08-12 NOTE — TELEPHONE ENCOUNTER
Can someone in office tell me the reason for denial on the letter? Plan exlcusion or alternative?  Plan exclusion we cannot appeal

## 2024-08-26 ENCOUNTER — OFFICE VISIT (OUTPATIENT)
Facility: CLINIC | Age: 69
End: 2024-08-26
Payer: MEDICARE

## 2024-08-26 ENCOUNTER — NURSE ONLY (OUTPATIENT)
Dept: INTERNAL MEDICINE CLINIC | Facility: CLINIC | Age: 69
End: 2024-08-26
Payer: MEDICARE

## 2024-08-26 VITALS
HEART RATE: 65 BPM | HEIGHT: 61.5 IN | OXYGEN SATURATION: 96 % | SYSTOLIC BLOOD PRESSURE: 122 MMHG | BODY MASS INDEX: 49.39 KG/M2 | WEIGHT: 265 LBS | DIASTOLIC BLOOD PRESSURE: 64 MMHG

## 2024-08-26 DIAGNOSIS — E53.8 B12 DEFICIENCY: Primary | ICD-10-CM

## 2024-08-26 DIAGNOSIS — M81.6 LOCALIZED OSTEOPOROSIS WITHOUT CURRENT PATHOLOGICAL FRACTURE: Primary | ICD-10-CM

## 2024-08-26 PROCEDURE — 99204 OFFICE O/P NEW MOD 45 MIN: CPT | Performed by: STUDENT IN AN ORGANIZED HEALTH CARE EDUCATION/TRAINING PROGRAM

## 2024-08-26 RX ORDER — CYANOCOBALAMIN 1000 UG/ML
1000 INJECTION, SOLUTION INTRAMUSCULAR; SUBCUTANEOUS
Status: SHIPPED | OUTPATIENT
Start: 2024-08-26 | End: 2025-01-23

## 2024-08-26 NOTE — PROGRESS NOTES
ENDOCRINOLOGY, DIABETES & METABOLISM CONSULT NOTE   Date: 8/26/2024  Name: Serena Rivera   Referring Physician: No referring provider defined for this encounter.    Subjective:    HISTORY OF PRESENT ILLNESS:   Serena Rivera is a 68 year old female seen in consultation for her osteoporosis    Patient presents to the clinic for an initial bone health evaluation due to a history of DEXA confirmed 3/2024 in femoral neck.     OSTEOPOROSIS RISK FACTORS ASSESSMENT  Postmenopausal Yes, around age 49   Maternal hx of osteoporosis or hx of parental hip fx:  Yes, mother with hip fracture x2   Recent Fracture: No   Previous fracture after the age of 50:  No   Hx of kidney stones No   Frequent falls No; previously was falling more frequently. Has a hx of lymphedema and noted weakness. This  has improved and is in water aerobics   Poor vision No5/2024 last eye visit   Decrease in height Yes,   2-2.5 inches   New or Worsening Back Pain No   History of Vit D insufficiency:   Current Vitamin D supplementation: Yes, 12/2023  On vitamin D3, unsure of dose    Poor intake of calcium  Daily calcium intake: No yogurt daily. Cottage cheese 3-4x/week. Some milk in her coffee daily. Salad 2-3x/week. Cashews and almonds once a week   Is taking calcium 500 mg daily    Caffeine intake Yes, 1/2 regular 1/2 decaf coffee daily, pop twice a month   Smoking No   Alcohol intake >3/d:  No   Hx of thyroid disease Yes, hx of hypothyroidism since 2010, on LT4 112 mcg daily    Hx of calcium problems or hyperparathyroidism No   Previous treatment for osteoporosis No   Malabsorption, chronic diarrhea, celiac sprue   No   History of RA  No   History of skeletal radiation No   History of eating disorder No     Intake of medications that cause bone loss:  Long term steroid use: No  Aromatase inhibitor: No  Seizure medications: No  GnRH agonist: No  PPI: Yes, omeprazole since around 2004   Lithium: No  SSRI: Yes, escitaloprom for 10  years  Actos/Avandia: No    Treatment Contraindications:  Dysphagia: denies  Pain on swallowing: denies  Plans for dental procedures: Early 2024 for a cleaning      Allergies, PMH, SocHx and FHx reviewed and updated as appropriate in Epic on 8/26/2024    Allergies   Allergen Reactions    Dye [Iodine (Topical)] OTHER (SEE COMMENTS)     Blisters    Latex SWELLING    Shellfish SWELLING    Augmentin [Amoxicillin-Pot Clavulanate] DIARRHEA and NAUSEA AND VOMITING           Pcn [Bicillin C-R,] NAUSEA AND VOMITING    Sulfa Antibiotics OTHER (SEE COMMENTS)     Flushed      Penicillin G Proc & Benzathine OTHER (SEE COMMENTS)     unknown    Sulfamethoxazole OTHER (SEE COMMENTS)     flushed    Adhesive Tape RASH     Can tolerate paper tape    Verapamil RASH and OTHER (SEE COMMENTS)     Current Outpatient Medications   Medication Sig Dispense Refill    VITAMIN D, CHOLECALCIFEROL, OR Take 50 mcg by mouth daily.      CUSTOM MEDICATION Semaglutide/ cynaocobalamin injection 1/0.5 mg/ ML   2.5 ml vial   Take 50 units aka 0.5 ml q weekly 1 each 1    semaglutide-weight management 0.25 MG/0.5ML Subcutaneous Solution Auto-injector Inject 0.5 mL (0.25 mg total) into the skin once a week for 4 doses. 2 mL 0    Benazepril HCl 10 MG Oral Tab TAKE 1 TABLET(10 MG) BY MOUTH DAILY 90 tablet 1    escitalopram 10 MG Oral Tab TAKE 1 TABLET BY MOUTH DAILY WITH 5 MG FOR TOTAL 15 MG 90 tablet 0    escitalopram 5 MG Oral Tab TAKE 1 TABLET BY MOUTH DAILY WITH 10 MG FOR TOTAL 15 MG 90 tablet 0    flecainide 100 MG Oral Tab Take 1 tablet (100 mg total) by mouth 2 (two) times daily.      Potassium Gluconate 595 MG Oral Cap Take by mouth as needed.      furosemide 20 MG Oral Tab Take 20mg every other day alternating with 40mg every other day.      LEVOTHYROXINE 112 MCG Oral Tab TAKE 1 TABLET(112 MCG) BY MOUTH BEFORE BREAKFAST 90 tablet 0    albuterol (PROAIR HFA) 108 (90 Base) MCG/ACT Inhalation Aero Soln Inhale 2 puffs into the lungs every 4 (four) hours  as needed for Wheezing. 1 each 0    Spacer/Aero-Holding Chambers (AEROCHAMBER MINI CHAMBER) Does not apply Device Use with inhaler as needed 1 each 0    APPLE CIDER VINEGAR OR Take by mouth. gummies      Omeprazole 40 MG Oral Capsule Delayed Release Take 1 capsule (40 mg total) by mouth daily. 90 capsule 1    ELIQUIS 5 MG Oral Tab Take 1 tablet (5 mg total) by mouth 2 (two) times daily.      Calcium Carbonate-Vitamin D 250-125 MG-UNIT Oral Tab Take 1 tablet by mouth daily.      Bacillus Coagulans-Inulin (ALIGN PREBIOTIC-PROBIOTIC) 5-1.25 MG-GM Oral Chew Tab Chew 2 tablets by mouth daily.       Past Medical History:    Abnormal mammogram    Acute nasopharyngitis    Adjustment reaction    adjustment reaction    Allergic rhinitis    Anemia    Ankle strain    Anxiety    hx severe anxiety    Arrhythmia    A-fib DX YRS AGO    Arthritis    Asthma (HCC)    Atherosclerosis    mild, at the carotid bifurcations, L>R    Breast mass    left breast, s/p bx-benign; hx R breast mass    Breast mass, left    Bronchitis    and acute bronchitis    Bronchospasm    Carotid disease, bilateral (HCC)    Cellulitis    IN LEGS    Cephalgia    viral    Chest pain    Chickenpox    Coughing    coughing paroxysm    Dermatitis    stress    Disorder of thyroid    Easy bruising    Eczema    Edema    Epigastric fullness    Esophageal reflux    GERD    ETD (eustachian tube dysfunction)    Exertional chest pain    Extrinsic asthma, unspecified    Fibroid tumor    in uterine wall    Heart palpitations    Heartburn    Hemorrhoids    High blood pressure    HTN    History of anemia    History of bone density study    HTN (hypertension)    Hypothyroid    Hypothyroidism    Itch of skin    Laryngitis    Leg pain    Leg swelling    Lipid screening    Lymphedema of both lower extremities    Measles    Migraines    Mumps    OA (osteoarthritis)    knee    Obesity    Occult blood positive stool    Osteopenia    Other allergy, other than to medicinal agents     Alleriges    Otitis media    PAF (paroxysmal atrial fibrillation) (HCC)    PMS (premenstrual syndrome)    Pneumonia    PONV (postoperative nausea and vomiting)    Pre-diabetes    Rash    Rhinitis    Sinobronchitis    Sinusitis    and acute sinusitis    Stasis dermatitis    3/14/2000: severe    Stasis ulcer (HCC)    Thumb laceration    Unspecified sleep apnea    cpap    URI (upper respiratory infection)    Vasculitis (HCC)    Venous insufficiency    severe    Venous stasis dermatitis    severe    Visual impairment    glasses    Vomiting and diarrhea    Wears glasses     Past Surgical History:   Procedure Laterality Date    Breast surgery procedure unlisted      10/2000: breast bx (-); 2002: R excisional bx; 6/4/2012: Ultrasound-guided 8 gauge core bxs, L breast, benign fatty breast parenchyma    Colonoscopy N/A 01/09/2020    Procedure: COLONOSCOPY;  Surgeon: Ang Adan DO;  Location:  ENDOSCOPY    Colonoscopy N/A 8/28/2023    Procedure: COLONOSCOPY WITH COLD SNARE POLYPECTOMY;  Surgeon: Ang Adan DO;  Location:  ENDOSCOPY    [place] localization wire 1 site right (cpt=[id number])  2008?    benign     Needle biopsy left  06/2012    benign    Needle biopsy left      2022 PAPILLOMA    Other surgical history      LUMPECTOMY IN RIGHT BREAST    Other surgical history      Myomectomy     Social History     Socioeconomic History    Marital status:    Tobacco Use    Smoking status: Never     Passive exposure: Never    Smokeless tobacco: Never   Vaping Use    Vaping status: Never Used   Substance and Sexual Activity    Alcohol use: Not Currently     Comment: approx 1 drink per month    Drug use: Never   Other Topics Concern    Caffeine Concern Yes    Stress Concern No    Weight Concern No    Special Diet Yes     Comment: Low Salt Diet    Exercise Yes    Seat Belt Yes     Social Determinants of Health     Physical Activity: Insufficiently Active (12/16/2019)    [facility]    Exercise Vital Sign     Days of Exercise per Week: 2 days     Minutes of Exercise per Session: 60 min     Family History   Problem Relation Age of Onset    High Blood Pressure Father     Other (Other) Father         DEMENTIA    Hypertension Mother     High Blood Pressure Mother     Heart Disease Mother     Other (Other) Mother         MACULAR DEGENERATION    Other (ASVD) Mother     Other (Osteoporosis) Mother     Other (Allergies/Asthma) Other         fam hx    Cancer Other         fam hx    Other (Skin disorders) Other         fam hx       REVIEW OF SYSTEMS: 10 point ROS completed, refer to HPI for pertinent positives    Objective:   PHYSICAL EXAMINATION:  Vital Signs:   Vitals:    08/26/24 1432   BP: 122/64   Pulse: 65   SpO2: 96%   Weight: 265 lb (120.2 kg)   Height: 5' 1.5\" (1.562 m)     General Appearance:  Alert, in no acute distress, well developed  Eyes:  normal conjunctivae, sclera  Ears/Nose/Mouth/Throat/Neck:  normal hearing, normal speech and no palpable thyroid nodules  Respiratory:  breathing comfortably on room air,  clear to auscultation bilaterally   Cardiovascular:   regular rate and rhythm, no murmurs, S3 or S4, no peripheral edema   Psychiatric:  Oriented to person, place and time, appropriate mood & affect  Skin: Normal moisture and skin texture  Neuro: sensory grossly intact, motor grossly intact.      Recent Labs: Personally reviewed in EPIC under lab tab on 8/26/2024      Lab Results   Component Value Date    CA 8.8 06/03/2024    CA 9.2 12/04/2023    CA 9.0 04/19/2023    CA 9.0 12/14/2022    CA 9.3 05/05/2022    CA 9.2 09/04/2021    CREATSERUM 0.98 06/03/2024    CREATSERUM 1.16 (H) 12/04/2023    CREATSERUM 1.04 (H) 04/19/2023    MG 2.0 03/10/2018    MG 2.1 02/23/2014    MG 1.5 (L) 02/22/2014    VITD 39.3 07/02/2024    VITD 29.5 (L) 12/04/2023         Radiology:  Independently visualized on 8/26/2024  I reviewed the patient's records from outside facility which revealed:  osteopenia [id number], osteoporosis 2024    DXA Scans:  Date L2-L4 BMD T-score % change Mean Femoral Neck BMD T-score % change   3/2024 HOB 0.845 -1.8 0.563 -2.6   1/2022 HOB  0.849 -1.8 0.591 -2.3   11/2019 HOB 0.849 -1.8 0.631 -2.0   10/2014 BK 0.853 -1.8 0.690 -1.4             ASSESSMENT/PLAN:  Serena Rivera is a 68 year old female seen in consultation for:    1. Localized osteoporosis without current pathological fracture  - PTH, Intact [E]; Future  - Renal Function Panel; Future  - TSH and Free T4 [E]; Future  - Alkaline Phosphatase, Bone Specific; Future  - C-Telopeptide ([employer]); Future   Discussed pathophysiology of bone loss and clinical significance of DEXA scans with the patient.  The patient has confirmed osteoporosis with low T-scores in the mean femoral necks.  Explained the patient's risk factors for osteoporosis.  2021 had endoscopy without ulcers, does have a hx of reflux on PPI  The patient is at high risk for future osteoporotic fractures if her osteoporosis remains untreated.  Recommended workup for secondary causes of osteoporosis, including PTH, Alk Phos levels, and vitamin D levels.  Discussed the importance of adopting lifestyle measures, such as adequate calcium and vitamin D intake, exercise, smoking cessation, counseling on fall prevention, and avoidance of heavy alcohol use, to reduce bone loss in postmenopausal women.  Suggested 1200 mg of elemental calcium daily (total diet plus supplement) and 1000 IU of vitamin D daily as general recommendations. Will update once labs result.   Recommended pharmacologic therapy for postmenopausal women with established osteoporosis or fragility fracture.  Discussed available treatment options for osteoporosis, including bisphosphonates (oral vs. IV), anabolics, Evenity, and Prolia injections, as well as their indications, risks, and benefits, including black box warnings. Will discuss in greater detail at upcoming appointment.    Discussed concerns about an increased risk of vertebral fracture after discontinuation of denosumab, the need for indefinite administration of denosumab    Recommended DXA every two years for patients starting on therapy, with additional evaluation for contributing factors if a clinically significant BMD decrease or new fracture occurs.  Will discuss next steps at follow up visit        The above assessment and plan was discussed with the patient. The patient noted understanding and agreement with the plan listed above.      Visit Duration : A total of 45 minutes was spent today on obtaining history, reviewing pertinent labs, evaluating patient, providing multiple treatment options, reinforcing diet/exercise and compliance, and completing documentation.      Note to patient: The 21 Century Cures Act makes medical notes like these available to patients in the interest of transparency. However, be advised this is a medical document. It is intended as peer to peer communication. It is written in medical language and may contain abbreviations or verbiage that are unfamiliar. It may appear blunt or direct. Medical documents are intended to carry relevant information, facts as evident, and the clinical opinion of the practitioner      Ludmila Harmon DO  Endocrinology, Diabetes & Metabolism   8/26/2024

## 2024-08-26 NOTE — PATIENT INSTRUCTIONS
Return Visit   [ X  ] Physician in 1 months   [  ] In person or video visit  [  ] In person only    [ x ] After visit summary   [ x ] Placed labs/imaging. Labs are to be drawn at 8A and fasting.      It was great seeing you today!    Today we discussed your osteoporosis :  -Please complete your labs and we will review next steps at your follow up visit  -There are 2 labs that may not be covered by your insurance: bone specific alkaline phosphotase and C-telopeptide. If there are not covered, do not complete them     Take care!  -Dr. Harmon

## 2024-08-27 ENCOUNTER — TELEPHONE (OUTPATIENT)
Dept: INTERNAL MEDICINE CLINIC | Facility: CLINIC | Age: 69
End: 2024-08-27

## 2024-08-27 ENCOUNTER — HOSPITAL ENCOUNTER (OUTPATIENT)
Dept: CT IMAGING | Facility: HOSPITAL | Age: 69
Discharge: HOME | End: 2024-08-27
Attending: NURSE PRACTITIONER
Payer: MEDICARE

## 2024-08-27 DIAGNOSIS — R10.32 LEFT LOWER QUADRANT ABDOMINAL PAIN: ICD-10-CM

## 2024-08-27 DIAGNOSIS — E03.9 ACQUIRED HYPOTHYROIDISM: ICD-10-CM

## 2024-08-27 LAB
CREAT BLD-MCNC: 1.1 MG/DL
EGFRCR SERPLBLD CKD-EPI 2021: 54 ML/MIN/1.73M2 (ref 60–?)

## 2024-08-27 PROCEDURE — 82565 ASSAY OF CREATININE: CPT

## 2024-08-27 PROCEDURE — 74177 CT ABD & PELVIS W/CONTRAST: CPT | Performed by: NURSE PRACTITIONER

## 2024-08-27 NOTE — TELEPHONE ENCOUNTER
Patient called requesting Lab Results. Please give her a call back at [phone]. Thanks a bunch!

## 2024-08-28 ENCOUNTER — TELEPHONE (OUTPATIENT)
Dept: INTERNAL MEDICINE CLINIC | Facility: CLINIC | Age: 69
End: 2024-08-28

## 2024-08-28 RX ORDER — LEVOTHYROXINE SODIUM 112 UG/1
112 TABLET ORAL
Qty: 90 TABLET | Refills: 0 | Status: SHIPPED | OUTPATIENT
Start: 2024-08-28

## 2024-08-28 NOTE — TELEPHONE ENCOUNTER
Thyroid Medication Protocol [id number]/27/2024 11:59 AM   Protocol Details TSH in past 12 months    Last TSH value is normal    In person appointment or virtual visit in the past 12 mos or appointment in next 3 mos       Future Appointments   Date Time Provider Department Center   9/30/2024 10:00 AM [facility] NURSE [facility]   10/1/2024  8:30 AM Ludmila Harmon DO [id number] [place]   10/2/2024 10:00 AM Yadira Avilez MD [facility]   10/28/2024 10:00 AM [facility]URSE [facility]   11/25/2024 10:00 AM [facility]   12/30/2024 10:00 AM [facility]URSE [facility]   1/8/2025  8:40 AM Yadira Avilez MD [facility]   4/16/2025 10:00 AM Yadira Avilez MD [facility]   7/16/2025 10:00 AM Yadira Avilez MD [facility]

## 2024-08-28 NOTE — TELEPHONE ENCOUNTER
Is this medication prescribed by the [facility]? yes    Did the patient contact the pharmacy directly?:  yes    Is patient out of meds or supply very low?:  3 pills    Medication Requested:  levothyroxine    Dose:  112mcg    Is patient requesting a 30 or 90 day supply?:  90    Pharmacy name and phone # or location:  [id number] - [facility] [address] AT [facility] [address], [phone], [phone]     Is the patient due for an appointment?: no  (if so, please schedule appt)    Additional Notes:      Please advise the patient refills take up to 72 business hours.

## 2024-09-05 ENCOUNTER — PATIENT MESSAGE (OUTPATIENT)
Facility: CLINIC | Age: 69
End: 2024-09-05

## 2024-09-09 ENCOUNTER — LAB ENCOUNTER (OUTPATIENT)
Dept: LAB | Age: 69
End: 2024-09-09
Attending: STUDENT IN AN ORGANIZED HEALTH CARE EDUCATION/TRAINING PROGRAM
Payer: MEDICARE

## 2024-09-09 DIAGNOSIS — M81.6 LOCALIZED OSTEOPOROSIS WITHOUT CURRENT PATHOLOGICAL FRACTURE: ICD-10-CM

## 2024-09-09 LAB
ALBUMIN SERPL-MCNC: 4.3 G/DL (ref 3.2–4.8)
ANION GAP SERPL CALC-SCNC: 5 MMOL/L (ref 0–18)
BUN BLD-MCNC: 16 MG/DL (ref 9–23)
CALCIUM BLD-MCNC: 9.8 MG/DL (ref 8.7–10.4)
CHLORIDE SERPL-SCNC: 104 MMOL/L (ref 98–112)
CO2 SERPL-SCNC: 29 MMOL/L (ref 21–32)
CREAT BLD-MCNC: 1.06 MG/DL
EGFRCR SERPLBLD CKD-EPI 2021: 57 ML/MIN/1.73M2 (ref 60–?)
GLUCOSE BLD-MCNC: 89 MG/DL (ref 70–99)
OSMOLALITY SERPL CALC.SUM OF ELEC: 287 MOSM/KG (ref 275–295)
PHOSPHATE SERPL-MCNC: 3.6 MG/DL (ref 2.4–5.1)
POTASSIUM SERPL-SCNC: 4.8 MMOL/L (ref 3.5–5.1)
PTH-INTACT SERPL-MCNC: 56 PG/ML (ref 18.5–88)
SODIUM SERPL-SCNC: 138 MMOL/L (ref 136–145)
T4 FREE SERPL-MCNC: 1.7 NG/DL (ref 0.8–1.7)
TSI SER-ACNC: 6.03 MIU/ML (ref 0.55–4.78)

## 2024-09-09 PROCEDURE — 84443 ASSAY THYROID STIM HORMONE: CPT

## 2024-09-09 PROCEDURE — 80069 RENAL FUNCTION PANEL: CPT

## 2024-09-09 PROCEDURE — 36415 COLL VENOUS BLD VENIPUNCTURE: CPT

## 2024-09-09 PROCEDURE — 84439 ASSAY OF FREE THYROXINE: CPT

## 2024-09-09 PROCEDURE — 83970 ASSAY OF PARATHORMONE: CPT

## 2024-09-30 ENCOUNTER — NURSE ONLY (OUTPATIENT)
Dept: INTERNAL MEDICINE CLINIC | Facility: CLINIC | Age: 69
End: 2024-09-30
Payer: MEDICARE

## 2024-09-30 RX ADMIN — CYANOCOBALAMIN 1000 MCG: 1000 INJECTION, SOLUTION INTRAMUSCULAR; SUBCUTANEOUS at 18:26:00

## 2024-10-01 ENCOUNTER — OFFICE VISIT (OUTPATIENT)
Facility: CLINIC | Age: 69
End: 2024-10-01
Payer: MEDICARE

## 2024-10-01 VITALS
DIASTOLIC BLOOD PRESSURE: 64 MMHG | WEIGHT: 257 LBS | OXYGEN SATURATION: 98 % | SYSTOLIC BLOOD PRESSURE: 128 MMHG | HEART RATE: 64 BPM | BODY MASS INDEX: 48 KG/M2

## 2024-10-01 DIAGNOSIS — M81.6 LOCALIZED OSTEOPOROSIS WITHOUT CURRENT PATHOLOGICAL FRACTURE: Primary | ICD-10-CM

## 2024-10-01 PROCEDURE — 99214 OFFICE O/P EST MOD 30 MIN: CPT | Performed by: STUDENT IN AN ORGANIZED HEALTH CARE EDUCATION/TRAINING PROGRAM

## 2024-10-01 NOTE — PATIENT INSTRUCTIONS
Return Visit   [ x ] Physician in 7 months   [  ] In person or video visit  [  ] In person only    [ x ] After visit summary   [ x ] Placed labs/imaging.   [ x ] Dental clearance form     -Continue your current Levothyroxine and space out your Prilosec   -Repeat thyroid labs in 6 weeks  -We discussed multiple therapeutic options and we are leaning towards Reclast. Please keep me updated regarding your final decision once I receive your dental clearance     Take care!  -Dr. Harmon

## 2024-10-01 NOTE — PROGRESS NOTES
ENDOCRINOLOGY, DIABETES & METABOLISM CONSULT NOTE   Date: 10/1/2024  Name: Serena Rivera   Referring Physician: No referring provider defined for this encounter.    Subjective:    HISTORY OF PRESENT ILLNESS:   Serena Rivera is a 68 year old female seen in consultation for her osteoporosis    Patient presents to the clinic for an initial bone health evaluation due to a history of DEXA confirmed 3/2024 in femoral neck.     OSTEOPOROSIS RISK FACTORS ASSESSMENT  Postmenopausal Yes, around age 49   Maternal hx of osteoporosis or hx of parental hip fx:  Yes, mother with hip fracture x2   Frequent falls No; previously was falling more frequently. Has a hx of lymphedema and noted weakness. This  has improved and is in water aerobics   Poor vision No5/2024 last eye visit   Decrease in height Yes,   2-2.5 inches   History of Vit D insufficiency:   Current Vitamin D supplementation: Yes, 12/2023  On vitamin D3, unsure of dose    Poor intake of calcium  Daily calcium intake: No yogurt daily. Cottage cheese 3-4x/week. Some milk in her coffee daily. Salad 2-3x/week. Cashews and almonds once a week   Is taking calcium 500 mg daily    Caffeine intake Yes, 1/2 regular 1/2 decaf coffee daily, pop twice a month   Hx of thyroid disease Yes, hx of hypothyroidism since 2010, on LT4 112 mcg daily      Intake of medications that cause bone loss:  Long term steroid use: No  Aromatase inhibitor: No  Seizure medications: No  GnRH agonist: No  PPI: Yes, omeprazole since around 2004   Lithium: No  SSRI: Yes, escitaloprom for 10 years  Actos/Avandia: No    Treatment Contraindications:  Dysphagia: denies  Pain on swallowing: denies  Plans for dental procedures: Early 2024 for a cleaning     9/2024 9/2024 TSH 6.0, free T41.7, total calcium 9.8, creatinine 1.06, GFR 57, albumin 4.3, PTH 56  Denies any falls/fractures since LOV  Continues on vitamin D and calcium supplement  Has started compounded semaglutide + B12 for the last 3 weeks  and takes her LT4 with prilosec in the AM  Denies any other changes to her medication regimen      Allergies, PMH, SocHx and FHx reviewed and updated as appropriate in Epic on 10/1/2024    Allergies   Allergen Reactions    Dye [Iodine (Topical)] OTHER (SEE COMMENTS)     Blisters    Latex SWELLING    Shellfish SWELLING    Augmentin [Amoxicillin-Pot Clavulanate] DIARRHEA and NAUSEA AND VOMITING           Pcn [Bicillin C-R,] NAUSEA AND VOMITING    Sulfa Antibiotics OTHER (SEE COMMENTS)     Flushed      Penicillin G Proc & Benzathine OTHER (SEE COMMENTS)     unknown    Sulfamethoxazole OTHER (SEE COMMENTS)     flushed    Adhesive Tape RASH     Can tolerate paper tape    Verapamil RASH and OTHER (SEE COMMENTS)     Current Outpatient Medications   Medication Sig Dispense Refill    levothyroxine 112 MCG Oral Tab Take 1 tablet (112 mcg total) by mouth before breakfast. 90 tablet 0    CUSTOM MEDICATION Semaglutide/ cynaocobalamin injection 1/0.5 mg/ ML   2.5 ml vial   Take 50 units aka 0.5 ml q weekly 1 each 1    Benazepril HCl 10 MG Oral Tab TAKE 1 TABLET(10 MG) BY MOUTH DAILY 90 tablet 1    escitalopram 10 MG Oral Tab TAKE 1 TABLET BY MOUTH DAILY WITH 5 MG FOR TOTAL 15 MG 90 tablet 0    escitalopram 5 MG Oral Tab TAKE 1 TABLET BY MOUTH DAILY WITH 10 MG FOR TOTAL 15 MG 90 tablet 0    flecainide 100 MG Oral Tab Take 1 tablet (100 mg total) by mouth 2 (two) times daily.      Potassium Gluconate 595 MG Oral Cap Take by mouth as needed.      furosemide 20 MG Oral Tab Take 20mg every other day alternating with 40mg every other day.      albuterol (PROAIR HFA) 108 (90 Base) MCG/ACT Inhalation Aero Soln Inhale 2 puffs into the lungs every 4 (four) hours as needed for Wheezing. 1 each 0    Spacer/Aero-Holding Chambers (AEROCHAMBER MINI CHAMBER) Does not apply Device Use with inhaler as needed 1 each 0    VITAMIN D, CHOLECALCIFEROL, OR Take 50 mcg by mouth daily.      APPLE CIDER VINEGAR OR Take by mouth. gummies      Omeprazole 40  MG Oral Capsule Delayed Release Take 1 capsule (40 mg total) by mouth daily. 90 capsule 1    ELIQUIS 5 MG Oral Tab Take 1 tablet (5 mg total) by mouth 2 (two) times daily.      Calcium Carbonate-Vitamin D 250-125 MG-UNIT Oral Tab Take 1 tablet by mouth daily.      Bacillus Coagulans-Inulin (ALIGN PREBIOTIC-PROBIOTIC) 5-1.25 MG-GM Oral Chew Tab Chew 2 tablets by mouth daily.       Past Medical History:    Abnormal mammogram    Acute nasopharyngitis    Adjustment reaction    adjustment reaction    Allergic rhinitis    Anemia    Ankle strain    Anxiety    hx severe anxiety    Arrhythmia    A-fib DX YRS AGO    Arthritis    Asthma (HCC)    Atherosclerosis    mild, at the carotid bifurcations, L>R    Breast mass    left breast, s/p bx-benign; hx R breast mass    Breast mass, left    Bronchitis    and acute bronchitis    Bronchospasm    Carotid disease, bilateral (HCC)    Cellulitis    IN LEGS    Cephalgia    viral    Chest pain    Chickenpox    Coughing    coughing paroxysm    Dermatitis    stress    Disorder of thyroid    Easy bruising    Eczema    Edema    Epigastric fullness    Esophageal reflux    GERD    ETD (eustachian tube dysfunction)    Exertional chest pain    Extrinsic asthma, unspecified    Fibroid tumor    in uterine wall    Heart palpitations    Heartburn    Hemorrhoids    High blood pressure    HTN    History of anemia    History of bone density study    HTN (hypertension)    Hypothyroid    Hypothyroidism    Itch of skin    Laryngitis    Leg pain    Leg swelling    Lipid screening    Lymphedema of both lower extremities    Measles    Migraines    Mumps    OA (osteoarthritis)    knee    Obesity    Occult blood positive stool    Osteopenia    Other allergy, other than to medicinal agents    Alleriges    Otitis media    PAF (paroxysmal atrial fibrillation) (HCC)    PMS (premenstrual syndrome)    Pneumonia    PONV (postoperative nausea and vomiting)    Pre-diabetes    Rash    Rhinitis    Sinobronchitis     Sinusitis    and acute sinusitis    Stasis dermatitis    3/14/2000: severe    Stasis ulcer (HCC)    Thumb laceration    Unspecified sleep apnea    cpap    URI (upper respiratory infection)    Vasculitis (HCC)    Venous insufficiency    severe    Venous stasis dermatitis    severe    Visual impairment    glasses    Vomiting and diarrhea    Wears glasses     Past Surgical History:   Procedure Laterality Date    Breast surgery procedure unlisted      10/2000: breast bx (-); 2002: R excisional bx; 6/4/2012: Ultrasound-guided 8 gauge core bxs, L breast, benign fatty breast parenchyma    Colonoscopy N/A 01/09/2020    Procedure: COLONOSCOPY;  Surgeon: Ang Adan DO;  Location:  ENDOSCOPY    Colonoscopy N/A 8/28/2023    Procedure: COLONOSCOPY WITH COLD SNARE POLYPECTOMY;  Surgeon: Ang Adan DO;  Location:  ENDOSCOPY    [place] localization wire 1 site right (cpt=[id number])  2008?    benign     Needle biopsy left  06/2012    benign    Needle biopsy left      2022 PAPILLOMA    Other surgical history      LUMPECTOMY IN RIGHT BREAST    Other surgical history      Myomectomy     Social History     Socioeconomic History    Marital status:    Tobacco Use    Smoking status: Never     Passive exposure: Never    Smokeless tobacco: Never   Vaping Use    Vaping status: Never Used   Substance and Sexual Activity    Alcohol use: Not Currently     Comment: approx 1 drink per month    Drug use: Never   Other Topics Concern    Caffeine Concern Yes    Stress Concern No    Weight Concern No    Special Diet Yes     Comment: Low Salt Diet    Exercise Yes    Seat Belt Yes     Social Determinants of Health     Physical Activity: Insufficiently Active (12/16/2019)    [facility]    Exercise Vital Sign     Days of Exercise per Week: 2 days     Minutes of Exercise per Session: 60 min     Family History   Problem Relation Age of Onset    High Blood Pressure Father     Other (Other)  Father         DEMENTIA    Hypertension Mother     High Blood Pressure Mother     Heart Disease Mother     Other (Other) Mother         MACULAR DEGENERATION    Other (ASVD) Mother     Other (Osteoporosis) Mother     Other (Allergies/Asthma) Other         fam hx    Cancer Other         fam hx    Other (Skin disorders) Other         fam hx       REVIEW OF SYSTEMS: 10 point ROS completed, refer to HPI for pertinent positives    Objective:   PHYSICAL EXAMINATION:  Vital Signs:   There were no vitals filed for this visit.    General Appearance:  Alert, in no acute distress, well developed  Eyes:  normal conjunctivae, sclera  Ears/Nose/Mouth/Throat/Neck:  normal hearing, normal speech and no palpable thyroid nodules  Respiratory:  breathing comfortably on room air,  clear to auscultation bilaterally   Cardiovascular:   regular rate and rhythm, no murmurs, S3 or S4, no peripheral edema   Psychiatric:  Oriented to person, place and time, appropriate mood & affect  Skin: Normal moisture and skin texture  Neuro: sensory grossly intact, motor grossly intact.      Recent Labs: Personally reviewed in EPIC under lab tab on 10/1/2024      Lab Results   Component Value Date    PTH 56.0 09/09/2024    CA 9.8 09/09/2024    CA 8.8 06/03/2024    CA 9.2 12/04/2023    CA 9.0 04/19/2023    CA 9.0 12/14/2022    CA 9.3 05/05/2022    CREATSERUM 1.06 (H) 09/09/2024    CREATSERUM 0.98 06/03/2024    CREATSERUM 1.16 (H) 12/04/2023    PHOS 3.6 09/09/2024    MG 2.0 03/10/2018    MG 2.1 02/23/2014    MG 1.5 (L) 02/22/2014    VITD 39.3 07/02/2024    VITD 29.5 (L) 12/04/2023         Radiology:  Independently visualized on 10/1/2024  I reviewed the patient's records from outside facility which revealed: osteopenia [id number], osteoporosis 2024    DXA Scans:  Date L2-L4 BMD T-score % change Mean Femoral Neck BMD T-score % change   3/2024 HOB 0.845 -1.8 0.563 -2.6   1/2022 HOB  0.849 -1.8 0.591 -2.3   11/2019 HOB 0.849 -1.8 0.631 -2.0   10/2014 BK 0.853  -1.8 0.690 -1.4             ASSESSMENT/PLAN:  Serena Rivera is a 68 year old female seen in consultation for:    #Localized osteoporosis without current pathological fracture  - TSH and Free T4 [E]; Future   Discussed pathophysiology of bone loss and clinical significance of DEXA scans with the patient.  The patient has confirmed osteoporosis with low T-scores in the mean femoral necks.  Explained the patient's risk factors for osteoporosis.  2021 had endoscopy without ulcers, does have a hx of reflux on PPI  The patient is at high risk for future osteoporotic fractures if her osteoporosis remains untreated.  Recommended workup for secondary causes of osteoporosis, including PTH, Alk Phos levels, and vitamin D levels.  Discussed the importance of adopting lifestyle measures, such as adequate calcium and vitamin D intake, exercise, smoking cessation, counseling on fall prevention, and avoidance of heavy alcohol use, to reduce bone loss in postmenopausal women.  September 2024 secondary workup within range;total calcium 9.8, creatinine 1.06, GFR 57, albumin 4.3, PTH 56  Plan  Suggested 1200 mg of elemental calcium daily (total diet plus supplement) and 1000 IU of vitamin D daily as general recommendations.   Recommended pharmacologic therapy for postmenopausal women with established osteoporosis or fragility fracture.  Discussed available treatment options for osteoporosis, including bisphosphonates (oral vs. IV), anabolics, Evenity, and Prolia injections, as well as their indications, risks, and benefits, including black box warnings. Will discuss in greater detail at upcoming appointment.   Discussed concerns about an increased risk of vertebral fracture after discontinuation of denosumab, the need for indefinite administration of denosumab    Would not recommend oral bisphosphonate due to history of reflux  We discussed Prolia versus Reclast  Recommended DXA every two years for patients starting on therapy,  with additional evaluation for contributing factors if a clinically significant BMD decrease or new fracture occurs.  Patient to send us dental clearance and I will confirm therapeutic plan at that time     #Hypothyroidism  Patient denies any symptoms of hypothyroidism   Recent TFTs show a slightly elevated TSH of 6 with a free T4 in the upper range of normal at 1.7   Patient notes since last set of thyroid test she has started compounded semaglutide   Since patient has been on current thyroid dose for many years we discussed spacing out Prilosec from levothyroxine   She is to continue thryoid Meds: levothyroxine Tabs - 112 MCG  Repeat labs in 6 weeks with PCP      The above assessment and plan was discussed with the patient. The patient noted understanding and agreement with the plan listed above.      Visit Duration : A total of 30 minutes was spent today on obtaining history, reviewing pertinent labs, evaluating patient, providing multiple treatment options, reinforcing diet/exercise and compliance, and completing documentation.      Note to patient: The 21 Century Cures Act makes medical notes like these available to patients in the interest of transparency. However, be advised this is a medical document. It is intended as peer to peer communication. It is written in medical language and may contain abbreviations or verbiage that are unfamiliar. It may appear blunt or direct. Medical documents are intended to carry relevant information, facts as evident, and the clinical opinion of the practitioner      Ludmila Harmon DO  Endocrinology, Diabetes & Metabolism   10/1/2024

## 2024-10-02 ENCOUNTER — OFFICE VISIT (OUTPATIENT)
Dept: INTERNAL MEDICINE CLINIC | Facility: CLINIC | Age: 69
End: 2024-10-02
Payer: MEDICARE

## 2024-10-02 VITALS
HEART RATE: 85 BPM | BODY MASS INDEX: 49.09 KG/M2 | HEIGHT: 61 IN | SYSTOLIC BLOOD PRESSURE: 122 MMHG | RESPIRATION RATE: 18 BRPM | WEIGHT: 260 LBS | DIASTOLIC BLOOD PRESSURE: 76 MMHG

## 2024-10-02 DIAGNOSIS — E66.01 CLASS 3 SEVERE OBESITY DUE TO EXCESS CALORIES WITH SERIOUS COMORBIDITY AND BODY MASS INDEX (BMI) OF 50.0 TO 59.9 IN ADULT (HCC): ICD-10-CM

## 2024-10-02 DIAGNOSIS — E66.813 CLASS 3 SEVERE OBESITY DUE TO EXCESS CALORIES WITH SERIOUS COMORBIDITY AND BODY MASS INDEX (BMI) OF 50.0 TO 59.9 IN ADULT (HCC): ICD-10-CM

## 2024-10-02 DIAGNOSIS — Z51.81 THERAPEUTIC DRUG MONITORING: Primary | ICD-10-CM

## 2024-10-02 PROCEDURE — 99214 OFFICE O/P EST MOD 30 MIN: CPT | Performed by: INTERNAL MEDICINE

## 2024-10-02 NOTE — PROGRESS NOTES
HISTORY OF PRESENT ILLNESS  Weight Check Down 10 lb     Serena Rivera is a 68 year old female here for follow up in medical weight loss program.     Denies chest pain, shortness of breath, dizziness, blurred vision, headache, paresthesia, nausea/vomiting.   Lost 10, making good progress. Feels she is less hunger   Nail and skins are growing better  Sometimes struggles with low hunger   No snacking  Splitting meals.   Sometimes craves ice cream     Doing water walking and stairs a few times per day   Very active around the house andmoving     [facility] Follow Up    General Information  Success Moment: Making better food choices  Challenging Moment: Getting enough protein  Nutrition Recall  Breakfast: [place] protein bar and sometimes yogurt with protein Lunch:   [place] deli meat, fruit, sensible portions wavy crisps, sometimes a slice   of cheese.   Progresso soup   Dinner: Chicken , chicken sausage, veggies sometimes cottage sauce Snacks:   Raw carrots, cauliflower raw, celery. I havent been doing that much   snacking   Fluids: Coffee in the morning 2 cups.     Water all day and with meals   Dining Out: 21   Exercise   Patient stated exercises # days/week: 4  Patient stated perceived level of   exertion: 3 Anaerobic Days: 2   Aerobic Days: 2   Patient stated average level of stress: 5  Sleep   Patient stated # hours uninterrupted sleep: 8   Patient stated feels   restful: Yes      Cause of disruption of sleep: Getting up to use the bathroom                  Wt Readings from Last 6 Encounters:   10/02/24 260 lb (117.9 kg)   10/01/24 257 lb (116.6 kg)   08/26/24 265 lb (120.2 kg)   08/07/24 265 lb (120.2 kg)   07/31/24 270 lb (122.5 kg)   07/02/24 269 lb (122 kg)            Breakfast Lunch Dinner Snacks Fluids              REVIEW OF SYSTEMS  GENERAL HEALTH: feels well otherwise, denied any fevers chills or night sweats   RESPIRATORY: denies shortness of breath   CARDIOVASCULAR: denies chest pain  GI: denies abdominal  pain    EXAM  /76   Pulse 85   Resp 18   Ht 5' 1\" (1.549 m)   Wt 260 lb (117.9 kg)   LMP 01/01/2007   BMI 49.13 kg/m²   GENERAL: well developed, well nourished,in no apparent distress, A/O x3  SKIN: no rashes,no suspicious lesions  HEENT: atraumatic, normocephalic, OP-clear, PERRL  NECK: supple,no adenopathy  LUNGS: clear to auscultation bilaterally   CARDIO: RRR without murmur  GI: good BS's,NT/ND, no masses or HSM  EXTREMITIES: no cyanosis, no clubbing, no edema    Lab Results   Component Value Date    WBC 5.2 06/03/2024    RBC 3.91 06/03/2024    HGB 11.8 (L) 06/03/2024    HCT 37.4 06/03/2024    MCV 95.7 06/03/2024    MCH 30.2 06/03/2024    MCHC 31.6 06/03/2024    RDW 13.2 06/03/2024    .0 06/03/2024     Lab Results   Component Value Date    GLU 89 09/09/2024    BUN 16 09/09/2024    BUNCREA 21.1 (H) 08/18/2020    CREATSERUM 1.06 (H) 09/09/2024    ANIONGAP 5 09/09/2024    GFR 71 05/10/2017    GFRNAA 47 (L) 05/05/2022    GFRAA 54 (L) 05/05/2022    CA 9.8 09/09/2024    OSMOCALC 287 09/09/2024    ALKPHO 104 06/03/2024    AST 10 (L) 06/03/2024    ALT 15 06/03/2024    BILT 0.5 06/03/2024    TP 7.0 06/03/2024    ALB 4.3 09/09/2024    GLOBULIN 3.7 06/03/2024     09/09/2024    K 4.8 09/09/2024     09/09/2024    CO2 29.0 09/09/2024     Lab Results   Component Value Date     07/02/2024    A1C 6.0 (H) 07/02/2024     Lab Results   Component Value Date    CHOLEST 190 06/03/2024    TRIG 130 06/03/2024    HDL 60 (H) 06/03/2024     (H) 06/03/2024    VLDL 22 06/03/2024    TCHDLRATIO 2.65 05/10/2017    NONHDLC 130 (H) 06/03/2024     Lab Results   Component Value Date    T4F 1.7 09/09/2024    TSH 6.027 (H) 09/09/2024     Lab Results   Component Value Date    B12 258 07/02/2024     Lab Results   Component Value Date    VITD 39.3 07/02/2024       Current Outpatient Medications on File Prior to Visit   Medication Sig Dispense Refill    levothyroxine 112 MCG Oral Tab Take 1 tablet (112 mcg  total) by mouth before breakfast. 90 tablet 0    CUSTOM MEDICATION Semaglutide/ cynaocobalamin injection 1/0.5 mg/ ML   2.5 ml vial   Take 50 units aka 0.5 ml q weekly 1 each 1    Benazepril HCl 10 MG Oral Tab TAKE 1 TABLET(10 MG) BY MOUTH DAILY 90 tablet 1    escitalopram 10 MG Oral Tab TAKE 1 TABLET BY MOUTH DAILY WITH 5 MG FOR TOTAL 15 MG 90 tablet 0    escitalopram 5 MG Oral Tab TAKE 1 TABLET BY MOUTH DAILY WITH 10 MG FOR TOTAL 15 MG 90 tablet 0    flecainide 100 MG Oral Tab Take 1 tablet (100 mg total) by mouth 2 (two) times daily.      Potassium Gluconate 595 MG Oral Cap Take by mouth as needed.      furosemide 20 MG Oral Tab Take 20mg every other day alternating with 40mg every other day.      albuterol (PROAIR HFA) 108 (90 Base) MCG/ACT Inhalation Aero Soln Inhale 2 puffs into the lungs every 4 (four) hours as needed for Wheezing. 1 each 0    Spacer/Aero-Holding Chambers (AEROCHAMBER MINI CHAMBER) Does not apply Device Use with inhaler as needed 1 each 0    VITAMIN D, CHOLECALCIFEROL, OR Take 50 mcg by mouth daily.      APPLE CIDER VINEGAR OR Take by mouth. gummies      Omeprazole 40 MG Oral Capsule Delayed Release Take 1 capsule (40 mg total) by mouth daily. 90 capsule 1    ELIQUIS 5 MG Oral Tab Take 1 tablet (5 mg total) by mouth 2 (two) times daily.      Calcium Carbonate-Vitamin D 250-125 MG-UNIT Oral Tab Take 1 tablet by mouth daily.      Bacillus Coagulans-Inulin (ALIGN PREBIOTIC-PROBIOTIC) 5-1.25 MG-GM Oral Chew Tab Chew 2 tablets by mouth daily.       Current Facility-Administered Medications on File Prior to Visit   Medication Dose Route Frequency Provider Last Rate Last Admin    cyanocobalamin (Vitamin B12) 1000 MCG/ML injection 1,000 mcg  1,000 mcg Intramuscular Q30 Days    1,000 mcg at 09/30/24 1826       ASSESSMENT  Analyzed weight data:       Diagnoses and all orders for this visit:    Therapeutic drug monitoring [Z51.81]    Class 3 severe obesity due to excess calories with serious comorbidity  and body mass index (BMI) of 50.0 to 59.9 in adult ([facility]) [E66.01, Z68.43]        PLAN   Initial consult : 7/2/24 at 269 lb / BMI 50   Down 9   Doing well with protein.   Continue semaglutide 0.5 mg q weekly   Compound semaglutide is a custom-made medication that mimics the GLP-1 hormone. It is used to treat type 2 diabetes and lower the risk of heart or blood vessel disease. It works by increasing insulin release, lowering glucagon release, delaying gastric emptying and reducing appetite. Compound semaglutide is prescribed when an FDA-approved medication, dose, or dosage form is unavailable (ie. Nationwide shortage or no obesity coverage for GLP-1 meds). Patients are aware of the difference between these medications.   Cost of these medications is  dollars monthly based on dose.    Nutrition: low carb diet/ recommended to eat breakfast daily/ regular protein intake  Medication use and side effects reviewed with patient.  Medication contraindications: n/a  Follow up with dietitian and psychologist as recommended.  Discussed the role of sleep and stress in weight management.  Counseled on comprehensive weight loss plan including attention to nutrition, exercise and behavior/stress management for success. See patient instruction below for more details.  Discussed strategies to overcome barriers to successful weight loss and weight maintenance  FITTE: ACSM recommendations (150-300 minutes/ week in active weight loss)   Weight Loss consent to treat reviewed and signed    There are no Patient Instructions on file for this visit.    No follow-ups on file.    Patient verbalizes understanding.    Yadira Avilez MD

## 2024-10-02 NOTE — PROGRESS NOTES
HISTORY OF PRESENT ILLNESS  Chief Complaint   Patient presents with    Weight Check     Down 10 pounds        Serena Rivera is a 68 year old female here for follow up in medical weight loss program.     Denies chest pain, shortness of breath, dizziness, blurred vision, headache, paresthesia, nausea/vomiting.   Lost 10, making good progress. Feels she is less hunger   Nail and skins are growing better  Sometimes struggles with low hunger   No snacking  Splitting meals.   Sometimes craves ice cream     Doing water walking and stairs a few times per day   Very active around the house andmoving     [facility] Follow Up    General Information  Success Moment: Making better food choices  Challenging Moment: Getting enough protein  Nutrition Recall  Breakfast: Ava protein bar and sometimes yogurt with protein Lunch:   Turkey deli meat, fruit, sensible portions wavy crisps, sometimes a slice   of cheese.   Progresso soup   Dinner: Chicken , chicken sausage, veggies sometimes cottage sauce Snacks:   Raw carrots, cauliflower raw, celery. I havent been doing that much   snacking   Fluids: Coffee in the morning 2 cups.     Water all day and with meals   Dining Out: 21   Exercise   Patient stated exercises # days/week: 4  Patient stated perceived level of   exertion: 3 Anaerobic Days: 2   Aerobic Days: 2   Patient stated average level of stress: 5  Sleep   Patient stated # hours uninterrupted sleep: 8   Patient stated feels   restful: Yes      Cause of disruption of sleep: Getting up to use the bathroom                  Wt Readings from Last 6 Encounters:   10/02/24 260 lb (117.9 kg)   10/01/24 257 lb (116.6 kg)   08/26/24 265 lb (120.2 kg)   08/07/24 265 lb (120.2 kg)   07/31/24 270 lb (122.5 kg)   07/02/24 269 lb (122 kg)            Breakfast Lunch Dinner Snacks Fluids              REVIEW OF SYSTEMS  GENERAL HEALTH: feels well otherwise, denied any fevers chills or night sweats   RESPIRATORY: denies shortness of breath    CARDIOVASCULAR: denies chest pain  GI: denies abdominal pain    EXAM  /76   Pulse 85   Resp 18   Ht 5' 1\" (1.549 m)   Wt 260 lb (117.9 kg)   LMP 01/01/2007   BMI 49.13 kg/m²   GENERAL: well developed, well nourished,in no apparent distress, A/O x3  SKIN: no rashes,no suspicious lesions  HEENT: atraumatic, normocephalic, OP-clear, PERRL  NECK: supple,no adenopathy  LUNGS: clear to auscultation bilaterally   CARDIO: RRR without murmur  GI: good BS's,NT/ND, no masses or HSM  EXTREMITIES: no cyanosis, no clubbing, no edema    Lab Results   Component Value Date    WBC 5.2 06/03/2024    RBC 3.91 06/03/2024    HGB 11.8 (L) 06/03/2024    HCT 37.4 06/03/2024    MCV 95.7 06/03/2024    MCH 30.2 06/03/2024    MCHC 31.6 06/03/2024    RDW 13.2 06/03/2024    .0 06/03/2024     Lab Results   Component Value Date    GLU 89 09/09/2024    BUN 16 09/09/2024    BUNCREA 21.1 (H) 08/18/2020    CREATSERUM 1.06 (H) 09/09/2024    ANIONGAP 5 09/09/2024    GFR 71 05/10/2017    GFRNAA 47 (L) 05/05/2022    GFRAA 54 (L) 05/05/2022    CA 9.8 09/09/2024    OSMOCALC 287 09/09/2024    ALKPHO 104 06/03/2024    AST 10 (L) 06/03/2024    ALT 15 06/03/2024    BILT 0.5 06/03/2024    TP 7.0 06/03/2024    ALB 4.3 09/09/2024    GLOBULIN 3.7 06/03/2024     09/09/2024    K 4.8 09/09/2024     09/09/2024    CO2 29.0 09/09/2024     Lab Results   Component Value Date     07/02/2024    A1C 6.0 (H) 07/02/2024     Lab Results   Component Value Date    CHOLEST 190 06/03/2024    TRIG 130 06/03/2024    HDL 60 (H) 06/03/2024     (H) 06/03/2024    VLDL 22 06/03/2024    TCHDLRATIO 2.65 05/10/2017    NONHDLC 130 (H) 06/03/2024     Lab Results   Component Value Date    T4F 1.7 09/09/2024    TSH 6.027 (H) 09/09/2024     Lab Results   Component Value Date    B12 258 07/02/2024     Lab Results   Component Value Date    VITD 39.3 07/02/2024       Current Outpatient Medications on File Prior to Visit   Medication Sig Dispense Refill     levothyroxine 112 MCG Oral Tab Take 1 tablet (112 mcg total) by mouth before breakfast. 90 tablet 0    CUSTOM MEDICATION Semaglutide/ cynaocobalamin injection 1/0.5 mg/ ML   2.5 ml vial   Take 50 units aka 0.5 ml q weekly 1 each 1    Benazepril HCl 10 MG Oral Tab TAKE 1 TABLET(10 MG) BY MOUTH DAILY 90 tablet 1    escitalopram 10 MG Oral Tab TAKE 1 TABLET BY MOUTH DAILY WITH 5 MG FOR TOTAL 15 MG 90 tablet 0    escitalopram 5 MG Oral Tab TAKE 1 TABLET BY MOUTH DAILY WITH 10 MG FOR TOTAL 15 MG 90 tablet 0    flecainide 100 MG Oral Tab Take 1 tablet (100 mg total) by mouth 2 (two) times daily.      Potassium Gluconate 595 MG Oral Cap Take by mouth as needed.      furosemide 20 MG Oral Tab Take 20mg every other day alternating with 40mg every other day.      albuterol (PROAIR HFA) 108 (90 Base) MCG/ACT Inhalation Aero Soln Inhale 2 puffs into the lungs every 4 (four) hours as needed for Wheezing. 1 each 0    Spacer/Aero-Holding Chambers (AEROCHAMBER MINI CHAMBER) Does not apply Device Use with inhaler as needed 1 each 0    VITAMIN D, CHOLECALCIFEROL, OR Take 50 mcg by mouth daily.      APPLE CIDER VINEGAR OR Take by mouth. gummies      Omeprazole 40 MG Oral Capsule Delayed Release Take 1 capsule (40 mg total) by mouth daily. 90 capsule 1    ELIQUIS 5 MG Oral Tab Take 1 tablet (5 mg total) by mouth 2 (two) times daily.      Calcium Carbonate-Vitamin D 250-125 MG-UNIT Oral Tab Take 1 tablet by mouth daily.      Bacillus Coagulans-Inulin (ALIGN PREBIOTIC-PROBIOTIC) 5-1.25 MG-GM Oral Chew Tab Chew 2 tablets by mouth daily.       Current Facility-Administered Medications on File Prior to Visit   Medication Dose Route Frequency Provider Last Rate Last Admin    cyanocobalamin (Vitamin B12) 1000 MCG/ML injection 1,000 mcg  1,000 mcg Intramuscular Q30 Days    1,000 mcg at 09/30/24 1826       ASSESSMENT  Analyzed weight data:       Diagnoses and all orders for this visit:    Therapeutic drug monitoring [Z51.81]    Class 3 severe  obesity due to excess calories with serious comorbidity and body mass index (BMI) of 50.0 to 59.9 in adult ([facility]) [E66.01, Z68.43]        PLAN   Initial consult : 7/2/24 at 269 lb / BMI 50   Down 9   Continue semaglutide 0.5 mg q weekly   Compound semaglutide is a custom-made medication that mimics the GLP-1 hormone. It is used to treat type 2 diabetes and lower the risk of heart or blood vessel disease. It works by increasing insulin release, lowering glucagon release, delaying gastric emptying and reducing appetite. Compound semaglutide is prescribed when an FDA-approved medication, dose, or dosage form is unavailable (ie. Nationwide shortage or no obesity coverage for GLP-1 meds). Patients are aware of the difference between these medications.   Cost of these medications is  dollars monthly based on dose.    Nutrition: low carb diet/ recommended to eat breakfast daily/ regular protein intake  Medication use and side effects reviewed with patient.  Medication contraindications: n/a  Follow up with dietitian and psychologist as recommended.  Discussed the role of sleep and stress in weight management.  Counseled on comprehensive weight loss plan including attention to nutrition, exercise and behavior/stress management for success. See patient instruction below for more details.  Discussed strategies to overcome barriers to successful weight loss and weight maintenance  FITTE: ACSM recommendations (150-300 minutes/ week in active weight loss)   Weight Loss consent to treat reviewed and signed    There are no Patient Instructions on file for this visit.    No follow-ups on file.    Patient verbalizes understanding.    Yadira Avilez MD

## 2024-10-07 NOTE — TELEPHONE ENCOUNTER
Requesting   Requested Prescriptions     Pending Prescriptions Disp Refills    CUSTOM MEDICATION 1 each 1     Sig: Semaglutide/ cynaocobalamin injection 1/0.5 mg/ ML   2.5 ml vial   Take 50 units aka 0.5 ml q weekly       LOV: 10/02/2024  RTC: 01/08/2025  Filled: 08/15/2024 #1 with 1 refills    Future Appointments   Date Time Provider Department Center   10/28/2024 10:00 AM [facility] NURSE [facility]   11/25/2024 10:00 AM [facility]URSE [facility]   12/30/2024 10:00 AM [facility]   1/8/2025  8:40 AM Yadira Avilez MD [facility]   4/16/2025 10:00 AM Yadira Avilez MD [facility]   5/5/2025  9:00 AM Ludmila Harmon DO [id number] [facility] [place]   7/16/2025 10:00 AM Yadira Avilez MD [facility]    Please Advise     Patient Comment: Dr Avilez, if you could please send in the refill request for my semaglutide today I would appreciate it. We thought we had 2 more doses left but actually used the final dose on Friday. We had discussed getting a double refill so we can only have to pay for shipping once. We will be returning from our vacation this Friday and hope the medication will be delivered then.  Thanks!

## 2024-10-15 ENCOUNTER — TELEPHONE (OUTPATIENT)
Facility: CLINIC | Age: 69
End: 2024-10-15

## 2024-10-15 NOTE — TELEPHONE ENCOUNTER
Antiresorptive Dental Clearance Letter  Patient is cleared to receive Reclast or Prolia   Placed in [address]

## 2024-10-15 NOTE — TELEPHONE ENCOUNTER
Per last office visit note: -We discussed multiple therapeutic options and we are leaning towards Reclast. Please keep me updated regarding your final decision once I receive your dental clearance     RN phoned patient to discuss, no answer. Left message for call back to discuss moving forward with Treatment.     Dental Clearance placed in RN brown folder to await decision.

## 2024-10-28 ENCOUNTER — TELEPHONE (OUTPATIENT)
Dept: INTERNAL MEDICINE CLINIC | Facility: CLINIC | Age: 69
End: 2024-10-28

## 2024-10-28 ENCOUNTER — NURSE ONLY (OUTPATIENT)
Dept: INTERNAL MEDICINE CLINIC | Facility: CLINIC | Age: 69
End: 2024-10-28
Payer: MEDICARE

## 2024-10-28 DIAGNOSIS — E53.8 B12 DEFICIENCY: Primary | ICD-10-CM

## 2024-10-28 RX ORDER — CYANOCOBALAMIN 1000 UG/ML
1000 INJECTION, SOLUTION INTRAMUSCULAR; SUBCUTANEOUS ONCE
Status: COMPLETED | OUTPATIENT
Start: 2024-10-28 | End: 2024-10-28

## 2024-10-28 RX ADMIN — CYANOCOBALAMIN 1000 MCG: 1000 INJECTION, SOLUTION INTRAMUSCULAR; SUBCUTANEOUS at 10:16:00

## 2024-10-28 NOTE — TELEPHONE ENCOUNTER
Incoming (mail or fax):  Fax  Received from:  [facility]   Documentation given to:  Triage in      Prescription for Medical Compression Garments       In Jackelyn's bin for signature

## 2024-10-29 DIAGNOSIS — F39 MOOD DISORDER (HCC): ICD-10-CM

## 2024-10-30 ENCOUNTER — TELEPHONE (OUTPATIENT)
Dept: INTERNAL MEDICINE CLINIC | Facility: CLINIC | Age: 69
End: 2024-10-30

## 2024-10-30 NOTE — TELEPHONE ENCOUNTER
Incoming (mail or fax):  fax   Received from:  [facility]   Documentation given to:  triage in     Need clinical notes and letter for compression garments

## 2024-10-31 RX ORDER — ESCITALOPRAM OXALATE 5 MG/1
TABLET ORAL
Qty: 90 TABLET | Refills: 0 | Status: SHIPPED | OUTPATIENT
Start: 2024-10-31

## 2024-10-31 RX ORDER — ESCITALOPRAM OXALATE 10 MG/1
TABLET ORAL
Qty: 90 TABLET | Refills: 0 | Status: SHIPPED | OUTPATIENT
Start: 2024-10-31

## 2024-10-31 NOTE — TELEPHONE ENCOUNTER
OV note faxed to [facility] [phone]    Future Appointments   Date Time Provider Department Center   11/25/2024 10:00 AM [facility] NURSE [facility]   12/30/2024 10:00 AM [facility]   1/8/2025  8:40 AM Yadira Avilez MD [facility]   4/16/2025 10:00 AM Yadira Avilez MD [facility]   5/5/2025  9:00 AM Ludmila Harmon DO [id number] [facility] [place]   7/16/2025 10:00 AM Yadira Avilez MD [facility]

## 2024-10-31 NOTE — TELEPHONE ENCOUNTER
Psychiatric Non-Scheduled (Anti-Anxiety) Lgtkis95/29/2024 05:56 PM   Protocol Details In person appointment or virtual visit in the past 6 mos or appointment in next 3 mos    Depression Screening completed within the past 12 months      14. Mood disorder (HCC)  Stable. CTM.  Orders:  - escitalopram 10 MG Oral Tab; TAKE 1 TABLET BY MOUTH DAILY WITH 5 MG FOR TOTAL 15 MG DAILY  Dispense: 90 tablet; Refill: 0  - escitalopram 5 MG Oral Tab; TAKE 1 TABLET BY MOUTH DAILY WITH 10 MG FOR TOTAL 15 MG DAILY  Dispense: 90 tablet; Refill: 0  Future Appointments   Date Time Provider Department Center   11/25/2024 10:00 AM [facility]URSE [facility]   12/30/2024 10:00 AM [facility]   1/8/2025  8:40 AM Yadira Avilez MD [facility]   4/16/2025 10:00 AM Yadira Avilez MD [facility]   5/5/2025  9:00 AM Ludmila Harmon DO [id number] [facility] [place]   7/16/2025 10:00 AM Yadira Avilez MD [facility]

## 2024-10-31 NOTE — TELEPHONE ENCOUNTER
Placed in provider bin. Asking for notes to be amend or if unable to addend have patient schedule appointment regarding compression garments. Thanks!

## 2024-10-31 NOTE — TELEPHONE ENCOUNTER
I addended my note from 2/14 of this year which is when the patient was seen specifically for her lymphedema.  If this is not sufficient and she needs an appointment since that is not the most recent exam then she can do so if needed.  Otherwise she is due in December and can be seen at that time for annual with me.  Thank you.

## 2024-11-04 ENCOUNTER — TELEPHONE (OUTPATIENT)
Dept: HEMATOLOGY/ONCOLOGY | Facility: HOSPITAL | Age: 69
End: 2024-11-04

## 2024-11-05 ENCOUNTER — TELEPHONE (OUTPATIENT)
Dept: HEMATOLOGY/ONCOLOGY | Facility: HOSPITAL | Age: 69
End: 2024-11-05

## 2024-11-07 ENCOUNTER — LAB ENCOUNTER (OUTPATIENT)
Dept: LAB | Age: 69
End: 2024-11-07
Attending: STUDENT IN AN ORGANIZED HEALTH CARE EDUCATION/TRAINING PROGRAM
Payer: MEDICARE

## 2024-11-07 DIAGNOSIS — M81.6 LOCALIZED OSTEOPOROSIS WITHOUT CURRENT PATHOLOGICAL FRACTURE: ICD-10-CM

## 2024-11-07 LAB
ALBUMIN SERPL-MCNC: 4.1 G/DL (ref 3.2–4.8)
ALBUMIN/GLOB SERPL: 1.3 {RATIO} (ref 1–2)
ALP LIVER SERPL-CCNC: 94 U/L
ALT SERPL-CCNC: 10 U/L
ANION GAP SERPL CALC-SCNC: 3 MMOL/L (ref 0–18)
AST SERPL-CCNC: 13 U/L (ref ?–34)
BILIRUB SERPL-MCNC: 0.6 MG/DL (ref 0.2–1.1)
BUN BLD-MCNC: 21 MG/DL (ref 9–23)
CALCIUM BLD-MCNC: 10.3 MG/DL (ref 8.7–10.4)
CHLORIDE SERPL-SCNC: 103 MMOL/L (ref 98–112)
CO2 SERPL-SCNC: 32 MMOL/L (ref 21–32)
CREAT BLD-MCNC: 1.12 MG/DL
EGFRCR SERPLBLD CKD-EPI 2021: 53 ML/MIN/1.73M2 (ref 60–?)
FASTING STATUS PATIENT QL REPORTED: YES
GLOBULIN PLAS-MCNC: 3.1 G/DL (ref 2–3.5)
GLUCOSE BLD-MCNC: 87 MG/DL (ref 70–99)
OSMOLALITY SERPL CALC.SUM OF ELEC: 288 MOSM/KG (ref 275–295)
PHOSPHATE SERPL-MCNC: 3.5 MG/DL (ref 2.4–5.1)
POTASSIUM SERPL-SCNC: 4.1 MMOL/L (ref 3.5–5.1)
PROT SERPL-MCNC: 7.2 G/DL (ref 5.7–8.2)
SODIUM SERPL-SCNC: 138 MMOL/L (ref 136–145)
VIT D+METAB SERPL-MCNC: 58.7 NG/ML (ref 30–100)

## 2024-11-07 PROCEDURE — 80053 COMPREHEN METABOLIC PANEL: CPT

## 2024-11-07 PROCEDURE — 82306 VITAMIN D 25 HYDROXY: CPT

## 2024-11-07 PROCEDURE — 84100 ASSAY OF PHOSPHORUS: CPT

## 2024-11-07 PROCEDURE — 36415 COLL VENOUS BLD VENIPUNCTURE: CPT

## 2024-11-08 NOTE — PROGRESS NOTES
Per 10/1 OV: We discussed multiple therapeutic options and we are leaning towards Reclast. Please keep me updated regarding your final decision once I receive your dental clearance.    11/7 labs stable; Have we received dental clearance? I will recalculate crcl once dental clearance is received.

## 2024-11-15 DIAGNOSIS — E03.9 ACQUIRED HYPOTHYROIDISM: ICD-10-CM

## 2024-11-18 ENCOUNTER — PATIENT MESSAGE (OUTPATIENT)
Facility: CLINIC | Age: 69
End: 2024-11-18

## 2024-11-18 DIAGNOSIS — M81.6 LOCALIZED OSTEOPOROSIS WITHOUT CURRENT PATHOLOGICAL FRACTURE: Primary | ICD-10-CM

## 2024-11-19 RX ORDER — LEVOTHYROXINE SODIUM 112 UG/1
112 TABLET ORAL
Qty: 90 TABLET | Refills: 0 | Status: SHIPPED | OUTPATIENT
Start: 2024-11-19

## 2024-11-19 NOTE — TELEPHONE ENCOUNTER
Last OV relevant to medication: 6/17/24  Last refill date: 8/28/24 #90/refills: 0  When pt was asked to return for OV: 12/17/24  Upcoming appt/reason:   Future Appointments   Date Time Provider Department Center   11/25/2024 10:00 AM [facility] NURSE [facility]   12/12/2024  1:20 PM Ermelinda Greenberg APRN [facility] [address] [place]   12/30/2024 10:00 AM [facility]   1/8/2025  8:40 AM Yadira Avilez MD [facility]   4/16/2025 10:00 AM Yadira Avilez MD [facility]   5/5/2025  9:00 AM Ludmila Harmon DO [id number] [facility] [place]   7/16/2025 10:00 AM Yadira Avilez MD [facility]   Was pt informed of any over due labs: utd  Free T4 (ng/dL)   Date Value   09/09/2024 1.7     FREE T4 (ng/dL)   Date Value   02/23/2014 1.2     TSH (mIU/mL)   Date Value   09/09/2024 6.027 (H)   02/23/2014 0.686

## 2024-11-21 ENCOUNTER — APPOINTMENT (OUTPATIENT)
Dept: HEMATOLOGY/ONCOLOGY | Facility: HOSPITAL | Age: 69
End: 2024-11-21
Attending: STUDENT IN AN ORGANIZED HEALTH CARE EDUCATION/TRAINING PROGRAM
Payer: MEDICARE

## 2024-11-25 ENCOUNTER — NURSE ONLY (OUTPATIENT)
Dept: INTERNAL MEDICINE CLINIC | Facility: CLINIC | Age: 69
End: 2024-11-25
Payer: MEDICARE

## 2024-11-25 DIAGNOSIS — E53.8 B12 DEFICIENCY: Primary | ICD-10-CM

## 2024-11-25 RX ORDER — CYANOCOBALAMIN 1000 UG/ML
1000 INJECTION, SOLUTION INTRAMUSCULAR; SUBCUTANEOUS ONCE
Status: COMPLETED | OUTPATIENT
Start: 2024-11-25 | End: 2024-11-25

## 2024-11-25 RX ADMIN — CYANOCOBALAMIN 1000 MCG: 1000 INJECTION, SOLUTION INTRAMUSCULAR; SUBCUTANEOUS at 09:59:00

## 2024-12-09 ENCOUNTER — PATIENT MESSAGE (OUTPATIENT)
Dept: INTERNAL MEDICINE CLINIC | Facility: CLINIC | Age: 69
End: 2024-12-09

## 2024-12-09 NOTE — TELEPHONE ENCOUNTER
Requesting semaglutide compound increase  LOV: 10/2/24  RTC: not noted  Last Relevant Labs: na  Filled: *** #*** with *** refills    Future Appointments   Date Time Provider Department Center   12/12/2024  1:20 PM Ermelinda Greenberg APRN [facility] [address] [place]   12/30/2024 10:00 AM [facility]   1/8/2025  8:40 AM Yadira Avilez MD [facility]   4/16/2025 10:00 AM Yadira Avilez MD [facility]   5/5/2025  9:00 AM Ludmila Harmon DO [id number] [facility] [place]   7/16/2025 10:00 AM Yadira Avilez MD [facility]

## 2024-12-09 NOTE — TELEPHONE ENCOUNTER
Requesting semaglutide compound increase  LOV: 10/2/24  RTC: not noted  Last Relevant Labs: na  Filled: 10/8/24 #2 with 0 refills    1/8/2025  8:40 AM Yadira Avilez MD [facility]   4/16/2025 10:00 AM Yadira Avilez MD EMGDANYELLE [facility]   5/5/2025  9:00 AM Ludmila Harmon DO [id number] [facility] [place]   7/16/2025 10:00 AM Yadira Avilez MD [facility]       Medication Detail    Medication Quantity Refills Start End   CUSTOM MEDICATION 2 each 0 10/8/2024 --   Sig:   Semaglutide/ cynaocobalamin injection 1/0.5 mg/ ML  2.5 ml vial  Take 50 units aka 0.5 ml q weekly     Route:   (none)     Note to Pharmacy:   Telephone Information:  Home Phone      [phone]

## 2024-12-12 ENCOUNTER — OFFICE VISIT (OUTPATIENT)
Dept: INTERNAL MEDICINE CLINIC | Facility: CLINIC | Age: 69
End: 2024-12-12
Payer: MEDICARE

## 2024-12-12 VITALS
HEART RATE: 65 BPM | SYSTOLIC BLOOD PRESSURE: 124 MMHG | HEIGHT: 61.81 IN | BODY MASS INDEX: 47.69 KG/M2 | TEMPERATURE: 98 F | WEIGHT: 259.13 LBS | RESPIRATION RATE: 16 BRPM | OXYGEN SATURATION: 96 % | DIASTOLIC BLOOD PRESSURE: 60 MMHG

## 2024-12-12 DIAGNOSIS — K21.9 GASTROESOPHAGEAL REFLUX DISEASE WITHOUT ESOPHAGITIS: ICD-10-CM

## 2024-12-12 DIAGNOSIS — I89.0 LYMPHEDEMA OF BOTH LOWER EXTREMITIES: ICD-10-CM

## 2024-12-12 DIAGNOSIS — I10 PRIMARY HYPERTENSION: ICD-10-CM

## 2024-12-12 DIAGNOSIS — E03.9 ACQUIRED HYPOTHYROIDISM: ICD-10-CM

## 2024-12-12 DIAGNOSIS — M15.9 OSTEOARTHRITIS OF MULTIPLE JOINTS, UNSPECIFIED OSTEOARTHRITIS TYPE: ICD-10-CM

## 2024-12-12 DIAGNOSIS — E78.00 PURE HYPERCHOLESTEROLEMIA: ICD-10-CM

## 2024-12-12 DIAGNOSIS — D64.9 NORMOCYTIC ANEMIA: ICD-10-CM

## 2024-12-12 DIAGNOSIS — J45.20 MILD INTERMITTENT ASTHMA WITHOUT COMPLICATION (HCC): ICD-10-CM

## 2024-12-12 DIAGNOSIS — E66.01 CLASS 3 SEVERE OBESITY DUE TO EXCESS CALORIES WITH SERIOUS COMORBIDITY AND BODY MASS INDEX (BMI) OF 50.0 TO 59.9 IN ADULT (HCC): ICD-10-CM

## 2024-12-12 DIAGNOSIS — G47.33 OSA ON CPAP: ICD-10-CM

## 2024-12-12 DIAGNOSIS — K59.00 CONSTIPATION, UNSPECIFIED CONSTIPATION TYPE: ICD-10-CM

## 2024-12-12 DIAGNOSIS — E55.9 VITAMIN D DEFICIENCY: ICD-10-CM

## 2024-12-12 DIAGNOSIS — M81.6 LOCALIZED OSTEOPOROSIS WITHOUT CURRENT PATHOLOGICAL FRACTURE: ICD-10-CM

## 2024-12-12 DIAGNOSIS — M25.562 CHRONIC PAIN OF BOTH KNEES: ICD-10-CM

## 2024-12-12 DIAGNOSIS — Z12.31 ENCOUNTER FOR SCREENING MAMMOGRAM FOR BREAST CANCER: ICD-10-CM

## 2024-12-12 DIAGNOSIS — Z00.00 ENCOUNTER FOR ANNUAL HEALTH EXAMINATION: Primary | ICD-10-CM

## 2024-12-12 DIAGNOSIS — I48.0 PAF (PAROXYSMAL ATRIAL FIBRILLATION) (HCC): ICD-10-CM

## 2024-12-12 DIAGNOSIS — D64.89 ANEMIA DUE TO OTHER CAUSE, NOT CLASSIFIED: ICD-10-CM

## 2024-12-12 DIAGNOSIS — K57.90 DIVERTICULOSIS: ICD-10-CM

## 2024-12-12 DIAGNOSIS — E66.813 CLASS 3 SEVERE OBESITY DUE TO EXCESS CALORIES WITH SERIOUS COMORBIDITY AND BODY MASS INDEX (BMI) OF 50.0 TO 59.9 IN ADULT (HCC): ICD-10-CM

## 2024-12-12 DIAGNOSIS — F39 MOOD DISORDER (HCC): ICD-10-CM

## 2024-12-12 DIAGNOSIS — G89.29 CHRONIC PAIN OF BOTH KNEES: ICD-10-CM

## 2024-12-12 DIAGNOSIS — R73.03 PRE-DIABETES: ICD-10-CM

## 2024-12-12 DIAGNOSIS — I65.23 BILATERAL CAROTID ARTERY STENOSIS: ICD-10-CM

## 2024-12-12 DIAGNOSIS — L30.9 ECZEMA, UNSPECIFIED TYPE: ICD-10-CM

## 2024-12-12 DIAGNOSIS — M25.561 CHRONIC PAIN OF BOTH KNEES: ICD-10-CM

## 2024-12-12 RX ORDER — BENAZEPRIL HYDROCHLORIDE 10 MG/1
10 TABLET ORAL DAILY
Qty: 90 TABLET | Refills: 1 | Status: SHIPPED | OUTPATIENT
Start: 2024-12-12

## 2024-12-12 RX ORDER — FUROSEMIDE 20 MG/1
TABLET ORAL
Qty: 135 TABLET | Refills: 1 | Status: SHIPPED | OUTPATIENT
Start: 2024-12-12

## 2024-12-12 RX ORDER — OMEPRAZOLE 40 MG/1
40 CAPSULE, DELAYED RELEASE ORAL DAILY
Qty: 90 CAPSULE | Refills: 1 | Status: SHIPPED | OUTPATIENT
Start: 2024-12-12

## 2024-12-12 RX ORDER — ESCITALOPRAM OXALATE 10 MG/1
TABLET ORAL
Qty: 90 TABLET | Refills: 1 | Status: SHIPPED | OUTPATIENT
Start: 2024-12-12

## 2024-12-12 RX ORDER — ALBUTEROL SULFATE 90 UG/1
2 INHALANT RESPIRATORY (INHALATION) EVERY 4 HOURS PRN
Qty: 1 EACH | Refills: 0 | Status: SHIPPED | OUTPATIENT
Start: 2024-12-12

## 2024-12-12 RX ORDER — ESCITALOPRAM OXALATE 5 MG/1
TABLET ORAL
Qty: 90 TABLET | Refills: 1 | Status: SHIPPED | OUTPATIENT
Start: 2024-12-12

## 2024-12-12 NOTE — PROGRESS NOTES
Subjective:   Serena Rivera is a 69 year old female who presents for a Medicare Subsequent Annual Wellness visit (Pt already had Initial Annual Wellness).     Lives with her . Is independent with ADLs. Diet is good, exercise is fair- is swimming. Vaccines reviewed. Wearing seat belt and no texting and driving. Feels safe at home. Mammo UTD. Doing self breast exams. Dexa UTD. Colon cancer screening UTD. No smoking. Occasional alcohol. No skin concerns. Labs to be ordered/reviewed.    Sees cards for afib, CAD. Stable on current management. Bruising at times. No bleeding.    HTN-  Stable. No headaches or vision changes. No SE to medication. Taking medication as prescribed.     Asthma/sleep apnea- stable. No sob. Uses CPAP    Prediabetes- no excessive thirst or urination.     Thyroid- stable. On levothyroxine. Needs labs.     GERD- stable on current management. No SE.     Mood- stable. Is on lexapro. No SE.     Has lymphedema- uses Liban hose. Is with compression machine. Is seeing the lymphedema clinic.    OA- has joint pain- left knee and behind the knee are bothersome. Has seen ortho. Would like to see them again and would like a different referral.    Back pain with some sciatica to right leg- mild to moderate pain with weakness with getting leg into car at times. No weakness with walking. No loss of bladder function.     Osteoporosis- sees carin. Is planning to start reclast in January.     History/Other:   Fall Risk Assessment:   She has been screened for Falls and is low risk.      Cognitive Assessment:   She had a completely normal cognitive assessment - see flowsheet entries     Functional Ability/Status:   Serena Rivera has some abnormal functions as listed below:  She has Meal Preparation difficulties based on screening of functional status.She has difficulties Shopping for Groceries based on screening of functional status. She has Vision problems based on screening of functional status. She  has Walking problems based on screening of functional status.       Depression Screening (PHQ-2/PHQ-9): PHQ-2 SCORE: 0  , done 12/5/2024        Advanced Directives:   She does NOT have a Living Will. [Do you have a living will?: (Patient-Rptd) No]  She does NOT have a Power of  for Health Care. [Do you have a healthcare power of ?: (Patient-Rptd) No]  Discussed Advance Care Planning with patient (and family/surrogate if present). Standard forms made available to patient in After Visit Summary.      Patient Active Problem List   Diagnosis    Esophageal reflux    Eczema    Hypothyroidism    OA (osteoarthritis)    Mood disorder ([facility])    Other allergy, other than to medicinal agents    BHUPINDER on CPAP    Osteopenia    Lymphedema of both lower extremities    PAF (paroxysmal atrial fibrillation) ([facility])    HTN (hypertension)    Class 3 severe obesity due to excess calories with serious comorbidity and body mass index (BMI) of 50.0 to 59.9 in adult ([facility])    Mild intermittent asthma without complication ([facility])    Anemia    Carotid disease, bilateral ([facility])    Pre-diabetes    Pure hypercholesterolemia    Diverticulosis    Localized osteoporosis without current pathological fracture     Allergies:  She is allergic to dye [iodine (topical)]; latex; shellfish; augmentin [amoxicillin-pot clavulanate]; pcn [bicillin c-r,]; sulfa antibiotics; penicillin g proc & benzathine; sulfamethoxazole; adhesive tape; and verapamil.    Current Medications:  Outpatient Medications Marked as Taking for the 12/12/24 encounter (Office Visit) with Ermelinda Greenberg APRN   Medication Sig    CUSTOM MEDICATION Semaglutide/ cynaocobalamin injection 5/0.5 mg/ ML   2.5 ml vial   Inject 0.2 mL / 20 units per week subcutaneously    LEVOTHYROXINE 112 MCG Oral Tab TAKE 1 TABLET(112 MCG) BY MOUTH BEFORE BREAKFAST    escitalopram 5 MG Oral Tab TAKE ONE TABLET BY MOUTH EVERY DAY ALONG WITH THE 10MG TABLET FOR A TOTAL DOSE OF 15MG DAILY    escitalopram  10 MG Oral Tab TAKE ONE TABLET BY MOUTH EVERY DAY ALONG WITH 5MG TABLET FOR A TOTAL DOSE OF 15MG DAILY    Benazepril HCl 10 MG Oral Tab TAKE 1 TABLET(10 MG) BY MOUTH DAILY    flecainide 100 MG Oral Tab Take 1 tablet (100 mg total) by mouth 2 (two) times daily.    Potassium Gluconate 595 MG Oral Cap Take by mouth as needed.    furosemide 20 MG Oral Tab Take 20mg every other day alternating with 40mg every other day.    albuterol (PROAIR HFA) 108 (90 Base) MCG/ACT Inhalation Aero Soln Inhale 2 puffs into the lungs every 4 (four) hours as needed for Wheezing.    Spacer/Aero-Holding Chambers (AEROCHAMBER MINI CHAMBER) Does not apply Device Use with inhaler as needed    Omeprazole 40 MG Oral Capsule Delayed Release Take 1 capsule (40 mg total) by mouth daily.    ELIQUIS 5 MG Oral Tab Take 1 tablet (5 mg total) by mouth 2 (two) times daily.    Calcium Carbonate-Vitamin D 250-125 MG-UNIT Oral Tab Take 1 tablet by mouth daily.    Bacillus Coagulans-Inulin (ALIGN PREBIOTIC-PROBIOTIC) 5-1.25 MG-GM Oral Chew Tab Chew 2 tablets by mouth daily.     Current Facility-Administered Medications for the 12/12/24 encounter (Office Visit) with Ermelinda Greenberg APRN   Medication    cyanocobalamin (Vitamin B12) 1000 MCG/ML injection 1,000 mcg       Medical History:  She  has a past medical history of Abnormal mammogram, Acute nasopharyngitis, Adjustment reaction (12/12/2000), Allergic rhinitis, Anemia (03/11/2019), Ankle strain, Anxiety, Arrhythmia, Arthritis, Asthma ([facility]) (09/15/2015), Atherosclerosis (01/12/2009), Breast mass (05/21/2012), Breast mass, left (05/29/2012), Bronchitis, Bronchospasm, Carotid disease, bilateral (HCC) (04/29/2021), Cellulitis, Cephalgia (03/15/2012), Chest pain (11/03/2012), Chickenpox, Coughing (04/01/2010), Dermatitis, Disorder of thyroid, Easy bruising (On blood thinners), Eczema, Edema, Epigastric fullness (04/27/2011), Esophageal reflux, Essential hypertension, ETD (eustachian tube dysfunction),  Exertional chest pain (03/05/2012), Extrinsic asthma, unspecified, Fibroid tumor, Heart palpitations, Heartburn, Hemorrhoids, High blood pressure, History of anemia, History of bone density study (01/12/2009), HTN (hypertension) (08/04/2016), Hyperlipidemia, Hypothyroid, Hypothyroidism, Itch of skin (Had a reaction to Xorelto), Laryngitis, Leg pain, Leg swelling, Lipid screening (02/06/2009), Lymphedema of both lower extremities (09/15/2015), Measles, Migraines, Mumps, OA (osteoarthritis), Obesity (03/05/2012), Occult blood positive stool, Osteopenia (10/08/2014), Other allergy, other than to medicinal agents (03/05/2012), Otitis media, PAF (paroxysmal atrial fibrillation) ([facility]) (05/23/2016), PMS (premenstrual syndrome), Pneumonia, PONV (postoperative nausea and vomiting), Pre-diabetes (11/04/2021), Rash, Rhinitis, Sinobronchitis, Sinusitis, Stasis dermatitis (05/31/2003), Stasis ulcer ([facility]), Thumb laceration (02/20/2006), Unspecified sleep apnea, URI (upper respiratory infection), Vasculitis ([facility]), Venous insufficiency, Venous stasis dermatitis (05/07/2002), Visual impairment, Vomiting and diarrhea, and Wears glasses.  Surgical History:  She  has a past surgical history that includes other surgical history; other surgical history; breast surgery procedure unlisted; needle biopsy left (06/2012); noemi localization wire 1 site right (cpt=19281) (2008?); colonoscopy (N/A, 01/09/2020); needle biopsy left; and colonoscopy (N/A, 8/28/2023).   Family History:  Her family history includes ASVD in her mother; Allergies/Asthma in an other family member; Cancer in an other family member; Dementia in her mother; Heart Disease in her mother; High Blood Pressure in her father and mother; Hypertension in her mother; Osteoporosis in her mother; Other in her father and mother; Skin disorders in an other family member.  Social History:  She  reports that she has never smoked. She has never been exposed to tobacco smoke. She has never  used smokeless tobacco. She reports current alcohol use. She reports that she does not use drugs.    Tobacco:  She has never smoked tobacco.    CAGE Alcohol Screen:   CAGE screening score of 0 on 12/5/2024, showing low risk of alcohol abuse.      Patient Care Team:  Roxanna Rice MD as PCP - General (Internal Medicine)  Omar Santiago MD as Consulting Physician (CARDIOLOGY)  Amor Burgos MD (PULMONARY DISEASES)  Adelaide Johnson APRN (Nurse Practitioner)  Benja Temple MD as Gastroenterologist (GASTROENTEROLOGY)  Vanessa Yang RN as Registered Nurse (Registered Nurse)  Ermelinda Greenberg APRN (Nurse Practitioner)  Juliana Torres APRN (Nurse Practitioner)  Evita Perez OT as Occupational Therapist (Occupational Therapist)  Edith Duncan (DERMATOLOGY)  Yadira Avilez MD (BARIATRICS)    Review of Systems  GENERAL: feels well otherwise  SKIN: denies any unusual skin lesions  EYES: denies blurred vision or double vision  HEENT: denies nasal congestion, sinus pain or ST  LUNGS: denies shortness of breath with exertion  CARDIOVASCULAR: denies chest pain on exertion  GI: denies abdominal pain, denies heartburn  : denies dysuria, vaginal discharge or itching, no complaint of urinary incontinence   MUSCULOSKELETAL: see HPI   NEURO: denies headaches  PSYCHE: denies depression   HEMATOLOGIC: denies sx of anemia  ENDOCRINE: see HPI  ALL/ASTHMA: see HPI     Objective:   Physical Exam  /60 (BP Location: Left arm, Patient Position: Sitting, Cuff Size: large)   Pulse 65   Temp 97.5 °F (36.4 °C) (Temporal)   Resp 16   Ht 5' 1.81\" (1.57 m)   Wt 259 lb 1.6 oz (117.5 kg)   LMP 01/01/2007   SpO2 96%   BMI 47.68 kg/m²   General appearance: alert, appears stated age and cooperative  Head: Normocephalic, without obvious abnormality, atraumatic  Eyes: negative  Ears: normal TM's and external ear canals both ears  Neck: no adenopathy, supple, symmetrical, trachea midline and thyroid not  enlarged, symmetric, no tenderness/mass/nodules  Back: negative    Lungs: clear to auscultation bilaterally  Heart: S1, S2 normal, regular rate and rhythm  Abdomen: soft, non-tender; bowel sounds normal; no masses,  no organomegaly  Extremities: edema 2+ pitting edema, TEDs present  Neurologic: Grossly normal    /60 (BP Location: Left arm, Patient Position: Sitting, Cuff Size: large)   Pulse 65   Temp 97.5 °F (36.4 °C) (Temporal)   Resp 16   Ht 5' 1.81\" (1.57 m)   Wt 259 lb 1.6 oz (117.5 kg)   LMP 01/01/2007   SpO2 96%   BMI 47.68 kg/m²  Estimated body mass index is 47.68 kg/m² as calculated from the following:    Height as of this encounter: 5' 1.81\" (1.57 m).    Weight as of this encounter: 259 lb 1.6 oz (117.5 kg).    Medicare Hearing Assessment:   Hearing Screening    Time taken: 12/12/2024  1:31 PM  Entry User: Argentina Navarro MA  Screening Method: Finger Rub  Finger Rub Result: Pass                 Assessment & Plan:   Serena Rivera is a 69 year old female who presents for a Medicare Assessment.     1. Encounter for annual health examination (Primary)  -     CBC With Differential With Platelet; Future; Expected date   -     Comp Metabolic Panel (14); Future   - vaccines reviewed  - Mammo UTD  - dexa UTD  - C scope UTD  - does not want to see derm    2. PAF (paroxysmal atrial fibrillation) (HCC)  - continue to see cards    3. Bilateral carotid artery disease, unspecified type (HCC)  Overview:  Mild  - continue to see cards   - repeat carotid US ordered for eval   - continue lifestyle changes  - US CAROTID DOPPLER BILAT - DIAG IMG (CPT=93880); Future    4. Benign essential HTN   - Stable. No headaches or vision changes. No SE to medication. Taking medication as prescribed.   - continue current medication   - Benazepril HCl 10 MG Oral Tab; Take 1 tablet (10 mg total) by mouth daily.  Dispense: 90 tablet; Refill: 1  - furosemide 20 MG Oral Tab; Take 20mg every other day alternating with 40mg  every other day.  Dispense: 135 tablet; Refill: 1    5. HLD  - CVD risk 12.5%. consider statin  - get repeat carotid US  - talk to cards about statin  -continue low fat diet and exercise  - get repeat lab  - Lipid Panel; Future    6. Mild intermittent asthma without complication  -     Albuterol Sulfate HFA; Inhale 2 puffs into the lungs every 4 (four) hours as needed for Wheezing or Shortness of Breath.  Dispense: 18 g; Refill: 0  Stable. CTM    7. BHUPINDER on CPAP  Overview:  2014, CPAP  Continue to see pulmo  Stable.     8. Pre-diabetes  - healthy lifestyle    9. Class 3 severe obesity due to excess calories with serious comorbidity and body mass index (BMI) of 50.0 to 59.9 in adult (HCC)  - healthy lifestyle    10. Acquired hypothyroidism  - Assay, Thyroid Stim Hormone; Future  - levothyroxine 112 MCG Oral Tab; Take 1 tablet (112 mcg total) by mouth before breakfast.  Dispense: 90 tablet; Refill: 1  -stable. Continue current management    11. Lymphedema of both lower extremities  -     continue to see lymphedema clinic    12. Anemia due to other cause, not classified  - stable. CTM    13. Gastroesophageal reflux disease without esophagitis  Overview:  GERD  - stable. Continue current management  - Omeprazole 40 MG Oral Capsule Delayed Release; Take 1 capsule (40 mg total) by mouth daily.  Dispense: 90 capsule; Refill: 1    14. Mood disorder ([facility])  Stable. CTM.  Orders:  - escitalopram 10 MG Oral Tab; TAKE 1 TABLET BY MOUTH DAILY WITH 5 MG FOR TOTAL 15 MG DAILY  Dispense: 90 tablet; Refill: 0  - escitalopram 5 MG Oral Tab; TAKE 1 TABLET BY MOUTH DAILY WITH 10 MG FOR TOTAL 15 MG DAILY  Dispense: 90 tablet; Refill: 0    15. Osteoarthritis of multiple joints, unspecified osteoarthritis type  Overview:  knee  Continue to see ortho  - get repeat xrays    16. Osteoporosis, unspecified location  - Continue to see end    17. Vitamin D deficiency  -     Vitamin D; Future     18. Eczema, unspecified type  - stable. Continue to  monitor.     19. Encounter for screening mammogram for breast cancer  - [facility] JADE 2D+3D SCREENING BILAT (CPT=77067/12819); Future    20. Chronic pain of both knees  - get xrays, see ortho  - ORTHOPEDIC - INTERNAL  - XR Knee (non-replaced) right complete (4 or more views) - [facility] Consult Only; Future  - XR Knee (non-replaced) left complete (4 or more views)- [facility] Consult Only; Future    21. Normocytic anemia  - CTM. Repeat lab  - CBC W Differential W Platelet [E]; Future    22. Diverticulosis  - stable. CTM    23. Constipation, unspecified constipation type  - regimen given      The patient indicates understanding of these issues and agrees to the plan.  Reinforced healthy diet, lifestyle, and exercise.      No follow-ups on file.     Follow up in 6 months for a med check or sooner as needed     MARIELA Wagner    Supplementary Documentation:   General Health:  In the past six months, have you lost more than 10 pounds without trying?: (Patient-Rptd) 2 - No  Has your appetite been poor?: (Patient-Rptd) No  Type of Diet: (Patient-Rptd) Balanced;Low Salt;Low Carb  How does the patient maintain a good energy level?: (Patient-Rptd) Other  How would you describe your daily physical activity?: (Patient-Rptd) Light  How would you describe your current health state?: (Patient-Rptd) Good  How do you maintain positive mental well-being?: (Patient-Rptd) Visiting Friends;Visiting Family  On a scale of 0 to 10, with 0 being no pain and 10 being severe pain, what is your pain level?: (Patient-Rptd) 5 - (Moderate)  In the past six months, have you experienced urine leakage?: (Patient-Rptd) 0-No  At any time do you feel concerned for the safety/well-being of yourself and/or your children, in your home or elsewhere?: (Patient-Rptd) No  Have you had any immunizations at another office such as Influenza, Hepatitis B, Tetanus, or Pneumococcal?: (Patient-Rptd) No       Serena Rivera's SCREENING SCHEDULE   Tests on this  list are recommended by your physician but may not be covered, or covered at this frequency, by your insurer.   Please check with your insurance carrier before scheduling to verify coverage.   PREVENTATIVE SERVICES FREQUENCY &  COVERAGE DETAILS LAST COMPLETION DATE   Diabetes Screening    Fasting Blood Sugar /  Glucose    One screening every 12 months if never tested or if previously tested but not diagnosed with pre-diabetes   One screening every 6 months if diagnosed with pre-diabetes Lab Results   Component Value Date    GLU 87 11/07/2024        Cardiovascular Disease Screening    Lipid Panel  Cholesterol  Lipoprotein (HDL)  Triglycerides Covered every 5 years for all Medicare beneficiaries without apparent signs or symptoms of cardiovascular disease Lab Results   Component Value Date    CHOLEST 190 06/03/2024    HDL 60 (H) 06/03/2024     (H) 06/03/2024    TRIG 130 06/03/2024         Electrocardiogram (EKG)   Covered if needed at [facility] to Medicare, and non-screening if indicated for medical reasons 04/20/2022      Ultrasound Screening for Abdominal Aortic Aneurysm (AAA) Covered once in a lifetime for one of the following risk factors    Men who are 65-75 years old and have ever smoked    Anyone with a family history -     Colorectal Cancer Screening  Covered for ages 50-85; only need ONE of the following:    Colonoscopy   Covered every 10 years    Covered every 2 years if patient is at high risk or previous colonoscopy was abnormal 08/28/2023    Health Maintenance   Topic Date Due    Colorectal Cancer Screening  08/28/2026       Flexible Sigmoidoscopy   Covered every 4 years -    Fecal Occult Blood Test Covered annually -   Bone Density Screening    Bone density screening    Covered every 2 years after age 65 if diagnosed with risk of osteoporosis or estrogen deficiency.    Covered yearly for long-term glucocorticoid medication use (Steroids) Last Dexa Scan:    XR DEXA BONE DENSITOMETRY (CPT=77080)  03/19/2024      No recommendations at this time   Pap and Pelvic    Pap   Covered every 2 years for women at normal risk; Annually if at high risk -  No recommendations at this time    Chlamydia Annually if high risk -  No recommendations at this time   Screening Mammogram    Mammogram     Recommend annually for all female patients aged 40 and older    One baseline mammogram covered for patients aged 35-39 04/20/2024    Health Maintenance   Topic Date Due    Mammogram  04/20/2025       Immunizations    Influenza Covered once per flu season  Please get every year -  Influenza Vaccine(1) due on 10/01/2024    Pneumococcal Each vaccine ([id number] & [id number]) covered once after 65 Prevnar 13: -    [id number]: 10/28/2008     Pneumococcal Vaccination(2 of 2 - PCV) due on 10/28/2009    Hepatitis B One screening covered for patients with certain risk factors   -  No recommendations at this time    Tetanus Toxoid Not covered by Medicare Part B unless medically necessary (cut with metal); may be covered with your pharmacy prescription benefits 02/20/2006    Tetanus, Diptheria and Pertusis TD and TDaP Not covered by Medicare Part B -  No recommendations at this time    Zoster Not covered by Medicare Part B; may be covered with your pharmacy  prescription benefits -  Zoster Vaccines(1 of 2) Never done     Annual Monitoring of Persistent Medications (ACE/ARB, digoxin diuretics, anticonvulsants)    Potassium Annually Lab Results   Component Value Date    K 4.1 11/07/2024         Creatinine   Annually Lab Results   Component Value Date    CREATSERUM 1.12 (H) 11/07/2024         BUN Annually Lab Results   Component Value Date    BUN 21 11/07/2024       Drug Serum Conc Annually No results found for: \"DIGOXIN\", \"DIG\", \"VALP\"

## 2024-12-12 NOTE — PATIENT INSTRUCTIONS
Prevnar 20 vaccine recommended    Flu shot recommended.      COVID vaccine recommended     Check with your insurance to verify coverage for the Shingrix vaccine for shingles. Also check to see where you can go to get the vaccine. You can also get a price check at the pharmacy instead.      Get your mammogram in April when due    Continue to work with Dr. Harmon for your osteoporosis    Avoid falling    Continue to see dermatology    Get the knee xrays done    See ortho for your knee pain    Get your carotid ultrasound done    Talk to cardiology about statin medications for reducing your cardiovascular disease risk    Get your labs done. You should be fasting for at least 10 hours. If you take a multivitamin with Biotin or any biotin product it should be held for 3 days prior to getting your labs done.     Follow up in 6 months, sooner as needed     Increase your hydration, be active, eat fruits and veggies, drink hot decaf liquids. If no improvement start metamucil daily. If no improvement start mirilax daily as needed. If still no improvement do prune punch (4oz of orange juice or apple if you have acid reflux, 4 oz of prune juice heated in the microwave together until hot and add a dose of milk of magnesia from over the counter) daily as needed.   Constipation (Adult)  Constipation means that you have bowel movements that are less frequent than usual. Stools often become very hard and difficult to pass.  Constipation is very common. At some point in life, it affects almost everyone. Since everyone's bowel habits are different, what is constipation to one person may not be to another. Your healthcare provider may do tests to diagnose constipation. It depends on what he or she finds when evaluating you.    Symptoms of constipation include:  Abdominal pain  Bloating  Vomiting  Painful bowel movements  Itching, swelling, bleeding, or pain around the anus  Causes  Constipation can have many causes. These  include:  Diet low in fiber  Too much dairy  Not drinking enough liquids  Lack of exercise or physical activity (especially true for older adults)  Changes in lifestyle or daily routine, including pregnancy, aging, work, and travel  Frequent use or misuse of laxatives  Ignoring the urge to have a bowel movement or delaying it until later  Medicines, such as certain prescription pain medicines, iron supplements, antacids, certain antidepressants, and calcium supplements  Diseases like irritable bowel syndrome, bowel obstructions, stroke, diabetes, thyroid disease, Parkinson disease, hemorrhoids, and colon cancer  Complications  Potential complications of constipation can include:  Hemorrhoids  Rectal bleeding from hemorrhoids or anal fissures (skin tears)  Hernias  Dependency on laxatives  Chronic constipation  Fecal impaction, a severe form of constipation in which a large amount of hard stool is in your rectum that you can't pass  Bowel obstruction or perforation  Home care  All treatment should be done after talking with your healthcare provider. This is especially true if you have another medical problems, are taking prescription medicines, or are an older adult. Treatment most often involves lifestyle changes. You may also need medicines. Your healthcare provider will tell you which will work best for you. Follow the advice below to help avoid this problem in the future.  Lifestyle changes  These lifestyle changes can help prevent constipation:  Diet. Eat a high-fiber diet, with fresh fruit and vegetables, and reduce dairy intake, meats, and processed foods  Fluids. It's important to get enough fluids each day. Drink plenty of water when you eat more fiber. If you are on diet that limits the amount of fluid you can have, talk about this with your healthcare provider.  Regular exercise. Check with your healthcare provider first.  Medicines  Take any medicines as directed. Some laxatives are safe to use only every  now and then. Others can be taken on a regular basis. While laxatives don't cause bowel dependence, they are treating the symptoms. So your constipation may return if you don't make other changes. Talk with your healthcare provider or pharmacist if you have questions.  Prescription pain medicines can cause constipation. If you are taking this kind of medicine, ask your healthcare provider if you should also take a stool softener.  Medicines you may take to treat constipation include:  Fiber supplements  Stool softeners  Laxatives  Enemas  Rectal suppositories  Follow-up care  Follow up with your healthcare provider if symptoms don't get better in the next few days. You may need to have more tests or see a specialist.  Call 911  Call 911 if any of these occur:  Trouble breathing  Stiff, rigid abdomen that is severely painful to touch  Confusion  Fainting or loss of consciousness  Rapid heart rate  Chest pain  When to seek medical advice  Call your healthcare provider right away if any of these occur:  Fever of 100.4°F (38°C) or higher, or as directed by your healthcare provider  Failure to resume normal bowel movements  Pain in your abdomen or back gets worse  Nausea or vomiting  Swelling in your abdomen  Blood in the stool  Black, tarry stool  Involuntary weight loss  Weakness  [employer] last reviewed this educational content on 6/1/2018  © 8422-0689 [employer]. All rights reserved. This information is not intended as a substitute for professional medical care. Always follow your healthcare professional's instructions.

## 2024-12-21 ENCOUNTER — LAB ENCOUNTER (OUTPATIENT)
Dept: LAB | Facility: HOSPITAL | Age: 69
End: 2024-12-21
Attending: NURSE PRACTITIONER
Payer: MEDICARE

## 2024-12-21 DIAGNOSIS — E78.00 PURE HYPERCHOLESTEROLEMIA: ICD-10-CM

## 2024-12-21 DIAGNOSIS — M81.6 LOCALIZED OSTEOPOROSIS WITHOUT CURRENT PATHOLOGICAL FRACTURE: ICD-10-CM

## 2024-12-21 DIAGNOSIS — D64.9 NORMOCYTIC ANEMIA: ICD-10-CM

## 2024-12-21 LAB
ALBUMIN SERPL-MCNC: 4.4 G/DL (ref 3.2–4.8)
ALBUMIN/GLOB SERPL: 1.8 {RATIO} (ref 1–2)
ALP LIVER SERPL-CCNC: 99 U/L
ALT SERPL-CCNC: 19 U/L
ANION GAP SERPL CALC-SCNC: 7 MMOL/L (ref 0–18)
AST SERPL-CCNC: 22 U/L (ref ?–34)
BASOPHILS # BLD AUTO: 0.04 X10(3) UL (ref 0–0.2)
BASOPHILS NFR BLD AUTO: 0.7 %
BILIRUB SERPL-MCNC: 0.6 MG/DL (ref 0.2–1.1)
BUN BLD-MCNC: 25 MG/DL (ref 9–23)
CALCIUM BLD-MCNC: 9.5 MG/DL (ref 8.7–10.4)
CHLORIDE SERPL-SCNC: 105 MMOL/L (ref 98–112)
CHOLEST SERPL-MCNC: 190 MG/DL (ref ?–200)
CO2 SERPL-SCNC: 29 MMOL/L (ref 21–32)
CREAT BLD-MCNC: 1.13 MG/DL
EGFRCR SERPLBLD CKD-EPI 2021: 52 ML/MIN/1.73M2 (ref 60–?)
EOSINOPHIL # BLD AUTO: 0.14 X10(3) UL (ref 0–0.7)
EOSINOPHIL NFR BLD AUTO: 2.5 %
ERYTHROCYTE [DISTWIDTH] IN BLOOD BY AUTOMATED COUNT: 12.9 %
FASTING PATIENT LIPID ANSWER: YES
FASTING STATUS PATIENT QL REPORTED: YES
GLOBULIN PLAS-MCNC: 2.4 G/DL (ref 2–3.5)
GLUCOSE BLD-MCNC: 88 MG/DL (ref 70–99)
HCT VFR BLD AUTO: 37.3 %
HDLC SERPL-MCNC: 57 MG/DL (ref 40–59)
HGB BLD-MCNC: 12.3 G/DL
IMM GRANULOCYTES # BLD AUTO: 0.03 X10(3) UL (ref 0–1)
IMM GRANULOCYTES NFR BLD: 0.5 %
LDLC SERPL CALC-MCNC: 115 MG/DL (ref ?–100)
LYMPHOCYTES # BLD AUTO: 1.35 X10(3) UL (ref 1–4)
LYMPHOCYTES NFR BLD AUTO: 24.3 %
MCH RBC QN AUTO: 31.1 PG (ref 26–34)
MCHC RBC AUTO-ENTMCNC: 33 G/DL (ref 31–37)
MCV RBC AUTO: 94.2 FL
MONOCYTES # BLD AUTO: 0.4 X10(3) UL (ref 0.1–1)
MONOCYTES NFR BLD AUTO: 7.2 %
NEUTROPHILS # BLD AUTO: 3.6 X10 (3) UL (ref 1.5–7.7)
NEUTROPHILS # BLD AUTO: 3.6 X10(3) UL (ref 1.5–7.7)
NEUTROPHILS NFR BLD AUTO: 64.8 %
NONHDLC SERPL-MCNC: 133 MG/DL (ref ?–130)
OSMOLALITY SERPL CALC.SUM OF ELEC: 296 MOSM/KG (ref 275–295)
PLATELET # BLD AUTO: 248 10(3)UL (ref 150–450)
POTASSIUM SERPL-SCNC: 4.5 MMOL/L (ref 3.5–5.1)
PROT SERPL-MCNC: 6.8 G/DL (ref 5.7–8.2)
RBC # BLD AUTO: 3.96 X10(6)UL
SODIUM SERPL-SCNC: 141 MMOL/L (ref 136–145)
T4 FREE SERPL-MCNC: 1.4 NG/DL (ref 0.8–1.7)
TRIGL SERPL-MCNC: 100 MG/DL (ref 30–149)
TSI SER-ACNC: 3.41 UIU/ML (ref 0.55–4.78)
VLDLC SERPL CALC-MCNC: 17 MG/DL (ref 0–30)
WBC # BLD AUTO: 5.6 X10(3) UL (ref 4–11)

## 2024-12-21 PROCEDURE — 85025 COMPLETE CBC W/AUTO DIFF WBC: CPT

## 2024-12-21 PROCEDURE — 36415 COLL VENOUS BLD VENIPUNCTURE: CPT

## 2024-12-21 PROCEDURE — 84443 ASSAY THYROID STIM HORMONE: CPT

## 2024-12-21 PROCEDURE — 82523 COLLAGEN CROSSLINKS: CPT

## 2024-12-21 PROCEDURE — 84439 ASSAY OF FREE THYROXINE: CPT

## 2024-12-21 PROCEDURE — 80061 LIPID PANEL: CPT

## 2024-12-21 PROCEDURE — 80053 COMPREHEN METABOLIC PANEL: CPT

## 2024-12-26 ENCOUNTER — HOSPITAL ENCOUNTER (OUTPATIENT)
Dept: ULTRASOUND IMAGING | Facility: HOSPITAL | Age: 69
Discharge: HOME | End: 2024-12-26
Attending: NURSE PRACTITIONER
Payer: MEDICARE

## 2024-12-26 DIAGNOSIS — I65.23 BILATERAL CAROTID ARTERY STENOSIS: ICD-10-CM

## 2024-12-26 PROCEDURE — 93880 EXTRACRANIAL BILAT STUDY: CPT | Performed by: NURSE PRACTITIONER

## 2024-12-30 ENCOUNTER — HOSPITAL ENCOUNTER (OUTPATIENT)
Age: 69
Discharge: HOME | End: 2024-12-30
Payer: MEDICARE

## 2024-12-30 VITALS
TEMPERATURE: 99 F | RESPIRATION RATE: 20 BRPM | DIASTOLIC BLOOD PRESSURE: 53 MMHG | HEART RATE: 87 BPM | SYSTOLIC BLOOD PRESSURE: 118 MMHG | OXYGEN SATURATION: 97 %

## 2024-12-30 DIAGNOSIS — U07.1 COVID: Primary | ICD-10-CM

## 2024-12-30 LAB
C-TELOPEPTIDE: 420 PG/ML
POCT INFLUENZA A: NEGATIVE
POCT INFLUENZA B: NEGATIVE
SARS-COV-2 RNA RESP QL NAA+PROBE: DETECTED

## 2024-12-30 NOTE — ED INITIAL ASSESSMENT (HPI)
Pt c/o cough, ear fullness, body aches and fatigue. Pt states she's had symptoms since Thursday.

## 2024-12-30 NOTE — ED PROVIDER NOTES
Patient Seen in: [facility]      History     Chief Complaint   Patient presents with    Cough/URI    Fatigue    Body ache and/or chills    Ear Problem Pain     Stated Complaint: cough    Subjective:   HPI    70 YO female with below stated past medical history presents to immediate care with 5 day history of cough, fatigue, body aches, chills, bilateral ear pressure.  Denies SOB or chest pain. OTC medication provide temporary relief.         Objective:     Past Medical History:    Abnormal mammogram    Acute nasopharyngitis    Adjustment reaction    adjustment reaction    Allergic rhinitis    Anemia    Ankle strain    Anxiety    hx severe anxiety    Arrhythmia    A-fib DX YRS AGO    Arthritis    Asthma ([facility])    Atherosclerosis    mild, at the carotid bifurcations, L>R    Breast mass    left breast, s/p bx-benign; hx R breast mass    Breast mass, left    Bronchitis    and acute bronchitis    Bronchospasm    Carotid disease, bilateral ([facility])    Cellulitis    IN LEGS    Cephalgia    viral    Chest pain    Chickenpox    Coughing    coughing paroxysm    Dermatitis    stress    Disorder of thyroid    Easy bruising    Eczema    Edema    Epigastric fullness    Esophageal reflux    GERD    Essential hypertension    ETD (eustachian tube dysfunction)    Exertional chest pain    Extrinsic asthma, unspecified    Fibroid tumor    in uterine wall    Heart palpitations    Heartburn    Hemorrhoids    High blood pressure    HTN    History of anemia    History of bone density study    HTN (hypertension)    Hyperlipidemia    Hypothyroid    Hypothyroidism    Itch of skin    Laryngitis    Leg pain    Leg swelling    Lipid screening    Lymphedema of both lower extremities    Measles    Migraines    Mumps    OA (osteoarthritis)    knee    Obesity    Occult blood positive stool    Osteopenia    Other allergy, other than to medicinal agents    Alleriges    Otitis media    PAF (paroxysmal atrial fibrillation) ([facility])    PMS  (premenstrual syndrome)    Pneumonia    PONV (postoperative nausea and vomiting)    Pre-diabetes    Rash    Rhinitis    Sinobronchitis    Sinusitis    and acute sinusitis    Stasis dermatitis    3/14/2000: severe    Stasis ulcer (HCC)    Thumb laceration    Unspecified sleep apnea    cpap    URI (upper respiratory infection)    Vasculitis (HCC)    Venous insufficiency    severe    Venous stasis dermatitis    severe    Visual impairment    glasses    Vomiting and diarrhea    Wears glasses              Past Surgical History:   Procedure Laterality Date    Breast surgery procedure unlisted      10/2000: breast bx (-); 2002: R excisional bx; 6/4/2012: Ultrasound-guided 8 gauge core bxs, L breast, benign fatty breast parenchyma    Colonoscopy N/A 01/09/2020    Procedure: COLONOSCOPY;  Surgeon: Ang Adan DO;  Location:  ENDOSCOPY    Colonoscopy N/A 8/28/2023    Procedure: COLONOSCOPY WITH COLD SNARE POLYPECTOMY;  Surgeon: Ang Adan DO;  Location:  ENDOSCOPY    [place] localization wire 1 site right ([id number])  2008?    benign     Needle biopsy left  06/2012    benign    Needle biopsy left      2022 PAPILLOMA    Other surgical history      LUMPECTOMY IN RIGHT BREAST    Other surgical history      Myomectomy                Social History     Socioeconomic History    Marital status:    Tobacco Use    Smoking status: Never     Passive exposure: Never    Smokeless tobacco: Never   Vaping Use    Vaping status: Never Used   Substance and Sexual Activity    Alcohol use: Yes     Comment: Social drinking    Drug use: Never   Other Topics Concern    Caffeine Concern No    Stress Concern Yes    Weight Concern Yes    Special Diet Yes    Exercise No    Seat Belt Yes     Social Drivers of Health     Physical Activity: Insufficiently Active (12/16/2019)    Received from [place]    Exercise Vital Sign     Days of Exercise per Week: 2 days     Minutes of Exercise per Session:  60 min              Review of Systems    Positive for stated complaint: cough  Other systems are as noted in HPI.  Constitutional and vital signs reviewed.      All other systems reviewed and negative except as noted above.    Physical Exam     ED Triage Vitals [12/30/24 1639]   /53   Pulse 87   Resp 20   Temp 98.9 °F (37.2 °C)   Temp src Oral   SpO2 97 %   O2 Device None (Room air)       Current Vitals:   Vital Signs  BP: 118/53  Pulse: 87  Resp: 20  Temp: 98.9 °F (37.2 °C)  Temp src: Oral    Oxygen Therapy  SpO2: 97 %  O2 Device: None (Room air)        Physical Exam  Vitals and nursing note reviewed.   Constitutional:       General: She is not in acute distress.     Appearance: Normal appearance. She is not ill-appearing, toxic-appearing or diaphoretic.   HENT:      Right Ear: Tympanic membrane is not erythematous.      Left Ear: Tympanic membrane is not erythematous.      Mouth/Throat:      Mouth: Mucous membranes are moist.   Cardiovascular:      Rate and Rhythm: Normal rate.      Heart sounds: Normal heart sounds.   Pulmonary:      Effort: Pulmonary effort is normal. No respiratory distress.      Breath sounds: Normal breath sounds. No wheezing.   Lymphadenopathy:      Cervical: No cervical adenopathy.   Neurological:      Mental Status: She is alert and oriented to person, place, and time.   Psychiatric:         Mood and Affect: Mood normal.         Behavior: Behavior normal.         ED Course     Labs Reviewed   RAPID SARS-COV-2 BY PCR - Abnormal; Notable for the following components:       Result Value    Rapid SARS-CoV-2 by PCR Detected (*)     All other components within normal limits   POCT FLU TEST - Normal    Narrative:     This assay is a rapid molecular in vitro test utilizing nucleic acid amplification of influenza A and B viral RNA.        MDM      Differential diagnosis considered but not limited to COVID, influenza, bronchitis, pneumonia    Patient is afebrile with reassuring physical exam.  Chest X-ray deferred.  COVID-positive.  Influenza negative. Discussed Paxlovid, which patient declined.  She states symptoms are mild and manageable. Previously recovered from COVID without Paxlovid per patient. Return instructions discussed with understanding.   Well-appearing at discharge.         Medical Decision Making  Amount and/or Complexity of Data Reviewed  Labs: ordered. Decision-making details documented in ED Course.    Risk  OTC drugs.        Disposition and Plan     Clinical Impression:  1. COVID         Disposition:  Discharge  12/30/2024  5:12 pm    Follow-up:  [facility] [address] [address]  [phone]    If symptoms worsen          Medications Prescribed:  Discharge Medication List as of 12/30/2024  5:12 PM              Supplementary Documentation:

## 2025-01-02 ENCOUNTER — OFFICE VISIT (OUTPATIENT)
Dept: INTERNAL MEDICINE CLINIC | Facility: CLINIC | Age: 70
End: 2025-01-02
Payer: MEDICARE

## 2025-01-02 ENCOUNTER — HOSPITAL ENCOUNTER (OUTPATIENT)
Dept: GENERAL RADIOLOGY | Age: 70
Discharge: HOME | End: 2025-01-02
Attending: NURSE PRACTITIONER
Payer: MEDICARE

## 2025-01-02 VITALS
HEART RATE: 73 BPM | RESPIRATION RATE: 18 BRPM | DIASTOLIC BLOOD PRESSURE: 60 MMHG | HEIGHT: 61 IN | TEMPERATURE: 99 F | BODY MASS INDEX: 49 KG/M2 | OXYGEN SATURATION: 98 % | SYSTOLIC BLOOD PRESSURE: 112 MMHG

## 2025-01-02 DIAGNOSIS — J45.21 MILD INTERMITTENT ASTHMA WITH ACUTE EXACERBATION (HCC): ICD-10-CM

## 2025-01-02 DIAGNOSIS — U07.1 COVID-19 VIRUS INFECTION: Primary | ICD-10-CM

## 2025-01-02 DIAGNOSIS — H66.91 ACUTE RIGHT OTITIS MEDIA: ICD-10-CM

## 2025-01-02 PROCEDURE — 99214 OFFICE O/P EST MOD 30 MIN: CPT | Performed by: NURSE PRACTITIONER

## 2025-01-02 PROCEDURE — 71046 X-RAY EXAM CHEST 2 VIEWS: CPT | Performed by: NURSE PRACTITIONER

## 2025-01-02 PROCEDURE — G2211 COMPLEX E/M VISIT ADD ON: HCPCS | Performed by: NURSE PRACTITIONER

## 2025-01-02 RX ORDER — PREDNISONE 20 MG/1
40 TABLET ORAL DAILY
Qty: 10 TABLET | Refills: 0 | Status: SHIPPED | OUTPATIENT
Start: 2025-01-02 | End: 2025-01-07

## 2025-01-02 RX ORDER — DOXYCYCLINE 100 MG/1
100 CAPSULE ORAL 2 TIMES DAILY
Qty: 20 CAPSULE | Refills: 0 | Status: SHIPPED | OUTPATIENT
Start: 2025-01-02

## 2025-01-02 NOTE — PATIENT INSTRUCTIONS
Start the oral prednisone today. Take it with food. Do not lay down for 1 hour after taking it. Monitor for side effects including stomach pain, acid reflux, trouble sleeping, and anxiety.     Take antibiotic completely as ordered.    Take antibiotic with food.    Eat yogurt twice a day while on antibiotic or take an oral probiotic.    Monitor for diarrhea, side effects, allergy.    Start over the counter Flonase nasal spray.    Get the chest x-ray done today.

## 2025-01-02 NOTE — PROGRESS NOTES
CHIEF COMPLAINT:     Chief Complaint   Patient presents with    Urgent Care F/u     Pt was seen at  12/30; pt states glands are very swollen, sore throat, wheezing. Pt is using home nebulizer. Patient is on day 7. Patient was not given paxlovid because she was on day 5 on 12/30.       HPI:   Serena Rivera is a 69 year old female coming in with complaints of worsening URI symptoms and follow up from  visit.     Has been sick for 1 week. Went to  on 12/30/24 and tested positive for covid infection. She was advised on supportive care. Complaints of bilateral ear pressure and mild pain, productive cough, wheezing, fevers, fatigue, body aches, sore throat, and swollen glands. She does have asthma and needed to use her nebulizer yesterday due to the wheezing. Denies any chest pain, shortness of breath, nausea, vomiting, or diarrhea. She is taking robitussin and tylenol with some relief.     Past Medical History:    Abnormal mammogram    Acute nasopharyngitis    Adjustment reaction    adjustment reaction    Allergic rhinitis    Anemia    Ankle strain    Anxiety    hx severe anxiety    Arrhythmia    A-fib DX YRS AGO    Arthritis    Asthma (HCC)    Atherosclerosis    mild, at the carotid bifurcations, L>R    Breast mass    left breast, s/p bx-benign; hx R breast mass    Breast mass, left    Bronchitis    and acute bronchitis    Bronchospasm    Carotid disease, bilateral (HCC)    Cellulitis    IN LEGS    Cephalgia    viral    Chest pain    Chickenpox    Coughing    coughing paroxysm    Dermatitis    stress    Disorder of thyroid    Easy bruising    Eczema    Edema    Epigastric fullness    Esophageal reflux    GERD    Essential hypertension    ETD (eustachian tube dysfunction)    Exertional chest pain    Extrinsic asthma, unspecified    Fibroid tumor    in uterine wall    Heart palpitations    Heartburn    Hemorrhoids    High blood pressure    HTN    History of anemia    History of bone density study    HTN  (hypertension)    Hyperlipidemia    Hypothyroid    Hypothyroidism    Itch of skin    Laryngitis    Leg pain    Leg swelling    Lipid screening    Lymphedema of both lower extremities    Measles    Migraines    Mumps    OA (osteoarthritis)    knee    Obesity    Occult blood positive stool    Osteopenia    Other allergy, other than to medicinal agents    Alleriges    Otitis media    PAF (paroxysmal atrial fibrillation) (HCC)    PMS (premenstrual syndrome)    Pneumonia    PONV (postoperative nausea and vomiting)    Pre-diabetes    Rash    Rhinitis    Sinobronchitis    Sinusitis    and acute sinusitis    Stasis dermatitis    3/14/2000: severe    Stasis ulcer (HCC)    Thumb laceration    Unspecified sleep apnea    cpap    URI (upper respiratory infection)    Vasculitis (HCC)    Venous insufficiency    severe    Venous stasis dermatitis    severe    Visual impairment    glasses    Vomiting and diarrhea    Wears glasses      Past Surgical History:   Procedure Laterality Date    Breast surgery procedure unlisted      10/2000: breast bx (-); 2002: R excisional bx; 6/4/2012: Ultrasound-guided 8 gauge core bxs, L breast, benign fatty breast parenchyma    Colonoscopy N/A 01/09/2020    Procedure: COLONOSCOPY;  Surgeon: Ang Adan DO;  Location:  ENDOSCOPY    Colonoscopy N/A 8/28/2023    Procedure: COLONOSCOPY WITH COLD SNARE POLYPECTOMY;  Surgeon: Ang Adan DO;  Location:  ENDOSCOPY    [place] localization wire 1 site right (cpt=[id number])  2008?    benign     Needle biopsy left  06/2012    benign    Needle biopsy left      2022 PAPILLOMA    Other surgical history      LUMPECTOMY IN RIGHT BREAST    Other surgical history      Myomectomy      Social History:  Social History     Socioeconomic History    Marital status:    Tobacco Use    Smoking status: Never     Passive exposure: Never    Smokeless tobacco: Never   Vaping Use    Vaping status: Never Used   Substance and Sexual Activity    Alcohol use: Yes      Comment: Social drinking    Drug use: Never   Other Topics Concern    Caffeine Concern No    Stress Concern Yes    Weight Concern Yes    Special Diet Yes    Exercise No    Seat Belt Yes     Social Drivers of Health     Physical Activity: Insufficiently Active (12/16/2019)    [facility]    Exercise Vital Sign     Days of Exercise per Week: 2 days     Minutes of Exercise per Session: 60 min      Family History:  Family History   Problem Relation Age of Onset    High Blood Pressure Father     Other (Other) Father         DEMENTIA    Hypertension Mother     High Blood Pressure Mother     Heart Disease Mother     Other (Other) Mother         MACULAR DEGENERATION    Other (ASVD) Mother     Other (Osteoporosis) Mother     Dementia Mother     Other (Allergies/Asthma) Other         fam hx    Cancer Other         fam hx    Other (Skin disorders) Other         fam hx      Allergies:  Allergies[1]   Current Meds:  Current Outpatient Medications   Medication Sig Dispense Refill    predniSONE 20 MG Oral Tab Take 2 tablets (40 mg total) by mouth daily for 5 days. 10 tablet 0    doxycycline 100 MG Oral Cap Take 1 capsule (100 mg total) by mouth 2 (two) times daily. 20 capsule 0    albuterol (PROAIR HFA) 108 (90 Base) MCG/ACT Inhalation Aero Soln Inhale 2 puffs into the lungs every 4 (four) hours as needed for Wheezing. 1 each 0    escitalopram 5 MG Oral Tab TAKE ONE TABLET BY MOUTH EVERY DAY ALONG WITH THE 10MG TABLET FOR A TOTAL DOSE OF 15MG DAILY 90 tablet 1    escitalopram 10 MG Oral Tab TAKE ONE TABLET BY MOUTH EVERY DAY ALONG WITH 5MG TABLET FOR A TOTAL DOSE OF 15MG DAILY 90 tablet 1    Benazepril HCl 10 MG Oral Tab Take 1 tablet (10 mg total) by mouth daily. 90 tablet 1    Omeprazole 40 MG Oral Capsule Delayed Release Take 1 capsule (40 mg total) by mouth daily. 90 capsule 1    furosemide 20 MG Oral Tab Take 20mg every other day alternating with 40mg every other day. 135  tablet 1    CUSTOM MEDICATION Semaglutide/ cynaocobalamin injection 5/0.5 mg/ ML   2.5 ml vial   Inject 0.2 mL / 20 units per week subcutaneously 1 each 1    LEVOTHYROXINE 112 MCG Oral Tab TAKE 1 TABLET(112 MCG) BY MOUTH BEFORE BREAKFAST 90 tablet 0    flecainide 100 MG Oral Tab Take 1 tablet (100 mg total) by mouth 2 (two) times daily.      Potassium Gluconate 595 MG Oral Cap Take by mouth as needed.      Spacer/Aero-Holding Chambers (AEROCHAMBER MINI CHAMBER) Does not apply Device Use with inhaler as needed 1 each 0    ELIQUIS 5 MG Oral Tab Take 1 tablet (5 mg total) by mouth 2 (two) times daily.      Bacillus Coagulans-Inulin (ALIGN PREBIOTIC-PROBIOTIC) 5-1.25 MG-GM Oral Chew Tab Chew 2 tablets by mouth daily.      Calcium Carbonate-Vitamin D 250-125 MG-UNIT Oral Tab Take 1 tablet by mouth daily. (Patient not taking: Reported on 1/2/2025)         Counseling given: Not Answered       REVIEW OF SYSTEMS:   See HPI.    EXAM:     /60 (BP Location: Left arm, Patient Position: Sitting, Cuff Size: adult)   Pulse 73   Temp 98.7 °F (37.1 °C) (Temporal)   Resp 18   Ht 5' 1\" (1.549 m)   LMP 01/01/2007   SpO2 98%   BMI 48.96 kg/m²   Body mass index is 48.96 kg/m².   Vital signs reviewed. Appears stated age, well groomed, in no acute distress.  Physical Exam:  GENERAL: Patient is alert, awake and oriented, well developed, well nourished.  HEENT: Head: Normocephalic, atraumatic. Eyes: EOMI, PERRLA, conjunctivae clear bilaterally, no eye discharge. Ears: External normal, left TM clear, right TM with mild erythema, no bulging or drainage noted. bilaterally. Throat: No tonsillar erythema or exudate, PND noted. Mild erythema to posterior pharynx. No maxillary or frontal sinus tenderness.   NECK: Supple, bilateral anterior cervical lymphadenopathy noted.   HEART: RRR without murmur.  LUNGS: Expiratory wheezing heard throughout the lung fields.  ABDOMEN: good BS's, no masses, HSM or tenderness  MUSCULOSKELETAL: No  obvious joint deformity or swelling.   NEURO: Oriented time three.     LABS:      Lab Results   Component Value Date    WBC 5.6 12/21/2024    RBC 3.96 12/21/2024    HGB 12.3 12/21/2024    HCT 37.3 12/21/2024    MCV 94.2 12/21/2024    MCH 31.1 12/21/2024    MCHC 33.0 12/21/2024    RDW 12.9 12/21/2024    .0 12/21/2024      Lab Results   Component Value Date    GLU 88 12/21/2024    BUN 25 (H) 12/21/2024    BUNCREA 21.1 (H) 08/18/2020    CREATSERUM 1.13 (H) 12/21/2024    ANIONGAP 7 12/21/2024    GFR 71 05/10/2017    GFRNAA 47 (L) 05/05/2022    GFRAA 54 (L) 05/05/2022    CA 9.5 12/21/2024    OSMOCALC 296 (H) 12/21/2024    ALKPHO 99 12/21/2024    AST 22 12/21/2024    ALT 19 12/21/2024    BILT 0.6 12/21/2024    TP 6.8 12/21/2024    ALB 4.4 12/21/2024    GLOBULIN 2.4 12/21/2024     12/21/2024    K 4.5 12/21/2024     12/21/2024    CO2 29.0 12/21/2024      Lab Results   Component Value Date    CHOLEST 190 12/21/2024    TRIG 100 12/21/2024    HDL 57 12/21/2024     (H) 12/21/2024    VLDL 17 12/21/2024    TCHDLRATIO 2.65 05/10/2017    NONHDLC 133 (H) 12/21/2024      Lab Results   Component Value Date    T4F 1.4 12/21/2024    TSH 3.406 12/21/2024      Lab Results   Component Value Date     07/02/2024    A1C 6.0 (H) 07/02/2024        IMAGING:     US CAROTID DOPPLER BILAT - DIAG IMG (CPT=93880)    Result Date: 12/26/2024  PROCEDURE:  US CAROTID DOPPLER BILAT - DIAG IMG (CPT=93880)  COMPARISON:  [address], US, [facility] CAROTID DOPPLER BILAT, 1/12/2009, 9:49 AM.  INDICATIONS:  I65.23 Bilateral carotid artery stenosis  TECHNIQUE:  Real-time gray-scale and duplex ultrasound was used to evaluate the bilateral extracranial carotid and vertebral arteries.  B-mode 2-dimensional images of the vascular structures, Doppler spectral analysis, and color flow Doppler imaging were  performed.  Stenosis measurements obtained in this exam were performed using Consensus Panel categories and NASCET criteria  PATIENT  STATED HISTORY: (As transcribed by Technologist)     FINDINGS:   IMAGE FINDINGS:  Minimal smooth bilateral calcified plaque without significant stenosis.  HEART RATE:      Regular.  VELOCITY MEASUREMENTS:                         Right CCA Peak Systolic Velocity:  113 centimeters/second             Right Proximal ICA Peak Systolic Velocity:  116.51 cm/s             Right Mid ICA Peak Systolic Velocity:  92.73 cm/s             Right Distal ICA Peak Systolic Velocity:  85.76 cm/s             Right ICA/CCA Velocity Ratio:  1.03                           Left CCA Peak Systolic Velocity:  98 centimeters/seconds             Left Proximal ICA Peak Systolic Velocity:  67.17 cm/s             Left Mid ICA Peak Systolic Velocity:  88.99 cm/s             Left Distal ICA Peak Systolic Velocity:  91.92 cm/s             Left ICA/CCA Velocity Ratio:  0.94  The vertebral arteries demonstrate antegrade flow.            CONCLUSION:  Less than 50% stenosis bilaterally. There is antegrade flow in both vertebral arteries.   LOCATION:  [facility]     Dictated by (CST): Juan Barrett MD on 12/26/2024 at 8:48 AM     Finalized by (CST): Juan Barrett MD on 12/26/2024 at 8:49 AM          ASSESSMENT AND PLAN:   1. COVID-19 virus infection  2. Mild intermittent asthma with acute exacerbation (HCC)  -Get CXR to r/o pneumonia  -Start prednisone course, SE discussed  -Continue robitussin prn  -Continue albuterol inhaler or nebulizer prn  -Supportive care discussed  -Go to ER for shortness of breath or chest pain  -follow up if symptoms persist/worsen  - predniSONE 20 MG Oral Tab; Take 2 tablets (40 mg total) by mouth daily for 5 days.  Dispense: 10 tablet; Refill: 0  - XR CHEST PA + LAT CHEST (CPT=71046); Future    3. Acute right otitis media  -Start doxycycline, advised to take with yogurt or probiotic  -Take tylenol prn  - doxycycline 100 MG Oral Cap; Take 1 capsule (100 mg total) by mouth 2 (two) times daily.  Dispense: 20 capsule; Refill:  0     The patient indicates understanding of these issues and agrees to the plan.  Return if symptoms worsen or fail to improve.    Jenn Mosesda, APRN  1/2/2025         [1]   Allergies  Allergen Reactions    Dye [Iodine (Topical)] OTHER (SEE COMMENTS)     Blisters    Latex SWELLING    Shellfish SWELLING    Augmentin [Amoxicillin-Pot Clavulanate] DIARRHEA and NAUSEA AND VOMITING           Pcn [Bicillin C-R,] NAUSEA AND VOMITING    Sulfa Antibiotics OTHER (SEE COMMENTS)     Flushed      Penicillin G Proc & Benzathine OTHER (SEE COMMENTS)     unknown    Sulfamethoxazole OTHER (SEE COMMENTS)     flushed    Adhesive Tape RASH     Can tolerate paper tape    Verapamil RASH and OTHER (SEE COMMENTS)

## 2025-01-06 ENCOUNTER — HOSPITAL ENCOUNTER (OUTPATIENT)
Dept: GENERAL RADIOLOGY | Facility: HOSPITAL | Age: 70
Discharge: HOME | End: 2025-01-06
Attending: NURSE PRACTITIONER
Payer: MEDICARE

## 2025-01-06 ENCOUNTER — NURSE ONLY (OUTPATIENT)
Dept: INTERNAL MEDICINE CLINIC | Facility: CLINIC | Age: 70
End: 2025-01-06
Payer: MEDICARE

## 2025-01-06 DIAGNOSIS — G89.29 CHRONIC PAIN OF BOTH KNEES: ICD-10-CM

## 2025-01-06 DIAGNOSIS — M25.561 CHRONIC PAIN OF BOTH KNEES: ICD-10-CM

## 2025-01-06 DIAGNOSIS — E53.8 B12 DEFICIENCY: Primary | ICD-10-CM

## 2025-01-06 DIAGNOSIS — M25.562 CHRONIC PAIN OF BOTH KNEES: ICD-10-CM

## 2025-01-06 PROCEDURE — 73564 X-RAY EXAM KNEE 4 OR MORE: CPT | Performed by: NURSE PRACTITIONER

## 2025-01-06 RX ORDER — CYANOCOBALAMIN 1000 UG/ML
1000 INJECTION, SOLUTION INTRAMUSCULAR; SUBCUTANEOUS ONCE
Status: COMPLETED | OUTPATIENT
Start: 2025-01-06 | End: 2025-01-06

## 2025-01-06 RX ADMIN — CYANOCOBALAMIN 1000 MCG: 1000 INJECTION, SOLUTION INTRAMUSCULAR; SUBCUTANEOUS at 13:02:00

## 2025-01-07 NOTE — PROGRESS NOTES
HISTORY OF PRESENT ILLNESS  Chief Complaint   Patient presents with    Weight Check     Video       Serena Stoner is a 69 year old female here for follow up in medical weight loss program.     Denies chest pain, shortness of breath, dizziness, blurred vision, headache, paresthesia, nausea/vomiting.   Has been out this week while being sick   Plans to resume medication this week   Has not been able to work out   Not able to have appetite     Recent weight at home : 253 lb     Struggling with some constipation and started diarrhea after antibiotcs   Doing align brand!   Ears checked as well at that time!   Currently on CPAP for severe sleep apnea                                          [facility]       Accredited by the American Academy of Sleep Medicine (AASM)     PATIENT'S NAME:        SERENA STONER  REFERRING PHYSICIAN:   Joanne Pinto M.D.  CONSULTING PHYSICIAN:  Keegan Cyr D.O.  PATIENT ACCOUNT #:     [id number]         LOCATION:       [facility]  MEDICAL RECORD #:      [id number]        YOB: 1955  DATE OF STUDY:         05/02/2014     SLEEP STUDY REPORT        STUDY TYPE:  CPAP titration study.     HISTORY:  This is a 58-year-old woman with a body mass index of 44,  weight of 248 pounds.  The patient has a history of sleep apnea and  is currently on CPAP therapy.  A CPAP retitration study was ordered.  The patient has a prior history of severe sleep apnea diagnosed in  August of 2013.  The patient is currently on Prilosec, levothyroxine,  Lasix, benazepril and Lexapro to treat asthma, hypertension, acid  reflux, arthritis and anxiety.  [place] scale score is normal at  5/24.     CPAP TITRATION RECORDING PARAMETERS:  The patient underwent a formal  CPAP titration evaluation at the [facility].  The study was  acquired in compliance with the AASM Manual for the Scoring of Sleep  and Associated Events.  The following parameters were monitored:  EEG, EOG,  ECG, chin EMG, left and right tibialis EMG, snore  microphone, respiratory inductance plethysmography, and oxygen  saturation via continuous pulse oximetry.  In accordance with AASM  recommendations, hypopnea events are scored based on an oxygen  saturation more than or equal to 4 percent.  Body position is  documented via technician notes every fifteen minutes.  There is an  additional channel for measurement of CPAP flow for the titration of  positive airway pressure.     FINDINGS:  Lights out at 10:00 p.m., lights on 5:07 a.m.  Total sleep  time 357 minutes giving a reduced sleep efficiency of 83.7%.  Sleep  latency 23 minutes, REM latency prolonged at 120 minutes.     SLEEP ARCHITECTURE:  Stage N1 sleep 4%; stage N2 sleep 36%; stage N3  sleep increased at 34%; REM sleep 25%.     CPAP was initiated at pressures of 4 and increased to pressures of  14.  The patient required higher pressures towards the end of the  study due to REM-related oxygen desaturations and hypopneas  especially in the side position.  Unfortunately, no supine sleep was  observed which is one significant drawback of the study.  The patient  certainly did have REM-related hypoxemia and obstructive hypopneas  necessitating increase in pressure during side non-REM sleep.  Sleep-disordered breathing was well treated at pressures of 12,  however, again, once entering REM sleep there were oxygen  desaturations associated with some hypopneic breathing episodes.  This occurred at the end of the study.  Pressures of 14 during side  REM sleep showed improved oxygen saturations.  The overall  apnea-hypopnea index was 5 events per hour of sleep.     Oxygen desaturation jose was 77% with 6% of the night being spent  with oxygen saturations below 90%.     Periodic limb movements were not observed.     The EKG and EEG showed no significant abnormalities.     IMPRESSION:  Prior severe sleep-disordered breathing with severe  REM-related desaturations to as  low as 57%.  Currently on CPAP  therapy, pressures of 14 appear to be more effective in controlling  this patient's sleep-disordered breathing especially when entering  REM sleep.  One drawback of the study was the lack of supine sleep.     DIAGNOSIS:  Obstructive sleep apnea, 327.23.     RECOMMENDATIONS:  1.    The patient should be set up with CPAP at the prescribed  pressure of at least 14.  She may need higher pressures.  Thus, an  autotitrating machine set at pressure range of 12 to 20 may be more  effective.  2.    Overnight oximetry may need to be performed while on this  therapy.  3.    Weight loss would be an important long-term treatment option  for the patient.  4.    A ResMed Mirage FX mask was used during the study.  5.    The patient will need to be followed closely with regards to  her ability to comply with therapy and to troubleshoot any problems  that might arise.  6.    As indicated on the order form, sleep consultation is  requested.  We will attempt to contact this patient and set up an  outpatient sleep evaluation, and to initiate CPAP therapy along with  appropriate outpatient followup.     Thank you for your support of the [facility].     Dictated By Keegan Cyr D.O.  d:    05/05/2014 16:13:08  t:    05/06/2014 11:28:59  [facility]   [id number]            Wt Readings from Last 6 Encounters:   12/12/24 259 lb 1.6 oz (117.5 kg)   10/02/24 260 lb (117.9 kg)   10/01/24 257 lb (116.6 kg)   08/26/24 265 lb (120.2 kg)   08/07/24 265 lb (120.2 kg)   07/31/24 270 lb (122.5 kg)          [facility] Follow Up    General Information  Success Moment: Realizing i’m full before i over eat  Challenging Moment: Makng sure i have junito veggies  Nutrition Recall  Breakfast: Over the last week I haven’t been eating much due to Covid   Lunch: 5 oz of roast turkey lunch meat.   Sensible portions veggie stix   one serving size maybe less.  Sometimes soup, usually some kind of fruit   Dinner: Various meat, lean  hamburger, chicken, pork loin roast, some kind   of vegetable , potatoes and rice and spagetti are not the norm. Snacks:   100 calories pretzels, fruit, grapes, orange.   Fluids: Usually water.  Coffee in the morning, only 2 cups Dining Out: 2   Exercise   Patient stated exercises # days/week: 7  Patient stated perceived level of   exertion: 3 Anaerobic Days: 2   Aerobic Days: 2   Patient stated average level of stress: 1  Sleep   Patient stated # hours uninterrupted sleep: 8   Patient stated feels   restful: Yes      Cause of disruption of sleep: Coughing runny nose   Goals: Weight lossand to get back to exercise              Breakfast Lunch Dinner Snacks Fluids   Reviewed           REVIEW OF SYSTEMS  GENERAL HEALTH: feels well otherwise, denied any fevers chills or night sweats   RESPIRATORY: denies shortness of breath   CARDIOVASCULAR: denies chest pain  GI: denies abdominal pain    EXAM  LMP 01/01/2007   GENERAL: well developed, well nourished,in no apparent distress, A/O x3  SKIN: no rashes,no suspicious lesions  HEENT: atraumatic, normocephalic, OP-clear, PERRL  NECK: supple,no adenopathy  LUNGS: clear to auscultation bilaterally   CARDIO: RRR without murmur  GI: good BS's,NT/ND, no masses or HSM  EXTREMITIES: no cyanosis, no clubbing, no edema    Lab Results   Component Value Date    WBC 5.6 12/21/2024    RBC 3.96 12/21/2024    HGB 12.3 12/21/2024    HCT 37.3 12/21/2024    MCV 94.2 12/21/2024    MCH 31.1 12/21/2024    MCHC 33.0 12/21/2024    RDW 12.9 12/21/2024    .0 12/21/2024     Lab Results   Component Value Date    GLU 88 12/21/2024    BUN 25 (H) 12/21/2024    BUNCREA 21.1 (H) 08/18/2020    CREATSERUM 1.13 (H) 12/21/2024    ANIONGAP 7 12/21/2024    GFR 71 05/10/2017    GFRNAA 47 (L) 05/05/2022    GFRAA 54 (L) 05/05/2022    CA 9.5 12/21/2024    OSMOCALC 296 (H) 12/21/2024    ALKPHO 99 12/21/2024    AST 22 12/21/2024    ALT 19 12/21/2024    BILT 0.6 12/21/2024    TP 6.8 12/21/2024    ALB 4.4  12/21/2024    GLOBULIN 2.4 12/21/2024     12/21/2024    K 4.5 12/21/2024     12/21/2024    CO2 29.0 12/21/2024     Lab Results   Component Value Date     07/02/2024    A1C 6.0 (H) 07/02/2024     Lab Results   Component Value Date    CHOLEST 190 12/21/2024    TRIG 100 12/21/2024    HDL 57 12/21/2024     (H) 12/21/2024    VLDL 17 12/21/2024    TCHDLRATIO 2.65 05/10/2017    NONHDLC 133 (H) 12/21/2024     Lab Results   Component Value Date    T4F 1.4 12/21/2024    TSH 3.406 12/21/2024     Lab Results   Component Value Date    B12 258 07/02/2024     Lab Results   Component Value Date    VITD 58.7 11/07/2024       Medications Ordered Prior to Encounter[1]    ASSESSMENT  Analyzed weight data:       Diagnoses and all orders for this visit:    Severe sleep apnea    B12 deficiency  -     Vitamin B12 [E]; Future    Therapeutic drug monitoring [Z51.81]  -     Vitamin B12 [E]; Future    Vitamin D deficiency [E55.9]  -     Vitamin B12 [E]; Future    Prediabetes [R73.03]  -     Vitamin B12 [E]; Future    Morbid obesity (HCC)  -     Vitamin B12 [E]; Future    At high risk for cardiovascular disease  -     Vitamin B12 [E]; Future    Family history of heart disease  -     Vitamin B12 [E]; Future    Mood disorder (HCC) [F39]  -     Vitamin B12 [E]; Future    Acquired hypothyroidism [E03.9]  -     Vitamin B12 [E]; Future    Pure hypercholesterolemia [E78.00]  -     Vitamin B12 [E]; Future    BHUPINDER on CPAP [G47.33]  -     Tirzepatide-Weight Management (ZEPBOUND) 2.5 MG/0.5ML Subcutaneous Solution Auto-injector; Inject 2.5 mg into the skin once a week.        PLAN  Trial of Zepbound   -advised of side effects and adverse effects of this medication  Reviewed its role in treatment of severe BHUPINDER  Reviewed its assistance in obesity management as well.   Recheck b12 level   Total time spent on chart review, pre-charting, obtaining history, counseling, and educating, reviewing labs was 30 minutes.    Nutrition: low carb  diet/ recommended to eat breakfast daily/ regular protein intake  Medication use and side effects reviewed with patient.  Medication contraindications: n/a  Follow up with dietitian and psychologist as recommended.  Discussed the role of sleep and stress in weight management.  Counseled on comprehensive weight loss plan including attention to nutrition, exercise and behavior/stress management for success. See patient instruction below for more details.  Discussed strategies to overcome barriers to successful weight loss and weight maintenance  FITTE: ACSM recommendations (150-300 minutes/ week in active weight loss)   Weight Loss consent to treat reviewed and signed    There are no Patient Instructions on file for this visit.    Return in about 8 weeks (around 3/5/2025).    Patient verbalizes understanding.    Yadira Avilez MD           [1]   Current Outpatient Medications on File Prior to Visit   Medication Sig Dispense Refill    doxycycline 100 MG Oral Cap Take 1 capsule (100 mg total) by mouth 2 (two) times daily. 20 capsule 0    albuterol (PROAIR HFA) 108 (90 Base) MCG/ACT Inhalation Aero Soln Inhale 2 puffs into the lungs every 4 (four) hours as needed for Wheezing. 1 each 0    escitalopram 5 MG Oral Tab TAKE ONE TABLET BY MOUTH EVERY DAY ALONG WITH THE 10MG TABLET FOR A TOTAL DOSE OF 15MG DAILY 90 tablet 1    escitalopram 10 MG Oral Tab TAKE ONE TABLET BY MOUTH EVERY DAY ALONG WITH 5MG TABLET FOR A TOTAL DOSE OF 15MG DAILY 90 tablet 1    Benazepril HCl 10 MG Oral Tab Take 1 tablet (10 mg total) by mouth daily. 90 tablet 1    Omeprazole 40 MG Oral Capsule Delayed Release Take 1 capsule (40 mg total) by mouth daily. 90 capsule 1    furosemide 20 MG Oral Tab Take 20mg every other day alternating with 40mg every other day. 135 tablet 1    LEVOTHYROXINE 112 MCG Oral Tab TAKE 1 TABLET(112 MCG) BY MOUTH BEFORE BREAKFAST 90 tablet 0    flecainide 100 MG Oral Tab Take 1 tablet (100 mg total) by mouth 2 (two) times daily.       Potassium Gluconate 595 MG Oral Cap Take by mouth as needed.      Spacer/Aero-Holding Chambers (AEROCHAMBER MINI CHAMBER) Does not apply Device Use with inhaler as needed 1 each 0    ELIQUIS 5 MG Oral Tab Take 1 tablet (5 mg total) by mouth 2 (two) times daily.      Calcium Carbonate-Vitamin D 250-125 MG-UNIT Oral Tab Take 1 tablet by mouth daily. (Patient not taking: Reported on 1/2/2025)      Bacillus Coagulans-Inulin (ALIGN PREBIOTIC-PROBIOTIC) 5-1.25 MG-GM Oral Chew Tab Chew 2 tablets by mouth daily.       Current Facility-Administered Medications on File Prior to Visit   Medication Dose Route Frequency Provider Last Rate Last Admin    cyanocobalamin (Vitamin B12) 1000 MCG/ML injection 1,000 mcg  1,000 mcg Intramuscular Q30 Days    1,000 mcg at 09/30/24 [id number]

## 2025-01-08 ENCOUNTER — TELEMEDICINE (OUTPATIENT)
Dept: INTERNAL MEDICINE CLINIC | Facility: CLINIC | Age: 70
End: 2025-01-08
Payer: MEDICARE

## 2025-01-08 DIAGNOSIS — G47.33 OSA ON CPAP: ICD-10-CM

## 2025-01-08 DIAGNOSIS — G47.30 SEVERE SLEEP APNEA: Primary | ICD-10-CM

## 2025-01-08 DIAGNOSIS — R73.03 PREDIABETES: ICD-10-CM

## 2025-01-08 DIAGNOSIS — E03.9 ACQUIRED HYPOTHYROIDISM: ICD-10-CM

## 2025-01-08 DIAGNOSIS — E78.00 PURE HYPERCHOLESTEROLEMIA: ICD-10-CM

## 2025-01-08 DIAGNOSIS — Z91.89 AT HIGH RISK FOR CARDIOVASCULAR DISEASE: ICD-10-CM

## 2025-01-08 DIAGNOSIS — E66.01 MORBID OBESITY (HCC): ICD-10-CM

## 2025-01-08 DIAGNOSIS — E53.8 B12 DEFICIENCY: ICD-10-CM

## 2025-01-08 DIAGNOSIS — F39 MOOD DISORDER (HCC): ICD-10-CM

## 2025-01-08 DIAGNOSIS — Z82.49 FAMILY HISTORY OF HEART DISEASE: ICD-10-CM

## 2025-01-08 DIAGNOSIS — Z51.81 THERAPEUTIC DRUG MONITORING: ICD-10-CM

## 2025-01-08 DIAGNOSIS — E55.9 VITAMIN D DEFICIENCY: ICD-10-CM

## 2025-01-08 RX ORDER — TIRZEPATIDE 2.5 MG/.5ML
2.5 INJECTION, SOLUTION SUBCUTANEOUS WEEKLY
Qty: 2 ML | Refills: 0 | Status: SHIPPED | OUTPATIENT
Start: 2025-01-08

## 2025-01-09 ENCOUNTER — OFFICE VISIT (OUTPATIENT)
Dept: INTERNAL MEDICINE CLINIC | Facility: CLINIC | Age: 70
End: 2025-01-09
Payer: MEDICARE

## 2025-01-09 VITALS
HEIGHT: 61 IN | DIASTOLIC BLOOD PRESSURE: 82 MMHG | RESPIRATION RATE: 16 BRPM | TEMPERATURE: 98 F | OXYGEN SATURATION: 98 % | WEIGHT: 252.63 LBS | SYSTOLIC BLOOD PRESSURE: 136 MMHG | BODY MASS INDEX: 47.7 KG/M2 | HEART RATE: 59 BPM

## 2025-01-09 DIAGNOSIS — R09.81 NASAL CONGESTION: ICD-10-CM

## 2025-01-09 DIAGNOSIS — U07.1 COVID-19: Primary | ICD-10-CM

## 2025-01-09 DIAGNOSIS — H69.93 EUSTACHIAN TUBE DISORDER, BILATERAL: ICD-10-CM

## 2025-01-09 PROCEDURE — G2211 COMPLEX E/M VISIT ADD ON: HCPCS | Performed by: NURSE PRACTITIONER

## 2025-01-09 PROCEDURE — 99213 OFFICE O/P EST LOW 20 MIN: CPT | Performed by: NURSE PRACTITIONER

## 2025-01-09 RX ORDER — FLUTICASONE PROPIONATE 50 MCG
2 SPRAY, SUSPENSION (ML) NASAL DAILY
Qty: 46 G | Refills: 0 | Status: SHIPPED | OUTPATIENT
Start: 2025-01-09

## 2025-01-09 NOTE — PROGRESS NOTES
Serena Rivera is a 69 year old female.  CHIEF COMPLAINT   Recent COVID, ear issue    HPI:   Had recent covid. Symptoms resolved except ears are clogged and hearing decreased. Also has nasal congestion. No sob, fever, chills. Cough is resolving.     Current Outpatient Medications   Medication Sig Dispense Refill    Tirzepatide-Weight Management (ZEPBOUND) 2.5 MG/0.5ML Subcutaneous Solution Auto-injector Inject 2.5 mg into the skin once a week. 2 mL 0    doxycycline 100 MG Oral Cap Take 1 capsule (100 mg total) by mouth 2 (two) times daily. 20 capsule 0    albuterol (PROAIR HFA) 108 (90 Base) MCG/ACT Inhalation Aero Soln Inhale 2 puffs into the lungs every 4 (four) hours as needed for Wheezing. 1 each 0    escitalopram 5 MG Oral Tab TAKE ONE TABLET BY MOUTH EVERY DAY ALONG WITH THE 10MG TABLET FOR A TOTAL DOSE OF 15MG DAILY 90 tablet 1    escitalopram 10 MG Oral Tab TAKE ONE TABLET BY MOUTH EVERY DAY ALONG WITH 5MG TABLET FOR A TOTAL DOSE OF 15MG DAILY 90 tablet 1    Benazepril HCl 10 MG Oral Tab Take 1 tablet (10 mg total) by mouth daily. 90 tablet 1    Omeprazole 40 MG Oral Capsule Delayed Release Take 1 capsule (40 mg total) by mouth daily. 90 capsule 1    furosemide 20 MG Oral Tab Take 20mg every other day alternating with 40mg every other day. 135 tablet 1    LEVOTHYROXINE 112 MCG Oral Tab TAKE 1 TABLET(112 MCG) BY MOUTH BEFORE BREAKFAST 90 tablet 0    flecainide 100 MG Oral Tab Take 1 tablet (100 mg total) by mouth 2 (two) times daily.      Potassium Gluconate 595 MG Oral Cap Take by mouth as needed.      Spacer/Aero-Holding Chambers (AEROCHAMBER MINI CHAMBER) Does not apply Device Use with inhaler as needed 1 each 0    ELIQUIS 5 MG Oral Tab Take 1 tablet (5 mg total) by mouth 2 (two) times daily.      Calcium Carbonate-Vitamin D 250-125 MG-UNIT Oral Tab Take 1 tablet by mouth daily.      Bacillus Coagulans-Inulin (ALIGN PREBIOTIC-PROBIOTIC) 5-1.25 MG-GM Oral Chew Tab Chew 2 tablets by mouth daily.         Past Medical History:    Abnormal mammogram    Acute nasopharyngitis    Adjustment reaction    adjustment reaction    Allergic rhinitis    Anemia    Ankle strain    Anxiety    hx severe anxiety    Arrhythmia    A-fib DX YRS AGO    Arthritis    Asthma (HCC)    Atherosclerosis    mild, at the carotid bifurcations, L>R    Breast mass    left breast, s/p bx-benign; hx R breast mass    Breast mass, left    Bronchitis    and acute bronchitis    Bronchospasm    Carotid disease, bilateral (HCC)    Cellulitis    IN LEGS    Cephalgia    viral    Chest pain    Chickenpox    Coughing    coughing paroxysm    Dermatitis    stress    Disorder of thyroid    Easy bruising    Eczema    Edema    Epigastric fullness    Esophageal reflux    GERD    Essential hypertension    ETD (eustachian tube dysfunction)    Exertional chest pain    Extrinsic asthma, unspecified    Fibroid tumor    in uterine wall    Heart palpitations    Heartburn    Hemorrhoids    High blood pressure    HTN    History of anemia    History of bone density study    HTN (hypertension)    Hyperlipidemia    Hypothyroid    Hypothyroidism    Itch of skin    Laryngitis    Leg pain    Leg swelling    Lipid screening    Lymphedema of both lower extremities    Measles    Migraines    Mumps    OA (osteoarthritis)    knee    Obesity    Occult blood positive stool    Osteopenia    Other allergy, other than to medicinal agents    Alleriges    Otitis media    PAF (paroxysmal atrial fibrillation) (HCC)    PMS (premenstrual syndrome)    Pneumonia    PONV (postoperative nausea and vomiting)    Pre-diabetes    Rash    Rhinitis    Sinobronchitis    Sinusitis    and acute sinusitis    Stasis dermatitis    3/14/2000: severe    Stasis ulcer (HCC)    Thumb laceration    Unspecified sleep apnea    cpap    URI (upper respiratory infection)    Vasculitis (HCC)    Venous insufficiency    severe    Venous stasis dermatitis    severe    Visual impairment    glasses    Vomiting and diarrhea     Wears glasses      Social History:  Social History     Socioeconomic History    Marital status:    Tobacco Use    Smoking status: Never     Passive exposure: Never    Smokeless tobacco: Never   Vaping Use    Vaping status: Never Used   Substance and Sexual Activity    Alcohol use: Yes     Comment: Social drinking    Drug use: Never   Other Topics Concern    Caffeine Concern No    Stress Concern Yes    Weight Concern Yes    Special Diet Yes    Exercise No    Seat Belt Yes     Social Drivers of Health     Physical Activity: Insufficiently Active (12/16/2019)    Received from [facility], [facility]    Exercise Vital Sign     Days of Exercise per Week: 2 days     Minutes of Exercise per Session: 60 min        REVIEW OF SYSTEMS:   See HPI     EXAM:     /82 (BP Location: Right arm, Patient Position: Sitting, Cuff Size: adult)   Pulse 59   Temp 98.2 °F (36.8 °C) (Temporal)   Resp 16   Ht 5' 1\" (1.549 m)   Wt 252 lb 9.6 oz (114.6 kg)   LMP 01/01/2007   SpO2 98%   BMI 47.73 kg/m²   Body mass index is 47.73 kg/m².   GENERAL: well developed, well nourished,in no apparent distress  HEENT: atraumatic, normocephalic,ears with trace erythema to both Tms bilaterally   EYES: conjunctiva are clear  NECK: supple,no adenopathy    LUNGS: clear to auscultation; no rhonchi, rales, or wheezing  CARDIO: RRR without murmur  GI: no masses, organomegaly or tenderness  MUSCULOSKELETAL:    Normal gait.  EXTREMITIES: 2+ edema to BLE R>L, no calve tenderness   NEURO: Oriented times three     LABS:      Lab Results   Component Value Date    WBC 5.6 12/21/2024    RBC 3.96 12/21/2024    HGB 12.3 12/21/2024    HCT 37.3 12/21/2024    MCV 94.2 12/21/2024    MCH 31.1 12/21/2024    MCHC 33.0 12/21/2024    RDW 12.9 12/21/2024    .0 12/21/2024      Lab Results   Component Value Date    GLU 88 12/21/2024    BUN 25 (H) 12/21/2024    BUNCREA 21.1 (H) 08/18/2020    CREATSERUM 1.13 (H) 12/21/2024    ANIONGAP 7  12/21/2024    GFR 71 05/10/2017    GFRNAA 47 (L) 05/05/2022    GFRAA 54 (L) 05/05/2022    CA 9.5 12/21/2024    OSMOCALC 296 (H) 12/21/2024    ALKPHO 99 12/21/2024    AST 22 12/21/2024    ALT 19 12/21/2024    BILT 0.6 12/21/2024    TP 6.8 12/21/2024    ALB 4.4 12/21/2024    GLOBULIN 2.4 12/21/2024     12/21/2024    K 4.5 12/21/2024     12/21/2024    CO2 29.0 12/21/2024      Lab Results   Component Value Date    CHOLEST 190 12/21/2024    TRIG 100 12/21/2024    HDL 57 12/21/2024     (H) 12/21/2024    VLDL 17 12/21/2024    TCHDLRATIO 2.65 05/10/2017    NONHDLC 133 (H) 12/21/2024      Lab Results   Component Value Date    T4F 1.4 12/21/2024    TSH 3.406 12/21/2024      Lab Results   Component Value Date     07/02/2024    A1C 6.0 (H) 07/02/2024        IMAGING:     XR KNEE, COMPLETE (4 OR MORE VIEWS), RIGHT (CPT=73564)    Result Date: 1/6/2025  PROCEDURE:  XR KNEE, COMPLETE (4 OR MORE VIEWS), RIGHT (CPT=73564)  TECHNIQUE:  AP, lateral, sunrise, and tunnel views were obtained  COMPARISON:  [facility] , XR, XR KNEE, COMPLETE (4 OR MORE VIEWS), LEFT (CPT=73564), 1/06/2025, 9:52 AM.  INDICATIONS:  G89.29 Chronic pain of both knees M25.562 Chronic pain of both knees M25.561 Chronic pain of both knees  PATIENT STATED HISTORY: (As transcribed by Technologist)  patient states she has had bilateral chronic knee pain.    FINDINGS:  Marked narrowing of medial joint compartment.  Tricompartmental marginal osteophytes.  No fracture or dislocation.            CONCLUSION:  Moderate to severe tricompartmental osteoarthritic changes.   LOCATION:  [facility]   Dictated by (CST): Aditya Malik MD on 1/06/2025 at 10:59 AM     Finalized by (CST): Aditya Malik MD on 1/06/2025 at 11:00 AM       XR KNEE, COMPLETE (4 OR MORE VIEWS), LEFT (CPT=73564)    Result Date: 1/6/2025  PROCEDURE:  XR KNEE, COMPLETE (4 OR MORE VIEWS), LEFT (CPT=73564)  TECHNIQUE:  AP, lateral, sunrise, and tunnel views were obtained  COMPARISON:  None.   INDICATIONS:  G89.29 Chronic pain of both knees M25.562 Chronic pain of both knees M25.561 Chronic pain of both knees  PATIENT STATED HISTORY: (As transcribed by Technologist)  patient states she has had bilateral chronic knee pain.    FINDINGS:  No fracture or dislocation.  Marked narrowing of medial joint compartment and moderate narrowing of lateral joint compartment.  Tricompartmental marginal osteophytes are most pronounced in the patellofemoral joint and lateral femoral condyle.            CONCLUSION:  Moderate to severe tricompartmental osteoarthritic changes.   LOCATION:  [facility]   Dictated by (CST): Aditya Malik MD on 1/06/2025 at 10:52 AM     Finalized by (CST): Aditya Malik MD on 1/06/2025 at 10:54 AM       XR CHEST PA + LAT CHEST (CPT=71046)    Result Date: 1/2/2025  PROCEDURE:  XR CHEST PA + LAT CHEST (CPT=71046)  INDICATIONS:  J45.21 Mild intermittent asthma with acute exacerbation (HCC)  COMPARISON:  [facility], XR, CHEST PA   LATERAL, 4/02/2010, 2:14 PM.  TECHNIQUE:  PA and lateral chest radiographs were obtained.  PATIENT STATED HISTORY: (As transcribed by Technologist)  Mild intermittent asthma with acute exacerbation .    FINDINGS:  Tortuous ectatic aorta present.  Cardiac shadow is enlarged..  The lungs are clear of active--appearing disease process.  The costophrenic angles are sharp.  There is no active disease seen. Note is made of degenerative changes present in the spine.            CONCLUSION:  Tortuous ectatic aorta.  Cardiac shadow is enlarged.  No acute disease seen.    LOCATION:  [facility]   Dictated by (CST): Joni Veliz MD on 1/02/2025 at 11:57 AM     Finalized by (CST): Joni Veliz MD on 1/02/2025 at 11:57 AM       US CAROTID DOPPLER BILAT - DIAG IMG (CPT=93880)    Result Date: 12/26/2024  PROCEDURE:  US CAROTID DOPPLER BILAT - DIAG IMG (CPT=93880)  COMPARISON:  [address], US, VASC CAROTID DOPPLER BILAT, 1/12/2009, 9:49 AM.  INDICATIONS:  I65.23 Bilateral carotid artery  stenosis  TECHNIQUE:  Real-time gray-scale and duplex ultrasound was used to evaluate the bilateral extracranial carotid and vertebral arteries.  B-mode 2-dimensional images of the vascular structures, Doppler spectral analysis, and color flow Doppler imaging were  performed.  Stenosis measurements obtained in this exam were performed using Consensus Panel categories and NASCET criteria  PATIENT STATED HISTORY: (As transcribed by Technologist)     FINDINGS:   IMAGE FINDINGS:  Minimal smooth bilateral calcified plaque without significant stenosis.  HEART RATE:      Regular.  VELOCITY MEASUREMENTS:                         Right CCA Peak Systolic Velocity:  113 centimeters/second             Right Proximal ICA Peak Systolic Velocity:  116.51 cm/s             Right Mid ICA Peak Systolic Velocity:  92.73 cm/s             Right Distal ICA Peak Systolic Velocity:  85.76 cm/s             Right ICA/CCA Velocity Ratio:  1.03                           Left CCA Peak Systolic Velocity:  98 centimeters/seconds             Left Proximal ICA Peak Systolic Velocity:  67.17 cm/s             Left Mid ICA Peak Systolic Velocity:  88.99 cm/s             Left Distal ICA Peak Systolic Velocity:  91.92 cm/s             Left ICA/CCA Velocity Ratio:  0.94  The vertebral arteries demonstrate antegrade flow.            CONCLUSION:  Less than 50% stenosis bilaterally. There is antegrade flow in both vertebral arteries.   LOCATION:  [facility]     Dictated by (CST): Juan Barrett MD on 12/26/2024 at 8:48 AM     Finalized by (CST): Juan Barrett MD on 12/26/2024 at 8:49 AM          ASSESSMENT AND PLAN:   1. COVID-19  2. Eustachian tube disorder, bilateral  3. Nasal congestion  - pt had recent covid. Doing okay. No sob. Has nasal congestion and ears are clogged. Hearing worse. No cerumen.  - start flonase, mucinex  - do ear exercises  - do deep breathing  - to ER for sob or emergent symptoms  - fluticasone propionate 50 MCG/ACT Nasal  Suspension; 2 sprays by Each Nare route daily.  Dispense: 46 g; Refill: 0     The patient indicates understanding of these issues and agrees to the plan.  The patient is asked to return as needed or when routine care is due.

## 2025-01-09 NOTE — PATIENT INSTRUCTIONS
Take antibiotic completely as ordered     Take antibiotic with food, do not lay down for 30 to an hour after to avoid acid reflux    Eat yogurt twice a day while on antibiotic or take an oral probiotic    Monitor for diarrhea, side effects, allergic reaction    If you have a mild allergic reaction take benadryl and call the office. If it is severe and you have lip or throat swelling or trouble breathing go to the ER or call 911    Start Flonase.    Start the ear exercises    You may continue Mucinex or Mucinex DM as needed    Follow up in one week as needed or when routine care is due

## 2025-01-14 ENCOUNTER — OFFICE VISIT (OUTPATIENT)
Facility: CLINIC | Age: 70
End: 2025-01-14
Payer: MEDICARE

## 2025-01-14 ENCOUNTER — TELEPHONE (OUTPATIENT)
Facility: CLINIC | Age: 70
End: 2025-01-14

## 2025-01-14 VITALS
HEART RATE: 87 BPM | SYSTOLIC BLOOD PRESSURE: 124 MMHG | HEIGHT: 61 IN | BODY MASS INDEX: 47.58 KG/M2 | OXYGEN SATURATION: 98 % | DIASTOLIC BLOOD PRESSURE: 66 MMHG | WEIGHT: 252 LBS

## 2025-01-14 DIAGNOSIS — M81.6 LOCALIZED OSTEOPOROSIS WITHOUT CURRENT PATHOLOGICAL FRACTURE: Primary | ICD-10-CM

## 2025-01-14 PROCEDURE — 99214 OFFICE O/P EST MOD 30 MIN: CPT | Performed by: STUDENT IN AN ORGANIZED HEALTH CARE EDUCATION/TRAINING PROGRAM

## 2025-01-14 NOTE — PATIENT INSTRUCTIONS
Visit Summary  Please keep me updated regarding if you would like to start Prolia  We discussed risks/benefits and the possibility of indefinite treatment  If starting Prolia, we will plan on a nurse visit and I will see you 6 months after that for your 2nd injection     General follow up information:  Please let us know if you require any refills at least 1 week prior to your medication running out. If you do run out of medication, please call our office ASAP to request refills (do not wait until your follow up).  Please call us if you experience any problems with insurance coverage of medication, lab work, or imaging.   Lab results and imaging will typically be reviewed at follow up appointments, or within 3-5 business days of ALL results being in if you do not have an appointment scheduled in the near future. Our office will contact you for any abnormal results requiring more urgent follow up or action.  The on-call pager is for urgent matters only. If you are a type 1 diabetic and run out of insulin after business hours 8AM-4PM, you may call the on-call pager for a refill to a 24 hour pharmacy. If you have adrenal insufficiency and run out of steroids, you may call the on-call pager for a refill to a 24 hours pharmacy. All other refill requests should be requested during business hours.    Return Visit   [x]  Dr. Harmon in 6 months   [] Virtual visit  [] No follow up appointment needed  [] Follow up to be scheduled pending      []  Fasting/8AM labs  []  Central scheduling # for ultrasound/nuclear med/CT/MRI/DXA/IR  []  Provide flyer for:  [] ENT   [] Weight Management  [] Infertility/Reproductive Endocrinology  [] Transgender care  []  Directions to 1st floor lab  [] Collect urine collection jug  [] Collect salivary cortisol tubes  []  Dental clearance form  []  Will need authorization for outside records

## 2025-01-14 NOTE — PROGRESS NOTES
ENDOCRINOLOGY, DIABETES & METABOLISM CONSULT NOTE   Date: 1/14/2025  Name: Serena Rivera   Referring Physician: No referring provider defined for this encounter.    Subjective:    HISTORY OF PRESENT ILLNESS:   Serena Rivera is a 69 year old female seen in consultation for her osteoporosis    Patient presents to the clinic for an initial bone health evaluation due to a history of DEXA confirmed 3/2024 in femoral neck.     OSTEOPOROSIS RISK FACTORS ASSESSMENT  Postmenopausal Yes, around age 49   Maternal hx of osteoporosis or hx of parental hip fx:  Yes, mother with hip fracture x2   Frequent falls No; previously was falling more frequently. Has a hx of lymphedema and noted weakness. This  has improved and is in water aerobics   Poor vision No5/2024 last eye visit   Decrease in height Yes,   2-2.5 inches   History of Vit D insufficiency:   Current Vitamin D supplementation: Yes, 12/2023  On vitamin D3, unsure of dose    Poor intake of calcium  Daily calcium intake: No yogurt daily. Cottage cheese 3-4x/week. Some milk in her coffee daily. Salad 2-3x/week. Cashews and almonds once a week   Is taking calcium 500 mg daily    Caffeine intake Yes, 1/2 regular 1/2 decaf coffee daily, pop twice a month   Hx of thyroid disease Yes, hx of hypothyroidism since 2010, on LT4 112 mcg daily      Intake of medications that cause bone loss:  Long term steroid use: No  Aromatase inhibitor: No  Seizure medications: No  GnRH agonist: No  PPI: Yes, omeprazole since around 2004   Lithium: No  SSRI: Yes, escitaloprom for 10 years  Actos/Avandia: No    Treatment Contraindications:  Dysphagia: denies  Pain on swallowing: denies  Plans for dental procedures: Early 2024 for a cleaning     9/2024 9/2024 TSH 6.0, free T41.7, total calcium 9.8, creatinine 1.06, GFR 57, albumin 4.3, PTH 56  Denies any falls/fractures since LOV  Continues on vitamin D and calcium supplement  Has started compounded semaglutide + B12 for the last 3 weeks  and takes her LT4 with prilosec in the AM  Denies any other changes to her medication regimen     Interval Hx 01/14/25  Labs: 12/21/2024 creatinine 1.13, EGFR 52.  Creatinine clearance of 83   Notes hearing loss after recent COVID infection   Continues on calcium and vitamin D supplements  No falls or fractures since last office visit       Allergies, PMH, SocHx and FHx reviewed and updated as appropriate in Epic on 1/14/2025    Allergies   Allergen Reactions    Dye [Iodine (Topical)] OTHER (SEE COMMENTS)     Blisters    Latex SWELLING    Shellfish SWELLING    Augmentin [Amoxicillin-Pot Clavulanate] DIARRHEA and NAUSEA AND VOMITING           Pcn [Bicillin C-R,] NAUSEA AND VOMITING    Sulfa Antibiotics OTHER (SEE COMMENTS)     Flushed      Penicillin G Proc & Benzathine OTHER (SEE COMMENTS)     unknown    Sulfamethoxazole OTHER (SEE COMMENTS)     flushed    Adhesive Tape RASH     Can tolerate paper tape    Verapamil RASH and OTHER (SEE COMMENTS)     Current Outpatient Medications   Medication Sig Dispense Refill    fluticasone propionate 50 MCG/ACT Nasal Suspension 2 sprays by Each Nare route daily. 46 g 0    Tirzepatide-Weight Management (ZEPBOUND) 2.5 MG/0.5ML Subcutaneous Solution Auto-injector Inject 2.5 mg into the skin once a week. 2 mL 0    doxycycline 100 MG Oral Cap Take 1 capsule (100 mg total) by mouth 2 (two) times daily. 20 capsule 0    albuterol (PROAIR HFA) 108 (90 Base) MCG/ACT Inhalation Aero Soln Inhale 2 puffs into the lungs every 4 (four) hours as needed for Wheezing. 1 each 0    escitalopram 5 MG Oral Tab TAKE ONE TABLET BY MOUTH EVERY DAY ALONG WITH THE 10MG TABLET FOR A TOTAL DOSE OF 15MG DAILY 90 tablet 1    escitalopram 10 MG Oral Tab TAKE ONE TABLET BY MOUTH EVERY DAY ALONG WITH 5MG TABLET FOR A TOTAL DOSE OF 15MG DAILY 90 tablet 1    Benazepril HCl 10 MG Oral Tab Take 1 tablet (10 mg total) by mouth daily. 90 tablet 1    Omeprazole 40 MG Oral Capsule Delayed Release Take 1 capsule (40 mg  total) by mouth daily. 90 capsule 1    furosemide 20 MG Oral Tab Take 20mg every other day alternating with 40mg every other day. 135 tablet 1    LEVOTHYROXINE 112 MCG Oral Tab TAKE 1 TABLET(112 MCG) BY MOUTH BEFORE BREAKFAST 90 tablet 0    flecainide 100 MG Oral Tab Take 1 tablet (100 mg total) by mouth 2 (two) times daily.      Potassium Gluconate 595 MG Oral Cap Take by mouth as needed.      Spacer/Aero-Holding Chambers (AEROCHAMBER MINI CHAMBER) Does not apply Device Use with inhaler as needed 1 each 0    ELIQUIS 5 MG Oral Tab Take 1 tablet (5 mg total) by mouth 2 (two) times daily.      Calcium Carbonate-Vitamin D 250-125 MG-UNIT Oral Tab Take 1 tablet by mouth daily.      Bacillus Coagulans-Inulin (ALIGN PREBIOTIC-PROBIOTIC) 5-1.25 MG-GM Oral Chew Tab Chew 2 tablets by mouth daily.       Past Medical History:    Abnormal mammogram    Acute nasopharyngitis    Adjustment reaction    adjustment reaction    Allergic rhinitis    Anemia    Ankle strain    Anxiety    hx severe anxiety    Arrhythmia    A-fib DX YRS AGO    Arthritis    Asthma (HCC)    Atherosclerosis    mild, at the carotid bifurcations, L>R    Breast mass    left breast, s/p bx-benign; hx R breast mass    Breast mass, left    Bronchitis    and acute bronchitis    Bronchospasm    Carotid disease, bilateral (HCC)    Cellulitis    IN LEGS    Cephalgia    viral    Chest pain    Chickenpox    Coughing    coughing paroxysm    Dermatitis    stress    Disorder of thyroid    Easy bruising    Eczema    Edema    Epigastric fullness    Esophageal reflux    GERD    Essential hypertension    ETD (eustachian tube dysfunction)    Exertional chest pain    Extrinsic asthma, unspecified    Fibroid tumor    in uterine wall    Heart palpitations    Heartburn    Hemorrhoids    High blood pressure    HTN    History of anemia    History of bone density study    HTN (hypertension)    Hyperlipidemia    Hypothyroid    Hypothyroidism    Itch of skin    Laryngitis    Leg pain     Leg swelling    Lipid screening    Lymphedema of both lower extremities    Measles    Migraines    Mumps    OA (osteoarthritis)    knee    Obesity    Occult blood positive stool    Osteopenia    Other allergy, other than to medicinal agents    Alleriges    Otitis media    PAF (paroxysmal atrial fibrillation) (HCC)    PMS (premenstrual syndrome)    Pneumonia    PONV (postoperative nausea and vomiting)    Pre-diabetes    Rash    Rhinitis    Sinobronchitis    Sinusitis    and acute sinusitis    Stasis dermatitis    3/14/2000: severe    Stasis ulcer (HCC)    Thumb laceration    Unspecified sleep apnea    cpap    URI (upper respiratory infection)    Vasculitis (HCC)    Venous insufficiency    severe    Venous stasis dermatitis    severe    Visual impairment    glasses    Vomiting and diarrhea    Wears glasses     Past Surgical History:   Procedure Laterality Date    Breast surgery procedure unlisted      10/2000: breast bx (-); 2002: R excisional bx; 6/4/2012: Ultrasound-guided 8 gauge core bxs, L breast, benign fatty breast parenchyma    Colonoscopy N/A 01/09/2020    Procedure: COLONOSCOPY;  Surgeon: Ang Adan DO;  Location:  ENDOSCOPY    Colonoscopy N/A 8/28/2023    Procedure: COLONOSCOPY WITH COLD SNARE POLYPECTOMY;  Surgeon: Ang Adan DO;  Location:  ENDOSCOPY    [place] localization wire 1 site right (cpt=[id number])  2008?    benign     Needle biopsy left  06/2012    benign    Needle biopsy left      2022 PAPILLOMA    Other surgical history      LUMPECTOMY IN RIGHT BREAST    Other surgical history      Myomectomy     Social History     Socioeconomic History    Marital status:    Tobacco Use    Smoking status: Never     Passive exposure: Never    Smokeless tobacco: Never   Vaping Use    Vaping status: Never Used   Substance and Sexual Activity    Alcohol use: Yes     Comment: Social drinking    Drug use: Never   Other Topics Concern    Caffeine Concern No    Stress Concern Yes    Weight Concern Yes     Special Diet Yes    Exercise No    Seat Belt Yes     Social Drivers of Health     Physical Activity: Insufficiently Active (12/16/2019)    [email or web address]    Exercise Vital Sign     Days of Exercise per Week: 2 days     Minutes of Exercise per Session: 60 min     Family History   Problem Relation Age of Onset    High Blood Pressure Father     Other (Other) Father         DEMENTIA    Hypertension Mother     High Blood Pressure Mother     Heart Disease Mother     Other (Other) Mother         MACULAR DEGENERATION    Other (ASVD) Mother     Other (Osteoporosis) Mother     Dementia Mother     Other (Allergies/Asthma) Other         fam hx    Cancer Other         fam hx    Other (Skin disorders) Other         fam hx       REVIEW OF SYSTEMS: 10 point ROS completed, refer to HPI for pertinent positives    Objective:   PHYSICAL EXAMINATION:  Vital Signs:   Vitals:    01/14/25 1125   BP: 124/66   Pulse: 87   SpO2: 98%   Weight: 252 lb (114.3 kg)   Height: 5' 1\" (1.549 m)       General Appearance:  Alert, in no acute distress, well developed  Eyes:  normal conjunctivae, sclera  Ears/Nose/Mouth/Throat/Neck: Decreased hearing, normal speech and no thyromegaly  Respiratory:  breathing comfortably on room air, no accessory muscle usage  Cardiovascular:   regular rate and rhythm, no peripheral edema   Psychiatric:  Oriented to person, place and time, appropriate mood & affect  Skin: Normal moisture and skin texture  Neuro: sensory grossly intact, motor grossly intact.  Uses cane to ambulate    Recent Labs: Personally reviewed in [facility] under lab tab on 1/14/2025      Lab Results   Component Value Date    PTH 56.0 09/09/2024    CA 9.5 12/21/2024    CA 10.3 11/07/2024    CA 9.8 09/09/2024    CA 8.8 06/03/2024    CA 9.2 12/04/2023    CA 9.0 04/19/2023    CREATSERUM 1.13 (H) 12/21/2024    CREATSERUM 1.12 (H) 11/07/2024    CREATSERUM 1.06 (H) 09/09/2024    PHOS 3.5 11/07/2024    PHOS 3.6  09/09/2024    MG 2.0 03/10/2018    MG 2.1 02/23/2014    MG 1.5 (L) 02/22/2014    VITD 58.7 11/07/2024    VITD 39.3 07/02/2024         Radiology:  Independently visualized on 1/14/2025  I reviewed the patient's records from outside facility which revealed: osteopenia [id number], osteoporosis 2024    DXA Scans:  Date L2-L4 BMD T-score % change Mean Femoral Neck BMD T-score % change   3/2024 HOB 0.845 -1.8 0.563 -2.6   1/2022 HOB  0.849 -1.8 0.591 -2.3   11/2019 HOB 0.849 -1.8 0.631 -2.0   10/2014 BK 0.853 -1.8 0.690 -1.4             ASSESSMENT/PLAN:  Serena Rivera is a 69 year old female seen in consultation for:    #Localized osteoporosis without current pathological fracture  Discussed pathophysiology of bone loss and clinical significance of DEXA scans with the patient.  The patient has confirmed osteoporosis with low T-scores in the mean femoral necks.  Explained the patient's risk factors for osteoporosis.  2021 had endoscopy without ulcers, does have a hx of reflux on PPI  The patient is at high risk for future osteoporotic fractures if her osteoporosis remains untreated.  Recommended workup for secondary causes of osteoporosis, including PTH, Alk Phos levels, and vitamin D levels.  Discussed the importance of adopting lifestyle measures, such as adequate calcium and vitamin D intake, exercise, smoking cessation, counseling on fall prevention, and avoidance of heavy alcohol use, to reduce bone loss in postmenopausal women.  September 2024 secondary workup within range;total calcium 9.8, creatinine 1.06, GFR 57, albumin 4.3, PTH 56  Discussed available treatment options for osteoporosis, including bisphosphonates (oral vs. IV), anabolics, Evenity, and Prolia injections, as well as their indications, risks, and benefits, including black box warnings. Will discuss in greater detail at upcoming appointment.   Discussed concerns about an increased risk of vertebral fracture after discontinuation of denosumab, the  need for indefinite administration of denosumab    Would not recommend oral bisphosphonate due to history of reflux  We discussed Prolia versus Reclast; 12/2024 creatinine clearance of 83 based on 114.3 kg (not pounds) therefore okay to continue with Reclast  Plan  Suggested 1200 mg of elemental calcium daily (total diet plus supplement) and 1000 IU of vitamin D daily as general recommendations.    Recommended DXA every two years for patients starting on therapy, with additional evaluation for contributing factors if a clinically significant BMD decrease or new fracture occurs.   Patient to keep me updated if she would like to move forward with Reclast or Prolia     #Hypothyroidism  Patient denies any symptoms of hypothyroidism   Recent TFTs show a slightly elevated TSH of 6 with a free T4 in the upper range of normal at 1.7   Patient notes since last set of thyroid test she has started compounded semaglutide   Since patient has been on current thyroid dose for many years we discussed spacing out Prilosec from levothyroxine   She is to continue thryoid Meds: levothyroxine Tabs - 112 MCG  Continue to follow with PCP    Return in about 6 months (around 7/14/2025).    The above plan was discussed in detail with the patient who verbalized understanding and agreement.         Ludmila Harmon DO  [facility]  1/14/2025     In reviewing this note, please be advised that Dragon Voice Recognition software used to dictate the note may have made errors in recognizing some of the words or phrases.     Note to patient: The 21 Century Cures Act makes medical notes like these available to patients in the interest of transparency. However, be advised this is a medical document. It is intended as peer to peer communication. It is written in medical language and may contain abbreviations or verbiage that are unfamiliar. It may appear blunt or direct. Medical documents are intended to carry relevant information, facts as  evident, and the clinical opinion of the practitioner.

## 2025-01-14 NOTE — TELEPHONE ENCOUNTER
RN had spoken with Provider regarding call she made to patient. She is ok to choose between Prolia and Reclast for osteo treatment.    Patient phoned and in and reviewed she would like to try Reclast once yearly infusion.    Reviewed with patient that we will follow up with order is sent to infusion center, once scheduled labs are to be done within two weeks.    Patient verbalized understanding of all.    Routed to pool for initiation.

## 2025-01-15 ENCOUNTER — OFFICE VISIT (OUTPATIENT)
Dept: INTERNAL MEDICINE CLINIC | Facility: CLINIC | Age: 70
End: 2025-01-15
Payer: MEDICARE

## 2025-01-15 VITALS
RESPIRATION RATE: 16 BRPM | TEMPERATURE: 97 F | SYSTOLIC BLOOD PRESSURE: 126 MMHG | DIASTOLIC BLOOD PRESSURE: 80 MMHG | BODY MASS INDEX: 47.08 KG/M2 | OXYGEN SATURATION: 97 % | HEART RATE: 72 BPM | HEIGHT: 61 IN | WEIGHT: 249.38 LBS

## 2025-01-15 DIAGNOSIS — H69.93 EUSTACHIAN TUBE DISORDER, BILATERAL: Primary | ICD-10-CM

## 2025-01-15 PROCEDURE — G2211 COMPLEX E/M VISIT ADD ON: HCPCS | Performed by: NURSE PRACTITIONER

## 2025-01-15 PROCEDURE — 99213 OFFICE O/P EST LOW 20 MIN: CPT | Performed by: NURSE PRACTITIONER

## 2025-01-15 RX ORDER — METHYLPREDNISOLONE 4 MG/1
TABLET ORAL
Qty: 1 EACH | Refills: 0 | Status: SHIPPED | OUTPATIENT
Start: 2025-01-15

## 2025-01-15 NOTE — PROGRESS NOTES
Serena Rivera is a 69 year old female.  CHIEF COMPLAINT   Recent COVID, ear issue    HPI:   Last visit -had recent covid. Symptoms resolved except ears are clogged and hearing decreased. Also has nasal congestion. No sob, fever, chills. Cough is resolving.     Since last visit she has done the Flonase a few times.  She has done Robitussin once.  She is doing the ear exercises.  The left ear is somewhat better.  The right ear still feels clogged.  She is not having any pain to either ear.  She did complete the antibiotic.  No sinus pain or congestion.  Cough is resolving and nearly gone at this point.      Current Outpatient Medications   Medication Sig Dispense Refill    fluticasone propionate 50 MCG/ACT Nasal Suspension 2 sprays by Each Nare route daily. 46 g 0    Tirzepatide-Weight Management (ZEPBOUND) 2.5 MG/0.5ML Subcutaneous Solution Auto-injector Inject 2.5 mg into the skin once a week. 2 mL 0    albuterol (PROAIR HFA) 108 (90 Base) MCG/ACT Inhalation Aero Soln Inhale 2 puffs into the lungs every 4 (four) hours as needed for Wheezing. 1 each 0    escitalopram 5 MG Oral Tab TAKE ONE TABLET BY MOUTH EVERY DAY ALONG WITH THE 10MG TABLET FOR A TOTAL DOSE OF 15MG DAILY 90 tablet 1    escitalopram 10 MG Oral Tab TAKE ONE TABLET BY MOUTH EVERY DAY ALONG WITH 5MG TABLET FOR A TOTAL DOSE OF 15MG DAILY 90 tablet 1    Benazepril HCl 10 MG Oral Tab Take 1 tablet (10 mg total) by mouth daily. 90 tablet 1    Omeprazole 40 MG Oral Capsule Delayed Release Take 1 capsule (40 mg total) by mouth daily. 90 capsule 1    furosemide 20 MG Oral Tab Take 20mg every other day alternating with 40mg every other day. 135 tablet 1    LEVOTHYROXINE 112 MCG Oral Tab TAKE 1 TABLET(112 MCG) BY MOUTH BEFORE BREAKFAST 90 tablet 0    flecainide 100 MG Oral Tab Take 1 tablet (100 mg total) by mouth 2 (two) times daily.      Potassium Gluconate 595 MG Oral Cap Take by mouth as needed.      Spacer/Aero-Holding Chambers (AEROCHAMBER MINI  CHAMBER) Does not apply Device Use with inhaler as needed 1 each 0    ELIQUIS 5 MG Oral Tab Take 1 tablet (5 mg total) by mouth 2 (two) times daily.      Calcium Carbonate-Vitamin D 250-125 MG-UNIT Oral Tab Take 1 tablet by mouth daily.      Bacillus Coagulans-Inulin (ALIGN PREBIOTIC-PROBIOTIC) 5-1.25 MG-GM Oral Chew Tab Chew 2 tablets by mouth daily.        Past Medical History:    Abnormal mammogram    Acute nasopharyngitis    Adjustment reaction    adjustment reaction    Allergic rhinitis    Anemia    Ankle strain    Anxiety    hx severe anxiety    Arrhythmia    A-fib DX YRS AGO    Arthritis    Asthma (HCC)    Atherosclerosis    mild, at the carotid bifurcations, L>R    Breast mass    left breast, s/p bx-benign; hx R breast mass    Breast mass, left    Bronchitis    and acute bronchitis    Bronchospasm    Carotid disease, bilateral (HCC)    Cellulitis    IN LEGS    Cephalgia    viral    Chest pain    Chickenpox    Coughing    coughing paroxysm    Dermatitis    stress    Disorder of thyroid    Easy bruising    Eczema    Edema    Epigastric fullness    Esophageal reflux    GERD    Essential hypertension    ETD (eustachian tube dysfunction)    Exertional chest pain    Extrinsic asthma, unspecified    Fibroid tumor    in uterine wall    Heart palpitations    Heartburn    Hemorrhoids    High blood pressure    HTN    History of anemia    History of bone density study    HTN (hypertension)    Hyperlipidemia    Hypothyroid    Hypothyroidism    Itch of skin    Laryngitis    Leg pain    Leg swelling    Lipid screening    Lymphedema of both lower extremities    Measles    Migraines    Mumps    OA (osteoarthritis)    knee    Obesity    Occult blood positive stool    Osteopenia    Other allergy, other than to medicinal agents    Alleriges    Otitis media    PAF (paroxysmal atrial fibrillation) (HCC)    PMS (premenstrual syndrome)    Pneumonia    PONV (postoperative nausea and vomiting)    Pre-diabetes    Rash    Rhinitis     Sinobronchitis    Sinusitis    and acute sinusitis    Stasis dermatitis    3/14/2000: severe    Stasis ulcer (HCC)    Thumb laceration    Unspecified sleep apnea    cpap    URI (upper respiratory infection)    Vasculitis (HCC)    Venous insufficiency    severe    Venous stasis dermatitis    severe    Visual impairment    glasses    Vomiting and diarrhea    Wears glasses      Social History:  Social History     Socioeconomic History    Marital status:    Tobacco Use    Smoking status: Never     Passive exposure: Never    Smokeless tobacco: Never   Vaping Use    Vaping status: Never Used   Substance and Sexual Activity    Alcohol use: Yes     Comment: Social drinking    Drug use: Never   Other Topics Concern    Caffeine Concern No    Stress Concern Yes    Weight Concern Yes    Special Diet Yes    Exercise No    Seat Belt Yes     Social Drivers of Health     Physical Activity: Insufficiently Active (12/16/2019)    [facility]    Exercise Vital Sign     Days of Exercise per Week: 2 days     Minutes of Exercise per Session: 60 min        REVIEW OF SYSTEMS:   See HPI     EXAM:     /80 (BP Location: Left arm, Patient Position: Sitting, Cuff Size: large)   Pulse 72   Temp 97.1 °F (36.2 °C) (Temporal)   Resp 16   Ht 5' 1\" (1.549 m)   Wt 249 lb 6.4 oz (113.1 kg)   LMP 01/01/2007   SpO2 97%   BMI 47.12 kg/m²   Body mass index is 47.12 kg/m².   GENERAL: well developed, well nourished,in no apparent distress  HEENT: atraumatic, normocephalic,ears with trace erythema to  right ear only, TM on left side is clear.  No effusions to ears.  No sinus pain or tenderness.  EYES: conjunctiva are clear  NECK: supple,no adenopathy    LUNGS: clear to auscultation; no rhonchi, rales, or wheezing  CARDIO: RRR without murmur  GI: no masses, organomegaly or tenderness  MUSCULOSKELETAL:  Normal gait.  EXTREMITIES: 2+ edema to BLE R>L, no calve tenderness 20  NEURO: Oriented ti  try oral mes three     LABS:      Lab Results   Component Value Date    WBC 5.6 12/21/2024    RBC 3.96 12/21/2024    HGB 12.3 12/21/2024    HCT 37.3 12/21/2024    MCV 94.2 12/21/2024    MCH 31.1 12/21/2024    MCHC 33.0 12/21/2024    RDW 12.9 12/21/2024    .0 12/21/2024      Lab Results   Component Value Date    GLU 88 12/21/2024    BUN 25 (H) 12/21/2024    BUNCREA 21.1 (H) 08/18/2020    CREATSERUM 1.13 (H) 12/21/2024    ANIONGAP 7 12/21/2024    GFR 71 05/10/2017    GFRNAA 47 (L) 05/05/2022    GFRAA 54 (L) 05/05/2022    CA 9.5 12/21/2024    OSMOCALC 296 (H) 12/21/2024    ALKPHO 99 12/21/2024    AST 22 12/21/2024    ALT 19 12/21/2024    BILT 0.6 12/21/2024    TP 6.8 12/21/2024    ALB 4.4 12/21/2024    GLOBULIN 2.4 12/21/2024     12/21/2024    K 4.5 12/21/2024     12/21/2024    CO2 29.0 12/21/2024      Lab Results   Component Value Date    CHOLEST 190 12/21/2024    TRIG 100 12/21/2024    HDL 57 12/21/2024     (H) 12/21/2024    VLDL 17 12/21/2024    TCHDLRATIO 2.65 05/10/2017    NONHDLC 133 (H) 12/21/2024      Lab Results   Component Value Date    T4F 1.4 12/21/2024    TSH 3.406 12/21/2024      Lab Results   Component Value Date     07/02/2024    A1C 6.0 (H) 07/02/2024        IMAGING:     XR KNEE, COMPLETE (4 OR MORE VIEWS), RIGHT (CPT=73564)    Result Date: 1/6/2025  PROCEDURE:  XR KNEE, COMPLETE (4 OR MORE VIEWS), RIGHT (CPT=73564)  TECHNIQUE:  AP, lateral, sunrise, and tunnel views were obtained  COMPARISON:  [facility] , XR, XR KNEE, COMPLETE (4 OR MORE VIEWS), LEFT (CPT=73564), 1/06/2025, 9:52 AM.  INDICATIONS:  G89.29 Chronic pain of both knees M25.562 Chronic pain of both knees M25.561 Chronic pain of both knees  PATIENT STATED HISTORY: (As transcribed by Technologist)  patient states she has had bilateral chronic knee pain.    FINDINGS:  Marked narrowing of medial joint compartment.  Tricompartmental marginal osteophytes.  No fracture or dislocation.            CONCLUSION:  Moderate  to severe tricompartmental osteoarthritic changes.   LOCATION:  [facility]   Dictated by (CST): Aditya Malik MD on 1/06/2025 at 10:59 AM     Finalized by (CST): Aditya Malik MD on 1/06/2025 at 11:00 AM       XR KNEE, COMPLETE (4 OR MORE VIEWS), LEFT (CPT=73564)    Result Date: 1/6/2025  PROCEDURE:  XR KNEE, COMPLETE (4 OR MORE VIEWS), LEFT (CPT=73564)  TECHNIQUE:  AP, lateral, sunrise, and tunnel views were obtained  COMPARISON:  None.  INDICATIONS:  G89.29 Chronic pain of both knees M25.562 Chronic pain of both knees M25.561 Chronic pain of both knees  PATIENT STATED HISTORY: (As transcribed by Technologist)  patient states she has had bilateral chronic knee pain.    FINDINGS:  No fracture or dislocation.  Marked narrowing of medial joint compartment and moderate narrowing of lateral joint compartment.  Tricompartmental marginal osteophytes are most pronounced in the patellofemoral joint and lateral femoral condyle.            CONCLUSION:  Moderate to severe tricompartmental osteoarthritic changes.   LOCATION:  [facility]   Dictated by (CST): Aditya Malik MD on 1/06/2025 at 10:52 AM     Finalized by (CST): Aditya Malik MD on 1/06/2025 at 10:54 AM       XR CHEST PA + LAT CHEST (CPT=71046)    Result Date: 1/2/2025  PROCEDURE:  XR CHEST PA + LAT CHEST (CPT=71046)  INDICATIONS:  J45.21 Mild intermittent asthma with acute exacerbation (HCC)  COMPARISON:  [facility], XR, CHEST PA   LATERAL, 4/02/2010, 2:14 PM.  TECHNIQUE:  PA and lateral chest radiographs were obtained.  PATIENT STATED HISTORY: (As transcribed by Technologist)  Mild intermittent asthma with acute exacerbation .    FINDINGS:  Tortuous ectatic aorta present.  Cardiac shadow is enlarged..  The lungs are clear of active--appearing disease process.  The costophrenic angles are sharp.  There is no active disease seen. Note is made of degenerative changes present in the spine.            CONCLUSION:  Tortuous ectatic aorta.  Cardiac shadow is enlarged.  No acute  disease seen.    LOCATION:  [facility]   Dictated by (CST): Joni Veliz MD on 1/02/2025 at 11:57 AM     Finalized by (CST): Joni Veliz MD on 1/02/2025 at 11:57 AM       US CAROTID DOPPLER BILAT - DIAG IMG (CPT=93880)    Result Date: 12/26/2024  PROCEDURE:  US CAROTID DOPPLER BILAT - DIAG IMG (CPT=93880)  COMPARISON:  [facility], US, [facility] CAROTID DOPPLER BILAT, 1/12/2009, 9:49 AM.  INDICATIONS:  I65.23 Bilateral carotid artery stenosis  TECHNIQUE:  Real-time gray-scale and duplex ultrasound was used to evaluate the bilateral extracranial carotid and vertebral arteries.  B-mode 2-dimensional images of the vascular structures, Doppler spectral analysis, and color flow Doppler imaging were  performed.  Stenosis measurements obtained in this exam were performed using Consensus Panel categories and NASCET criteria  PATIENT STATED HISTORY: (As transcribed by Technologist)     FINDINGS:   IMAGE FINDINGS:  Minimal smooth bilateral calcified plaque without significant stenosis.  HEART RATE:      Regular.  VELOCITY MEASUREMENTS:                         Right CCA Peak Systolic Velocity:  113 centimeters/second             Right Proximal ICA Peak Systolic Velocity:  116.51 cm/s             Right Mid ICA Peak Systolic Velocity:  92.73 cm/s             Right Distal ICA Peak Systolic Velocity:  85.76 cm/s             Right ICA/CCA Velocity Ratio:  1.03                           Left CCA Peak Systolic Velocity:  98 centimeters/seconds             Left Proximal ICA Peak Systolic Velocity:  67.17 cm/s             Left Mid ICA Peak Systolic Velocity:  88.99 cm/s             Left Distal ICA Peak Systolic Velocity:  91.92 cm/s             Left ICA/CCA Velocity Ratio:  0.94  The vertebral arteries demonstrate antegrade flow.            CONCLUSION:  Less than 50% stenosis bilaterally. There is antegrade flow in both vertebral arteries.   LOCATION:  [facility]     Dictated by (CST): Juan Barrett MD on 12/26/2024 at 8:48 AM      Finalized by (CST): Juan Barrett MD on 12/26/2024 at 8:49 AM          ASSESSMENT AND PLAN:   1. Eustachian tube disorder, bilateral  -The patient is with continued eustachian tube dysfunction without infection.  She completed the antibiotic that was given previously.  Her sinuses are improved.  Her left ear is improved with the feeling like it is clogged but the right ear continues to be the same.  No ear pain.  -Advised to continue Flonase, continue Robitussin  -Continue ear exercises  -Also start Medrol Dosepak if symptoms do not start improving in the next couple of days. See patient instructions for EDU  -If still no improvement then see ENT as needed  - methylPREDNISolone (MEDROL) 4 MG Oral Tablet Therapy Pack; As directed.  Dispense: 1 each; Refill: 0  - ENT - INTERNAL     The patient indicates understanding of these issues and agrees to the plan.  The patient is asked to return as needed or when routine care is due.

## 2025-01-15 NOTE — PATIENT INSTRUCTIONS
Increase the Flonase to 2 sprays each nare once a day.    Continue Robitussin DM at least until you are really feeling better.    If this does not get you in the right direction in the next couple of days it is okay to start the Medrol Dosepak.  Take it with food and do not lay down for 1 hour after taking it.  No NSAIDs while taking this medication.  You may still take Tylenol.  Watch closely for side effects, effectiveness.    Given update Friday to let me know how you are doing through MyChart    If still no improvement see the ENT for further evaluation    Follow-up as needed or when routine care is due

## 2025-01-16 ENCOUNTER — PATIENT MESSAGE (OUTPATIENT)
Dept: INTERNAL MEDICINE CLINIC | Facility: CLINIC | Age: 70
End: 2025-01-16

## 2025-01-17 NOTE — TELEPHONE ENCOUNTER
RN phoned patient to review Reclast order.     Reviewed order to be [facility] to [facility]- they will follow up to schedule. If she does not hear from them in 1 weeks, call office.    Patient can have our labs done within two weeks or do labs same day. Labs ordered.     Patient verbalized understanding of all.    Denied further questions.    Order faxed to [phone]. Awaiting confirmation.

## 2025-01-22 DIAGNOSIS — M81.6 LOCALIZED OSTEOPOROSIS WITHOUT CURRENT PATHOLOGICAL FRACTURE: Primary | ICD-10-CM

## 2025-01-22 RX ORDER — ZOLEDRONIC ACID 0.05 MG/ML
5 INJECTION, SOLUTION INTRAVENOUS ONCE
OUTPATIENT
Start: 2025-01-22

## 2025-01-22 NOTE — TELEPHONE ENCOUNTER
Pt phoned office stating she has not heard from [facility].    Phoned [facility], confirmed they have received Reclast order and are processing it.    Phoned patient back and reviewed she should be hearing from them, hopefully by end of the week to schedule.    Reclast order sent to scanning.    Closing this encounter.

## 2025-01-23 ENCOUNTER — LAB ENCOUNTER (OUTPATIENT)
Dept: LAB | Age: 70
End: 2025-01-23
Attending: STUDENT IN AN ORGANIZED HEALTH CARE EDUCATION/TRAINING PROGRAM
Payer: MEDICARE

## 2025-01-23 DIAGNOSIS — M81.6 LOCALIZED OSTEOPOROSIS WITHOUT CURRENT PATHOLOGICAL FRACTURE: ICD-10-CM

## 2025-01-23 LAB
ALBUMIN SERPL-MCNC: 4.2 G/DL (ref 3.2–4.8)
ANION GAP SERPL CALC-SCNC: 8 MMOL/L (ref 0–18)
BUN BLD-MCNC: 22 MG/DL (ref 9–23)
CALCIUM BLD-MCNC: 9.4 MG/DL (ref 8.7–10.6)
CHLORIDE SERPL-SCNC: 101 MMOL/L (ref 98–112)
CO2 SERPL-SCNC: 30 MMOL/L (ref 21–32)
CREAT BLD-MCNC: 1.1 MG/DL
EGFRCR SERPLBLD CKD-EPI 2021: 54 ML/MIN/1.73M2 (ref 60–?)
GLUCOSE BLD-MCNC: 91 MG/DL (ref 70–99)
OSMOLALITY SERPL CALC.SUM OF ELEC: 291 MOSM/KG (ref 275–295)
PHOSPHATE SERPL-MCNC: 3.2 MG/DL (ref 2.4–5.1)
POTASSIUM SERPL-SCNC: 3.9 MMOL/L (ref 3.5–5.1)
SODIUM SERPL-SCNC: 139 MMOL/L (ref 136–145)
VIT D+METAB SERPL-MCNC: 59.9 NG/ML (ref 30–100)

## 2025-01-23 PROCEDURE — 82306 VITAMIN D 25 HYDROXY: CPT

## 2025-01-23 PROCEDURE — 80069 RENAL FUNCTION PANEL: CPT

## 2025-01-23 PROCEDURE — 36415 COLL VENOUS BLD VENIPUNCTURE: CPT

## 2025-01-28 ENCOUNTER — OFFICE VISIT (OUTPATIENT)
Dept: AUDIOLOGY | Facility: CLINIC | Age: 70
End: 2025-01-28

## 2025-01-28 ENCOUNTER — OFFICE VISIT (OUTPATIENT)
Dept: OTOLARYNGOLOGY | Facility: CLINIC | Age: 70
End: 2025-01-28
Payer: MEDICARE

## 2025-01-28 DIAGNOSIS — H91.90 HEARING LOSS, UNSPECIFIED HEARING LOSS TYPE, UNSPECIFIED LATERALITY: Primary | ICD-10-CM

## 2025-01-28 DIAGNOSIS — H90.6 MIXED CONDUCTIVE AND SENSORINEURAL HEARING LOSS OF BOTH EARS: ICD-10-CM

## 2025-01-28 DIAGNOSIS — R07.0 THROAT PAIN: ICD-10-CM

## 2025-01-28 DIAGNOSIS — H90.3 SENSORINEURAL HEARING LOSS (SNHL) OF BOTH EARS: Primary | ICD-10-CM

## 2025-01-28 DIAGNOSIS — H69.90 EUSTACHIAN TUBE DISORDER, UNSPECIFIED LATERALITY: ICD-10-CM

## 2025-01-28 PROCEDURE — 99204 OFFICE O/P NEW MOD 45 MIN: CPT | Performed by: STUDENT IN AN ORGANIZED HEALTH CARE EDUCATION/TRAINING PROGRAM

## 2025-01-28 PROCEDURE — G2211 COMPLEX E/M VISIT ADD ON: HCPCS | Performed by: STUDENT IN AN ORGANIZED HEALTH CARE EDUCATION/TRAINING PROGRAM

## 2025-01-28 PROCEDURE — 92567 TYMPANOMETRY: CPT | Performed by: AUDIOLOGIST

## 2025-01-28 PROCEDURE — 92557 COMPREHENSIVE HEARING TEST: CPT | Performed by: AUDIOLOGIST

## 2025-01-28 NOTE — PROGRESS NOTES
Serena Rivera is a 69 year old female.   Chief Complaint   Patient presents with    Hearing Loss     Patient is here for difficult to hear x 2 weeks ago reports glands are swollen reports sore throat from the let side reports ears popping.     HPI:   69-year-old presents with hearing loss she states her hearing has been muffled for several weeks since she had COVID around [place] time.  She has been on Flonase and a Medrol Dosepak.  Also reports an irritation in her throat    Current Outpatient Medications   Medication Sig Dispense Refill    fluticasone propionate 50 MCG/ACT Nasal Suspension 2 sprays by Each Nare route daily. 46 g 0    Tirzepatide-Weight Management (ZEPBOUND) 2.5 MG/0.5ML Subcutaneous Solution Auto-injector Inject 2.5 mg into the skin once a week. 2 mL 0    albuterol (PROAIR HFA) 108 (90 Base) MCG/ACT Inhalation Aero Soln Inhale 2 puffs into the lungs every 4 (four) hours as needed for Wheezing. 1 each 0    escitalopram 5 MG Oral Tab TAKE ONE TABLET BY MOUTH EVERY DAY ALONG WITH THE 10MG TABLET FOR A TOTAL DOSE OF 15MG DAILY 90 tablet 1    escitalopram 10 MG Oral Tab TAKE ONE TABLET BY MOUTH EVERY DAY ALONG WITH 5MG TABLET FOR A TOTAL DOSE OF 15MG DAILY 90 tablet 1    Benazepril HCl 10 MG Oral Tab Take 1 tablet (10 mg total) by mouth daily. 90 tablet 1    Omeprazole 40 MG Oral Capsule Delayed Release Take 1 capsule (40 mg total) by mouth daily. 90 capsule 1    furosemide 20 MG Oral Tab Take 20mg every other day alternating with 40mg every other day. 135 tablet 1    LEVOTHYROXINE 112 MCG Oral Tab TAKE 1 TABLET(112 MCG) BY MOUTH BEFORE BREAKFAST 90 tablet 0    flecainide 100 MG Oral Tab Take 1 tablet (100 mg total) by mouth 2 (two) times daily.      Potassium Gluconate 595 MG Oral Cap Take by mouth as needed.      Spacer/Aero-Holding Chambers (AEROCHAMBER MINI CHAMBER) Does not apply Device Use with inhaler as needed 1 each 0    ELIQUIS 5 MG Oral Tab Take 1 tablet (5 mg total) by mouth 2 (two)  times daily.      Calcium Carbonate-Vitamin D 250-125 MG-UNIT Oral Tab Take 1 tablet by mouth daily.      Bacillus Coagulans-Inulin (ALIGN PREBIOTIC-PROBIOTIC) 5-1.25 MG-GM Oral Chew Tab Chew 2 tablets by mouth daily.      methylPREDNISolone (MEDROL) 4 MG Oral Tablet Therapy Pack As directed. (Patient not taking: Reported on 1/28/2025) 1 each 0      Past Medical History:    Abnormal mammogram    Acute nasopharyngitis    Adjustment reaction    adjustment reaction    Allergic rhinitis    Anemia    Ankle strain    Anxiety    hx severe anxiety    Arrhythmia    A-fib DX YRS AGO    Arthritis    Asthma (HCC)    Atherosclerosis    mild, at the carotid bifurcations, L>R    Breast mass    left breast, s/p bx-benign; hx R breast mass    Breast mass, left    Bronchitis    and acute bronchitis    Bronchospasm    Carotid disease, bilateral    Cellulitis    IN LEGS    Cephalgia    viral    Chest pain    Chickenpox    Coughing    coughing paroxysm    Dermatitis    stress    Disorder of thyroid    Easy bruising    Eczema    Edema    Epigastric fullness    Esophageal reflux    GERD    Essential hypertension    ETD (eustachian tube dysfunction)    Exertional chest pain    Extrinsic asthma, unspecified    Fibroid tumor    in uterine wall    Heart palpitations    Heartburn    Hemorrhoids    High blood pressure    HTN    History of anemia    History of bone density study    HTN (hypertension)    Hyperlipidemia    Hypothyroid    Hypothyroidism    Itch of skin    Laryngitis    Leg pain    Leg swelling    Lipid screening    Lymphedema of both lower extremities    Measles    Migraines    Mumps    OA (osteoarthritis)    knee    Obesity    Occult blood positive stool    Osteopenia    Other allergy, other than to medicinal agents    Alleriges    Otitis media    PAF (paroxysmal atrial fibrillation) (HCC)    PMS (premenstrual syndrome)    Pneumonia    PONV (postoperative nausea and vomiting)    Pre-diabetes    Rash    Rhinitis    Sinobronchitis     Sinusitis    and acute sinusitis    Stasis dermatitis    3/14/2000: severe    Stasis ulcer (HCC)    Thumb laceration    Unspecified sleep apnea    cpap    URI (upper respiratory infection)    Vasculitis    Venous insufficiency    severe    Venous stasis dermatitis    severe    Visual impairment    glasses    Vomiting and diarrhea    Wears glasses      Social History:  Social History     Socioeconomic History    Marital status:    Tobacco Use    Smoking status: Never     Passive exposure: Never    Smokeless tobacco: Never   Vaping Use    Vaping status: Never Used   Substance and Sexual Activity    Alcohol use: Yes     Comment: Social drinking    Drug use: Never   Other Topics Concern    Caffeine Concern No    Stress Concern Yes    Weight Concern Yes    Special Diet Yes    Exercise No    Seat Belt Yes     Social Drivers of Health     Physical Activity: Insufficiently Active (12/16/2019)    [facility]    Exercise Vital Sign     Days of Exercise per Week: 2 days     Minutes of Exercise per Session: 60 min      Past Surgical History:   Procedure Laterality Date    Breast surgery procedure unlisted      10/2000: breast bx (-); 2002: R excisional bx; 6/4/2012: Ultrasound-guided 8 gauge core bxs, L breast, benign fatty breast parenchyma    Colonoscopy N/A 01/09/2020    Procedure: COLONOSCOPY;  Surgeon: Ang Adan DO;  Location:  ENDOSCOPY    Colonoscopy N/A 8/28/2023    Procedure: COLONOSCOPY WITH COLD SNARE POLYPECTOMY;  Surgeon: Ang Adan DO;  Location:  ENDOSCOPY    [place] localization wire 1 site right (cpt=19281)  2008?    benign     Needle biopsy left  06/2012    benign    Needle biopsy left      2022 PAPILLOMA    Other surgical history      LUMPECTOMY IN RIGHT BREAST    Other surgical history      Myomectomy         EXAM:   LMP 01/01/2007     System Details   Skin Inspection - Normal.   Constitutional Overall appearance - Normal.   Head/Face  Symmetric, TMJ tenderness not present    Eyes EOMI, PERRL   Right ear:  Canal clear, TM intact, no DMO   Left ear:  Canal clear, TM intact, no DOM   Nose: Septum midline, inferior turbinates not enlarged, nasal valves without collapse    Oral cavity/Oropharynx: No lesions or masses on inspection or palpation, tonsils symmetric    Neck: Soft without LAD, thyroid not enlarged  Voice clear/ no stridor   Other:      SCOPES AND PROCEDURES:         AUDIOGRAM AND IMAGING:         IMPRESSION:   1. Hearing loss, unspecified hearing loss type, unspecified laterality  - Audiology Referral - [place] ([facility])    2. Eustachian tube disorder, unspecified laterality    3. Mixed conductive and sensorineural hearing loss of both ears    4. Throat pain       Recommendations:  -Audiogram with a slight air-bone gap in each ear but largely symmetric.  Has a slightly curved tympanogram on the right that was noted to be type B and a S on the left ear  -Upon close inspection of her ear did not appear to be fluid in either ear and we did not perform myringotomy although we discussed the utility of this  -Suspect this will slowly improve over the next several weeks we will have her use the Flonase and auto insufflation  -If not improved in the next month can return for repeat testing to compare her marks    The patient indicates understanding of these issues and agrees to the plan.  Longitudinal care will be provided    Jan Wolf MD  1/28/2025  3:19 PM

## 2025-01-29 ENCOUNTER — OFFICE VISIT (OUTPATIENT)
Age: 70
End: 2025-01-29
Attending: STUDENT IN AN ORGANIZED HEALTH CARE EDUCATION/TRAINING PROGRAM
Payer: MEDICARE

## 2025-01-29 VITALS
OXYGEN SATURATION: 96 % | DIASTOLIC BLOOD PRESSURE: 60 MMHG | RESPIRATION RATE: 16 BRPM | TEMPERATURE: 98 F | HEART RATE: 81 BPM | WEIGHT: 249.38 LBS | BODY MASS INDEX: 47 KG/M2 | SYSTOLIC BLOOD PRESSURE: 120 MMHG

## 2025-01-29 DIAGNOSIS — M81.6 LOCALIZED OSTEOPOROSIS WITHOUT CURRENT PATHOLOGICAL FRACTURE: Primary | ICD-10-CM

## 2025-01-29 RX ORDER — ZOLEDRONIC ACID 0.05 MG/ML
5 INJECTION, SOLUTION INTRAVENOUS ONCE
Status: COMPLETED | OUTPATIENT
Start: 2025-01-29 | End: 2025-01-29

## 2025-01-29 RX ORDER — ZOLEDRONIC ACID 0.05 MG/ML
5 INJECTION, SOLUTION INTRAVENOUS ONCE
Status: CANCELLED | OUTPATIENT
Start: 2025-01-29

## 2025-01-29 RX ADMIN — ZOLEDRONIC ACID 5 MG: 0.05 INJECTION, SOLUTION INTRAVENOUS at 13:42:00

## 2025-01-29 NOTE — PROGRESS NOTES
Pt here for reclast . Pt denies any issues or concerns.      Ordering Provider: Ludmila Harmon  Order Exp: after today     Pt tolerated infusion without difficulty or complaint. Reviewed next apt date/time: instructed pt she will need new order for next year      Education Record  Learner:  Patient  Disease / Diagnosis: osteopenia  Barriers / Limitations:  None  Method:  Brief focused  General Topics:  Plan of care reviewed  Outcome:  Shows understanding      Pt discharged in stable condition.

## 2025-02-13 ENCOUNTER — PATIENT MESSAGE (OUTPATIENT)
Dept: INTERNAL MEDICINE CLINIC | Facility: CLINIC | Age: 70
End: 2025-02-13

## 2025-02-17 RX ORDER — ALBUTEROL SULFATE 90 UG/1
2 INHALANT RESPIRATORY (INHALATION) EVERY 4 HOURS PRN
Qty: 8.5 G | Refills: 0 | Status: SHIPPED | OUTPATIENT
Start: 2025-02-17

## 2025-02-17 NOTE — TELEPHONE ENCOUNTER
Last OV relevant to medication: 12/12/24  Last refill date: 12/12/24 #1/refills: 0  When pt was asked to return for OV: 6/12/25  Upcoming appt/reason:   Future Appointments   Date Time Provider Department Center   4/16/2025 10:00 AM Yadira Avilez MD [facility]   5/5/2025  9:00 AM Ludmila Harmon DO [id number] [facility]   7/16/2025 10:00 AM Yadira Avilez MD [facility]   Was pt informed of any over due labs: utd

## 2025-02-18 RX ORDER — ALBUTEROL SULFATE 90 UG/1
2 INHALANT RESPIRATORY (INHALATION) EVERY 4 HOURS PRN
Qty: 1 EACH | Refills: 0 | OUTPATIENT
Start: 2025-02-18

## 2025-02-19 DIAGNOSIS — E03.9 ACQUIRED HYPOTHYROIDISM: ICD-10-CM

## 2025-02-20 RX ORDER — LEVOTHYROXINE SODIUM 112 UG/1
112 TABLET ORAL
Qty: 90 TABLET | Refills: 0 | Status: SHIPPED | OUTPATIENT
Start: 2025-02-20

## 2025-02-20 NOTE — TELEPHONE ENCOUNTER
Last office visit: 12/12/2024   Protocol: pass  Requested medication(s) are due for refill today: yes  Requested medication(s) are on the active medication list same strength, form, dose/ sig: yes  Requested medication(s) are managed by provider: yes  Patient has already received a courtsey refill: no    NOV: none   Last Labs: 12/21/2024  Asked to Return: 6/12/2025

## 2025-03-10 DIAGNOSIS — G47.33 OSA ON CPAP: ICD-10-CM

## 2025-03-11 RX ORDER — TIRZEPATIDE 5 MG/.5ML
5 INJECTION, SOLUTION SUBCUTANEOUS WEEKLY
Qty: 2 ML | Refills: 0 | Status: SHIPPED | OUTPATIENT
Start: 2025-03-11 | End: 2025-04-02

## 2025-03-11 NOTE — TELEPHONE ENCOUNTER
Requesting   Requested Prescriptions     Pending Prescriptions Disp Refills    Tirzepatide-Weight Management (ZEPBOUND) 5 MG/0.5ML Subcutaneous Solution Auto-injector 2 mL 0     Sig: Inject 5 mg into the skin once a week for 4 doses.      LOV: 10/02/24  RTC: 04/16/25  Filled: 01/08/25 #2 with 0 refills    Future Appointments   Date Time Provider Department Center   4/16/2025 10:00 AM Yadira Avilez MD [id number]   5/5/2025  9:00 AM Ludmila Harmon DO [id number] [facility]   7/16/2025 10:00 AM Yadira Avilez MD [facility]

## 2025-03-16 DIAGNOSIS — I10 PRIMARY HYPERTENSION: ICD-10-CM

## 2025-03-18 NOTE — TELEPHONE ENCOUNTER
Looks like at one point last year lasix dose was changed to 20mg daily. Should still have refills on file but message sent to pt to confirm dose.

## 2025-03-19 RX ORDER — FUROSEMIDE 20 MG/1
TABLET ORAL
Qty: 90 TABLET | Refills: 0 | Status: SHIPPED | OUTPATIENT
Start: 2025-03-19

## 2025-04-07 ENCOUNTER — PATIENT MESSAGE (OUTPATIENT)
Dept: INTERNAL MEDICINE CLINIC | Facility: CLINIC | Age: 70
End: 2025-04-07

## 2025-04-07 RX ORDER — TIRZEPATIDE 7.5 MG/.5ML
7.5 INJECTION, SOLUTION SUBCUTANEOUS WEEKLY
Qty: 2 ML | Refills: 0 | Status: SHIPPED | OUTPATIENT
Start: 2025-04-07 | End: 2025-04-29

## 2025-04-07 NOTE — TELEPHONE ENCOUNTER
Requesting   Requested Prescriptions     Pending Prescriptions Disp Refills    Tirzepatide-Weight Management (ZEPBOUND) 7.5 MG/0.5ML Subcutaneous Solution Auto-injector 2 mL 0     Sig: Inject 7.5 mg into the skin once a week for 4 doses.      LOV: 01/03/25  RTC: 04/16/25  Filled: 01/08/25 #2 with 0 refills    Future Appointments   Date Time Provider Department Center   4/16/2025 10:00 AM Yadira Avilez MD [facility] [address]   4/21/2025  2:20 PM [facility] RM3  [facility]   5/5/2025  9:00 AM Ludmila Harmon DO [id number] [facility] [place]   7/16/2025 10:00 AM Yadira Avilez MD [facility] [address]

## 2025-04-16 ENCOUNTER — OFFICE VISIT (OUTPATIENT)
Dept: INTERNAL MEDICINE CLINIC | Facility: CLINIC | Age: 70
End: 2025-04-16
Payer: MEDICARE

## 2025-04-16 VITALS — HEART RATE: 89 BPM | RESPIRATION RATE: 20 BRPM | HEIGHT: 61 IN | BODY MASS INDEX: 47 KG/M2

## 2025-04-16 DIAGNOSIS — Z51.81 THERAPEUTIC DRUG MONITORING: Primary | ICD-10-CM

## 2025-04-16 DIAGNOSIS — E78.00 PURE HYPERCHOLESTEROLEMIA: ICD-10-CM

## 2025-04-16 DIAGNOSIS — R73.03 PREDIABETES: ICD-10-CM

## 2025-04-16 DIAGNOSIS — E53.8 B12 DEFICIENCY: ICD-10-CM

## 2025-04-16 DIAGNOSIS — G47.33 OSA ON CPAP: ICD-10-CM

## 2025-04-16 DIAGNOSIS — E66.01 MORBID OBESITY (HCC): ICD-10-CM

## 2025-04-16 PROCEDURE — 99214 OFFICE O/P EST MOD 30 MIN: CPT | Performed by: INTERNAL MEDICINE

## 2025-04-16 RX ORDER — TIRZEPATIDE 7.5 MG/.5ML
7.5 INJECTION, SOLUTION SUBCUTANEOUS WEEKLY
Qty: 2 ML | Refills: 1 | Status: SHIPPED | OUTPATIENT
Start: 2025-04-16 | End: 2025-05-08

## 2025-04-16 NOTE — PROGRESS NOTES
The following individual(s) verbally consented to be recorded using ambient AI listening technology and understand that they can each withdraw their consent to this listening technology at any point by asking the clinician to turn off or pause the recording:    Patient name: Serena Weinstein Miguel  Additional names:  n/a

## 2025-04-16 NOTE — PROGRESS NOTES
HISTORY OF PRESENT ILLNESS  Chief Complaint   Patient presents with    Weight Check     Down 8        Serena Rivera is a 69 year old female here for follow up in medical weight loss program.   [facility] Follow Up    General Information  Success Moment: I feel better with weight off  Challenging Moment: We were eating out a lot but have changed to meals at   home and it made a big difference  Nutrition Recall  Breakfast: Around 8am a [place] protein bar.   Sometimes a premier protein   shake Lunch: Lunch meat 3 oz, 2 wasa crackers, one wedge laughing cow   cheese,radishes or celery   Dinner: Chicken, pork chop, hamburger 3 oz on average, sometimes more.   Also veggies. I try to stay away from pasta and potatoes. Snacks: Fruit or   sometimes a fun size candy bar.Small bag of pretzels   Fluids: Coffee, iced tea, lemonade. Mostly water Dining Out: 3   Exercise   Patient stated exercises # days/week: 4  Patient stated perceived level of   exertion: 3 Anaerobic Days: 2   Aerobic Days: 2   Patient stated average level of stress: 4  Sleep   Patient stated # hours uninterrupted sleep: 9   Patient stated feels   restful: Yes      Goals: Not to gain              History of Present Illness  Serena Rivera is a 69-year-old female who presents for a follow-up visit regarding weight management.    Since the onset of the COVID-19 pandemic, her weight loss journey has been challenging, but she has recently lost eight pounds. She attributes this success to dietary changes and increased physical activity, including making healthier choices when eating out, such as opting for grilled chicken and vegetables instead of more calorie-dense options.    She experiences occasional constipation, which she manages by consuming licorice at night to facilitate bowel movements in the morning. She also reports mood dips, particularly when at home and reading, but these episodes are brief and do not last all day. These mood changes do not coincide  with hunger, which is a change from her previous eating habits.    She is currently on a medication regimen that has helped her manage her weight and reduce 'food noise,' leading to a quieter mind and less obsession with food. She is on a 7.5 mg dose of her medication, which she is about to start. She weighs herself weekly, either at home or at [employer], and is aiming for a gradual weight loss.    She has been receiving B12 shots due to stomach issues that make oral supplements difficult to tolerate. She is due for a blood test to determine if she needs to continue with the injections. She uses a CPAP machine every night, which has improved her sleep quality.    Wt Readings from Last 6 Encounters:   01/29/25 249 lb 6.4 oz (113.1 kg)   01/15/25 249 lb 6.4 oz (113.1 kg)   01/14/25 252 lb (114.3 kg)   01/09/25 252 lb 9.6 oz (114.6 kg)   12/12/24 259 lb 1.6 oz (117.5 kg)   10/02/24 260 lb (117.9 kg)              Breakfast Lunch Dinner Snacks Fluids   Reviewed           REVIEW OF SYSTEMS  GENERAL HEALTH: feels well otherwise, denied any fevers chills or night sweats   RESPIRATORY: denies shortness of breath   CARDIOVASCULAR: denies chest pain  GI: denies abdominal pain    EXAM  Pulse 89   Resp 20   Ht 5' 1\" (1.549 m)   LMP 01/01/2007   BMI 47.12 kg/m²   GENERAL: well developed, well nourished,in no apparent distress, A/O x3  SKIN: no rashes,no suspicious lesions  HEENT: atraumatic, normocephalic, OP-clear, PERRL  NECK: supple,no adenopathy  LUNGS: clear to auscultation bilaterally   CARDIO: RRR without murmur  GI: good BS's,NT/ND, no masses or HSM  EXTREMITIES: no cyanosis, no clubbing, no edema    Lab Results   Component Value Date    WBC 5.6 12/21/2024    RBC 3.96 12/21/2024    HGB 12.3 12/21/2024    HCT 37.3 12/21/2024    MCV 94.2 12/21/2024    MCH 31.1 12/21/2024    MCHC 33.0 12/21/2024    RDW 12.9 12/21/2024    .0 12/21/2024     Lab Results   Component Value Date    GLU 91 01/23/2025    BUN 22  01/23/2025    BUNCREA 21.1 (H) 08/18/2020    CREATSERUM 1.10 (H) 01/23/2025    ANIONGAP 8 01/23/2025    GFR 71 05/10/2017    GFRNAA 47 (L) 05/05/2022    GFRAA 54 (L) 05/05/2022    CA 9.4 01/23/2025    OSMOCALC 291 01/23/2025    ALKPHO 99 12/21/2024    AST 22 12/21/2024    ALT 19 12/21/2024    BILT 0.6 12/21/2024    TP 6.8 12/21/2024    ALB 4.2 01/23/2025    GLOBULIN 2.4 12/21/2024     01/23/2025    K 3.9 01/23/2025     01/23/2025    CO2 30.0 01/23/2025     Lab Results   Component Value Date     07/02/2024    A1C 6.0 (H) 07/02/2024     Lab Results   Component Value Date    CHOLEST 190 12/21/2024    TRIG 100 12/21/2024    HDL 57 12/21/2024     (H) 12/21/2024    VLDL 17 12/21/2024    TCHDLRATIO 2.65 05/10/2017    NONHDLC 133 (H) 12/21/2024     Lab Results   Component Value Date    T4F 1.4 12/21/2024    TSH 3.406 12/21/2024     Lab Results   Component Value Date    B12 258 07/02/2024     Lab Results   Component Value Date    VITD 59.9 01/23/2025       Medications Ordered Prior to Encounter[1]    ASSESSMENT  Analyzed weight data:     [facility] Information  Patient type: Lifestyle   Initial Body Fat %: 52.4      Date of Initial Weight: 7/2/2024 Initial Weight: 267     Initial BMI: 49.3         Have any comorbidities improved since the last visit?   Hypertension DM 2 Dyslipidemia Osteoarthritis Sleep Apnea                    Diagnoses and all orders for this visit:    Therapeutic drug monitoring [Z51.81]    Morbid obesity (HCC)    BHUPINDER on CPAP [G47.33]    Prediabetes [R73.03]    Pure hypercholesterolemia [E78.00]    B12 deficiency    Other orders  -     Tirzepatide-Weight Management (ZEPBOUND) 7.5 MG/0.5ML Subcutaneous Solution Auto-injector; Inject 7.5 mg into the skin once a week for 4 doses.        PLAN  Assessment & Plan  Morbid obesity/ Prediabetes  Lost 8 pounds since last visit. On medication for appetite control and mood stabilization. Goal: gradual weight loss to 195 pounds. Cardiologist  supports slow weight loss.  - Continue medication regimen at 7.5 mg dose.  - Monitor weight weekly at home or [employer].  - Aim for gradual weight loss of 15-20 pounds with current dose.  - Consider adjusting goal weight to 175 pounds if reasonable for maintenance.  - Discuss potential for a doctor's letter to adjust [employer] goal weight for lifetime membership.    Severe sleep apnea  Uses CPAP nightly, improving sleep quality.  - Continue using CPAP machine nightly.    B12 deficiency  Completed B12 injections last year. Prefers injections due to queasiness with oral supplements. Blood test to assess current B12 levels.  - Order blood test to assess B12 levels.  - Continue B12 injections if levels are low or not sustained.  - Consider over-the-counter B complex if injections are not needed.    There are no Patient Instructions on file for this visit.    No follow-ups on file.    Patient verbalizes understanding.    Yadira Avilez MD           [1]   Current Outpatient Medications on File Prior to Visit   Medication Sig Dispense Refill    Tirzepatide-Weight Management (ZEPBOUND) 7.5 MG/0.5ML Subcutaneous Solution Auto-injector Inject 7.5 mg into the skin once a week for 4 doses. 2 mL 0    furosemide 20 MG Oral Tab TAKE 1 TABLET(20 MG) BY MOUTH DAILY 90 tablet 0    LEVOTHYROXINE 112 MCG Oral Tab TAKE 1 TABLET(112 MCG) BY MOUTH BEFORE BREAKFAST 90 tablet 0    ALBUTEROL 108 (90 Base) MCG/ACT Inhalation Aero Soln INHALE 2 PUFFS INTO THE LUNGS EVERY 4 HOURS AS NEEDED FOR WHEEZING 8.5 g 0    fluticasone propionate 50 MCG/ACT Nasal Suspension 2 sprays by Each Nare route daily. 46 g 0    escitalopram 5 MG Oral Tab TAKE ONE TABLET BY MOUTH EVERY DAY ALONG WITH THE 10MG TABLET FOR A TOTAL DOSE OF 15MG DAILY 90 tablet 1    escitalopram 10 MG Oral Tab TAKE ONE TABLET BY MOUTH EVERY DAY ALONG WITH 5MG TABLET FOR A TOTAL DOSE OF 15MG DAILY 90 tablet 1    Benazepril HCl 10 MG Oral Tab Take 1 tablet (10 mg total) by mouth  daily. 90 tablet 1    Omeprazole 40 MG Oral Capsule Delayed Release Take 1 capsule (40 mg total) by mouth daily. 90 capsule 1    flecainide 100 MG Oral Tab Take 1 tablet (100 mg total) by mouth 2 (two) times daily.      Potassium Gluconate 595 MG Oral Cap Take by mouth as needed.      Spacer/Aero-Holding Chambers (AEROCHAMBER MINI CHAMBER) Does not apply Device Use with inhaler as needed 1 each 0    ELIQUIS 5 MG Oral Tab Take 1 tablet (5 mg total) by mouth 2 (two) times daily.      Calcium Carbonate-Vitamin D 250-125 MG-UNIT Oral Tab Take 1 tablet by mouth daily.      Bacillus Coagulans-Inulin (ALIGN PREBIOTIC-PROBIOTIC) 5-1.25 MG-GM Oral Chew Tab Chew 2 tablets by mouth daily.       No current facility-administered medications on file prior to visit.

## 2025-04-21 ENCOUNTER — HOSPITAL ENCOUNTER (OUTPATIENT)
Dept: MAMMOGRAPHY | Facility: HOSPITAL | Age: 70
Discharge: HOME | End: 2025-04-21
Attending: NURSE PRACTITIONER
Payer: MEDICARE

## 2025-04-21 DIAGNOSIS — Z12.31 ENCOUNTER FOR SCREENING MAMMOGRAM FOR BREAST CANCER: ICD-10-CM

## 2025-04-21 PROCEDURE — 77063 BREAST TOMOSYNTHESIS BI: CPT | Performed by: NURSE PRACTITIONER

## 2025-04-21 PROCEDURE — 77067 SCR MAMMO BI INCL CAD: CPT | Performed by: NURSE PRACTITIONER

## 2025-04-25 ENCOUNTER — TELEPHONE (OUTPATIENT)
Dept: INTERNAL MEDICINE CLINIC | Facility: CLINIC | Age: 70
End: 2025-04-25

## 2025-04-25 NOTE — TELEPHONE ENCOUNTER
Form completed and signed by Jackelyn. Faxed back with notes, fax confirmed and sent for scanning.

## 2025-04-25 NOTE — TELEPHONE ENCOUNTER
Incoming (mail or fax):  fax  Received from:  [facility]  Documentation given to:  Triage in     Prescription for Medical [employer]ments

## 2025-04-28 ENCOUNTER — MED REC SCAN ONLY (OUTPATIENT)
Dept: INTERNAL MEDICINE CLINIC | Facility: CLINIC | Age: 70
End: 2025-04-28

## 2025-05-05 ENCOUNTER — OFFICE VISIT (OUTPATIENT)
Facility: CLINIC | Age: 70
End: 2025-05-05
Payer: MEDICARE

## 2025-05-05 VITALS
BODY MASS INDEX: 47.2 KG/M2 | OXYGEN SATURATION: 96 % | WEIGHT: 250 LBS | SYSTOLIC BLOOD PRESSURE: 120 MMHG | DIASTOLIC BLOOD PRESSURE: 58 MMHG | HEART RATE: 71 BPM | HEIGHT: 61 IN

## 2025-05-05 DIAGNOSIS — M81.6 LOCALIZED OSTEOPOROSIS WITHOUT CURRENT PATHOLOGICAL FRACTURE: Primary | ICD-10-CM

## 2025-05-05 PROCEDURE — 99214 OFFICE O/P EST MOD 30 MIN: CPT | Performed by: STUDENT IN AN ORGANIZED HEALTH CARE EDUCATION/TRAINING PROGRAM

## 2025-05-05 NOTE — PATIENT INSTRUCTIONS
Visit Summary  You will be due for 1/30/2026 onwards. Please complete your labs within 7-10 days of your infusion date.  You will be due for your bone density 3/20/2026 onwards  I will follow up with you the first week of April     General follow up information:  Please let us know if you require any refills at least 1 week prior to your medication running out. If you do run out of medication, please call our office ASAP to request refills (do not wait until your follow up).  Please call us if you experience any problems with insurance coverage of medication, lab work, or imaging.   Lab results and imaging will typically be reviewed at follow up appointments, or within 3-5 business days of ALL results being in if you do not have an appointment scheduled in the near future. Our office will contact you for any abnormal results requiring more urgent follow up or action.  The on-call pager is for urgent matters only. If you are a type 1 diabetic and run out of insulin after business hours 8AM-4PM, you may call the on-call pager for a refill to a 24 hour pharmacy. If you have adrenal insufficiency and run out of steroids, you may call the on-call pager for a refill to a 24 hours pharmacy. All other refill requests should be requested during business hours.    Return Visit   [x]  Dr. Harmon in 11 months   [] Virtual visit  [] No follow up appointment needed  [] Follow up to be scheduled pending      []  Fasting/8AM labs  []  Central scheduling # for ultrasound/nuclear med/CT/MRI/DXA/IR  []  Provide flyer for:  [] ENT   [] Weight Management  [] Infertility/Reproductive Endocrinology  [] Transgender care  []  Directions to 1st floor lab  [] Collect urine collection jug  [] Collect salivary cortisol tubes  []  Dental clearance form  []  Will need authorization for outside records

## 2025-05-05 NOTE — PROGRESS NOTES
ENDOCRINOLOGY, DIABETES & METABOLISM CONSULT NOTE   Date: 5/5/2025  Name: Serena Rivera   Referring Physician: No referring provider defined for this encounter.    Subjective:    HISTORY OF PRESENT ILLNESS:   Serena Rivera is a 69 year old female seen in consultation for her osteoporosis    Patient presents to the clinic for an initial bone health evaluation due to a history of DEXA confirmed 3/2024 in femoral neck.     OSTEOPOROSIS RISK FACTORS ASSESSMENT  Postmenopausal Yes, around age 49   Maternal hx of osteoporosis or hx of parental hip fx:  Yes, mother with hip fracture x2   Frequent falls No; previously was falling more frequently. Has a hx of lymphedema and noted weakness. This  has improved and is in water aerobics   Poor vision No5/2024 last eye visit   Decrease in height Yes,   2-2.5 inches   History of Vit D insufficiency:   Current Vitamin D supplementation: Yes, 12/2023  On vitamin D3, unsure of dose    Poor intake of calcium  Daily calcium intake: No yogurt daily. Cottage cheese 3-4x/week. Some milk in her coffee daily. Salad 2-3x/week. Cashews and almonds once a week   Is taking calcium 500 mg daily    Caffeine intake Yes, 1/2 regular 1/2 decaf coffee daily, pop twice a month   Hx of thyroid disease Yes, hx of hypothyroidism since 2010, on LT4 112 mcg daily      Intake of medications that cause bone loss:  Long term steroid use: No  Aromatase inhibitor: No  Seizure medications: No  GnRH agonist: No  PPI: Yes, omeprazole since around 2004   Lithium: No  SSRI: Yes, escitaloprom for 10 years  Actos/Avandia: No    Treatment Contraindications:  Dysphagia: denies  Pain on swallowing: denies  Plans for dental procedures: Early 2024 for a cleaning     9/2024 9/2024 TSH 6.0, free T41.7, total calcium 9.8, creatinine 1.06, GFR 57, albumin 4.3, PTH 56  Denies any falls/fractures since LOV  Continues on vitamin D and calcium supplement  Has started compounded semaglutide + B12 for the last 3 weeks and  takes her LT4 with prilosec in the AM  Denies any other changes to her medication regimen     25  Labs: 2024 creatinine 1.13, EGFR 52.  Creatinine clearance of 83   Notes hearing loss after recent COVID infection   Continues on calcium and vitamin D supplements  No falls or fractures since last office visit    25  Labs: 2025 vitamin D 59, creatinine 1.1, GFR 54  Imagin2025 x-ray without fracture or dislocation   Since last office visit, completed Reclast 2025.  Notes she overall tolerated it well  No falls or fractures since LOV   Denies kidney stones  Continues on calcium and vitamin D supplements  Has an active lifestyle and also exercises      Allergies, PMH, SocHx and FHx reviewed and updated as appropriate in Epic on 2025  Allergies   Allergen Reactions    Dye [Iodine (Topical)] OTHER (SEE COMMENTS)     Blisters    Latex SWELLING    Shellfish SWELLING    Augmentin [Amoxicillin-Pot Clavulanate] DIARRHEA and NAUSEA AND VOMITING           Pcn [Bicillin C-R,] NAUSEA AND VOMITING    Sulfa Antibiotics OTHER (SEE COMMENTS)     Flushed      Iodine (Topical) OTHER (SEE COMMENTS)     iodine    Penicillin G Proc & Benzathine OTHER (SEE COMMENTS)     unknown    Sulfamethoxazole OTHER (SEE COMMENTS)     flushed    Adhesive Tape RASH     Can tolerate paper tape    Verapamil OTHER (SEE COMMENTS) and RASH     verapamil     Current Outpatient Medications   Medication Sig Dispense Refill    Tirzepatide-Weight Management (ZEPBOUND) 7.5 MG/0.5ML Subcutaneous Solution Auto-injector Inject 7.5 mg into the skin once a week for 4 doses. 2 mL 1    furosemide 20 MG Oral Tab TAKE 1 TABLET(20 MG) BY MOUTH DAILY 90 tablet 0    LEVOTHYROXINE 112 MCG Oral Tab TAKE 1 TABLET(112 MCG) BY MOUTH BEFORE BREAKFAST 90 tablet 0    ALBUTEROL 108 (90 Base) MCG/ACT Inhalation Aero Soln INHALE 2 PUFFS INTO THE LUNGS EVERY 4 HOURS AS NEEDED FOR WHEEZING 8.5 g 0    fluticasone propionate 50 MCG/ACT Nasal Suspension 2  sprays by Each Nare route daily. 46 g 0    escitalopram 5 MG Oral Tab TAKE ONE TABLET BY MOUTH EVERY DAY ALONG WITH THE 10MG TABLET FOR A TOTAL DOSE OF 15MG DAILY 90 tablet 1    escitalopram 10 MG Oral Tab TAKE ONE TABLET BY MOUTH EVERY DAY ALONG WITH 5MG TABLET FOR A TOTAL DOSE OF 15MG DAILY 90 tablet 1    Benazepril HCl 10 MG Oral Tab Take 1 tablet (10 mg total) by mouth daily. 90 tablet 1    Omeprazole 40 MG Oral Capsule Delayed Release Take 1 capsule (40 mg total) by mouth daily. 90 capsule 1    flecainide 100 MG Oral Tab Take 1 tablet (100 mg total) by mouth 2 (two) times daily.      Potassium Gluconate 595 MG Oral Cap Take by mouth as needed.      Spacer/Aero-Holding Chambers (AEROCHAMBER MINI CHAMBER) Does not apply Device Use with inhaler as needed 1 each 0    ELIQUIS 5 MG Oral Tab Take 1 tablet (5 mg total) by mouth 2 (two) times daily.      Calcium Carbonate-Vitamin D 250-125 MG-UNIT Oral Tab Take 1 tablet by mouth daily.      Bacillus Coagulans-Inulin (ALIGN PREBIOTIC-PROBIOTIC) 5-1.25 MG-GM Oral Chew Tab Chew 2 tablets by mouth daily.       Past Medical History:    Abnormal mammogram    Acute nasopharyngitis    Adjustment reaction    adjustment reaction    Allergic rhinitis    Anemia    Ankle strain    Anxiety    hx severe anxiety    Arrhythmia    A-fib DX YRS AGO    Arthritis    Asthma (HCC)    Atherosclerosis    mild, at the carotid bifurcations, L>R    Breast mass    left breast, s/p bx-benign; hx R breast mass    Breast mass, left    Bronchitis    and acute bronchitis    Bronchospasm    Carotid disease, bilateral    Cellulitis    IN LEGS    Cephalgia    viral    Chest pain    Chickenpox    Coughing    coughing paroxysm    Dermatitis    stress    Disorder of thyroid    Easy bruising    Eczema    Edema    Epigastric fullness    Esophageal reflux    GERD    Essential hypertension    ETD (eustachian tube dysfunction)    Exertional chest pain    Extrinsic asthma, unspecified    Fibroid tumor    in uterine  wall    Heart palpitations    Heartburn    Hemorrhoids    High blood pressure    HTN    History of anemia    History of bone density study    HTN (hypertension)    Hyperlipidemia    Hypothyroid    Hypothyroidism    Itch of skin    Laryngitis    Leg pain    Leg swelling    Lipid screening    Lymphedema of both lower extremities    Measles    Migraines    Mumps    OA (osteoarthritis)    knee    Obesity    Occult blood positive stool    Osteopenia    Other allergy, other than to medicinal agents    Alleriges    Otitis media    PAF (paroxysmal atrial fibrillation) (HCC)    PMS (premenstrual syndrome)    Pneumonia    PONV (postoperative nausea and vomiting)    Pre-diabetes    Rash    Rhinitis    Sinobronchitis    Sinusitis    and acute sinusitis    Stasis dermatitis    3/14/2000: severe    Stasis ulcer (HCC)    Thumb laceration    Unspecified sleep apnea    cpap    URI (upper respiratory infection)    Vasculitis    Venous insufficiency    severe    Venous stasis dermatitis    severe    Visual impairment    glasses    Vomiting and diarrhea    Wears glasses     Past Surgical History:   Procedure Laterality Date    Breast surgery procedure unlisted      10/2000: breast bx (-); 2002: R excisional bx; 6/4/2012: Ultrasound-guided 8 gauge core bxs, L breast, benign fatty breast parenchyma    Colonoscopy N/A 01/09/2020    Procedure: COLONOSCOPY;  Surgeon: Ang Adan DO;  Location:  ENDOSCOPY    Colonoscopy N/A 8/28/2023    Procedure: COLONOSCOPY WITH COLD SNARE POLYPECTOMY;  Surgeon: Ang Adan DO;  Location:  ENDOSCOPY    [place] localization wire 1 site right (cpt=[id number])  2008?    benign     Needle biopsy left  06/2012    benign    Needle biopsy left      2022 PAPILLOMA    Other surgical history      LUMPECTOMY IN RIGHT BREAST    Other surgical history      Myomectomy     Social History     Socioeconomic History    Marital status:    Tobacco Use    Smoking status: Never     Passive exposure: Never     Smokeless tobacco: Never   Vaping Use    Vaping status: Never Used   Substance and Sexual Activity    Alcohol use: Yes     Comment: Social drinking    Drug use: Never   Other Topics Concern    Caffeine Concern No    Stress Concern Yes    Weight Concern Yes    Special Diet Yes    Exercise No    Seat Belt Yes     Family History   Problem Relation Age of Onset    High Blood Pressure Father     Other (Other) Father         DEMENTIA    Hypertension Mother     High Blood Pressure Mother     Heart Disease Mother     Other (Other) Mother         MACULAR DEGENERATION    Other (ASVD) Mother     Other (Osteoporosis) Mother     Dementia Mother     Other (Allergies/Asthma) Other         fam hx    Cancer Other         fam hx    Other (Skin disorders) Other         fam hx       REVIEW OF SYSTEMS: 10 point ROS completed, refer to HPI for pertinent positives    Objective:   PHYSICAL EXAMINATION:  Vital Signs:   Vitals:    05/05/25 0855   BP: 120/58   Pulse: 71   SpO2: 96%   Weight: 250 lb (113.4 kg)   Height: 5' 1\" (1.549 m)       General Appearance:  Alert, in no acute distress, well developed  Eyes:  normal conjunctivae, sclera  Ears/Nose/Mouth/Throat/Neck: Decreased hearing, normal speech and no thyromegaly  Respiratory:  breathing comfortably on room air, no accessory muscle usage  Cardiovascular:   regular rate and rhythm, no peripheral edema   Psychiatric:  Oriented to person, place and time, appropriate mood & affect  Skin: Normal moisture and skin texture  Neuro: sensory grossly intact, motor grossly intact.      Recent Labs: Personally reviewed in EPIC under lab tab on 5/5/2025      Lab Results   Component Value Date    PTH 56.0 09/09/2024    CA 9.4 01/23/2025    CA 9.5 12/21/2024    CA 10.3 11/07/2024    CA 9.8 09/09/2024    CA 8.8 06/03/2024    CA 9.2 12/04/2023    CREATSERUM 1.10 (H) 01/23/2025    CREATSERUM 1.13 (H) 12/21/2024    CREATSERUM 1.12 (H) 11/07/2024    PHOS 3.2 01/23/2025    PHOS 3.5 11/07/2024    PHOS 3.6  09/09/2024    MG 2.0 03/10/2018    MG 2.1 02/23/2014    MG 1.5 (L) 02/22/2014    VITD 59.9 01/23/2025    VITD 58.7 11/07/2024         Radiology:  Independently visualized on 5/5/2025  I reviewed the patient's records from outside facility which revealed: osteopenia [id number], osteoporosis 2024    DXA Scans:  Date L2-L4 BMD T-score % change Mean Femoral Neck BMD T-score % change   3/2024 HOB 0.845 -1.8 0.563 -2.6   1/2022 HOB  0.849 -1.8 0.591 -2.3   11/2019 HOB 0.849 -1.8 0.631 -2.0   10/2014 BK 0.853 -1.8 0.690 -1.4             ASSESSMENT/PLAN:  Serena Rivera is a 69 year old female seen in consultation for:    #Localized osteoporosis without current pathological fracture  Discussed pathophysiology of bone loss and clinical significance of DEXA scans with the patient.  The patient has confirmed osteoporosis with low T-scores in the mean femoral necks.  Explained the patient's risk factors for osteoporosis.  2021 had endoscopy without ulcers, does have a hx of reflux on PPI  The patient is at high risk for future osteoporotic fractures if her osteoporosis remains untreated.  Recommended workup for secondary causes of osteoporosis, including PTH, Alk Phos levels, and vitamin D levels.  Discussed the importance of adopting lifestyle measures, such as adequate calcium and vitamin D intake, exercise, smoking cessation, counseling on fall prevention, and avoidance of heavy alcohol use, to reduce bone loss in postmenopausal women.  September 2024 secondary workup within range;total calcium 9.8, creatinine 1.06, GFR 57, albumin 4.3, PTH 56  Discussed available treatment options for osteoporosis, including bisphosphonates (oral vs. IV), anabolics, Evenity, and Prolia injections, as well as their indications, risks, and benefits, including black box warnings. Will discuss in greater detail at upcoming appointment.   Discussed concerns about an increased risk of vertebral fracture after discontinuation of denosumab, the  need for indefinite administration of denosumab    Would not recommend oral bisphosphonate due to history of reflux  We discussed Prolia versus Reclast; 12/2024 creatinine clearance of 83 based on 114.3 kg (not pounds) therefore okay to continue with Reclast.  Received Reclast No. 1 1/29/2025  Plan  Suggested 1200 mg of elemental calcium daily (total diet plus supplement) and 1000 IU of vitamin D daily as general recommendations.    Recommended DXA every two years for patients starting on therapy, with additional evaluation for contributing factors if a clinically significant BMD decrease or new fracture occurs.  Due for her next bone density March 2026  Patient to complete labs 7 to 10 days prior to Reclast No. 2 (due January 2026)     #Hypothyroidism  Patient denies any symptoms of hypothyroidism   Recent TFTs show a slightly elevated TSH of 6 with a free T4 in the upper range of normal at 1.7   Patient notes since last set of thyroid test she has started compounded semaglutide   Since patient has been on current thyroid dose for many years we discussed spacing out Prilosec from levothyroxine   She is to continue thryoid Meds: levothyroxine Tabs - 112 MCG  Continue to follow with PCP    Return in about 48 weeks (around 4/6/2026).    The above plan was discussed in detail with the patient who verbalized understanding and agreement.      Ludmila Harmon DO  [facility] Endocrinology  5/5/2025     In reviewing this note, please be advised that Dragon Voice Recognition software used to dictate the note may have made errors in recognizing some of the words or phrases.     Note to patient: The 21 Century Cures Act makes medical notes like these available to patients in the interest of transparency. However, be advised this is a medical document. It is intended as peer to peer communication. It is written in medical language and may contain abbreviations or verbiage that are unfamiliar. It may appear blunt or direct. Medical  documents are intended to carry relevant information, facts as evident, and the clinical opinion of the practitioner.

## 2025-05-20 DIAGNOSIS — E03.9 ACQUIRED HYPOTHYROIDISM: ICD-10-CM

## 2025-05-21 LAB
AMB EXT BILIRUBIN, TOTAL: 0.5 MG/DL
AMB EXT BUN: 22 MG/DL
AMB EXT CALCIUM: 8.8
AMB EXT CARBON DIOXIDE: 28
AMB EXT CHLORIDE: 103
AMB EXT CHOL/HDL RATIO: 3.1
AMB EXT CHOLESTEROL, TOTAL: 179 MG/DL
AMB EXT CMP ALT: 10 U/L
AMB EXT CMP AST: 12 U/L
AMB EXT CREATININE: 0.9 MG/DL
AMB EXT EGFR NON-AA: 61.9
AMB EXT GLUCOSE: 87 MG/DL
AMB EXT HDL CHOLESTEROL: 58 MG/DL
AMB EXT LDL CHOLESTEROL, DIRECT: 114 MG/DL
AMB EXT POSTASSIUM: 4.1 MMOL/L
AMB EXT SODIUM: 138 MMOL/L
AMB EXT TOTAL PROTEIN: 6.5
AMB EXT TRIGLYCERIDES: 98 MG/DL
AMB EXT VLDL: 20 MG/DL

## 2025-05-22 RX ORDER — LEVOTHYROXINE SODIUM 112 UG/1
112 TABLET ORAL
Qty: 90 TABLET | Refills: 0 | Status: SHIPPED | OUTPATIENT
Start: 2025-05-22

## 2025-05-22 NOTE — TELEPHONE ENCOUNTER
Thyroid Medication Protocol [id number]/20/2025 05:20 PM   Protocol Details TSH in past 12 months    Last TSH value is normal    In person appointment or virtual visit in the past 12 mos or appointment in next 3 mos    Medication is active on med list      10. Acquired hypothyroidism  - Assay, Thyroid Stim Hormone; Future  - levothyroxine 112 MCG Oral Tab; Take 1 tablet (112 mcg total) by mouth before breakfast.  Dispense: 90 tablet; Refill: 1  -stable. Continue current management  Future Appointments   Date Time Provider Department Center   7/16/2025 10:00 AM Yadira Avilez MD [facility]   10/29/2025 11:40 AM Yadira Avilez MD [facility]   1/21/2026 10:20 AM Yadira Avilez MD [facility]   4/13/2026  9:00 AM Ludmila Harmon DO [id number]

## 2025-05-29 ENCOUNTER — TELEPHONE (OUTPATIENT)
Dept: INTERNAL MEDICINE CLINIC | Facility: CLINIC | Age: 70
End: 2025-05-29

## 2025-05-29 NOTE — TELEPHONE ENCOUNTER
Incoming (mail or fax):  fax  Received from:  PHOEBE Sosa  Documentation given to:  Triage results    Lab results

## 2025-06-11 ENCOUNTER — MED REC SCAN ONLY (OUTPATIENT)
Dept: INTERNAL MEDICINE CLINIC | Facility: CLINIC | Age: 70
End: 2025-06-11

## 2025-06-16 ENCOUNTER — NURSE ONLY (OUTPATIENT)
Dept: INTERNAL MEDICINE CLINIC | Facility: CLINIC | Age: 70
End: 2025-06-16
Payer: MEDICARE

## 2025-06-16 DIAGNOSIS — E53.8 B12 DEFICIENCY: Primary | ICD-10-CM

## 2025-06-16 RX ORDER — CYANOCOBALAMIN 1000 UG/ML
1000 INJECTION, SOLUTION INTRAMUSCULAR; SUBCUTANEOUS ONCE
Status: COMPLETED | OUTPATIENT
Start: 2025-06-16 | End: 2025-06-16

## 2025-06-16 RX ADMIN — CYANOCOBALAMIN 1000 MCG: 1000 INJECTION, SOLUTION INTRAMUSCULAR; SUBCUTANEOUS at 13:03:00

## 2025-06-17 DIAGNOSIS — I10 PRIMARY HYPERTENSION: ICD-10-CM

## 2025-06-18 ENCOUNTER — OFFICE VISIT (OUTPATIENT)
Dept: INTERNAL MEDICINE CLINIC | Facility: CLINIC | Age: 70
End: 2025-06-18
Payer: MEDICARE

## 2025-06-18 VITALS
SYSTOLIC BLOOD PRESSURE: 118 MMHG | TEMPERATURE: 98 F | DIASTOLIC BLOOD PRESSURE: 74 MMHG | BODY MASS INDEX: 46.1 KG/M2 | HEIGHT: 61 IN | WEIGHT: 244.19 LBS | HEART RATE: 72 BPM | OXYGEN SATURATION: 98 % | RESPIRATION RATE: 16 BRPM

## 2025-06-18 DIAGNOSIS — I89.0 LYMPHEDEMA: Primary | ICD-10-CM

## 2025-06-18 DIAGNOSIS — H69.93 DISORDER OF BOTH EUSTACHIAN TUBES: ICD-10-CM

## 2025-06-18 PROCEDURE — 99214 OFFICE O/P EST MOD 30 MIN: CPT | Performed by: NURSE PRACTITIONER

## 2025-06-18 PROCEDURE — G2211 COMPLEX E/M VISIT ADD ON: HCPCS | Performed by: NURSE PRACTITIONER

## 2025-06-18 RX ORDER — TIRZEPATIDE 7.5 MG/.5ML
7.5 INJECTION, SOLUTION SUBCUTANEOUS WEEKLY
COMMUNITY
Start: 2025-06-06

## 2025-06-18 RX ORDER — FUROSEMIDE 20 MG/1
20 TABLET ORAL DAILY
Qty: 90 TABLET | Refills: 0 | Status: SHIPPED | OUTPATIENT
Start: 2025-06-18

## 2025-06-18 NOTE — PROGRESS NOTES
The following individual(s) verbally consented to be recorded using ambient AI listening technology and understand that they can each withdraw their consent to this listening technology at any point by asking the clinician to turn off or pause the recording:    Patient name: Serena Rivera is a 69 year old female.  CHIEF COMPLAINT   Lymphedema, ear issue    HPI:     History of Present Illness  Serena Rivera is a 69 year old female who presents for follow-up related to her lymphedema management.    She is experiencing issues with her lymphedema management. Her lymphedema is currently well-managed with the tools provided, but her compression stockings are becoming stretched out, partly due to weight loss. She has lost weight, going from 252 pounds in January to 244 pounds currently, and attributes this to lifestyle changes including dietary adjustments and using Weight Watchers.     She is undergoing treatment for her stage 2 lymphedema and requires compression garments to maintain reduction they have achieved through in clinic care. Patient requires 3 units for the right extremity and 3 units for the left extremity of [facility]   gradient compression stockings, below the knee, 30 to 40 mmhg, custom, each for daytime use due to the severity of her lymphedema.  Due to the patient's lymphedema causing a unique shape to their affected limbs, a custom fit daytime garment is required as they are shaped to fit the exact dimensions of the affected extremity.  In addition, the patient requires the following accessories: - quantity 12 custom silicone band necessary for the effective use of .  The custom band is required for these custom compression garments due to the irregular shape of the patient's limb.  The custom silicone band will ensure the garment stays in place throughout the day and does not slide down and cause a tourniquet on the patient's limb. : Quantity  12-knee comfort zone necessary for the effective use of   knee comfort zone is required for these custom compression garments due to their irregular shape and lymphatic fluid in the patient's limb and will promote easy donning and wearing comfort.  Patient has lymphedema in the knee area and this option will help the compression garment address the area of concern.  -jhotxxum 12: Custom close toe necessary for the effective use of .  Custom close toe is required for these custom compression garments due to the severity of the patient's lymphedema and will promote optimal fit and comfort.  I am recommending the patient receives these compression garments from [facility].  The requesting compression garments are essentially the patient's long-term care and part of the care plan for discharge from treatment.  An alternative would not be expected in self-management because they will not sit the patient correctly.        She reports ongoing sinus issues, which were previously addressed. She has seen an ENT who suggested a procedure to drain her ear, but she is hesitant due to the risk of infection. Her symptoms include a sensation of fullness in the ears, occasional pain, and drainage, which can be influenced by atmospheric pressure. She has had a hearing test which showed no impairment, although she feels her hearing is affected at times.             Current Medications[1]   Past Medical History[2]   Social History:  Short Social Hx on File[3]     REVIEW OF SYSTEMS:   See HPI     EXAM:     /74 (BP Location: Left arm, Patient Position: Sitting, Cuff Size: large)   Pulse 72   Temp 97.9 °F (36.6 °C) (Temporal)   Resp 16   Ht 5' 1\" (1.549 m)   Wt 244 lb 3.2 oz (110.8 kg)   LMP 01/01/2007   SpO2 98%   BMI 46.14 kg/m²   Body mass index is 46.14 kg/m².   GENERAL: well developed, well nourished,in no apparent distress  HEENT: atraumatic, normocephalic,ears clear  EYES: conjunctiva  are clear  LUNGS: clear to auscultation; no rhonchi, rales, or wheezing  CARDIO: RRR without murmur  GI:   no masses, organomegaly or tenderness  MUSCULOSKELETAL: No obvious joint deformity or swelling.  Normal gait.  EXTREMITIES: BLE edema R>L due to lymphedema, no calve tenderness   NEURO: Oriented times three     LABS:      Lab Results   Component Value Date    WBC 5.6 12/21/2024    RBC 3.96 12/21/2024    HGB 12.3 12/21/2024    HCT 37.3 12/21/2024    MCV 94.2 12/21/2024    MCH 31.1 12/21/2024    MCHC 33.0 12/21/2024    RDW 12.9 12/21/2024    .0 12/21/2024      Lab Results   Component Value Date    GLU 87 05/21/2025    BUN 22 05/21/2025    BUNCREA 21.1 (H) 08/18/2020    CREATSERUM 0.90 05/21/2025    ANIONGAP 8 01/23/2025    GFR 71 05/10/2017    GFRNAA 61.9 05/21/2025    GFRAA 54 (L) 05/05/2022    CA 8.8 05/21/2025    OSMOCALC 291 01/23/2025    ALKPHO 99 12/21/2024    AST 12 05/21/2025    ALT 10 05/21/2025    BILT 0.5 05/21/2025    TP 6.5 05/21/2025    ALB 4.2 01/23/2025    GLOBULIN 2.4 12/21/2024     01/23/2025    K 4.1 05/21/2025     05/21/2025    CO2 28 05/21/2025      Lab Results   Component Value Date    CHOLEST 179 05/21/2025    TRIG 98 05/21/2025    HDL 58 05/21/2025     (H) 12/21/2024    VLDL 20 05/21/2025    TCHDLRATIO 3.10 05/21/2025    NONHDLC 133 (H) 12/21/2024      Lab Results   Component Value Date    T4F 1.4 12/21/2024    TSH 3.406 12/21/2024      Lab Results   Component Value Date     07/02/2024    A1C 6.0 (H) 07/02/2024        ASSESSMENT AND PLAN:   1. Lymphedema  - Chronic lymphedema stage 2. managed with compression tools, which effectively reduce swelling.   - She is undergoing treatment for her lymphedema and requires compression garments to maintain reduction they have achieved through in clinic care. Patient requires 3 units for the right extremity and 3 units for the left extremity of [facility]   gradient compression stockings, below the knee, 30 to 40 mmhg,  custom, each for daytime use due to the severity of her lymphedema.  Due to the patient's lymphedema causing a unique shape to their affected limbs, a custom fit daytime garment is required as they are shaped to fit the exact dimensions of the affected extremity.  In addition, the patient requires the following accessories: - quantity 12 custom silicone band necessary for the effective use of .  The custom band is required for these custom compression garments due to the irregular shape of the patient's limb.  The custom silicone band will ensure the garment stays in place throughout the day and does not slide down and cause a tourniquet on the patient's limb. : Quantity 12-knee comfort zone necessary for the effective use of   knee comfort zone is required for these custom compression garments due to their irregular shape and lymphatic fluid in the patient's limb and will promote easy donning and wearing comfort.  Patient has lymphedema in the knee area and this option will help the compression garment address the area of concern.  -vasamucj 12: Custom close toe necessary for the effective use of .  Custom close toe is required for these custom compression garments due to the severity of the patient's lymphedema and will promote optimal fit and comfort.  I am recommending the patient receives these compression garments from [facility].  The requesting compression garments are essentially the patient's long-term care and part of the care plan for discharge from treatment.  An alternative would not be expected in self-management because they will not sit the patient correctly.    - Continue use of effective compression tools.    2. Disorder of both eustachian tubes  - see ENT for evaluation  - ENT - INTERNAL  - ENT - INTERNAL     The patient indicates understanding of these issues and agrees to the plan.  The patient is asked to return in 1 month for med check or  sooner as needed         [1]   Current Outpatient Medications   Medication Sig Dispense Refill    ZEPBOUND 7.5 MG/0.5ML Subcutaneous Solution Auto-injector Inject 7.5 mg into the skin once a week.      LEVOTHYROXINE 112 MCG Oral Tab TAKE 1 TABLET(112 MCG) BY MOUTH BEFORE BREAKFAST 90 tablet 0    furosemide 20 MG Oral Tab TAKE 1 TABLET(20 MG) BY MOUTH DAILY 90 tablet 0    ALBUTEROL 108 (90 Base) MCG/ACT Inhalation Aero Soln INHALE 2 PUFFS INTO THE LUNGS EVERY 4 HOURS AS NEEDED FOR WHEEZING 8.5 g 0    fluticasone propionate 50 MCG/ACT Nasal Suspension 2 sprays by Each Nare route daily. 46 g 0    escitalopram 5 MG Oral Tab TAKE ONE TABLET BY MOUTH EVERY DAY ALONG WITH THE 10MG TABLET FOR A TOTAL DOSE OF 15MG DAILY 90 tablet 1    escitalopram 10 MG Oral Tab TAKE ONE TABLET BY MOUTH EVERY DAY ALONG WITH 5MG TABLET FOR A TOTAL DOSE OF 15MG DAILY 90 tablet 1    Benazepril HCl 10 MG Oral Tab Take 1 tablet (10 mg total) by mouth daily. 90 tablet 1    Omeprazole 40 MG Oral Capsule Delayed Release Take 1 capsule (40 mg total) by mouth daily. 90 capsule 1    flecainide 100 MG Oral Tab Take 1 tablet (100 mg total) by mouth 2 (two) times daily.      Potassium Gluconate 595 MG Oral Cap Take by mouth as needed.      Spacer/Aero-Holding Chambers (AEROCHAMBER MINI CHAMBER) Does not apply Device Use with inhaler as needed 1 each 0    ELIQUIS 5 MG Oral Tab Take 1 tablet (5 mg total) by mouth 2 (two) times daily.      Calcium Carbonate-Vitamin D 250-125 MG-UNIT Oral Tab Take 1 tablet by mouth daily.      Bacillus Coagulans-Inulin (ALIGN PREBIOTIC-PROBIOTIC) 5-1.25 MG-GM Oral Chew Tab Chew 2 tablets by mouth daily.     [2]   Past Medical History:   Abnormal mammogram    Acute nasopharyngitis    Adjustment reaction    adjustment reaction    Allergic rhinitis    Anemia    Ankle strain    Anxiety    hx severe anxiety    Arrhythmia    A-fib DX YRS AGO    Arthritis    Asthma (HCC)    Atherosclerosis    mild, at the carotid bifurcations, L>R     Breast mass    left breast, s/p bx-benign; hx R breast mass    Breast mass, left    Bronchitis    and acute bronchitis    Bronchospasm    Carotid disease, bilateral    Cellulitis    IN LEGS    Cephalgia    viral    Chest pain    Chickenpox    Coughing    coughing paroxysm    Dermatitis    stress    Disorder of thyroid    Easy bruising    Eczema    Edema    Epigastric fullness    Esophageal reflux    GERD    Essential hypertension    ETD (eustachian tube dysfunction)    Exertional chest pain    Extrinsic asthma, unspecified    Fibroid tumor    in uterine wall    Heart palpitations    Heartburn    Hemorrhoids    High blood pressure    HTN    History of anemia    History of bone density study    HTN (hypertension)    Hyperlipidemia    Hypothyroid    Hypothyroidism    Itch of skin    Laryngitis    Leg pain    Leg swelling    Lipid screening    Lymphedema of both lower extremities    Measles    Migraines    Mumps    OA (osteoarthritis)    knee    Obesity    Occult blood positive stool    Osteoarthritis    Osteopenia    Other allergy, other than to medicinal agents    Alleriges    Otitis media    PAF (paroxysmal atrial fibrillation) (HCC)    PMS (premenstrual syndrome)    Pneumonia    PONV (postoperative nausea and vomiting)    Pre-diabetes    Rash    Rhinitis    Sinobronchitis    Sinusitis    and acute sinusitis    Stasis dermatitis    3/14/2000: severe    Stasis ulcer (HCC)    Thumb laceration    Unspecified sleep apnea    cpap    URI (upper respiratory infection)    Vasculitis    Venous insufficiency    severe    Venous stasis dermatitis    severe    Visual impairment    glasses    Vomiting and diarrhea    Wears glasses   [3]   Social History  Socioeconomic History    Marital status:    Tobacco Use    Smoking status: Never     Passive exposure: Never    Smokeless tobacco: Never   Vaping Use    Vaping status: Never Used   Substance and Sexual Activity    Alcohol use: Yes     Comment: Social drinking    Drug  use: Never   Other Topics Concern    Caffeine Concern No    Stress Concern Yes    Weight Concern Yes    Special Diet Yes    Exercise No    Seat Belt Yes

## 2025-06-18 NOTE — PATIENT INSTRUCTIONS
Get your labs done. You should be fasting for at least 10 hours. If you take a multivitamin with Biotin or any biotin product it should be held for 3 days prior to getting your labs done.     Follow up in 1-2 months for your med check or sooner as needed    VISIT SUMMARY:  Today, we discussed your lymphedema management, ear issues, knee pain, and weight loss progress. We reviewed your current treatments and made plans to address each concern.    YOUR PLAN:  LYMPHEDEMA: Your lymphedema is well-managed, but your compression stockings need replacement due to weight loss.  -Coordinate with your physical therapist to get the necessary equipment.  -Replace your compression stockings as needed.  -Continue using your current compression tools.    EAR FLUID AND INTERMITTENT PAIN: You have fluid and pain in your ears, likely due to atmospheric pressure changes. A previous ENT suggested draining your ear, but you are concerned about infection risks.  -Get a second opinion from another ENT specialist.  -Discuss the risks and benefits of potential procedures with the ENT, including infection risks and treatment options.    KNEE PAIN DUE TO OSTEOARTHRITIS: You have chronic knee pain from osteoarthritis, which has improved with weight loss and physical activity.  -Continue your weight loss efforts to help with knee pain.  -Keep swimming and doing other low-impact exercises to improve stability and strength.    OBESITY: You have lost weight and are following Weight Watchers, making dietary changes, and using protein drinks.  -Continue with the Weight Watchers program.  -Consider using My Fitness Pal for tracking if you stop Weight Watchers.  -Keep up with your dietary changes and exercise, including weight-bearing activities.  -Monitor your weight loss progress and adjust your plan as needed.    Contains text generated by Shaka

## 2025-06-18 NOTE — TELEPHONE ENCOUNTER
Hypertension Medications Protocol [id number]/17/2025 10:08 AM   Protocol Details CMP or BMP in past 12 months    Last BP reading less than 140/90    In person appointment or virtual visit in the past 12 mos or appointment in next 3 mos    EGFRCR or GFRNAA > 50    Medication is active on med list   See 3/19 encounter, taking once a day,    Future Appointments   Date Time Provider Department Center   7/16/2025 10:00 AM Yadira Avilez MD [facility]   8/18/2025  9:30 AM [facility] NURSE [facility]   9/29/2025  9:30 AM [facility] NURSE [facility]   10/29/2025 11:40 AM Yadira Avilez MD [facility]   11/17/2025  9:30 AM [facility] NURSE [facility]   1/21/2026 10:20 AM Yadira Avilez MD [facility]   4/13/2026  9:00 AM Ludmila Harmon DO [id number] [facility] [place]

## 2025-06-30 RX ORDER — TIRZEPATIDE 10 MG/.5ML
10 INJECTION, SOLUTION SUBCUTANEOUS WEEKLY
Qty: 2 ML | Refills: 0 | Status: SHIPPED | OUTPATIENT
Start: 2025-06-30 | End: 2025-07-22

## 2025-06-30 NOTE — TELEPHONE ENCOUNTER
Requesting   Requested Prescriptions     Pending Prescriptions Disp Refills    Tirzepatide-Weight Management (ZEPBOUND) 10 MG/0.5ML Subcutaneous Solution Auto-injector [Pharmacy Med Name: ZEPBOUND 7.5MG/0.5ML INJ(4PF PENS)] 2 mL 0     Sig: Inject 10 mg into the skin once a week for 4 doses.      LOV: 04/16/25  RTC: 07/16/25  Filled: 06/06/25 #2 with 0 refills    Future Appointments   Date Time Provider Department Center   7/16/2025 10:00 AM Yadira Avilez MD [facility]   8/18/2025  9:30 AM [facility] NURSE [facility]   9/29/2025  9:30 AM [facility] NURSE [facility]   10/29/2025 11:40 AM Yadira Avilez MD [facility]   11/17/2025  9:30 AM [facility] NURSE [facility]   1/21/2026 10:20 AM Yadira Avilez MD [facility]   4/13/2026  9:00 AM Ludmila Harmon DO [id number] [facility] [place]

## 2025-07-16 ENCOUNTER — OFFICE VISIT (OUTPATIENT)
Dept: INTERNAL MEDICINE CLINIC | Facility: CLINIC | Age: 70
End: 2025-07-16
Payer: MEDICARE

## 2025-07-16 VITALS
RESPIRATION RATE: 18 BRPM | SYSTOLIC BLOOD PRESSURE: 120 MMHG | DIASTOLIC BLOOD PRESSURE: 80 MMHG | WEIGHT: 246 LBS | BODY MASS INDEX: 46.44 KG/M2 | HEIGHT: 61 IN | HEART RATE: 89 BPM

## 2025-07-16 DIAGNOSIS — T80.90XD INJECTION SITE REACTION, SUBSEQUENT ENCOUNTER: ICD-10-CM

## 2025-07-16 DIAGNOSIS — J45.909 MODERATE ASTHMA, UNSPECIFIED WHETHER COMPLICATED, UNSPECIFIED WHETHER PERSISTENT (HCC): ICD-10-CM

## 2025-07-16 DIAGNOSIS — G47.30 SEVERE SLEEP APNEA: ICD-10-CM

## 2025-07-16 DIAGNOSIS — I89.0 LYMPHEDEMA: ICD-10-CM

## 2025-07-16 DIAGNOSIS — E53.8 B12 DEFICIENCY: ICD-10-CM

## 2025-07-16 DIAGNOSIS — E66.01 MORBID OBESITY (HCC): ICD-10-CM

## 2025-07-16 DIAGNOSIS — R73.03 PREDIABETES: ICD-10-CM

## 2025-07-16 DIAGNOSIS — G47.33 OSA ON CPAP: ICD-10-CM

## 2025-07-16 DIAGNOSIS — E78.00 PURE HYPERCHOLESTEROLEMIA: ICD-10-CM

## 2025-07-16 DIAGNOSIS — Z51.81 THERAPEUTIC DRUG MONITORING: Primary | ICD-10-CM

## 2025-07-16 PROCEDURE — 99214 OFFICE O/P EST MOD 30 MIN: CPT | Performed by: INTERNAL MEDICINE

## 2025-07-16 PROCEDURE — 96372 THER/PROPH/DIAG INJ SC/IM: CPT | Performed by: INTERNAL MEDICINE

## 2025-07-16 RX ORDER — TIRZEPATIDE 10 MG/.5ML
10 INJECTION, SOLUTION SUBCUTANEOUS WEEKLY
Qty: 2 ML | Refills: 2 | Status: SHIPPED | OUTPATIENT
Start: 2025-07-16 | End: 2025-08-07

## 2025-07-16 RX ORDER — CYANOCOBALAMIN 1000 UG/ML
1000 INJECTION, SOLUTION INTRAMUSCULAR; SUBCUTANEOUS ONCE
Status: COMPLETED | OUTPATIENT
Start: 2025-07-16 | End: 2025-07-16

## 2025-07-16 RX ADMIN — CYANOCOBALAMIN 1000 MCG: 1000 INJECTION, SOLUTION INTRAMUSCULAR; SUBCUTANEOUS at 10:37:00

## 2025-07-16 NOTE — PROGRESS NOTES
HISTORY OF PRESENT ILLNESS  Chief Complaint   Patient presents with    Weight Check     Down 6        Serena Rivera is a 69 year old female here for follow up in medical weight loss program.   [facility] Follow Up    General Information  Success Moment: I am able to fit into a size smaller tops  Challenging Moment: Bad choices  Nutrition Recall  Breakfast: [place] peanut butter protein packed crunchy granola bar Lunch:   [place]  light rye whole grain crisp bread 2 pieces, turkey breast lunch meat   3 ounces, strawberries a cup,cottage cheese 1/2 cup   Dinner: Corn on the cob, cucumber onion salad, 1 lean pork chop Snacks:   Usually a 100 calorie pk of pretzels   Fluids: Coffee 2 cups in the morning; water all day long and with dinner   Dining Out: 3   Exercise   Patient stated exercises # days/week: 4  Patient stated perceived level of   exertion: 3 Anaerobic Days: 2   Aerobic Days: 2   Patient stated average level of stress: 4  Sleep   Patient stated # hours uninterrupted sleep: 8   Patient stated feels   restful: Yes      Cause of disruption of sleep: Having to go to the bathroom   Goals: Keep losing weight              History of Present Illness  Serena Rivera is a 69 year old female who presents for a bariatric medicine consultation.    She experiences difficulty breathing due to asthma, which worsens in hot and humid weather, particularly when humidity exceeds ninety percent.    Her lymphedema is also aggravated by the current weather conditions.    She struggles with dietary choices, especially a preference for ice cream during the summer. She is attempting to adopt healthier eating habits by exploring alternatives. She tracks her intake using Weight Watchers, aiming for 20 to 30 grams of protein per meal. She is mindful of her protein intake and uses protein shakes as a supplement, limiting to one per day, and ensures she meets a minimum of 1,000 to 1,200 calories daily.    Physical activity has been limited  due to warm weather, though she engages in household activities like laundry. She is currently on a break from her ten-week swimming schedule.    She uses Zepbound and experiences occasional site reactions, which are not severe and occur inconsistently. She uses an alcohol swab before administration and ensures it dries completely to minimize reactions.    Wt Readings from Last 6 Encounters:   07/16/25 246 lb (111.6 kg)   06/18/25 244 lb 3.2 oz (110.8 kg)   05/05/25 250 lb (113.4 kg)   01/29/25 249 lb 6.4 oz (113.1 kg)   01/15/25 249 lb 6.4 oz (113.1 kg)   01/14/25 252 lb (114.3 kg)              Breakfast Lunch Dinner Snacks Fluids   Reviewed           REVIEW OF SYSTEMS  GENERAL HEALTH: feels well otherwise, denied any fevers chills or night sweats   RESPIRATORY: denies shortness of breath   CARDIOVASCULAR: denies chest pain  GI: denies abdominal pain    EXAM  /80   Pulse 89   Resp 18   Ht 5' 1\" (1.549 m)   Wt 246 lb (111.6 kg)   LMP 01/01/2007   BMI 46.48 kg/m²   GENERAL: well developed, well nourished,in no apparent distress, A/O x3  SKIN: no rashes,no suspicious lesions  HEENT: atraumatic, normocephalic, OP-clear, PERRL  NECK: supple,no adenopathy  LUNGS: clear to auscultation bilaterally   CARDIO: RRR without murmur  GI: good BS's,NT/ND, no masses or HSM  EXTREMITIES: no cyanosis, no clubbing, no edema    Lab Results   Component Value Date    WBC 5.6 12/21/2024    RBC 3.96 12/21/2024    HGB 12.3 12/21/2024    HCT 37.3 12/21/2024    MCV 94.2 12/21/2024    MCH 31.1 12/21/2024    MCHC 33.0 12/21/2024    RDW 12.9 12/21/2024    .0 12/21/2024     Lab Results   Component Value Date    GLU 87 05/21/2025    BUN 22 05/21/2025    BUNCREA 21.1 (H) 08/18/2020    CREATSERUM 0.90 05/21/2025    ANIONGAP 8 01/23/2025    GFR 71 05/10/2017    GFRNAA 61.9 05/21/2025    GFRAA 54 (L) 05/05/2022    CA 8.8 05/21/2025    OSMOCALC 291 01/23/2025    ALKPHO 99 12/21/2024    AST 12 05/21/2025    ALT 10 05/21/2025    BILT  0.5 05/21/2025    TP 6.5 05/21/2025    ALB 4.2 01/23/2025    GLOBULIN 2.4 12/21/2024     01/23/2025    K 4.1 05/21/2025     05/21/2025    CO2 28 05/21/2025     Lab Results   Component Value Date     07/02/2024    A1C 6.0 (H) 07/02/2024     Lab Results   Component Value Date    CHOLEST 179 05/21/2025    TRIG 98 05/21/2025    HDL 58 05/21/2025     (H) 12/21/2024    VLDL 20 05/21/2025    TCHDLRATIO 3.10 05/21/2025    NONHDLC 133 (H) 12/21/2024     Lab Results   Component Value Date    T4F 1.4 12/21/2024    TSH 3.406 12/21/2024     Lab Results   Component Value Date    B12 258 07/02/2024     Lab Results   Component Value Date    VITD 59.9 01/23/2025       Medications Ordered Prior to Encounter[1]    ASSESSMENT  Analyzed weight data:     [facility] Information  Patient type: Lifestyle   Initial Body Fat %: 52.4      Date of Initial Weight: 7/2/2024 Initial Weight: 267     Initial BMI: 49.3         Have any comorbidities improved since the last visit?   Hypertension DM 2 Dyslipidemia Osteoarthritis Sleep Apnea                    Diagnoses and all orders for this visit:    Therapeutic drug monitoring [Z51.81]  -     OP REFERRAL [facility]  -     Tirzepatide-Weight Management (ZEPBOUND) 10 MG/0.5ML Subcutaneous Solution Auto-injector; Inject 10 mg into the skin once a week for 4 doses.    Morbid obesity (HCC)  -     OP REFERRAL [facility]  -     Tirzepatide-Weight Management (ZEPBOUND) 10 MG/0.5ML Subcutaneous Solution Auto-injector; Inject 10 mg into the skin once a week for 4 doses.    BHUPINDER on CPAP [G47.33]  -     OP REFERRAL [facility]  -     Tirzepatide-Weight Management (ZEPBOUND) 10 MG/0.5ML Subcutaneous Solution Auto-injector; Inject 10 mg into the skin once a week for 4 doses.    Prediabetes [R73.03]  -     OP REFERRAL [facility]  -     Tirzepatide-Weight Management (ZEPBOUND) 10 MG/0.5ML Subcutaneous Solution Auto-injector; Inject 10 mg into the skin once a week  for 4 doses.    Pure hypercholesterolemia [E78.00]  -     OP REFERRAL [facility]  -     Tirzepatide-Weight Management (ZEPBOUND) 10 MG/0.5ML Subcutaneous Solution Auto-injector; Inject 10 mg into the skin once a week for 4 doses.    Injection site reaction, subsequent encounter  -     Tirzepatide-Weight Management (ZEPBOUND) 10 MG/0.5ML Subcutaneous Solution Auto-injector; Inject 10 mg into the skin once a week for 4 doses.    B12 deficiency  -     cyanocobalamin (Vitamin B12) 1000 MCG/ML injection 1,000 mcg  -     IM or Sub Q Injection [[id number]]    Severe sleep apnea    Lymphedema    Moderate asthma, unspecified whether complicated, unspecified whether persistent (HCC)        PLAN  Assessment & Plan  Morbid obesity  Challenges with dietary discipline, particularly ice cream consumption, but dietary habits have improved.  - Encourage ice cream alternatives like [place] bars and Protein Pints.  - Maintain protein intake of 20-30 grams per meal.  - Ensure daily caloric intake of 9946-9920 calories.  - Continue tracking dietary intake with Weight Watchers kenna.  - Incorporate favorite foods in moderation.    Asthma  Asthma exacerbated by hot and humid weather.    Lymphedema  Lymphedema aggravated by hot weather.    Physical activity  Discussed importance of daily physical activity. She engages in swimming and household activities.  - Explore chair yoga and home fitness options via YouTube.  - Refer to medical fitness program at [facility] in [place].  - Consider health coaching program with Simran Bautista for personalized fitness guidance.    BHUPINDER-continue CPAP treatment       Patient Instructions   Protein pints  Yasso bars  Fudge bars      No follow-ups on file.    Patient verbalizes understanding.    Yadira Avilez MD           [1]   Current Outpatient Medications on File Prior to Visit   Medication Sig Dispense Refill    Tirzepatide-Weight Management (ZEPBOUND) 10 MG/0.5ML Subcutaneous Solution  Auto-injector Inject 10 mg into the skin once a week for 4 doses. 2 mL 0    FUROSEMIDE 20 MG Oral Tab TAKE 1 TABLET(20 MG) BY MOUTH DAILY 90 tablet 0    LEVOTHYROXINE 112 MCG Oral Tab TAKE 1 TABLET(112 MCG) BY MOUTH BEFORE BREAKFAST 90 tablet 0    ALBUTEROL 108 (90 Base) MCG/ACT Inhalation Aero Soln INHALE 2 PUFFS INTO THE LUNGS EVERY 4 HOURS AS NEEDED FOR WHEEZING 8.5 g 0    fluticasone propionate 50 MCG/ACT Nasal Suspension 2 sprays by Each Nare route daily. 46 g 0    escitalopram 5 MG Oral Tab TAKE ONE TABLET BY MOUTH EVERY DAY ALONG WITH THE 10MG TABLET FOR A TOTAL DOSE OF 15MG DAILY 90 tablet 1    escitalopram 10 MG Oral Tab TAKE ONE TABLET BY MOUTH EVERY DAY ALONG WITH 5MG TABLET FOR A TOTAL DOSE OF 15MG DAILY 90 tablet 1    Benazepril HCl 10 MG Oral Tab Take 1 tablet (10 mg total) by mouth daily. 90 tablet 1    Omeprazole 40 MG Oral Capsule Delayed Release Take 1 capsule (40 mg total) by mouth daily. 90 capsule 1    flecainide 100 MG Oral Tab Take 1 tablet (100 mg total) by mouth 2 (two) times daily.      Potassium Gluconate 595 MG Oral Cap Take by mouth as needed.      Spacer/Aero-Holding Chambers (AEROCHAMBER MINI CHAMBER) Does not apply Device Use with inhaler as needed 1 each 0    ELIQUIS 5 MG Oral Tab Take 1 tablet (5 mg total) by mouth 2 (two) times daily.      Calcium Carbonate-Vitamin D 250-125 MG-UNIT Oral Tab Take 1 tablet by mouth daily.      Bacillus Coagulans-Inulin (ALIGN PREBIOTIC-PROBIOTIC) 5-1.25 MG-GM Oral Chew Tab Chew 2 tablets by mouth daily.       No current facility-administered medications on file prior to visit.

## 2025-07-16 NOTE — H&P
HISTORY OF PRESENT ILLNESS  No chief complaint on file.      Serena Rivera is a 69 year old female here for follow up in medical weight loss program.   [facility] Follow Up    General Information  Success Moment: I am able to fit into a size smaller tops  Challenging Moment: Bad choices  Nutrition Recall  Breakfast: [place] peanut butter protein packed crunchy granola bar Lunch:   [place]  light rye whole grain crisp bread 2 pieces, turkey breast lunch meat   3 ounces, strawberries a cup,[place] cheese 1/2 cup   Dinner: Corn on the cob, cucumber onion salad, 1 lean pork chop Snacks:   Usually a 100 calorie pk of pretzels   Fluids: Coffee 2 cups in the morning; water all day long and with dinner   Dining Out: 3   Exercise   Patient stated exercises # days/week: 4  Patient stated perceived level of   exertion: 3 Anaerobic Days: 2   Aerobic Days: 2   Patient stated average level of stress: 4  Sleep   Patient stated # hours uninterrupted sleep: 8   Patient stated feels   restful: Yes      Cause of disruption of sleep: Having to go to the bathroom   Goals: Keep losing weight              History of Present Illness    Wt Readings from Last 6 Encounters:   06/18/25 244 lb 3.2 oz (110.8 kg)   05/05/25 250 lb (113.4 kg)   01/29/25 249 lb 6.4 oz (113.1 kg)   01/15/25 249 lb 6.4 oz (113.1 kg)   01/14/25 252 lb (114.3 kg)   01/09/25 252 lb 9.6 oz (114.6 kg)              Breakfast Lunch Dinner Snacks Fluids   Reviewed           REVIEW OF SYSTEMS  GENERAL HEALTH: feels well otherwise, denied any fevers chills or night sweats   RESPIRATORY: denies shortness of breath   CARDIOVASCULAR: denies chest pain  GI: denies abdominal pain    EXAM  LMP 01/01/2007   GENERAL: well developed, well nourished,in no apparent distress, A/O x3  SKIN: no rashes,no suspicious lesions  HEENT: atraumatic, normocephalic, OP-clear, PERRL  NECK: supple,no adenopathy  LUNGS: clear to auscultation bilaterally   CARDIO: RRR without murmur  GI: good BS's,NT/ND, no  masses or HSM  EXTREMITIES: no cyanosis, no clubbing, no edema    Lab Results   Component Value Date    WBC 5.6 12/21/2024    RBC 3.96 12/21/2024    HGB 12.3 12/21/2024    HCT 37.3 12/21/2024    MCV 94.2 12/21/2024    MCH 31.1 12/21/2024    MCHC 33.0 12/21/2024    RDW 12.9 12/21/2024    .0 12/21/2024     Lab Results   Component Value Date    GLU 87 05/21/2025    BUN 22 05/21/2025    BUNCREA 21.1 (H) 08/18/2020    CREATSERUM 0.90 05/21/2025    ANIONGAP 8 01/23/2025    GFR 71 05/10/2017    GFRNAA 61.9 05/21/2025    GFRAA 54 (L) 05/05/2022    CA 8.8 05/21/2025    OSMOCALC 291 01/23/2025    ALKPHO 99 12/21/2024    AST 12 05/21/2025    ALT 10 05/21/2025    BILT 0.5 05/21/2025    TP 6.5 05/21/2025    ALB 4.2 01/23/2025    GLOBULIN 2.4 12/21/2024     01/23/2025    K 4.1 05/21/2025     05/21/2025    CO2 28 05/21/2025     Lab Results   Component Value Date     07/02/2024    A1C 6.0 (H) 07/02/2024     Lab Results   Component Value Date    CHOLEST 179 05/21/2025    TRIG 98 05/21/2025    HDL 58 05/21/2025     (H) 12/21/2024    VLDL 20 05/21/2025    TCHDLRATIO 3.10 05/21/2025    NONHDLC 133 (H) 12/21/2024     Lab Results   Component Value Date    T4F 1.4 12/21/2024    TSH 3.406 12/21/2024     Lab Results   Component Value Date    B12 258 07/02/2024     Lab Results   Component Value Date    VITD 59.9 01/23/2025       Medications Ordered Prior to Encounter[1]    ASSESSMENT  Analyzed weight data:     [facility] Information  Patient type: Lifestyle   Initial Body Fat %: 52.4      Date of Initial Weight: 7/2/2024 Initial Weight: 267     Initial BMI: 49.3         Have any comorbidities improved since the last visit?   Hypertension DM 2 Dyslipidemia Osteoarthritis Sleep Apnea                    Diagnoses and all orders for this visit:    Therapeutic drug monitoring [Z51.81]    Morbid obesity (HCC)    BHUPINDER on CPAP [G47.33]    Prediabetes [R73.03]    Pure hypercholesterolemia [E78.00]        PLAN  Assessment  & Plan      There are no Patient Instructions on file for this visit.    No follow-ups on file.    Patient verbalizes understanding.    Yadira Avilez MD           [1]   Current Outpatient Medications on File Prior to Visit   Medication Sig Dispense Refill    Tirzepatide-Weight Management (ZEPBOUND) 10 MG/0.5ML Subcutaneous Solution Auto-injector Inject 10 mg into the skin once a week for 4 doses. 2 mL 0    FUROSEMIDE 20 MG Oral Tab TAKE 1 TABLET(20 MG) BY MOUTH DAILY 90 tablet 0    LEVOTHYROXINE 112 MCG Oral Tab TAKE 1 TABLET(112 MCG) BY MOUTH BEFORE BREAKFAST 90 tablet 0    ALBUTEROL 108 (90 Base) MCG/ACT Inhalation Aero Soln INHALE 2 PUFFS INTO THE LUNGS EVERY 4 HOURS AS NEEDED FOR WHEEZING 8.5 g 0    fluticasone propionate 50 MCG/ACT Nasal Suspension 2 sprays by Each Nare route daily. 46 g 0    escitalopram 5 MG Oral Tab TAKE ONE TABLET BY MOUTH EVERY DAY ALONG WITH THE 10MG TABLET FOR A TOTAL DOSE OF 15MG DAILY 90 tablet 1    escitalopram 10 MG Oral Tab TAKE ONE TABLET BY MOUTH EVERY DAY ALONG WITH 5MG TABLET FOR A TOTAL DOSE OF 15MG DAILY 90 tablet 1    Benazepril HCl 10 MG Oral Tab Take 1 tablet (10 mg total) by mouth daily. 90 tablet 1    Omeprazole 40 MG Oral Capsule Delayed Release Take 1 capsule (40 mg total) by mouth daily. 90 capsule 1    flecainide 100 MG Oral Tab Take 1 tablet (100 mg total) by mouth 2 (two) times daily.      Potassium Gluconate 595 MG Oral Cap Take by mouth as needed.      Spacer/Aero-Holding Chambers (AEROCHAMBER MINI CHAMBER) Does not apply Device Use with inhaler as needed 1 each 0    ELIQUIS 5 MG Oral Tab Take 1 tablet (5 mg total) by mouth 2 (two) times daily.      Calcium Carbonate-Vitamin D 250-125 MG-UNIT Oral Tab Take 1 tablet by mouth daily.      Bacillus Coagulans-Inulin (ALIGN PREBIOTIC-PROBIOTIC) 5-1.25 MG-GM Oral Chew Tab Chew 2 tablets by mouth daily.       No current facility-administered medications on file prior to visit.

## 2025-07-21 ENCOUNTER — TELEPHONE (OUTPATIENT)
Dept: PHYSICAL THERAPY | Facility: HOSPITAL | Age: 70
End: 2025-07-21

## 2025-07-28 ENCOUNTER — TELEPHONE (OUTPATIENT)
Dept: INTERNAL MEDICINE CLINIC | Facility: CLINIC | Age: 70
End: 2025-07-28

## 2025-08-04 ENCOUNTER — TELEPHONE (OUTPATIENT)
Dept: INTERNAL MEDICINE CLINIC | Facility: CLINIC | Age: 70
End: 2025-08-04

## 2025-08-04 ENCOUNTER — OFFICE VISIT (OUTPATIENT)
Dept: INTERNAL MEDICINE CLINIC | Facility: CLINIC | Age: 70
End: 2025-08-04

## 2025-08-04 VITALS
HEART RATE: 67 BPM | SYSTOLIC BLOOD PRESSURE: 118 MMHG | RESPIRATION RATE: 16 BRPM | BODY MASS INDEX: 46 KG/M2 | HEIGHT: 61 IN | OXYGEN SATURATION: 98 % | DIASTOLIC BLOOD PRESSURE: 70 MMHG | TEMPERATURE: 98 F | WEIGHT: 243.63 LBS

## 2025-08-04 DIAGNOSIS — J45.20 MILD INTERMITTENT ASTHMA WITHOUT COMPLICATION (HCC): ICD-10-CM

## 2025-08-04 DIAGNOSIS — E03.9 ACQUIRED HYPOTHYROIDISM: ICD-10-CM

## 2025-08-04 DIAGNOSIS — E66.813 CLASS 3 SEVERE OBESITY DUE TO EXCESS CALORIES WITH SERIOUS COMORBIDITY AND BODY MASS INDEX (BMI) OF 50.0 TO 59.9 IN ADULT: ICD-10-CM

## 2025-08-04 DIAGNOSIS — E78.00 PURE HYPERCHOLESTEROLEMIA: ICD-10-CM

## 2025-08-04 DIAGNOSIS — R73.03 PREDIABETES: ICD-10-CM

## 2025-08-04 DIAGNOSIS — K21.9 GASTROESOPHAGEAL REFLUX DISEASE WITHOUT ESOPHAGITIS: ICD-10-CM

## 2025-08-04 DIAGNOSIS — I89.0 LYMPHEDEMA OF BOTH LOWER EXTREMITIES: ICD-10-CM

## 2025-08-04 DIAGNOSIS — E55.9 VITAMIN D DEFICIENCY: ICD-10-CM

## 2025-08-04 DIAGNOSIS — I10 PRIMARY HYPERTENSION: Primary | ICD-10-CM

## 2025-08-04 DIAGNOSIS — I77.9 BILATERAL CAROTID ARTERY DISEASE, UNSPECIFIED TYPE: ICD-10-CM

## 2025-08-04 DIAGNOSIS — Z00.00 PHYSICAL EXAM: ICD-10-CM

## 2025-08-04 DIAGNOSIS — F39 MOOD DISORDER: ICD-10-CM

## 2025-08-04 RX ORDER — LEVOTHYROXINE SODIUM 112 UG/1
112 TABLET ORAL
Qty: 90 TABLET | Refills: 0 | Status: SHIPPED | OUTPATIENT
Start: 2025-08-04

## 2025-08-04 RX ORDER — ESCITALOPRAM OXALATE 5 MG/1
TABLET ORAL
Qty: 90 TABLET | Refills: 1 | Status: SHIPPED | OUTPATIENT
Start: 2025-08-04

## 2025-08-04 RX ORDER — FUROSEMIDE 20 MG/1
20 TABLET ORAL DAILY
Qty: 90 TABLET | Refills: 0 | Status: SHIPPED | OUTPATIENT
Start: 2025-08-04

## 2025-08-04 RX ORDER — BENAZEPRIL HYDROCHLORIDE 10 MG/1
10 TABLET ORAL DAILY
Qty: 90 TABLET | Refills: 1 | Status: SHIPPED | OUTPATIENT
Start: 2025-08-04

## 2025-08-04 RX ORDER — ESCITALOPRAM OXALATE 10 MG/1
TABLET ORAL
Qty: 90 TABLET | Refills: 1 | Status: SHIPPED | OUTPATIENT
Start: 2025-08-04

## 2025-08-04 RX ORDER — ALBUTEROL SULFATE 90 UG/1
2 INHALANT RESPIRATORY (INHALATION) EVERY 4 HOURS PRN
Qty: 8.5 G | Refills: 0 | Status: SHIPPED | OUTPATIENT
Start: 2025-08-04

## 2025-08-04 RX ORDER — OMEPRAZOLE 40 MG/1
40 CAPSULE, DELAYED RELEASE ORAL DAILY
Qty: 90 CAPSULE | Refills: 1 | Status: SHIPPED | OUTPATIENT
Start: 2025-08-04

## 2025-08-18 ENCOUNTER — NURSE ONLY (OUTPATIENT)
Dept: INTERNAL MEDICINE CLINIC | Facility: CLINIC | Age: 70
End: 2025-08-18

## 2025-08-18 DIAGNOSIS — E53.8 B12 DEFICIENCY: Primary | ICD-10-CM

## 2025-08-18 RX ORDER — CYANOCOBALAMIN 1000 UG/ML
1000 INJECTION, SOLUTION INTRAMUSCULAR; SUBCUTANEOUS ONCE
Status: COMPLETED | OUTPATIENT
Start: 2025-08-18 | End: 2025-08-18

## 2025-08-18 RX ADMIN — CYANOCOBALAMIN 1000 MCG: 1000 INJECTION, SOLUTION INTRAMUSCULAR; SUBCUTANEOUS at 09:40:00

## 2025-08-25 ENCOUNTER — OFFICE VISIT (OUTPATIENT)
Dept: OTOLARYNGOLOGY | Facility: CLINIC | Age: 70
End: 2025-08-25

## 2025-08-25 VITALS — WEIGHT: 243 LBS | HEIGHT: 61 IN | BODY MASS INDEX: 45.88 KG/M2

## 2025-08-25 DIAGNOSIS — J34.3 NASAL TURBINATE HYPERTROPHY: ICD-10-CM

## 2025-08-25 DIAGNOSIS — H69.93 DYSFUNCTION OF BOTH EUSTACHIAN TUBES: Primary | ICD-10-CM

## 2025-08-25 RX ORDER — AZELASTINE 1 MG/ML
2 SPRAY, METERED NASAL 2 TIMES DAILY
Qty: 30 ML | Refills: 5 | Status: SHIPPED | OUTPATIENT
Start: 2025-08-25

## (undated) DIAGNOSIS — R92.2 INCONCLUSIVE MAMMOGRAPHY: Primary | ICD-10-CM

## (undated) DIAGNOSIS — N28.9 FUNCTION KIDNEY DECREASED: Primary | ICD-10-CM

## (undated) DIAGNOSIS — R92.8 ABNORMAL MAMMOGRAM OF LEFT BREAST: Primary | ICD-10-CM

## (undated) DIAGNOSIS — F41.9 ANXIETY: ICD-10-CM

## (undated) DEVICE — GOWN,SIRUS,FABRIC-REINFORCED,LARGE: Brand: MEDLINE

## (undated) DEVICE — SNARE 9MM 230CM 2.4MM EXACTO

## (undated) DEVICE — FORCEP BIOPSY RJ4 LG CAP W/ND

## (undated) DEVICE — STERILE POLYISOPRENE POWDER-FREE SURGICAL GLOVES: Brand: PROTEXIS

## (undated) DEVICE — UNDERPAD 23X36 LIGHT CHUX

## (undated) DEVICE — MEDI-VAC NON-CONDUCTIVE SUCTION TUBING: Brand: CARDINAL HEALTH

## (undated) DEVICE — FILTERLINE NASAL ADULT O2/CO2

## (undated) DEVICE — SOLUTION  .9 1000ML BTL

## (undated) DEVICE — DRAPE PACK CHEST & U BAR

## (undated) DEVICE — ENDOSCOPY PACK - LOWER: Brand: MEDLINE INDUSTRIES, INC.

## (undated) DEVICE — SUT MONOCRYL 4-0 PS-2 Y496G

## (undated) DEVICE — ABDOMINAL PAD: Brand: DERMACEA

## (undated) DEVICE — Device

## (undated) DEVICE — LIGHT HANDLE

## (undated) DEVICE — BIOGUARD CLEANING ADAPTER

## (undated) DEVICE — TRAP 4 CPTR CHMBR N EZ INLN

## (undated) DEVICE — STERIS KITS

## (undated) DEVICE — 1200CC GUARDIAN II: Brand: GUARDIAN

## (undated) DEVICE — MEGADYNE E-Z CLEAN BLADE 2.75"

## (undated) DEVICE — TRAP SPEC REMOVAL ETRAP 15CM

## (undated) DEVICE — 3M™ RED DOT™ MONITORING ELECTRODE WITH FOAM TAPE AND STICKY GEL, 50/BAG, 20/CASE, 72/PLT 2570: Brand: RED DOT™

## (undated) DEVICE — 3M™ RED DOT™ MONITORING ELECTRODE WITH FOAM TAPE AND STICKY GEL 2570-3, 3/BAG, 200/CASE, 54/PLT: Brand: RED DOT™

## (undated) DEVICE — DRAPE,TAPE STRIPS,STERILE: Brand: MEDLINE

## (undated) DEVICE — Device: Brand: DEFENDO AIR/WATER/SUCTION AND BIOPSY VALVE

## (undated) DEVICE — 10FT COMBINED O2 DELIVERY/CO2 MONITORING. FILTER WITH MICROSTREAM TYPE LUER: Brand: DUAL ADULT NASAL CANNULA

## (undated) DEVICE — SUT SILK 2-0 FS 685G

## (undated) DEVICE — ENDOSCOPY PACK UPPER: Brand: MEDLINE INDUSTRIES, INC.

## (undated) DEVICE — SLEEVE KENDALL SCD EXPRESS MED

## (undated) DEVICE — SUT VICRYL 3-0 SH J416H

## (undated) DEVICE — LASSO POLYPECTOMY SNARE: Brand: LASSO

## (undated) DEVICE — TOWEL SURG OR 17X30IN BLUE

## (undated) DEVICE — HEMOCLIP HORIZON SM 001200

## (undated) DEVICE — VALVE KIT SGL USE DEFENDO

## (undated) DEVICE — MEDI-VAC SUCTION HANDLE REGULAR CAPACITY: Brand: CARDINAL HEALTH

## (undated) DEVICE — 3M™ STERI-DRAPE™ INSTRUMENT POUCH 1018: Brand: STERI-DRAPE™

## (undated) DEVICE — BREAST-HERNIA-PORT CDS-LF: Brand: MEDLINE INDUSTRIES, INC.

## (undated) DEVICE — HEMOCLIP HORIZON MED 002200

## (undated) NOTE — LETTER
Patient Name: Ga Whiting  YOB: 1955          MRN :  [id number]  Date:  8/10/2023  Referring Provider: MARIELA Darby    Discharge Summary  Pt has attended a total of 38 visits in Occupational Therapy from 2/21-8/10/2023. Subjective:   Pt reports she received her correct compression capris last week. Has been wearing in conjunction with her new compression knee highs on a daily basis since then. Prior to that had been wearing compression pantyhose that she had received (incorrectly ordered) on most days; when not, was wearing new knee highs. Reports capris/knee highs combination are very comfortable. Reports she has had some difficulty with consistently getting capris applied correctly; has difficulty with panty area. Has also received Velcro-closure garments for her bilateral thighs/knees for nighttime use. Wore them initially when she first got them along with Velcro-closure footpieces, but was confused about why lower legs were exposed. Reports thigh/knee Velcro-closure garments would slide down during the night so she abandoned wearing them and returned to wearing her Christal Smith lower leg and footpiece garments with old Circaid lower leg garments covering proximal lower legs. Reports that she has been going to aquatic exercise, and notes that on those days there is a delay in getting full compression over her lower extremities as she only wears her knee highs home; did go to aquatic exercise this morning and feels like her Right lower leg looks a little larger. Also sharing that she did not wear any nighttime compression last night. Pt reports that since returning to therapy her legs \"feel much better;\" she is able to do more activity during the day, needing less rests; overall she feels \"much more energetic\" and more positive. Pt sharing plan to use Flexitouch device this evening between removing daytime garments and applying nighttime garments.      Assessment:   Pt returns today with good fitting custom-fit compression capris and knee highs. She demonstrated progress towards reduction of her Right LE volume, albeit today is measuring higher than when full limb measurements were collected on 7/13. Volume increase may have occurred d/t deferring nighttime compression last night and being out of full compression following aquatics class today. Over her course of treatment pt has also demonstrated improvement in the tissue quality of her Right LE and has demonstrated compliance with use of compression. Because of pt's aggressive  stage III lymphedema, it is recommended that pt return for a follow-up visit in 6 months. She will need a new prescription to return if this is approved by her PCP office. Objective:  Pt attended Occupational Therapy for complete decongestive therapy of bilateral lower quadrants; instruction in decongestive exercises and self-manual lymph drainage; assist with garment prescription; assist with return to use of Flexitouch device. Therapist was able to obtain a donated Right LE compression device garment for pt as the Velcro on pt's garment had become worn. Pt was measured for custom-fit compression capris and knee highs and bilateralt thigh/knee Velcro-closure garments for nighttime compression on 7/7. However, when her garments arrived a custom-fit pantyhose had been fabricated vs the capris. Her replacement garment arrived last week and she has been advised that she will be allowed to keep the pantyhose garment. She is aware of the need to replace her older Velcro-closure garments. NEW GARMENT SPECIFICS: Jessica Expert custom-fit compression capris, CCL1; Jessica Strong custom-fit knee highs, CLL2; Juxtafit Essentials upper leg with knees. PRIOR GARMENT SPECIFICS: Elvis Dub Classic lower leg garments (which will soon need replacement); Circaid Juxtafit lower leg garments (which need replacement); Farrow Wrap Classic footpieces.      Today's session activities consisting of:    --Discussion of pt's use of nigttime garments and confusion about what she is to wear. Discovered pt didn't realize she was to supplement the new thigh/knee garments with her lower leg garments. Recommended to add lower leg garments to thigh/knee and foot Velcro-closure garments for nighttime compression. Reminded of need to replace older Velcro-closure garments. --Re-assessment of status. Discussed rise in Right LE volume relative to deferring compression last night and time out of compression today following aquatic exercise. Advised to avoid prolonged periods of being out of compression. --Fit donated Right LE compression device garment to pt; garment with good fit. Operated LE program to test garment for any issues; air leaks occurring through hosing; garment hosing exposed and hose connections re-fitted. --Instruction in donning techniques for ease of donning of garments and assuring correct distribution of fabric. Discussed benefit of using donning lotion on thighs to assist with pulling capris over thighs. Pt needing verbal and physical direction to apply recommended techniques. EDEMA/TISSUE QUALITY:   *Right LE circumferential volume has increased by 27.1cm since last full limb measurement on 7/13 for an overall reduction of 15.9cm. *Left LE with slight rise since 7/13, h/e not back to baseline. *Right LE:  Tissue quality over proximal thigh is supple transitioning to slightly boggy to supple distally except for distal medial/posterior surfaces which are slightly boggy with spotty areas of boggy quality. Knee is boggy to slightly boggy. Proximal 1/2 of lower leg is boggy to slightly boggy with good superficial tissue mobility; distal 1/2 is densely boggy to boggy over anterior/medial/posterior surfaces with fair to good superficial tissue mobility / boggy over lateral surface with spotty areas of densely boggy quality with good superficial tissue mobility.  Ankle is slightly boggy. Dorsum of foot is slightly boggy over proximal surface / boggy over distal surface. *Left LE:  Tissue quality over proximal thigh is supple transitioning to slightly boggy to supple distally except over distal medial surface which is boggy to slightly boggy; discoloration in area of prior cellulitis. Knee is slightly boggy to supple. Proximal 1/3 of lower leg is slightly boggy; mid 1/3 is boggy to slightly boggy, pitting with good superficial tissue mobility; distal 1/3 is densely boggy to boggy, pitting with fair to good superficial tissue mobility; discoloration in area of prior cellulitis. Ankle is slightly boggy. Dorsum of foot is slightly boggy. *Emerging hair follicles over bilateral lower legs. Tissue hydration is good. Negative Stemmer's sign. Medium to light discoloration to distal lower legs. Recommendations:  1. Use compression garments on a daily/nightly basis. Avoid prolonged periods out of compression. Replace older Velcro-closure garments. Replace all garments on a timely basis. 2.  Use Flexitouch device 1-2x/day. If unable to use device, complete self-manual lymph drainage . 3. Continue with daily completion of skin care and decongestive exercises. 4.  Return in 6 months for follow-up visit. 5.  Contact this therapist/department for any questions/concerns. Goals:  (to be met in 24 visits)  1. Pt will wear Right lower leg Circaid garments on a daily basis. ACHIEVED   2. Pt will return to use of Flexitouch device 6-7 days/week. ACHIEVED   3. Pt will be independent in decongestive exercises. ACHIEVED   4. Pt will tolerate Right lower leg short stretch compression bandaging for 20-23 hours. ACHIEVED   5. Pt/Caregiver will be independent in Right lower leg short stretch compression bandaging. DEFERRED 4/17 4/17 NEW GOAL: Pt's  will be independent in Right thigh/knee short stretch compression bandaging.  ACHIEVED    5/22 NEW GOAL: Pt will tolerate short stretch compression bandaging over bilateral thighs/knees for 20-23 hours. ACHIEVED     6. Reduce Right LE lymphedema volume by 30-50cm to allow pt to transition back into custom-fit knee high and to reduce limb heaviness during ambulation. ACHIEVED   7. Reduce bilateral lower leg density to boggy to reduce infection risk. PROGRESSING  8. Pt will be independent in use of compression garments, self-manual lymph drainage, decongestive exercises, Flexitouch device and lymphedema precautions for life-long self-management of lymphedema. ACHIEVED     To be met in 4-6 visits:  7/13 NEW GOAL: With assist from clinician and from  in home setting, pt will sustain progress made during course of therapy until new custom-fit garments arrive. ACHIEVED     Thank you for your referral. If you have any questions, please contact me at Dept: [phone]. Sincerely,  Electronically signed by therapist: Olivia Wilkes. LAI Perez/L, [employer] Act Notice to Patient: Medical documents like this are made available to patients in the interest of transparency. However, be advised this is a medical document and it is intended as [id number] communication between your medical providers. This medical document may contain abbreviations, assessments, medical data, and results or other terms that are unfamiliar. Medical documents are intended to carry relevant information, facts as evident, and the clinical opinion of the practitioner. As such, this medical document may be written in language that appears blunt or direct. You are encouraged to contact your medical provider and/or [facility] Patient Experience if you have any questions about this medical document.

## (undated) NOTE — LETTER
AUTHORIZATION FOR SURGICAL OPERATION OR OTHER PROCEDURE    1. I hereby authorize Jan Tay, and [facility] staff assigned to my case to perform the following operation and/or procedure at the [facility] site:    _______________________________________________________________________________________________    Right Ear Myringotomy  _______________________________________________________________________________________________    2.  My physician has explained the nature and purpose of the operation or other procedure, possible alternative methods of treatment, the risks involved, and the possibility of complication to me.  I acknowledge that no guarantee has been made as to the result that may be obtained.  3.  I recognize that, during the course of this operation, or other procedure, unforseen conditions may necessitate additional or different procedure than those listed above.  I, therefore, further authorize and request that the above named physician, his/her physician assistants or designees perform such procedures as are, in his/her professional opinion, necessary and desirable.  4.  Any tissue or organs removed in the operation or other procedure may be disposed of by and at the discretion of the [facility] and [facility].  5.  I understand that in the event of a medical emergency, I will be transported by local paramedics to [facility] or other hospital emergency department.  6.  I certify that I have read and fully understand the above consent to operation and/or other procedure.    7.  I acknowledge that my physician has explained sedation/analgesia administration to me including the risks and benefits.  I consent to the administration of sedation/analgesia as may be necessary or desirable in the judgement of my physician.    Witness signature: ___________________________________________________ Date:  ______/______/_____                     Time:  ________ A.M.  P.M.       Patient Name:  ______________________________________________________  (please print)      Patient signature:  ___________________________________________________             Relationship to Patient:           []  Parent    Responsible person                          []  Spouse  In case of minor or                    [] Other  _____________   Incompetent name:  __________________________________________________                               (please print)      _____________      Responsible person  In case of minor or  Incompetent signature:  _______________________________________________    Statement of Physician  My signature below affirms that prior to the time of the procedure, I have explained to the patient and/or his/her guardian, the risks and benefits involved in the proposed treatment and any reasonable alternative to the proposed treatment.  I have also explained the risks and benefits involved in the refusal of the proposed treatment and have answered the patient's questions.                        Date:  ______/______/_______  Provider                      Signature:  __________________________________________________________       Time:  ___________ A.M    P.M.

## (undated) NOTE — ED AVS SNAPSHOT
Jimena Ansari   MRN: [id number]    Department:  [facility] Emergency Department   Date of Visit:  3/15/2018           Disclosure     Insurance plans vary and the physician(s) referred by the ER may not be covered by your plan.  Please contact tell this physician (or your personal doctor if your instructions are to return to your personal doctor) about any new or lasting problems. The primary care or specialist physician will see patients referred from the [facility] Emergency Department.  Wing Clemens

## (undated) NOTE — LETTER
06/04/21    [address]  [place] [id number]    Dear Torrey,    This is a friendly reminder to let you know you have outstanding lab work as listed below.   To schedule your appointments,  please call Central Scheduling at [phone]

## (undated) NOTE — LETTER
Patient Name: Jl Elkins  YOB: 1955          MRN :  [id number]  Date:  6/20/2023  Referring Provider: Teresa Li    Progress Summary  Pt has attended 7/12 visits in Occupational Therapy. Subjective:   *Pt reports she wore bilateral LE full compression every day since last session for recommended hours. Has been going to aquatics class; wears Velcro-closure garments to aquatics class, removes and has applied immediately following class. Has not been using Flexitouch device as much as she is in compression nearly all the time. Feels like she has been walking better; feels more assured in her walking. Has also noticed that with reduction in volume the pain she previously had in her Right foot is reducing. Assessment:   Pt continuing to demonstrate progress towards reduction of Right LE lymphedema volume along with improving tissue quality in bilateral LEs. With reduction in volume, pt reporting improvement in her ambulatory skills. Recommend continued therapy to maximize volume loss and improvement in tissue quality prior to pt transitioning into custom-fit compression capris and custom-fit compression knee highs. Objective:  Pt attending Occupational Therapy for complete decongestive therapy of bilateral lower quadrants. Her  is assisting in the treatment plan by not only donning Velcro-closure garments on a daily basis, but also applying short stretch compression bandaging over bilateral thighs/knees/proximal lower legs. *Right LE:  Circumferential lymphedema volume has decreased by 19.5cm since last progress summary for an overall reduction of 32.7cm. Tissue quality over proximal thigh is supple transitioning to slightly boggy to supple distally except for distal medial/posterior surfaces which are boggy to slightly boggy. Knee is boggy to slightly boggy.  Proximal 1/2 of lower leg is boggy to slightly boggy with good superficial tissue mobility; distal 1/2 is densely boggy to boggy over anterior/medial/posterior surfaces with fair to good superficial tissue mobility / boggy over lateral surface with spotty areas of densely boggy quality with good superficial tissue mobility. Ankle is slightly boggy. Dorsum of foot is slightly boggy over proximal surface / boggy over distal surface. *Left LE:  Circumferential lymphedema volume has decreased by 10.4cm since last progress summary for an overall reduction of 8.1cm. Tissue quality over proximal thigh is supple transitioning to slightly boggy to supple distally except over distal medial surface which is boggy to slightly boggy; discoloration in area of prior cellulitis. Knee is slightly boggy to supple. Proximal 1/3 of lower leg is slightly boggy; mid 1/3 is boggy to slightly boggy, pitting with good superficial tissue mobility; distal 1/3 is densely boggy to boggy, pitting with fair to good superficial tissue mobility; discoloration in area of prior cellulitis. Ankle is slightly boggy. Dorsum of foot is slightly boggy. *Emerging hair follicles over bilateral lower legs. Tissue hydration is good. Negative Stemmer's sign. Medium to light discoloration to distal lower legs. TREATMENT:   *Removed compression. *Re-assessment of status   *Applied full leg short stretch compression bandaging over bilateral LEs. COMPRESSION:  *Right lower leg: stockinet; 1/2\" foam insert over anterior/medial/posterior surface of distal lower leg; 1/2\" foam insert around crease superior to ankle; Rosidal wrap toes to knee; 4 short stretch compression bandages: 1, 8cm, 2, 10cm (herringbone pattern of distal leg with first bandage) , 1, 12cm  *Left lower leg: stockinet; 1/2\" foam insert over anterior/medial/posterior surface of distal lower leg; 1/2\" foam insert around crease superior to ankle;  Rosidal wrap toes to knee; 3 short stretch compression bandages: 1, 8cm, 1, 10cm (herringbone pattern of distal leg), 1, 12cm  *Bilateral thigh/knee/proximal lower leg: stockinet-lined 1/2\" foam over anterior/medial/posterior surface of thigh held in place with 1 Rosidal wrap from distal thigh through proximal thigh; 2 Rosidal wrap from proximal lower leg through thigh; 2, 12cm short stretch compression bandages proximal lower leg through thigh with closed knee. Covered all with size \"J\" Tensogrip sleeve. Goals:  (to be met in 24 visits)  1. Pt will wear Right lower leg Circaid garments on a daily basis. ACHIEVED   2. Pt will return to use of Flexitouch device 6-7 days/week. ACHIEVED   3. Pt will be independent in decongestive exercises. ACHIEVED   4. Pt will tolerate Right lower leg short stretch compression bandaging for 20-23 hours. ACHIEVED   5. Pt/Caregiver will be independent in Right lower leg short stretch compression bandaging. DEFERRED 4/17 4/17 NEW GOAL: Pt's  will be independent in Right thigh/knee short stretch compression bandaging. ACHIEVED    5/22 NEW GOAL: Pt will tolerate short stretch compression bandaging over bilateral thighs/knees for 20-23 hours. ACHIEVED     6. Reduce Right LE lymphedema volume by 30-50cm to allow pt to transition back into custom-fit knee high and to reduce limb heaviness during ambulation. 7. Reduce bilateral lower leg density to boggy to reduce infection risk. 8. Pt will be independent in use of compression garments, self-manual lymph drainage, decongestive exercises, Flexitouch device and lymphedema precautions for life-long self-management of lymphedema. Plan: Continue skilled Occupational Therapy 2 x/week or a total of 5 more visits. Treatment will include: complete decongestive therapy of bilateral lower quadrants; assist with custom-fit garment prescription; assessment of garment fit / response to garment use.         Patient/Family/Caregiver was advised of these findings, precautions, and treatment options and has agreed to actively participate in planning and for this course of care.    Thank you for your referral. If you have any questions, please contact me at Dept: [phone]. Sincerely,  Electronically signed by therapist: Darren Garcia. LAI Perez/L, [employer] Act Notice to Patient: Medical documents like this are made available to patients in the interest of transparency. However, be advised this is a medical document and it is intended as [id number] communication between your medical providers. This medical document may contain abbreviations, assessments, medical data, and results or other terms that are unfamiliar. Medical documents are intended to carry relevant information, facts as evident, and the clinical opinion of the practitioner. As such, this medical document may be written in language that appears blunt or direct. You are encouraged to contact your medical provider and/or [facility] Patient Experience if you have any questions about this medical document.

## (undated) NOTE — LETTER
ASTHMA ACTION PLAN for Dawn Leon     : 1955     Date: 2018  Provider:  Liliana Mcdonald MD  Phone for doctor or clinic: [facility],  [address] [place] [address] Dr [address]  [address] [place] [id number]

## (undated) NOTE — LETTER
To: Dr. Fletcher Ross  Date:  2022  Fax #: [phone]   Patient Name: Paulina ROCHE-Age / Sex: 1955-A: 78 y  female  Phone:  [phone]  CSN: [id number]        Medical Records: [id number]    Clearance for Surgery Requested by Surgeon    Request for:  Cardiac Clearance    Requested by Surgeon: Dr. Katja Moore    Surgical Date: 2022    Procedure: Left breast wire localized excisional biopsy, Left      Please fax the clearance note to the [facility] [phone]. Thank you.

## (undated) NOTE — LETTER
Patient Name: Gabino Kim  YOB: 1955          MRN :  [id number]  Date:  4/24/2023  Referring Physician:  MARIELA Hansen    Progress Summary  Pt has attended 17/24 visits in Occupational Therapy. Subjective:   Pt reports she wore full Right LE bandage system applied at last session until late the following day. Reports that  re-applied Right thigh/knee bandage system only once since last session. Wearing Farrow Wrap garments every day; struggling with Right lower leg garment sliding down. Used Flexitouch device once. After being alerted in rise in Right LE as compared to lowest levels measured on 4/6, pt sharing that since last session she did eat foods that she normally doesn't eat. Went to [employer] one evening; last night had a homemade [place] chicken dish that had salsa and packaged chicken taco seasoning in it. Assessment:   As of 4/6, pt making steady progress towards reduction of Right LE lymphedema volume and density. However, limb volume measuring much higher today. Impacting factors may include dietary changes, time of day being measured (seen in late afternoon today / usually has morning appts) as gap in therapy d/t therapist illness and pt needing to accompany  to medical appt. Pt's  has recently been trained in short stretch compression bandaging of her Right thigh/knee and anticipate return to more steady progress towards goals. Recommend continued therapy to maximize volume loss and improvement in tissue quality to reduce limb heaviness during ambulation and to allow pt to transition back in to custom-fit garments. Because of her aggressive, stage III lymphedema, it is highly likely she will require an extension of therapy services, especially as she also has lymphedema in her Left LE.     Objective:  Pt attending Occupational Therapy x3/week for complete decongestive therapy of Right lower quadrant; instruction of  in short stretch compression bandaging of Right thigh/knee. EDEMA/TISSUE QUALITY:   *Right LE:  As of 4/6, pt had measured with an overall 32.6cm reduction in lymphedema volume since initial eval. Today, pt only measuring with a 3.1cm decrease since last progress summary for an overall reduction of 12.1cm. Tissue quality over thigh is slightly boggy to supple except for distal medial/posterior surfaces which are boggy to slightly boggy. Knee is boggy, pitting. Proximal 1/4 of lower leg is boggy, with fair to good superficial tissue mobility; distal 3/4 is densely boggy with scattered areas of boggy quality, pitting with fair to good superficial tissue mobility. Ankle is slightly boggy. Dorsum of foot is slightly boggy. *Left LE:  Circumferential lymphedema volume decreased by 5.7cm since last progress summary for an overall reduction of 6.5cm. Tissue quality over thigh is slightly boggy to supple; discoloration in area of prior cellulitis. Knee is slightly boggy to supple. Proximal 1/3 of lower leg is slightly boggy; mid 1/3 is boggy, pitting with fair to good superficial tissue mobility; distal 1/3 is boggy with scattered areas of densely boggy quality, pitting with fair superficial tissue mobility; discoloration in area of prior cellulitis. Ankle is slightly boggy. Dorsum of foot is slightly boggy. *Emerging hair follicles over bilateral lower legs. Tissue hydration is good. Negative Stemmer's sign. Medium to light discoloration to distal lower legs. Goals:  (to be met in 24 visits)  1. Pt will wear Right lower leg Circaid garments on a daily basis. ACHIEVED   2. Pt will return to use of Flexitouch device 6-7 days/week. ACHIEVED   3. Pt will be independent in decongestive exercises. 4. Pt will tolerate Right lower leg short stretch compression bandaging for 20-23 hours. ACHIEVED   5. Pt/Caregiver will be independent in Right lower leg short stretch compression bandaging.  DEFERRED 4/17 4/17 NEW GOAL: Pt's  will be independent in Right thigh/knee short stretch compression bandaging. 6. Reduce Right LE lymphedema volume by 30-50cm to allow pt to transition back into custom-fit knee high and to reduce limb heaviness during ambulation. 7. Reduce bilateral lower leg density to boggy to reduce infection risk. 8. Pt will be independent in use of compression garments, self-manual lymph drainage, decongestive exercises, Flexitouch device and lymphedema precautions for life-long self-management of lymphedema. Plan: Continue skilled Occupational Therapy 3 x/week or a total of 7 more visits. Treatment will include: complete decongestive therapy of Right lower quadrant. Patient/Family/Caregiver was advised of these findings, precautions, and treatment options and has agreed to actively participate in planning and for this course of care. Thank you for your referral. If you have any questions, please contact me at Dept: [phone]. Sincerely,  Electronically signed by therapist: Nkechi Álvarez. LAI Perez/L, [employer] Act Notice to Patient: Medical documents like this are made available to patients in the interest of transparency. However, be advised this is a medical document and it is intended as [id number] communication between your medical providers. This medical document may contain abbreviations, assessments, medical data, and results or other terms that are unfamiliar. Medical documents are intended to carry relevant information, facts as evident, and the clinical opinion of the practitioner. As such, this medical document may be written in language that appears blunt or direct. You are encouraged to contact your medical provider and/or [facility] Patient Experience if you have any questions about this medical document.

## (undated) NOTE — LETTER
Patient Name: Temitope Mckinney  YOB: 1955          MRN number:  [id number]  Date:  5/25/2018  Referring Physician:  Ayesha Bloom     Discharge Summary    Pt has attended 16/16, cancelled 0, and no shown 0 visits in Occupational Therapy fro is possible that with continued use of her Circaid garments she may demonstrate further volume loss prior to replacing her compression stockings, it is recommended that she return for a follow-up visit near the end of the summer to assess the appropriatene tissue is present over distal lower leg. Proximal lower leg with resolving tissue coloration, posterior surface more than anterior; remaining lower leg with dusky pink coloration. Ankle is densely boggy, pitting.  Medial surface of foot is boggy, pitting; l lymph drainage, decongestive exercises, Flexitouch device and lymphedema precautions for life-long self-management of lymphedema. 16 sessions ACHIEVED        Thank you for your referral. If you have any questions, please contact me at Dept: [phone].

## (undated) NOTE — LETTER
09/12/18        [address] [place] [address]      Dear Eduard Cooper,    [facility] records indicate that you have outstanding lab work and or testing that was ordered for you and has not yet been completed:  Orders Placed This Encounter

## (undated) NOTE — IP AVS SNAPSHOT
[address]            (For Outpatient Use Only) Initial Admit Date: 3/4/2018   Inpt/Obs Admit Date: Inpt: 3/4/18 / Obs: N/A   Discharge Date:    Peggy Tian:  [de-identified]   MRN: [de-identified]   CSN: [id number]        ENCOUNTER  Patient C Hospital Account Financial Class: [facility]    March 12, 2018

## (undated) NOTE — IP AVS SNAPSHOT
Patient Demographics     Address  [address]  [place] [address] Phone  [phone] [facility]) *Preferred*  [phone] (Work)  [phone] [facility]) E-mail Address  [email or web address]      Emergency Contact(s)     Name Relation Home Work Mobile visit on 3/21/2018.     Specialty:  INFECTIOUS DISEASES  Contact information:  [address]  [address] [place] [address]  [phone]                  Your medication list      TAKE these medications       Instructions Authorizing Provider Morning A Next dose due: Tomorrow morning 3/13/18      Take 1 Cap by mouth daily. TAMBOCOR 100 MG Tabs  Generic drug:  Flecainide Acetate  Next dose due: Tonight 9 pm      Take 100 mg by mouth 2 (two) times daily.           TraMADol HCl 50 MG Tabs  Commonl [id number] ceFAZolin sodium (ANCEF/KEFZOL) 2 GM/20ML premix IV syringe 2 g 03/12/18 0214 New Bag      [id number] ceFAZolin sodium (ANCEF/KEFZOL) 2 GM/20ML premix IV syringe 2 g 03/12/18 0829 New Bag      [id number] ceFAZolin sodium (ANCEF/KEFZOL) 2 GM/20ML p H&P signed by Karen Yung DO at 3/4/2018  1:22 PM  Version 1 of 1    Author:  Karen Yung DO Service:  (none) Author Type:  Physician    Filed:  3/4/2018  1:22 PM Date of Service:  3/4/2018 12:59 PM Status:  Signed    :  Karen Yung DO (Phys • Fibroid tumor     in uterine wall   • High blood pressure     HTN   • History of anemia    • History of bone density study 1/12/2009   • HTN (hypertension) 8/4/2016   • Hypothyroid    • Hypothyroidism    • Laryngitis    • Leg pain    • Lipid screening 2/ • High Blood Pressure Mother    • Heart Disease Mother    • Other Andre Herb Mother      MACULAR DEGENERATION   • ASVD [OTHER] Mother    • Osteoporosis [OTHER] Mother    • Allergies/Asthma [OTHER] Other      fam hx   • Cancer Other      fam hx   • Skin disord Review of Systems:   A comprehensive 14 point review of systems was completed. Pertinent positives and negatives noted in the HPI.     Physical Exam:    /60   Pulse 86   Temp 98 °F (36.7 °C) (Temporal)   Resp 14   Ht 5' 2\" (1.575 m)   Wt 250 lb (1 1. Controlled  6. GERD  1. PPI  7. Hypothyroidism  1. Synthroid[NG.1]   8. Morbid obesity  1. BMI 45[NG.2]      Quality:  · DVT Prophylaxis: Pradaxa  · CODE status: full  · Saldaña: no    Plan of care discussed with patient[NG.1] and family at bedside. [NG.2] Diagnosis Date   • Abnormal mammogram    • Acute nasopharyngitis    • Adjustment reaction 12/12/2000    adjustment reaction   • Ankle strain    • Anxiety     hx severe anxiety   • Asthma 9/15/2015   • Atherosclerosis 1/12/2009    mild, at the carotid bifur Comment: 10/2000: breast bx (-); 2002: R excisional bx;               6/4/2012: Ultrasound-guided 8 gauge core bxs, L               breast, benign fatty breast parenchyma  2008?: MANDIE NEEDLE LOCALIZATION W/ SPECIMEN 1 SITE RIG*      Comment: benign   6/ •  acetaminophen (TYLENOL) tab 650 mg, 650 mg, Oral, Q6H PRN  •  ondansetron HCl (ZOFRAN) injection 4 mg, 4 mg, Intravenous, Q6H PRN  •  clindamycin in D5W (CLEOCIN) premix 600mg/50ml, 600 mg, Intravenous, Q8H    Review of Systems:    A comprehensive 10 po Cellulitis of left lower extremity     Morbid obesity with BMI of 45.0-49.9, adult (HCC)     Hyponatremia      ASSESSMENT/PLAN:  1.  Acute LLE cellultis calf to mid thigh cw STREP CELLULITIS  -improvement in fever/and leukocytosis  Persistent erythema, a mild, at the carotid bifurcations, L>R   • Breast mass 5/21/2012    left breast, s/p bx-benign; hx R breast mass   • Breast mass, left 5/29/2012   • Bronchitis     and acute bronchitis   • Bronchospasm    • Cellulitis     IN LEGS   • Cephalgia 3/15/2012 2008?: MANDIE NEEDLE LOCALIZATION W/ SPECIMEN 1 SITE RIG*      Comment: benign   6/2012: NEEDLE BIOPSY LEFT      Comment: benign  No date: OTHER SURGICAL HISTORY      Comment: LUMPECTOMY IN RIGHT BREAST  No date: OTHER SURGICAL HISTORY      Comment: Myomectom CMS Modifier (G-Code): CJ[NP.2]    FUNCTIONAL ABILITY STATUS  Gait Assessment[NP.1]   Gait Assistance: Supervision  Distance (ft): 150 ft  Assistive Device: Rolling walker  Pattern: L Decreased stance time  Stoop/Curb Assistance: Not tested  Comment : Conrado Lopez Goal #1 Patient is able to demonstrate supine - sit EOB @ level: modified independent--- Met 03/12/18   Goal #2 Patient is able to demonstrate transfers EOB to/from Chair/Wheelchair at assistance level: modified independent ---- Met 03/12/18   Goal #3 Lauren Loredo

## (undated) NOTE — LETTER
ASTHMA ACTION PLAN for Abdoul Yu     : 1955     Date: 2020  Provider:  Kemar Ro MD  Phone for doctor or clinic: [address],  [place] [phone]

## (undated) NOTE — LETTER
Patient Name: Delia Cr  YOB: 1955          MRN :  [id number]  Date:  3/21/2023  Referring Provider:  Sandi Marcos    Progress Summary  Pt has attended 6/24 visits in Occupational Therapy. Subjective:   Pt reports that she tolerated Right lower leg bandage system applied at last session without difficulty; removed this morning and donned Circaid garments following hygiene. Had  assist her to don her Left lower leg Farrow Wrap garments yesterday afternoon; was able to tolerate wearing them overnight, sharing that garments are comfortable; re-donned this morning following hygiene. Assessment:   Pt demonstrating good tolerance to short stretch compression bandaging of Right lower leg and good tolerance and response to first use of Farrow Wrap garments on Left lower leg. She has demonstrated progress towards improvement in tissue quality of her lower legs and in reduction of the volume in her lower legs. However, while lower leg volume has reduced, her thigh volume has increased, as was anticipated. Recommend continued therapy to maximize volume loss and improvement in tissue quality to reduce limb heaviness and infection risk and to allow pt to return to use of traditional custom-fit garments. Objective:  Pt attending Occupational Therapy for complete decongestive therapy of bilateral lower quadrants, including short stretch compression bandaging of Right lower leg; education in need for consistent 23 hours of therapeutic compression during the reduction phase / advised to no longer wear current custom-fit knee highs. She has been assisted to obtain [employer] garments for her Left lower leg for reduction purposes and Velcro hook was added to the ankle area of her Flexitouch device to assist with keeping garment closed during use.      EDEMA/TISSUE QUALITY:   *Right LE:  Although circumferential volume in lower leg decreased by 15.6cm since initial eval, only a 9.0cm overall reduction seen d/t rise in volume in thigh. Tissue quality over thigh is slightly boggy to supple except for distal medial/posterior surfaces which are boggy to slightly boggy. Knee is boggy, non-pitting. Proximal 1/4 of lower leg is boggy, pitting with fair superficial tissue mobility; distal 3/4 is densely boggy to boggy, pitting with poor to fair superficial tissue mobility. Ankle is boggy to slightly boggy. Dorsum of foot is slightly boggy. *Left LE:  Although circumferential volume in lower leg decreased by 9.5cm since initial eval, overall volume reduction remains stable d/t rise in volume in thigh. Tissue quality over thigh is slightly boggy to supple; discoloration in area of prior cellulitis. Knee is slightly boggy to supple. Proximal 1/3 of lower leg is slightly boggy to supple; mid 1/3 is boggy, pitting with fair superficial tissue mobility; distal 1/3 is densely boggy to boggy, pitting with poor to fair superficial tissue mobility; discoloration in area of prior cellulitis. Ankle is boggy to slightly boggy. Dorsum of foot is slightly boggy. Goals:  (to be met in 24 visits)  1. Pt will wear Right lower leg Circaid garments on a daily basis. ACHIEVED   2. Pt will return to use of Flexitouch device 6-7 days/week. ACHIEVED   3. Pt will be independent in decongestive exercises. 4. Pt will tolerate Right lower leg short stretch compression bandaging for 20-23 hours. 5. Pt/Caregiver will be independent in Right lower leg short stretch compression bandaging. 6. Reduce Right LE lymphedema volume by 30-50cm to allow pt to transition back into custom-fit knee high and to reduce limb heaviness during ambulation. 7. Reduce bilateral lower leg density to boggy to reduce infection risk. 8. Pt will be independent in use of compression garments, self-manual lymph drainage, decongestive exercises, Flexitouch device and lymphedema precautions for life-long self-management of lymphedema. Plan: Continue skilled Occupational Therapy 3 x/week or a total of 18 more visits. Treatment will include: complete decongestive therapy of bilateral lower quadrants; assist with garment prescription       Patient/Family/Caregiver was advised of these findings, precautions, and treatment options and has agreed to actively participate in planning and for this course of care. Thank you for your referral. If you have any questions, please contact me at Dept: [phone]. Sincerely,  Electronically signed by therapist: Sandra Frankel. LAI Perez/L, [employer] Act Notice to Patient: Medical documents like this are made available to patients in the interest of transparency. However, be advised this is a medical document and it is intended as [id number] communication between your medical providers. This medical document may contain abbreviations, assessments, medical data, and results or other terms that are unfamiliar. Medical documents are intended to carry relevant information, facts as evident, and the clinical opinion of the practitioner. As such, this medical document may be written in language that appears blunt or direct. You are encouraged to contact your medical provider and/or [facility] Patient Experience if you have any questions about this medical document.

## (undated) NOTE — LETTER
ASTHMA ACTION PLAN for Serena Rivera     : 1955     Date: 2024  Provider:  Roxanna Rice MD  Phone for doctor or clinic: [facility], [facility] [place]  [address]  [facility] [id number] [phone]    ACT Score: 25      You can use the colors of a traffic light to help learn about your asthma medicines.      1. Green - Go! % of Personal Best Peak Flow Use controller medicine.   Breathing is good  No cough or wheeze  Can work and play Medicine How much to take When to take it    No daily maintenance medications      2. Yellow - Caution. 50-79% Personal Best Peak  Flow.  Use reliever medicine to keep an asthma attack from getting bad.   Cough  Wheezing  Tight Chest  Wake up at night Medicine How much to take When to take it    Albuterol Inhaler. Inhale 2 puffs every 4 hours as needed  Schedule office visit with PCP       Additional instructions         3. Red - Stop! Danger!  <50% Personal Best Peak  Flow. Take these medications until  Get help from a doctor   Medicine not helping  Breathing is hard and fast  Nose opens wide  Can't walk  Ribs show  Can't talk well Medicine How much to take When to take it    Call 911 or go to the ER  Do not drive yourself.     Additional Instructions If your symptoms do not improve and you cannot contact your doctor, [facility] or call 911 immediately!     [x] Asthma Action Plan reviewed with patient (and caregiver if necessary) and a copy of the plan was given to the patient/caregiver.   [] Asthma Action Plan reviewed with patient (and caregiver if necessary) on the phone and mailed copy to patient or submitted via [employer].     Signatures:  Provider  Roxanna Rice MD   Patient Caretaker

## (undated) NOTE — LETTER
ASTHMA ACTION PLAN for Cesar Hernandez     : 1955     Date: 3/11/2019  Provider:  Leo Son MD  Phone for doctor or clinic: [address] [place] [address] [address]  [address] [place] [phone]

## (undated) NOTE — LETTER
Patient Name: Avon Severance  YOB: 1955          MRN number:  [id number]  Date:  5/1/2018  Referring Physician:  Ivan Gómez     Progress Summary    Pt has attended 14/16, cancelled 0, and no shown 0 visits in Occupational Therapy. It is recommended that she complete her course of therapy to assess the effectiveness of using the textured foam pack insert in conjunction with her Circaid garment to reduce the lymphedema density in her lower leg to improve tissue quality to reduce her i with reduction of lymphedema density. Due to her good response a larger foam pack insert was fabricated at today's session, covering approx 1/2 of her lower leg.  Pt has been advised that if she tolerates this well, it is recommended that another pack be fa with resolving coloration. Good tissue hydration, except around perimeters of sole in areas of callused/dead skin. Emerging hair follicles over proximal surface and now over distal surface. Goals:   1. Pt will be independent in daily skin care.  4 oscaro

## (undated) NOTE — MR AVS SNAPSHOT
[address] [address] [phone]               Thank you for choosing us for your health care visit with Elidia Viera MD.  We are glad to serve you and happy to provid Verelan [Verapamil Hcl]                 Today's Vital Signs     BP Pulse Temp Height Weight BMI    136/72 mmHg 80 97.7 °F (36.5 °C) (Oral) 60\" 261 lb 3.2 oz 51.01 kg/m2         Current Medications          This list is accurate as of: 3/13/17  1:31 PM. These medications were sent to [facility] [place], . [address] [phone], [phone] [facility], [facility]     Phone:  [phone]    - Benazepril HCl 10 MG Tabs  - furosemide 40 MG Tabs  - Levothyroxi

## (undated) NOTE — LETTER
OUTSIDE TESTING RESULT REQUEST     IMPORTANT: FOR YOUR IMMEDIATE ATTENTION  Please FAX all test results listed below to: [phone]     Testing already done on or about: 2022     * * * * If testing is NOT complete, arrange with patient A.S.A.P. * * * *      Patient Name: Adwoa Meza  Surgery Date: 2022  CSN: [id number]     Medical Record: [id number]   : 1955 - A: 78 y      Sex: female  Surgeon(s):  Eliz Sanchez MD  Procedure:  Left breast wire localized excisional biopsy  Anesthesia Type: MAC     Surgeon: Eliz Sanchez MD     The following Testing and Time Line are REQUIRED PER ANESTHESIA     EKG READ AND SIGNED WITHIN   90 days      Thank You,   Sent by:ABDON Irvin  5/13/15